# Patient Record
Sex: FEMALE | Race: WHITE | NOT HISPANIC OR LATINO | Employment: OTHER | URBAN - METROPOLITAN AREA
[De-identification: names, ages, dates, MRNs, and addresses within clinical notes are randomized per-mention and may not be internally consistent; named-entity substitution may affect disease eponyms.]

---

## 2017-02-01 ENCOUNTER — ANESTHESIA (OUTPATIENT)
Dept: PERIOP | Facility: HOSPITAL | Age: 67
End: 2017-02-01

## 2017-02-01 ENCOUNTER — ANESTHESIA EVENT (OUTPATIENT)
Dept: PERIOP | Facility: HOSPITAL | Age: 67
End: 2017-02-01

## 2017-02-01 PROCEDURE — 25010000002 MIDAZOLAM PER 1 MG: Performed by: NURSE ANESTHETIST, CERTIFIED REGISTERED

## 2017-02-01 PROCEDURE — 25010000002 PROPOFOL 10 MG/ML EMULSION: Performed by: NURSE ANESTHETIST, CERTIFIED REGISTERED

## 2017-02-01 PROCEDURE — 25010000002 SUCCINYLCHOLINE PER 20 MG: Performed by: NURSE ANESTHETIST, CERTIFIED REGISTERED

## 2017-02-01 PROCEDURE — 25010000002 PHENYLEPHRINE PER 1 ML: Performed by: NURSE ANESTHETIST, CERTIFIED REGISTERED

## 2017-02-01 PROCEDURE — 25010000002 FENTANYL CITRATE (PF) 100 MCG/2ML SOLUTION: Performed by: NURSE ANESTHETIST, CERTIFIED REGISTERED

## 2017-02-01 PROCEDURE — 25010000002 ONDANSETRON PER 1 MG: Performed by: NURSE ANESTHETIST, CERTIFIED REGISTERED

## 2017-02-01 PROCEDURE — 25010000002 DEXAMETHASONE PER 1 MG: Performed by: NURSE ANESTHETIST, CERTIFIED REGISTERED

## 2017-02-01 RX ORDER — LIDOCAINE HYDROCHLORIDE 10 MG/ML
INJECTION, SOLUTION INFILTRATION; PERINEURAL AS NEEDED
Status: DISCONTINUED | OUTPATIENT
Start: 2017-02-01 | End: 2017-02-01 | Stop reason: SURG

## 2017-02-01 RX ORDER — GLYCOPYRROLATE 0.2 MG/ML
INJECTION INTRAMUSCULAR; INTRAVENOUS AS NEEDED
Status: DISCONTINUED | OUTPATIENT
Start: 2017-02-01 | End: 2017-02-01 | Stop reason: SURG

## 2017-02-01 RX ORDER — MIDAZOLAM HYDROCHLORIDE 1 MG/ML
INJECTION INTRAMUSCULAR; INTRAVENOUS AS NEEDED
Status: DISCONTINUED | OUTPATIENT
Start: 2017-02-01 | End: 2017-02-01 | Stop reason: SURG

## 2017-02-01 RX ORDER — PROPOFOL 10 MG/ML
VIAL (ML) INTRAVENOUS AS NEEDED
Status: DISCONTINUED | OUTPATIENT
Start: 2017-02-01 | End: 2017-02-01 | Stop reason: SURG

## 2017-02-01 RX ORDER — ONDANSETRON 2 MG/ML
INJECTION INTRAMUSCULAR; INTRAVENOUS AS NEEDED
Status: DISCONTINUED | OUTPATIENT
Start: 2017-02-01 | End: 2017-02-01 | Stop reason: SURG

## 2017-02-01 RX ORDER — DEXAMETHASONE SODIUM PHOSPHATE 4 MG/ML
INJECTION, SOLUTION INTRA-ARTICULAR; INTRALESIONAL; INTRAMUSCULAR; INTRAVENOUS; SOFT TISSUE AS NEEDED
Status: DISCONTINUED | OUTPATIENT
Start: 2017-02-01 | End: 2017-02-01 | Stop reason: SURG

## 2017-02-01 RX ORDER — SUCCINYLCHOLINE CHLORIDE 20 MG/ML
INJECTION INTRAMUSCULAR; INTRAVENOUS AS NEEDED
Status: DISCONTINUED | OUTPATIENT
Start: 2017-02-01 | End: 2017-02-01 | Stop reason: SURG

## 2017-02-01 RX ORDER — FENTANYL CITRATE 50 UG/ML
INJECTION, SOLUTION INTRAMUSCULAR; INTRAVENOUS AS NEEDED
Status: DISCONTINUED | OUTPATIENT
Start: 2017-02-01 | End: 2017-02-01 | Stop reason: SURG

## 2017-02-01 RX ORDER — ATRACURIUM BESYLATE 10 MG/ML
INJECTION, SOLUTION INTRAVENOUS AS NEEDED
Status: DISCONTINUED | OUTPATIENT
Start: 2017-02-01 | End: 2017-02-01 | Stop reason: SURG

## 2017-02-01 RX ADMIN — EPHEDRINE SULFATE 5 MG: 50 INJECTION INTRAMUSCULAR; INTRAVENOUS; SUBCUTANEOUS at 09:45

## 2017-02-01 RX ADMIN — EPHEDRINE SULFATE 5 MG: 50 INJECTION INTRAMUSCULAR; INTRAVENOUS; SUBCUTANEOUS at 10:30

## 2017-02-01 RX ADMIN — PHENYLEPHRINE HYDROCHLORIDE 100 MCG: 10 INJECTION INTRAVENOUS at 10:30

## 2017-02-01 RX ADMIN — PHENYLEPHRINE HYDROCHLORIDE 50 MCG: 10 INJECTION INTRAVENOUS at 11:24

## 2017-02-01 RX ADMIN — EPHEDRINE SULFATE 10 MG: 50 INJECTION INTRAMUSCULAR; INTRAVENOUS; SUBCUTANEOUS at 09:48

## 2017-02-01 RX ADMIN — EPHEDRINE SULFATE 10 MG: 50 INJECTION INTRAMUSCULAR; INTRAVENOUS; SUBCUTANEOUS at 11:05

## 2017-02-01 RX ADMIN — EPHEDRINE SULFATE 10 MG: 50 INJECTION INTRAMUSCULAR; INTRAVENOUS; SUBCUTANEOUS at 09:09

## 2017-02-01 RX ADMIN — ROBINUL 0.2 MG: 0.2 INJECTION INTRAMUSCULAR; INTRAVENOUS at 08:27

## 2017-02-01 RX ADMIN — EPHEDRINE SULFATE 10 MG: 50 INJECTION INTRAMUSCULAR; INTRAVENOUS; SUBCUTANEOUS at 11:07

## 2017-02-01 RX ADMIN — EPHEDRINE SULFATE 10 MG: 50 INJECTION INTRAMUSCULAR; INTRAVENOUS; SUBCUTANEOUS at 11:24

## 2017-02-01 RX ADMIN — PHENYLEPHRINE HYDROCHLORIDE 100 MCG: 10 INJECTION INTRAVENOUS at 09:47

## 2017-02-01 RX ADMIN — EPHEDRINE SULFATE 10 MG: 50 INJECTION INTRAMUSCULAR; INTRAVENOUS; SUBCUTANEOUS at 08:20

## 2017-02-01 RX ADMIN — PHENYLEPHRINE HYDROCHLORIDE 50 MCG: 10 INJECTION INTRAVENOUS at 08:51

## 2017-02-01 RX ADMIN — SODIUM CHLORIDE, POTASSIUM CHLORIDE, SODIUM LACTATE AND CALCIUM CHLORIDE: 600; 310; 30; 20 INJECTION, SOLUTION INTRAVENOUS at 08:39

## 2017-02-01 RX ADMIN — PHENYLEPHRINE HYDROCHLORIDE 50 MCG: 10 INJECTION INTRAVENOUS at 08:59

## 2017-02-01 RX ADMIN — MIDAZOLAM HYDROCHLORIDE 2 MG: 1 INJECTION, SOLUTION INTRAMUSCULAR; INTRAVENOUS at 07:52

## 2017-02-01 RX ADMIN — EPHEDRINE SULFATE 10 MG: 50 INJECTION INTRAMUSCULAR; INTRAVENOUS; SUBCUTANEOUS at 10:35

## 2017-02-01 RX ADMIN — ONDANSETRON 4 MG: 2 INJECTION INTRAMUSCULAR; INTRAVENOUS at 11:33

## 2017-02-01 RX ADMIN — EPHEDRINE SULFATE 10 MG: 50 INJECTION INTRAMUSCULAR; INTRAVENOUS; SUBCUTANEOUS at 08:28

## 2017-02-01 RX ADMIN — EPHEDRINE SULFATE 5 MG: 50 INJECTION INTRAMUSCULAR; INTRAVENOUS; SUBCUTANEOUS at 08:51

## 2017-02-01 RX ADMIN — DEXAMETHASONE SODIUM PHOSPHATE 8 MG: 4 INJECTION, SOLUTION INTRAMUSCULAR; INTRAVENOUS at 08:30

## 2017-02-01 RX ADMIN — FENTANYL CITRATE 50 MCG: 50 INJECTION, SOLUTION INTRAMUSCULAR; INTRAVENOUS at 07:56

## 2017-02-01 RX ADMIN — SODIUM CHLORIDE, POTASSIUM CHLORIDE, SODIUM LACTATE AND CALCIUM CHLORIDE: 600; 310; 30; 20 INJECTION, SOLUTION INTRAVENOUS at 11:07

## 2017-02-01 RX ADMIN — EPHEDRINE SULFATE 5 MG: 50 INJECTION INTRAMUSCULAR; INTRAVENOUS; SUBCUTANEOUS at 10:25

## 2017-02-01 RX ADMIN — ATRACURIUM BESYLATE 10 MG: 10 INJECTION, SOLUTION INTRAVENOUS at 07:58

## 2017-02-01 RX ADMIN — FENTANYL CITRATE 125 MCG: 50 INJECTION, SOLUTION INTRAMUSCULAR; INTRAVENOUS at 12:23

## 2017-02-01 RX ADMIN — PROPOFOL 25 MG: 10 INJECTION, EMULSION INTRAVENOUS at 11:58

## 2017-02-01 RX ADMIN — SUCCINYLCHOLINE CHLORIDE 100 MG: 20 INJECTION, SOLUTION INTRAMUSCULAR; INTRAVENOUS at 07:59

## 2017-02-01 RX ADMIN — EPHEDRINE SULFATE 10 MG: 50 INJECTION INTRAMUSCULAR; INTRAVENOUS; SUBCUTANEOUS at 10:58

## 2017-02-01 RX ADMIN — LIDOCAINE HYDROCHLORIDE 50 MG: 10 INJECTION, SOLUTION INFILTRATION; PERINEURAL at 07:58

## 2017-02-01 RX ADMIN — LIDOCAINE HYDROCHLORIDE 60 MG: 10 INJECTION, SOLUTION INFILTRATION; PERINEURAL at 12:03

## 2017-02-01 RX ADMIN — FENTANYL CITRATE 25 MCG: 50 INJECTION, SOLUTION INTRAMUSCULAR; INTRAVENOUS at 11:03

## 2017-02-01 RX ADMIN — EPHEDRINE SULFATE 10 MG: 50 INJECTION INTRAMUSCULAR; INTRAVENOUS; SUBCUTANEOUS at 09:54

## 2017-02-01 RX ADMIN — PROPOFOL 100 MG: 10 INJECTION, EMULSION INTRAVENOUS at 07:58

## 2017-02-01 RX ADMIN — PHENYLEPHRINE HYDROCHLORIDE 50 MCG: 10 INJECTION INTRAVENOUS at 11:45

## 2017-02-01 RX ADMIN — FENTANYL CITRATE 50 MCG: 50 INJECTION, SOLUTION INTRAMUSCULAR; INTRAVENOUS at 08:15

## 2017-02-01 RX ADMIN — PROPOFOL 50 MG: 10 INJECTION, EMULSION INTRAVENOUS at 11:48

## 2017-02-01 RX ADMIN — PHENYLEPHRINE HYDROCHLORIDE 100 MCG: 10 INJECTION INTRAVENOUS at 10:20

## 2017-02-07 ENCOUNTER — APPOINTMENT (OUTPATIENT)
Dept: GENERAL RADIOLOGY | Facility: HOSPITAL | Age: 67
End: 2017-02-07

## 2017-02-07 ENCOUNTER — DOCUMENTATION (OUTPATIENT)
Dept: GASTROENTEROLOGY | Facility: CLINIC | Age: 67
End: 2017-02-07

## 2017-02-07 ENCOUNTER — HOSPITAL ENCOUNTER (INPATIENT)
Facility: HOSPITAL | Age: 67
LOS: 4 days | Discharge: HOME | End: 2017-02-11
Attending: OTOLARYNGOLOGY | Admitting: INTERNAL MEDICINE

## 2017-02-07 ENCOUNTER — HOSPITAL ENCOUNTER (EMERGENCY)
Facility: HOSPITAL | Age: 67
Discharge: ANOTHER HOSPITAL | End: 2017-02-07
Attending: EMERGENCY MEDICINE | Admitting: EMERGENCY MEDICINE

## 2017-02-07 ENCOUNTER — APPOINTMENT (OUTPATIENT)
Dept: CT IMAGING | Facility: HOSPITAL | Age: 67
End: 2017-02-07

## 2017-02-07 VITALS
DIASTOLIC BLOOD PRESSURE: 58 MMHG | BODY MASS INDEX: 21.35 KG/M2 | HEIGHT: 67 IN | OXYGEN SATURATION: 94 % | WEIGHT: 136 LBS | HEART RATE: 78 BPM | TEMPERATURE: 99.8 F | RESPIRATION RATE: 18 BRPM | SYSTOLIC BLOOD PRESSURE: 105 MMHG

## 2017-02-07 DIAGNOSIS — R13.12 OROPHARYNGEAL DYSPHAGIA: Primary | ICD-10-CM

## 2017-02-07 DIAGNOSIS — R22.1 NECK SWELLING: Primary | ICD-10-CM

## 2017-02-07 PROBLEM — R50.9 FEVER: Status: ACTIVE | Noted: 2017-02-07

## 2017-02-07 PROBLEM — E89.2 S/P PARATHYROIDECTOMY (HCC): Status: ACTIVE | Noted: 2017-02-07

## 2017-02-07 PROBLEM — D64.9 ANEMIA: Status: ACTIVE | Noted: 2017-02-07

## 2017-02-07 PROBLEM — L03.221 CELLULITIS, NECK: Status: ACTIVE | Noted: 2017-02-07

## 2017-02-07 PROBLEM — R13.10 DYSPHAGIA: Status: ACTIVE | Noted: 2017-02-07

## 2017-02-07 LAB
ALBUMIN SERPL-MCNC: 3.6 G/DL (ref 3.5–5)
ALBUMIN/GLOB SERPL: 1.2 G/DL (ref 1–2)
ALP SERPL-CCNC: 110 U/L (ref 38–126)
ALT SERPL W P-5'-P-CCNC: 29 U/L (ref 13–69)
ANION GAP SERPL CALCULATED.3IONS-SCNC: 19.8 MMOL/L
AST SERPL-CCNC: 12 U/L (ref 15–46)
BASOPHILS # BLD AUTO: 0 10*3/MM3 (ref 0–0.2)
BASOPHILS # BLD AUTO: 0.03 10*3/MM3 (ref 0–0.2)
BASOPHILS NFR BLD AUTO: 0 % (ref 0–1)
BASOPHILS NFR BLD AUTO: 0.4 % (ref 0–2.5)
BILIRUB SERPL-MCNC: 0.4 MG/DL (ref 0.2–1.3)
BUN BLD-MCNC: 14 MG/DL (ref 7–20)
BUN/CREAT SERPL: 14 (ref 7.1–23.5)
CA-I SERPL ISE-MCNC: 1 MMOL/L (ref 1.12–1.32)
CALCIUM SPEC-SCNC: 7.7 MG/DL (ref 8.4–10.2)
CHLORIDE SERPL-SCNC: 102 MMOL/L (ref 98–107)
CO2 SERPL-SCNC: 21 MMOL/L (ref 26–30)
CREAT BLD-MCNC: 1 MG/DL (ref 0.6–1.3)
D-LACTATE SERPL-SCNC: 1.4 MMOL/L (ref 0.5–2)
DEPRECATED RDW RBC AUTO: 46.5 FL (ref 37–54)
DEPRECATED RDW RBC AUTO: 48.4 FL (ref 37–54)
EOSINOPHIL # BLD AUTO: 0.02 10*3/MM3 (ref 0.1–0.3)
EOSINOPHIL # BLD AUTO: 0.36 10*3/MM3 (ref 0–0.7)
EOSINOPHIL NFR BLD AUTO: 0.3 % (ref 0–3)
EOSINOPHIL NFR BLD AUTO: 4.4 % (ref 0–7)
ERYTHROCYTE [DISTWIDTH] IN BLOOD BY AUTOMATED COUNT: 14 % (ref 11.5–14.5)
ERYTHROCYTE [DISTWIDTH] IN BLOOD BY AUTOMATED COUNT: 14.2 % (ref 11.3–14.5)
GFR SERPL CREATININE-BSD FRML MDRD: 55 ML/MIN/1.73
GLOBULIN UR ELPH-MCNC: 3.1 GM/DL
GLUCOSE BLD-MCNC: 115 MG/DL (ref 74–98)
HCT VFR BLD AUTO: 28.1 % (ref 34.5–44)
HCT VFR BLD AUTO: 30.1 % (ref 37–47)
HGB BLD-MCNC: 9.1 G/DL (ref 11.5–15.5)
HGB BLD-MCNC: 9.8 G/DL (ref 12–16)
HOLD SPECIMEN: NORMAL
IMM GRANULOCYTES # BLD: 0.02 10*3/MM3 (ref 0–0.03)
IMM GRANULOCYTES # BLD: 0.03 10*3/MM3 (ref 0–0.06)
IMM GRANULOCYTES NFR BLD: 0.3 % (ref 0–0.6)
IMM GRANULOCYTES NFR BLD: 0.4 % (ref 0–0.6)
LYMPHOCYTES # BLD AUTO: 0.64 10*3/MM3 (ref 0.6–4.8)
LYMPHOCYTES # BLD AUTO: 2.08 10*3/MM3 (ref 0.6–3.4)
LYMPHOCYTES NFR BLD AUTO: 10.7 % (ref 24–44)
LYMPHOCYTES NFR BLD AUTO: 25.3 % (ref 10–50)
MCH RBC QN AUTO: 29.6 PG (ref 27–31)
MCH RBC QN AUTO: 30.1 PG (ref 27–31)
MCHC RBC AUTO-ENTMCNC: 32.4 G/DL (ref 32–36)
MCHC RBC AUTO-ENTMCNC: 32.6 G/DL (ref 30–37)
MCV RBC AUTO: 90.9 FL (ref 81–99)
MCV RBC AUTO: 93 FL (ref 80–99)
MONOCYTES # BLD AUTO: 0.09 10*3/MM3 (ref 0–1)
MONOCYTES # BLD AUTO: 0.86 10*3/MM3 (ref 0–0.9)
MONOCYTES NFR BLD AUTO: 1.5 % (ref 0–12)
MONOCYTES NFR BLD AUTO: 10.5 % (ref 0–12)
NEUTROPHILS # BLD AUTO: 4.86 10*3/MM3 (ref 2–6.9)
NEUTROPHILS # BLD AUTO: 5.23 10*3/MM3 (ref 1.5–8.3)
NEUTROPHILS NFR BLD AUTO: 59 % (ref 37–80)
NEUTROPHILS NFR BLD AUTO: 87.2 % (ref 41–71)
NRBC BLD MANUAL-RTO: 0 /100 WBC (ref 0–0)
PLATELET # BLD AUTO: 253 10*3/MM3 (ref 150–450)
PLATELET # BLD AUTO: 292 10*3/MM3 (ref 130–400)
PMV BLD AUTO: 8.8 FL (ref 6–12)
PMV BLD AUTO: 9.4 FL (ref 6–12)
POTASSIUM BLD-SCNC: 3.8 MMOL/L (ref 3.5–5.1)
PROT SERPL-MCNC: 6.7 G/DL (ref 6.3–8.2)
RBC # BLD AUTO: 3.02 10*6/MM3 (ref 3.89–5.14)
RBC # BLD AUTO: 3.31 10*6/MM3 (ref 4.2–5.4)
SODIUM BLD-SCNC: 139 MMOL/L (ref 137–145)
WBC NRBC COR # BLD: 6 10*3/MM3 (ref 3.5–10.8)
WBC NRBC COR # BLD: 8.22 10*3/MM3 (ref 4.8–10.8)
WHOLE BLOOD HOLD SPECIMEN: NORMAL

## 2017-02-07 PROCEDURE — 83605 ASSAY OF LACTIC ACID: CPT | Performed by: EMERGENCY MEDICINE

## 2017-02-07 PROCEDURE — 25010000002 MORPHINE SULFATE (PF) 2 MG/ML SOLUTION: Performed by: OTOLARYNGOLOGY

## 2017-02-07 PROCEDURE — 82330 ASSAY OF CALCIUM: CPT | Performed by: OTOLARYNGOLOGY

## 2017-02-07 PROCEDURE — 25010000002 VANCOMYCIN HCL IN NACL 1.25-0.9 GM/250ML-% SOLUTION: Performed by: INTERNAL MEDICINE

## 2017-02-07 PROCEDURE — 25010000003 AMPICILLIN-SULBACTAM PER 1.5 G: Performed by: NURSE PRACTITIONER

## 2017-02-07 PROCEDURE — 25010000002 DEXAMETHASONE: Performed by: NURSE PRACTITIONER

## 2017-02-07 PROCEDURE — 70490 CT SOFT TISSUE NECK W/O DYE: CPT

## 2017-02-07 PROCEDURE — 85025 COMPLETE CBC W/AUTO DIFF WBC: CPT | Performed by: OTOLARYNGOLOGY

## 2017-02-07 PROCEDURE — 25010000002 ONDANSETRON PER 1 MG: Performed by: EMERGENCY MEDICINE

## 2017-02-07 PROCEDURE — 99285 EMERGENCY DEPT VISIT HI MDM: CPT

## 2017-02-07 PROCEDURE — 83970 ASSAY OF PARATHORMONE: CPT | Performed by: OTOLARYNGOLOGY

## 2017-02-07 PROCEDURE — 74220 X-RAY XM ESOPHAGUS 1CNTRST: CPT

## 2017-02-07 PROCEDURE — 85025 COMPLETE CBC W/AUTO DIFF WBC: CPT | Performed by: EMERGENCY MEDICINE

## 2017-02-07 PROCEDURE — 80053 COMPREHEN METABOLIC PANEL: CPT | Performed by: EMERGENCY MEDICINE

## 2017-02-07 PROCEDURE — 99223 1ST HOSP IP/OBS HIGH 75: CPT | Performed by: INTERNAL MEDICINE

## 2017-02-07 PROCEDURE — 87081 CULTURE SCREEN ONLY: CPT | Performed by: INTERNAL MEDICINE

## 2017-02-07 PROCEDURE — 92610 EVALUATE SWALLOWING FUNCTION: CPT

## 2017-02-07 PROCEDURE — 25010000002 DEXAMETHASONE SODIUM PHOSPHATE 10 MG/ML SOLUTION: Performed by: EMERGENCY MEDICINE

## 2017-02-07 PROCEDURE — 99284 EMERGENCY DEPT VISIT MOD MDM: CPT

## 2017-02-07 PROCEDURE — 87040 BLOOD CULTURE FOR BACTERIA: CPT | Performed by: NURSE PRACTITIONER

## 2017-02-07 PROCEDURE — 93005 ELECTROCARDIOGRAM TRACING: CPT | Performed by: EMERGENCY MEDICINE

## 2017-02-07 PROCEDURE — 96361 HYDRATE IV INFUSION ADD-ON: CPT

## 2017-02-07 PROCEDURE — 82330 ASSAY OF CALCIUM: CPT | Performed by: INTERNAL MEDICINE

## 2017-02-07 PROCEDURE — 25010000002 HYDROMORPHONE PER 4 MG: Performed by: EMERGENCY MEDICINE

## 2017-02-07 PROCEDURE — 25010000002 ONDANSETRON PER 1 MG: Performed by: OTOLARYNGOLOGY

## 2017-02-07 PROCEDURE — 71010 HC CHEST PA OR AP: CPT

## 2017-02-07 PROCEDURE — 96374 THER/PROPH/DIAG INJ IV PUSH: CPT

## 2017-02-07 PROCEDURE — 96375 TX/PRO/DX INJ NEW DRUG ADDON: CPT

## 2017-02-07 RX ORDER — POTASSIUM CHLORIDE 750 MG/1
40 CAPSULE, EXTENDED RELEASE ORAL AS NEEDED
Status: DISCONTINUED | OUTPATIENT
Start: 2017-02-07 | End: 2017-02-11 | Stop reason: HOSPADM

## 2017-02-07 RX ORDER — MECLIZINE HCL 25MG 25 MG/1
25 TABLET, CHEWABLE ORAL EVERY 6 HOURS PRN
COMMUNITY
End: 2018-11-28 | Stop reason: HOSPADM

## 2017-02-07 RX ORDER — ONDANSETRON 2 MG/ML
4 INJECTION INTRAMUSCULAR; INTRAVENOUS EVERY 6 HOURS PRN
Status: DISCONTINUED | OUTPATIENT
Start: 2017-02-07 | End: 2017-02-11 | Stop reason: HOSPADM

## 2017-02-07 RX ORDER — POTASSIUM CHLORIDE 7.45 MG/ML
10 INJECTION INTRAVENOUS
Status: DISCONTINUED | OUTPATIENT
Start: 2017-02-07 | End: 2017-02-11 | Stop reason: HOSPADM

## 2017-02-07 RX ORDER — SODIUM CHLORIDE 0.9 % (FLUSH) 0.9 %
1-10 SYRINGE (ML) INJECTION AS NEEDED
Status: DISCONTINUED | OUTPATIENT
Start: 2017-02-07 | End: 2017-02-11 | Stop reason: HOSPADM

## 2017-02-07 RX ORDER — HYDROCODONE BITARTRATE AND ACETAMINOPHEN 10; 325 MG/1; MG/1
1 TABLET ORAL EVERY 4 HOURS PRN
COMMUNITY
End: 2018-04-19

## 2017-02-07 RX ORDER — SODIUM CHLORIDE 9 MG/ML
125 INJECTION, SOLUTION INTRAVENOUS CONTINUOUS
Status: DISCONTINUED | OUTPATIENT
Start: 2017-02-07 | End: 2017-02-07 | Stop reason: HOSPADM

## 2017-02-07 RX ORDER — ONDANSETRON 2 MG/ML
4 INJECTION INTRAMUSCULAR; INTRAVENOUS ONCE
Status: COMPLETED | OUTPATIENT
Start: 2017-02-07 | End: 2017-02-07

## 2017-02-07 RX ORDER — ASPIRIN 81 MG/1
81 TABLET, CHEWABLE ORAL DAILY
Status: DISCONTINUED | OUTPATIENT
Start: 2017-02-08 | End: 2017-02-11 | Stop reason: HOSPADM

## 2017-02-07 RX ORDER — SODIUM CHLORIDE 0.9 % (FLUSH) 0.9 %
10 SYRINGE (ML) INJECTION AS NEEDED
Status: DISCONTINUED | OUTPATIENT
Start: 2017-02-07 | End: 2017-02-07 | Stop reason: HOSPADM

## 2017-02-07 RX ORDER — NALOXONE HCL 0.4 MG/ML
0.4 VIAL (ML) INJECTION
Status: DISCONTINUED | OUTPATIENT
Start: 2017-02-07 | End: 2017-02-11 | Stop reason: HOSPADM

## 2017-02-07 RX ORDER — SODIUM CHLORIDE 9 MG/ML
50 INJECTION, SOLUTION INTRAVENOUS CONTINUOUS
Status: DISCONTINUED | OUTPATIENT
Start: 2017-02-07 | End: 2017-02-07 | Stop reason: SDUPTHER

## 2017-02-07 RX ORDER — CLOPIDOGREL BISULFATE 75 MG/1
75 TABLET ORAL DAILY
Status: DISCONTINUED | OUTPATIENT
Start: 2017-02-08 | End: 2017-02-11 | Stop reason: HOSPADM

## 2017-02-07 RX ORDER — CALCIUM CARBONATE 200(500)MG
1 TABLET,CHEWABLE ORAL 3 TIMES DAILY PRN
Status: DISCONTINUED | OUTPATIENT
Start: 2017-02-07 | End: 2017-02-07

## 2017-02-07 RX ORDER — DEXAMETHASONE SODIUM PHOSPHATE 10 MG/ML
10 INJECTION, SOLUTION INTRAMUSCULAR; INTRAVENOUS ONCE
Status: COMPLETED | OUTPATIENT
Start: 2017-02-07 | End: 2017-02-07

## 2017-02-07 RX ORDER — VENLAFAXINE HYDROCHLORIDE 150 MG/1
150 CAPSULE, EXTENDED RELEASE ORAL EVERY OTHER DAY
COMMUNITY
End: 2018-03-22 | Stop reason: SDUPTHER

## 2017-02-07 RX ORDER — VENLAFAXINE HYDROCHLORIDE 75 MG/1
75 CAPSULE, EXTENDED RELEASE ORAL DAILY
COMMUNITY
End: 2017-02-07

## 2017-02-07 RX ORDER — SODIUM CHLORIDE 9 MG/ML
100 INJECTION, SOLUTION INTRAVENOUS CONTINUOUS
Status: DISCONTINUED | OUTPATIENT
Start: 2017-02-07 | End: 2017-02-07

## 2017-02-07 RX ORDER — MORPHINE SULFATE 2 MG/ML
2 INJECTION, SOLUTION INTRAMUSCULAR; INTRAVENOUS
Status: DISCONTINUED | OUTPATIENT
Start: 2017-02-07 | End: 2017-02-11 | Stop reason: HOSPADM

## 2017-02-07 RX ORDER — MORPHINE SULFATE 2 MG/ML
1 INJECTION, SOLUTION INTRAMUSCULAR; INTRAVENOUS EVERY 4 HOURS PRN
Status: DISCONTINUED | OUTPATIENT
Start: 2017-02-07 | End: 2017-02-11 | Stop reason: HOSPADM

## 2017-02-07 RX ORDER — ACETAMINOPHEN 325 MG/1
650 TABLET ORAL EVERY 4 HOURS PRN
Status: DISCONTINUED | OUTPATIENT
Start: 2017-02-07 | End: 2017-02-11 | Stop reason: HOSPADM

## 2017-02-07 RX ORDER — VANCOMYCIN HYDROCHLORIDE
20 ONCE
Status: COMPLETED | OUTPATIENT
Start: 2017-02-07 | End: 2017-02-08

## 2017-02-07 RX ORDER — POTASSIUM CHLORIDE 1.5 G/1.77G
40 POWDER, FOR SOLUTION ORAL AS NEEDED
Status: DISCONTINUED | OUTPATIENT
Start: 2017-02-07 | End: 2017-02-11 | Stop reason: HOSPADM

## 2017-02-07 RX ORDER — CEPHALEXIN 500 MG/1
500 CAPSULE ORAL 3 TIMES DAILY
COMMUNITY
Start: 2017-02-04 | End: 2017-02-11 | Stop reason: HOSPADM

## 2017-02-07 RX ORDER — MORPHINE SULFATE 2 MG/ML
2 INJECTION, SOLUTION INTRAMUSCULAR; INTRAVENOUS ONCE
Status: COMPLETED | OUTPATIENT
Start: 2017-02-07 | End: 2017-02-07

## 2017-02-07 RX ORDER — ONDANSETRON 4 MG/1
4 TABLET, FILM COATED ORAL EVERY 6 HOURS PRN
Status: DISCONTINUED | OUTPATIENT
Start: 2017-02-07 | End: 2017-02-11 | Stop reason: HOSPADM

## 2017-02-07 RX ORDER — FOLIC ACID 1 MG/1
1 TABLET ORAL DAILY
Status: DISCONTINUED | OUTPATIENT
Start: 2017-02-08 | End: 2017-02-11 | Stop reason: HOSPADM

## 2017-02-07 RX ORDER — HYDROCODONE BITARTRATE AND ACETAMINOPHEN 5; 325 MG/1; MG/1
1 TABLET ORAL EVERY 4 HOURS PRN
Status: DISCONTINUED | OUTPATIENT
Start: 2017-02-07 | End: 2017-02-11 | Stop reason: HOSPADM

## 2017-02-07 RX ADMIN — DEXAMETHASONE SODIUM PHOSPHATE 10 MG: 10 INJECTION, SOLUTION INTRAMUSCULAR; INTRAVENOUS at 02:40

## 2017-02-07 RX ADMIN — AMPICILLIN SODIUM AND SULBACTAM SODIUM 3 G: 2; 1 INJECTION, POWDER, FOR SOLUTION INTRAMUSCULAR; INTRAVENOUS at 18:04

## 2017-02-07 RX ADMIN — ONDANSETRON 4 MG: 2 INJECTION INTRAMUSCULAR; INTRAVENOUS at 02:42

## 2017-02-07 RX ADMIN — VANCOMYCIN HYDROCHLORIDE 1250 MG: 1 INJECTION, POWDER, LYOPHILIZED, FOR SOLUTION INTRAVENOUS at 22:41

## 2017-02-07 RX ADMIN — ONDANSETRON 4 MG: 2 INJECTION INTRAMUSCULAR; INTRAVENOUS at 11:10

## 2017-02-07 RX ADMIN — MORPHINE SULFATE 2 MG: 2 INJECTION, SOLUTION INTRAMUSCULAR; INTRAVENOUS at 10:41

## 2017-02-07 RX ADMIN — SODIUM CHLORIDE 125 ML/HR: 9 INJECTION, SOLUTION INTRAVENOUS at 02:47

## 2017-02-07 RX ADMIN — HYDROMORPHONE HYDROCHLORIDE 0.5 MG: 1 INJECTION, SOLUTION INTRAMUSCULAR; INTRAVENOUS; SUBCUTANEOUS at 02:43

## 2017-02-07 RX ADMIN — Medication 10 MG: at 16:00

## 2017-02-07 RX ADMIN — SODIUM CHLORIDE 100 ML/HR: 9 INJECTION, SOLUTION INTRAVENOUS at 16:51

## 2017-02-07 NOTE — ED NOTES
Contacted dispatch to request EMS transport to [facility] ER.     April Shira Dunlap  02/07/17 0446

## 2017-02-07 NOTE — PROGRESS NOTES
Acute Care - Speech Language Pathology   Swallow Initial Evaluation [facility]   Clinical Swallow Evaluation     Patient Name: Kailee Almanza  : 1950  MRN: [id number]  Today's Date: 2017               Admit Date: 2017    Visit Dx:     ICD-10-CM ICD-9-CM   1. Dysphagia, unspecified type R13.10 787.20     Patient Active Problem List   Diagnosis   • Peripheral vascular disease   • Hypertension   • Cerebrovascular disease   • Hyperlipidemia   • Chronic back pain   • Centrilobular emphysema   • Costal chondritis   • Dyslipidemia   • Dysphagia   • S/P parathyroidectomy 17   • Cellulitis, neck   • Anemia   • Fever     Past Medical History   Diagnosis Date   • Anemia      Due to low folic acid   • Arthritis    • Cerebrovascular disease 2016     Amaurosis fugax, OD.  Stent 70% LJ stenosis, 2014:  Questionable recurrent symptoms “early” following stent, treated with reinstitution Plavix.   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  High-degree left subclavian artery stenosis, treated with stenting, 2014.      • Chronic back pain 2016   • COPD (chronic obstructive pulmonary disease)    • Coronary artery disease    • History of chest x-ray 2015     post inflammatory scarring noted in right apical region, stable since CT of 10/10/2014; COPD changes with no acute findings   • History of chest x-ray 2014     no acute cardiopulmonary disease; calcified granuloma present in the left midlung; mild scarring in right upper lobe.    • Hyperlipidemia 2016   • Hypertension 2016   • Parathyroid abnormality    • Peripheral vascular disease 2016     1. Angiography the left subclavian artery.  2. Placement of a single, 4.0 mm diameter x 28 mm length, Xience Alpine everolimus-eluting stent in the proximal left subclavian artery in an area of in-stent restenosis. 7-22-15 3.  Femoral-femoral bypass  4.  Aortobifemoral bypass      Past Surgical History    Procedure Laterality Date   • Cholecystectomy     • Appendectomy     • Incontinence surgery     • Hysterectomy       bleeding, uterine cyst   • Aorta - femoral artery bypass graft     • Femoral artery - femoral artery bypass graft     • Subclavian artery stent       7/15   • Carotid endarterectomy       Right. 2010   • Carotid stent       Right common carotid artery, 2015   • Refractive surgery     • Breast biopsy Bilateral    • Eye surgery       lasik   • Salivary gland surgery Left    • Pr explore parathyroid glands Bilateral 2/1/2017     Procedure: PARATHYROIDECTOMY;  Surgeon: Isaac CORONA MD;  [facility];  Service: ENT          SWALLOW EVALUATION (last 72 hours)      Swallow Evaluation       02/07/17 1600                Rehab Evaluation    Document Type evaluation  -SM        Subjective Information no complaints  -SM        General Information    Patient Profile Review yes  -SM        Subjective Patient Observations Alert and cooperative  -        Pertinent History Of Current Problem Anterior neck swelling, onset dysphagia yesterday, improving  -SM        Current Diet Limitations thin liquids;other (see comments)   clear liq diet  -SM        Precautions/Limitations, Vision WFL  -SM        Precautions/Limitations, Hearing WFL  -SM        Prior Level of Function- Communication functional in all spheres  -SM        Prior Level of Function- Swallowing safe, efficient swallowing in all situations  -        Plans/Goals Discussed With patient and family;agreed upon  -        Barriers to Rehab none identified  -        Clinical Impression    Patient's Goals For Discharge eat/drink without coughing/choking;return to regular diet  -SM        SLP Diet Recommendation thin liquids;advance diet as tolerated;other (see comments)   continue clear for now  -        Recommended Diagnostics reassess via clinical swallow (non-instrumental exam)  -        Recommended Feeding/Eating Techniques maintain  upright posture during/after eating for 30 mins;small sips/bites  -        SLP Rec. for Method of Medication Administration --   meds finely crushed with thin liquids  -        Cognitive Assessment/Intervention    Current Cognitive/Communication Assessment functional  -        Oral Motor Structure and Function    Oral Musculature General Assessment WFL (within functional limits)  -        Clinical Swallow Exam    Oral Phase Results intact oral phase without signs of dysfunction;other (see comments)   DNT solids  -        Pharyngeal Phase Results sign/symptoms of pharyngeal impairment  -        Summary of Clinical Exam Dysphagia onset dollowing removal of drain, sympotoms resolving per pt.  Two hours ago with coughing on saliva and liquids.  Trialed small sips of liquid, showed no s/s aspiration though effortful swallow  utilized, likely to assist clearing bolus into esophagus and limiting residue.  C/o discomfort and increased sticking with nectar-thick tsp trial.    -          User Key  (r) = Recorded By, (t) = Taken By, (c) = Cosigned By    Initials Name Effective Dates     Ellen Escamilla MS CCC-SLP 06/22/15 -         EDUCATION  The patient has been educated in the following areas:   Dysphagia (Swallowing Impairment) Oral Care/Hydration Modified Diet Instruction.    SLP Recommendation and Plan     SLP Diet Recommendation: thin liquids, advance diet as tolerated, other (see comments) (continue clear for now)  Recommended Feeding/Eating Techniques: maintain upright posture during/after eating for 30 mins, small sips/bites  SLP Rec. for Method of Medication Administration:  (meds finely crushed with thin liquids)     Recommended Diagnostics: reassess via clinical swallow (non-instrumental exam)     Plan of Care Review  Plan Of Care Reviewed With: patient  Outcome Summary/Follow up Plan: Dysphagia Eval completed.  Dysphagia onset dollowing removal of drain, sympotoms resolving per pt.  Two hours  ago with coughing on saliva and liquids.  Trialed small sips of liquid, showed no s/s aspiration though effortful swallow  utilized, likely to assist clearing bolus into esophagus and limiting residue.  C/o discomfort and increased sticking with nectar-thick tsp trial.  Recommend: Continue clear liquid diet for now, meds (if needed) finely crushed and mixed with thin liquids.  If significantly improves overnight, may advance as tolerated (pt with good awareness and understanding of all, may follow her lead).  SLP will repeat clinical dysphagia eval tomorrow as suspect will continue to resolve with time.  If persists or not showing continued improvement, will consider FEES to further assess (will not do MBS as pt had Bairum Swallow of esophagus earlier and gagged in response to barium).  Thanks for the consult.              Time Calculation:         Time Calculation- SLP       02/07/17 1703          Time Calculation- SLP    SLP Start Time 1600  -      SLP Received On 02/07/17  -        User Key  (r) = Recorded By, (t) = Taken By, (c) = Cosigned By    Initials Name Provider Type     Ellen Escamilla MS CCC-SLP Speech and Language Pathologist          Therapy Charges for Today     Code Description Service Date Service Provider Modifiers Qty    [id number] HC ST EVAL ORAL PHARYNG SWALLOW 4 2/7/2017 Ellen Escamilla MS CCC-SLP GN 1               Ellen Escamilla MS CCC-SLP  2/7/2017

## 2017-02-07 NOTE — ED NOTES
Report given to Alicia Mendez @ [facility] ED     Kiana Prajapati RN  02/07/17 0444

## 2017-02-07 NOTE — H&P
Hospital Medicine History and Physical    Primary Care Physician: Janette Srinivasan MD    Chief complaint Dysphagia    Subjective     History of Present Illness   Mrs. Almanza is a 66 year old female with history of HTN, PAD s/p aortofem bypass and fem-fem bypass, and hyperparathyroidism s/p recent thyroidectomy on 2/1/2017 per Dr. Quiles, ENT.  2 days' postop patient developed fever up to 102, was seen in office, fever persisted and Keflex called into pharmacy 2/4/2017.  Despite taking antibiotic, patient states fever has intermittently persisted up to 103.  Last fever being yesterday evening.  This accompanied by chills, sweats, fatigue, body aches.  Patient was seen at ENT office yesterday and surgical drain was pulled.  She has noticed increasing anterior neck redness and swelling throughout the week, but continued to swell and is slightly redder than yesterday since drain was pulled.  Patient presented to [facility] ER overnight due to the dysphagia and inability to swallow secretions.  Patient denies any respiratory symptoms-no shortness of breath, wheezing, cough.  She was transferred to [facility] for ENT evaluation.    Patient has seen Dr. Galvan who recommended patient be discharged with dysphasia improved, however has not improved during ER duration.  Patient was evaluated by Dr. Fernandez, GI, and attempted to undergo modified barium swallow but was unable to tolerate consistencies or swallow.  States it caused her nausea and near vomiting.  CT without contrast of the neck did reveal soft tissue swelling, no abscess, however incomplete evaluation without contrasted study.  Patient will be admitted to hospitalist service for probable anterior neck cellulitis accompanied by dysphagia.  Review of Systems   Constitutional: Positive for appetite change, chills, diaphoresis, fatigue and fever.   HENT: Positive for drooling and trouble swallowing. Negative for facial swelling and voice  change.    Eyes: Negative.    Respiratory: Positive for choking. Negative for cough, chest tightness, shortness of breath and wheezing.    Cardiovascular: Negative.    Gastrointestinal: Negative.    Endocrine: Negative.    Genitourinary: Negative.    Musculoskeletal: Negative.    Skin: Positive for color change (redness at surgical site) and wound.   Neurological: Positive for weakness.   Hematological: Negative.    Psychiatric/Behavioral: Negative.       Otherwise complete ROS is negative except as mentioned in the HPI.    Past Medical History:   Past Medical History   Diagnosis Date   • Anemia      Due to low folic acid   • Arthritis    • Cerebrovascular disease 7/21/2016     Amaurosis fugax, OD.  Stent 70% LJ stenosis, April 2014:  Questionable recurrent symptoms “early” following stent, treated with reinstitution Plavix.   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  High-degree left subclavian artery stenosis, treated with stenting, April 2014.      • Chronic back pain 7/22/2016   • COPD (chronic obstructive pulmonary disease)    • Coronary artery disease    • History of chest x-ray 07/22/2015     post inflammatory scarring noted in right apical region, stable since CT of 10/10/2014; COPD changes with no acute findings   • History of chest x-ray 04/22/2014     no acute cardiopulmonary disease; calcified granuloma present in the left midlung; mild scarring in right upper lobe.    • Hyperlipidemia 7/21/2016   • Hypertension 7/21/2016   • Parathyroid abnormality    • Peripheral vascular disease 7/21/2016     1. Angiography the left subclavian artery.  2. Placement of a single, 4.0 mm diameter x 28 mm length, Xience Alpine everolimus-eluting stent in the proximal left subclavian artery in an area of in-stent restenosis. 7-22-15 3.  Femoral-femoral bypass 2009 4.  Aortobifemoral bypass 1995       Past Surgical History:  Past Surgical History   Procedure Laterality Date   • Cholecystectomy     • Appendectomy      • Incontinence surgery     • Hysterectomy       bleeding, uterine cyst   • Aorta - femoral artery bypass graft     • Femoral artery - femoral artery bypass graft     • Subclavian artery stent       7/15   • Carotid endarterectomy       Right. 2010   • Carotid stent       Right common carotid artery, 2015   • Refractive surgery     • Breast biopsy Bilateral    • Eye surgery       lasik   • Salivary gland surgery Left    • Pr explore parathyroid glands Bilateral 2/1/2017     Procedure: PARATHYROIDECTOMY;  Surgeon: Isaac CORONA MD;  Location: [facility];  Service: ENT       Family History: family history includes Alzheimer's disease in her mother; Heart attack in her father; Heart disease in her father, mother, and paternal grandfather.    Social History:  reports that she quit smoking about 10 years ago. Her smoking use included Cigarettes. She has a 35.00 pack-year smoking history. She does not have any smokeless tobacco history on file. She reports that she drinks alcohol. She reports that she does not use illicit drugs.    Medications:  Prescriptions Prior to Admission   Medication Sig Dispense Refill Last Dose   • aspirin 81 MG tablet Take 81 mg by mouth Daily.   1/19/2017 at 1800   • atorvastatin (LIPITOR) 80 MG tablet TAKE 1 TABLET AT BEDTIME 90 tablet 3 1/31/2017 at 1800   • Calcium Carb-Cholecalciferol (CALCIUM + D3 PO) Take 1 tablet by mouth Daily.      • cephalexin (KEFLEX) 500 MG capsule Take 500 mg by mouth 3 (Three) Times a Day. 7 day course      • clopidogrel (PLAVIX) 75 MG tablet TAKE 1 TABLET DAILY 90 tablet 2 1/19/2017 at 1800   • folic acid (FOLVITE) 1 MG tablet Take 1 mg by mouth Daily.   1/31/2017 at 1800   • HYDROcodone-acetaminophen (NORCO)  MG per tablet Take 1 tablet by mouth Every 4 (Four) Hours As Needed for moderate pain (4-6).      • lisinopril (PRINIVIL,ZESTRIL) 40 MG tablet Take 40 mg by mouth Daily.   1/31/2017 at 1800   • meclizine 25 MG chewable tablet chewable tablet  "Chew 25 mg Every 6 (Six) Hours As Needed.      • venlafaxine XR (EFFEXOR-XR) 150 MG 24 hr capsule Take 150 mg by mouth Every Other Day.        Allergies   Allergen Reactions   • Sulfa Antibiotics Rash     Last as a baby.   • Percodan [Oxycodone-Aspirin] Mental Status Change       Objective    Physical Exam:   Vital Signs:   Visit Vitals   • /64   • Pulse 66   • Temp 98.1 °F (36.7 °C) (Oral)   • Resp 20   • Ht 67\" (170.2 cm)   • Wt 136 lb (61.7 kg)   • SpO2 97%   • BMI 21.3 kg/m2     Physical Exam   Constitutional: She is oriented to person, place, and time. She appears well-developed and well-nourished. No distress.   Appears uncomfortable- using yonker at bedside   HENT:   Head: Normocephalic and atraumatic.   Mouth/Throat: Oropharynx is clear and moist.   Eyes: Pupils are equal, round, and reactive to light.   Neck: Neck supple.   Marked edema anteriorly with erythema around surgical site   Cardiovascular: Normal rate, regular rhythm, normal heart sounds and intact distal pulses.    No murmur heard.  Pulmonary/Chest: Effort normal and breath sounds normal. No respiratory distress. She has no wheezes.   Abdominal: Soft. Bowel sounds are normal. She exhibits no distension. There is no tenderness.   Musculoskeletal: Normal range of motion. She exhibits no edema.   Lymphadenopathy:     She has no cervical adenopathy.   Neurological: She is alert and oriented to person, place, and time.   Skin: Skin is warm and dry. There is erythema (anterior neck).   Psychiatric: She has a normal mood and affect. Her behavior is normal. Judgment and thought content normal.         Results Reviewed:  I have personally reviewed current lab, radiology, and data and agree.    Results from last 7 days  Lab Units 02/07/17  0945   WBC 10*3/mm3 6.00   HEMOGLOBIN g/dL 9.1*   PLATELETS 10*3/mm3 253       Results from last 7 days  Lab Units 02/07/17  0126   SODIUM mmol/L 139   POTASSIUM mmol/L 3.8   TOTAL CO2 mmol/L 21.0*   CREATININE " mg/dL 1.00   GLUCOSE mg/dL 115*   CALCIUM mg/dL 7.7*     i ca 1.0      Assessment/Plan   Assessment & Plan  Principal Problem:    Dysphagia  Active Problems:    Peripheral vascular disease    Hypertension    S/P parathyroidectomy 2/1/17    Cellulitis, neck    Anemia        Plan:  Anterior Neck swelling/ Probable cellulitis and dysphagia s/p parathyroidectomy  -- blood cultures, wound culture of surgical site pending  -- start unasyn  -- decadron 10mg IV daily  -- C/S ENT  -- SLP consult for eval  -- clear liquids for now    Fever  -- cultures pending  -- abx  -- tylenol prn    Essential HTN  -- continue home meds    Anemia  -- Monitor    H/O PVD/PAD    DVT PPX: Lovenox SQ      I discussed the patients findings and my recommendations with:Dr. Jordy South, APRN 02/07/17 4:34 PM      Brief Attending Note   I have seen and examined the patient, performing an independent face-to-face diagnostic evaluation with plan of care reviewed and developed with the advanced practice clinician (APC).  Brief Summary Statement/HPI:   67 yo with recent parathyroidectomy with postoperative fever despite keflex and increase swelling and redness of her neck to the point of inability to handle her own secretions.  Or to take her po calcium  Attending Physical Exam:  Patient is alert and talkative in no distress at rest  She has facial twitching  Neck has significant anterior redness and warmth no drainage from her sites  Heart is Reg wo murmur  Lungs are clear wo wheeze or crackle  Abd is soft without HSM or mass  MAEW  Skin is without rash  Neurologic exam in nonfocal   Mood is appropriate  Data:  reviewed all her labs and her radiology films  Brief Assessment/Plan :  65yo with postoperative fever and cellulitis of her neck.  She also has symptomatic hypocalcemia which we will provide replacement in a IV drip with BMP Q6H  Will cont unasyn ordered by ENT and add vanc, check mrsa screen  See above for further detailed  assessment and plan developed with APC which I have reviewed and/or edited.    I believe this patient meets inpatient criteria for her hypocalcemia and fever with inability to tolerate po     Fiona Spence MD 02/07/17 10:51 PM

## 2017-02-07 NOTE — PROGRESS NOTES
65yo wf who underwent parathyroidectomy by Dr Perdue last Wednesday. Had drain in until yesterday morning. Last evening began having dysphagia. No dyspnea. No worsening pain. No fever. No voice changes. Went to ED in [place]. CT scan performed and read as no fluid collection. Sent to [place] for evaluation. Feels like dysphagia is improving.    PE  Awake, alert, oriented x 3  No distress. No stridor. Breathing comfortably.  Eomi, perrl  Oc/op nl  Ears nl  Neck inc c/d/i, no fullness, no mass, no ecchymosis, good range of motion  Negative Chvostek's    A/p dysphagia after parathyroidectomy. No airway compromise present and no evidence of a hematoma. Will do liquid diet this am and d/c home if tolerates.

## 2017-02-07 NOTE — PLAN OF CARE
Problem: Patient Care Overview (Adult)  Goal: Plan of Care Review  Outcome: Ongoing (interventions implemented as appropriate)    02/07/17 1700   Coping/Psychosocial Response Interventions   Plan Of Care Reviewed With patient   Outcome Evaluation   Outcome Summary/Follow up Plan Dysphagia Eval completed. Dysphagia onset dollowing removal of drain, sympotoms resolving per pt. Two hours ago with coughing on saliva and liquids. Trialed small sips of liquid, showed no s/s aspiration though effortful swallow utilized, likely to assist clearing bolus into esophagus and limiting residue. C/o discomfort and increased sticking with nectar-thick tsp trial. Recommend: Continue clear liquid diet for now, meds (if needed) finely crushed and mixed with thin liquids. If significantly improves overnight, may advance as tolerated (pt with good awareness and understanding of all, may follow her lead). SLP will repeat clinical dysphagia eval tomorrow as suspect will continue to resolve with time. If persists or not showing continued improvement, will consider FEES to further assess (will not do MBS as pt had Bairum Swallow of esophagus earlier and gagged in response to barium). Thanks for the consult.

## 2017-02-07 NOTE — PROGRESS NOTES
Consult- Difficult swallowing  HPI: Mrs. Almanza is a 66-year-old with a history of hypertension and peripheral vascular disease.  The patient had parathyroid surgery on 1 February.  She went home the same day and states that on the Thursday she was just coming around from anesthesia.  The patient states on Friday, February 3 she did not feel well and had a fever.  The patient gives a history of having a fever of 101 to  102.  The patient was evaluated by Dr. Perdue on February 3.  The patient was given a prescription for Keflex on February 4 by Dr. Sanchez.  She states a drain was pulled yesterday during a follow-up visit.  The patient has noted increased swelling in the neck area and decreased ability to handle secretions. The family has noticed some increased erythema in the neck area.  She has also been unable to swallow any solid food.  The patient denies any difficult or painful swallowing prior to surgery.  The patient denies any problems with heartburn prior to surgery.  She does have a history of a carotid stent placed for severe carotid disease.  Mrs. Burden denies any abdominal pain.  There is no history of bloating with meals.  The patient denies any change in bowel habits.  There are no signs of GI bleeding.    PMH: Hypertension            PVD            Emphysema  PSH:  Appendectomy            Hysterectomy            Femoral bypass            Right carotid stent            Left subclavian stenet  All: Sulfa  Fam hx: Heart disease                PVD  Soc Hx: , 3 children, no current tobacco  ROS: see HPI  PE: Gen- pleasant female, uncomfortable in appearance         HEENT- some erythema in the neck diffusely, tender with palpation on right just above incision         Chest- clear to auscultation         Cardiac- s1s2, no murmur or gallop         Abdomen- soft, bowel sounds present, no tenderness         Ext- no cyanosis, no edema         Neuro- awake, alert, no asterixis, moves all extremities          Skin- no spider nevi  Lab: Hgb 9.1, WBC 6.0.   Radiology CT neck without contrast- results in EPIC reviewed with radiologist.    Imp: POD 6 from parathyroid surgery- now with  difficult swallowing and problems handling secretions. Patient reports fever for several days. The barium study was performed and no obvious issue was identified.    Recs: Suggest Neck CT with contrast.             Discussed with Dr. Perdue that at this juncture an EGD would be of no benefit.
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
## 2017-02-08 ENCOUNTER — APPOINTMENT (OUTPATIENT)
Dept: GENERAL RADIOLOGY | Facility: HOSPITAL | Age: 67
End: 2017-02-08
Attending: OTOLARYNGOLOGY

## 2017-02-08 LAB
ANION GAP SERPL CALCULATED.3IONS-SCNC: 11 MMOL/L (ref 3–11)
ANION GAP SERPL CALCULATED.3IONS-SCNC: 12 MMOL/L (ref 3–11)
ANION GAP SERPL CALCULATED.3IONS-SCNC: 8 MMOL/L (ref 3–11)
ANION GAP SERPL CALCULATED.3IONS-SCNC: 9 MMOL/L (ref 3–11)
BUN BLD-MCNC: 14 MG/DL (ref 9–23)
BUN BLD-MCNC: 15 MG/DL (ref 9–23)
BUN BLD-MCNC: 15 MG/DL (ref 9–23)
BUN BLD-MCNC: 17 MG/DL (ref 9–23)
BUN/CREAT SERPL: 16.7 (ref 7–25)
BUN/CREAT SERPL: 17.5 (ref 7–25)
BUN/CREAT SERPL: 18.8 (ref 7–25)
BUN/CREAT SERPL: 21.3 (ref 7–25)
CA-I SERPL ISE-MCNC: 0.77 MMOL/L (ref 1.12–1.32)
CA-I SERPL ISE-MCNC: 0.96 MMOL/L (ref 1.12–1.32)
CA-I SERPL ISE-MCNC: 1.06 MMOL/L (ref 1.12–1.32)
CA-I SERPL ISE-MCNC: 1.12 MMOL/L (ref 1.12–1.32)
CALCIUM SPEC-SCNC: 6.9 MG/DL (ref 8.7–10.4)
CALCIUM SPEC-SCNC: 7.4 MG/DL (ref 8.7–10.4)
CALCIUM SPEC-SCNC: 8.4 MG/DL (ref 8.7–10.4)
CALCIUM SPEC-SCNC: 8.8 MG/DL (ref 8.7–10.4)
CHLORIDE SERPL-SCNC: 109 MMOL/L (ref 99–109)
CHLORIDE SERPL-SCNC: 109 MMOL/L (ref 99–109)
CHLORIDE SERPL-SCNC: 110 MMOL/L (ref 99–109)
CHLORIDE SERPL-SCNC: 113 MMOL/L (ref 99–109)
CO2 SERPL-SCNC: 19 MMOL/L (ref 20–31)
CO2 SERPL-SCNC: 19 MMOL/L (ref 20–31)
CO2 SERPL-SCNC: 23 MMOL/L (ref 20–31)
CO2 SERPL-SCNC: 26 MMOL/L (ref 20–31)
CREAT BLD-MCNC: 0.8 MG/DL (ref 0.6–1.3)
CREAT BLD-MCNC: 0.9 MG/DL (ref 0.6–1.3)
DEPRECATED RDW RBC AUTO: 48.6 FL (ref 37–54)
ERYTHROCYTE [DISTWIDTH] IN BLOOD BY AUTOMATED COUNT: 14.2 % (ref 11.3–14.5)
GFR SERPL CREATININE-BSD FRML MDRD: 63 ML/MIN/1.73
GFR SERPL CREATININE-BSD FRML MDRD: 72 ML/MIN/1.73
GLUCOSE BLD-MCNC: 109 MG/DL (ref 70–100)
GLUCOSE BLD-MCNC: 120 MG/DL (ref 70–100)
GLUCOSE BLD-MCNC: 130 MG/DL (ref 70–100)
GLUCOSE BLD-MCNC: 149 MG/DL (ref 70–100)
HCT VFR BLD AUTO: 23.7 % (ref 34.5–44)
HGB BLD-MCNC: 7.6 G/DL (ref 11.5–15.5)
MCH RBC QN AUTO: 29.9 PG (ref 27–31)
MCHC RBC AUTO-ENTMCNC: 32.1 G/DL (ref 32–36)
MCV RBC AUTO: 93.3 FL (ref 80–99)
PLATELET # BLD AUTO: 224 10*3/MM3 (ref 150–450)
PMV BLD AUTO: 9 FL (ref 6–12)
POTASSIUM BLD-SCNC: 3.2 MMOL/L (ref 3.5–5.5)
POTASSIUM BLD-SCNC: 3.7 MMOL/L (ref 3.5–5.5)
POTASSIUM BLD-SCNC: 3.9 MMOL/L (ref 3.5–5.5)
POTASSIUM BLD-SCNC: 4.2 MMOL/L (ref 3.5–5.5)
PTH-INTACT SERPL-MCNC: 25 PG/ML (ref 15–65)
RBC # BLD AUTO: 2.54 10*6/MM3 (ref 3.89–5.14)
SODIUM BLD-SCNC: 140 MMOL/L (ref 132–146)
SODIUM BLD-SCNC: 141 MMOL/L (ref 132–146)
SODIUM BLD-SCNC: 143 MMOL/L (ref 132–146)
SODIUM BLD-SCNC: 144 MMOL/L (ref 132–146)
TSH SERPL DL<=0.05 MIU/L-ACNC: 0.03 MIU/ML (ref 0.35–5.35)
WBC NRBC COR # BLD: 7.27 10*3/MM3 (ref 3.5–10.8)

## 2017-02-08 PROCEDURE — 25010000003 AMPICILLIN-SULBACTAM PER 1.5 G: Performed by: NURSE PRACTITIONER

## 2017-02-08 PROCEDURE — 84443 ASSAY THYROID STIM HORMONE: CPT | Performed by: NURSE PRACTITIONER

## 2017-02-08 PROCEDURE — 80048 BASIC METABOLIC PNL TOTAL CA: CPT | Performed by: INTERNAL MEDICINE

## 2017-02-08 PROCEDURE — 87147 CULTURE TYPE IMMUNOLOGIC: CPT | Performed by: NURSE PRACTITIONER

## 2017-02-08 PROCEDURE — 74220 X-RAY XM ESOPHAGUS 1CNTRST: CPT

## 2017-02-08 PROCEDURE — 92612 ENDOSCOPY SWALLOW (FEES) VID: CPT

## 2017-02-08 PROCEDURE — 25010000002 CALCIUM GLUCONATE PER 10 ML: Performed by: INTERNAL MEDICINE

## 2017-02-08 PROCEDURE — 94799 UNLISTED PULMONARY SVC/PX: CPT

## 2017-02-08 PROCEDURE — 25010000002 DEXAMETHASONE: Performed by: NURSE PRACTITIONER

## 2017-02-08 PROCEDURE — 85027 COMPLETE CBC AUTOMATED: CPT | Performed by: NURSE PRACTITIONER

## 2017-02-08 PROCEDURE — 99232 SBSQ HOSP IP/OBS MODERATE 35: CPT | Performed by: INTERNAL MEDICINE

## 2017-02-08 PROCEDURE — 87186 SC STD MICRODIL/AGAR DIL: CPT | Performed by: NURSE PRACTITIONER

## 2017-02-08 PROCEDURE — 87070 CULTURE OTHR SPECIMN AEROBIC: CPT | Performed by: NURSE PRACTITIONER

## 2017-02-08 PROCEDURE — 92610 EVALUATE SWALLOWING FUNCTION: CPT

## 2017-02-08 PROCEDURE — 82330 ASSAY OF CALCIUM: CPT | Performed by: INTERNAL MEDICINE

## 2017-02-08 PROCEDURE — 87185 SC STD ENZYME DETCJ PER NZM: CPT | Performed by: NURSE PRACTITIONER

## 2017-02-08 PROCEDURE — 87205 SMEAR GRAM STAIN: CPT | Performed by: NURSE PRACTITIONER

## 2017-02-08 PROCEDURE — 87077 CULTURE AEROBIC IDENTIFY: CPT | Performed by: NURSE PRACTITIONER

## 2017-02-08 PROCEDURE — 83970 ASSAY OF PARATHORMONE: CPT | Performed by: OTOLARYNGOLOGY

## 2017-02-08 RX ORDER — VENLAFAXINE 37.5 MG/1
75 TABLET ORAL 2 TIMES DAILY WITH MEALS
Status: DISCONTINUED | OUTPATIENT
Start: 2017-02-08 | End: 2017-02-11 | Stop reason: HOSPADM

## 2017-02-08 RX ORDER — ZOLPIDEM TARTRATE 5 MG/1
5 TABLET ORAL NIGHTLY PRN
Status: DISCONTINUED | OUTPATIENT
Start: 2017-02-08 | End: 2017-02-11 | Stop reason: HOSPADM

## 2017-02-08 RX ADMIN — CLOPIDOGREL 75 MG: 75 TABLET, FILM COATED ORAL at 10:03

## 2017-02-08 RX ADMIN — AMPICILLIN SODIUM AND SULBACTAM SODIUM 3 G: 2; 1 INJECTION, POWDER, FOR SOLUTION INTRAMUSCULAR; INTRAVENOUS at 23:55

## 2017-02-08 RX ADMIN — HYDROCODONE BITARTATE AND ACETAMINOPHEN 1 TABLET: 5; 325 TABLET ORAL at 15:48

## 2017-02-08 RX ADMIN — ASPIRIN 81 MG 81 MG: 81 TABLET ORAL at 10:03

## 2017-02-08 RX ADMIN — AMPICILLIN SODIUM AND SULBACTAM SODIUM 3 G: 2; 1 INJECTION, POWDER, FOR SOLUTION INTRAMUSCULAR; INTRAVENOUS at 08:02

## 2017-02-08 RX ADMIN — AMPICILLIN SODIUM AND SULBACTAM SODIUM 3 G: 2; 1 INJECTION, POWDER, FOR SOLUTION INTRAMUSCULAR; INTRAVENOUS at 12:14

## 2017-02-08 RX ADMIN — ZOLPIDEM TARTRATE 5 MG: 5 TABLET, FILM COATED ORAL at 23:59

## 2017-02-08 RX ADMIN — CALCIUM GLUCONATE 50 ML/HR: 94 INJECTION, SOLUTION INTRAVENOUS at 00:33

## 2017-02-08 RX ADMIN — HYDROCODONE BITARTATE AND ACETAMINOPHEN 1 TABLET: 5; 325 TABLET ORAL at 03:33

## 2017-02-08 RX ADMIN — CALCIUM CARBONATE 1250 MG: 1250 SUSPENSION ORAL at 17:57

## 2017-02-08 RX ADMIN — AMPICILLIN SODIUM AND SULBACTAM SODIUM 3 G: 2; 1 INJECTION, POWDER, FOR SOLUTION INTRAMUSCULAR; INTRAVENOUS at 17:57

## 2017-02-08 RX ADMIN — FOLIC ACID 1 MG: 1 TABLET ORAL at 10:03

## 2017-02-08 RX ADMIN — VENLAFAXINE HYDROCHLORIDE 75 MG: 37.5 TABLET ORAL at 17:57

## 2017-02-08 RX ADMIN — POTASSIUM CHLORIDE 40 MEQ: 1.5 POWDER, FOR SOLUTION ORAL at 23:55

## 2017-02-08 RX ADMIN — AMPICILLIN SODIUM AND SULBACTAM SODIUM 3 G: 2; 1 INJECTION, POWDER, FOR SOLUTION INTRAMUSCULAR; INTRAVENOUS at 00:33

## 2017-02-08 RX ADMIN — POTASSIUM CHLORIDE 40 MEQ: 1.5 POWDER, FOR SOLUTION ORAL at 17:57

## 2017-02-08 RX ADMIN — Medication 10 MG: at 10:02

## 2017-02-08 NOTE — PROGRESS NOTES
Acute Care - Speech Language Pathology   Swallow Initial Evaluation [facility]   Fiberoptic Endoscopic Evaluation of Swallowing (FEES)       Patient Name: Kailee Almanza  : 1950  MRN: [id number]  Today's Date: 2017               Admit Date: 2017    Visit Dx:     ICD-10-CM ICD-9-CM   1. Dysphagia, unspecified type R13.10 787.20     Patient Active Problem List   Diagnosis   • Peripheral vascular disease   • Hypertension   • Cerebrovascular disease   • Hyperlipidemia   • Chronic back pain   • Centrilobular emphysema   • Costal chondritis   • Dyslipidemia   • Dysphagia   • S/P parathyroidectomy 17   • Cellulitis, neck   • Anemia   • Fever     Past Medical History   Diagnosis Date   • Anemia      Due to low folic acid   • Arthritis    • Cerebrovascular disease 2016     Amaurosis fugax, OD.  Stent 70% LJ stenosis, 2014:  Questionable recurrent symptoms “early” following stent, treated with reinstitution Plavix.   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  High-degree left subclavian artery stenosis, treated with stenting, 2014.      • Chronic back pain 2016   • COPD (chronic obstructive pulmonary disease)    • Coronary artery disease    • History of chest x-ray 2015     post inflammatory scarring noted in right apical region, stable since CT of 10/10/2014; COPD changes with no acute findings   • History of chest x-ray 2014     no acute cardiopulmonary disease; calcified granuloma present in the left midlung; mild scarring in right upper lobe.    • Hyperlipidemia 2016   • Hypertension 2016   • Parathyroid abnormality    • Peripheral vascular disease 2016     1. Angiography the left subclavian artery.  2. Placement of a single, 4.0 mm diameter x 28 mm length, Xience Alpine everolimus-eluting stent in the proximal left subclavian artery in an area of in-stent restenosis. 7-22-15 3.  Femoral-femoral bypass  4.  Aortobifemoral bypass       Past Surgical History   Procedure Laterality Date   • Cholecystectomy     • Appendectomy     • Incontinence surgery     • Hysterectomy       bleeding, uterine cyst   • Aorta - femoral artery bypass graft     • Femoral artery - femoral artery bypass graft     • Subclavian artery stent       7/15   • Carotid endarterectomy       Right. 2010   • Carotid stent       Right common carotid artery, 2015   • Refractive surgery     • Breast biopsy Bilateral    • Eye surgery       lasik   • Salivary gland surgery Left    • Pr explore parathyroid glands Bilateral 2/1/2017     Procedure: PARATHYROIDECTOMY;  Surgeon: Isaac CORONA MD;  Location: [facility];  Service: ENT          SWALLOW EVALUATION (last 72 hours)      Swallow Evaluation       02/08/17 1000 02/07/17 1600             Rehab Evaluation    Document Type evaluation  -SG evaluation  -SM       Subjective Information complains of   pt emotional and anxious during FEES  -SG no complaints  -SM       Patient Effort, Rehab Treatment good  -SG        Symptoms Noted During/After Treatment none  -SG        General Information    Patient Profile Review yes  -SG yes  -SM       Onset of Illness/Injury 02/01/17  -SG        Date of Surgery 02/01/17  -SG        Subjective Patient Observations Alert, cooperative, emotional   -SG Alert and cooperative  -SM       Pertinent History Of Current Problem c/o dysphagia following surgery 1 week ago. Esophagram completed this am. Aspiration resported when SLP reviewed w/ Radiology PA-- FEES compelted  -SG Anterior neck swelling, onset dysphagia yesterday, improving  -SM       Current Diet Limitations other (see comments)   clear liquids  -SG thin liquids;other (see comments)   clear liq diet  -SM       Precautions/Limitations, Vision WFL  -SG WFL  -SM       Precautions/Limitations, Hearing WFL  -SG WFL  -SM       Prior Level of Function- Communication functional in all spheres  -SG functional in all spheres  -SM       Prior Level of  Function- Swallowing no diet consistency restrictions  - safe, efficient swallowing in all situations  -       Plans/Goals Discussed With patient;family;agreed upon  - patient and family;agreed upon  -       Barriers to Rehab none identified  - none identified  -       Clinical Impression    Patient's Goals For Discharge eat/drink without coughing/choking  - eat/drink without coughing/choking;return to regular diet  -       Family Goals For Discharge patient able to eat/drink without coughing/choking  -        SLP Swallowing Diagnosis mild dysphagia  -        Rehab Potential/Prognosis, Swallowing good, to achieve stated therapy goals  -        Criteria for Skilled Therapeutic Interventions Met skilled criteria for dysphagia intervention met  -        FCM, Swallowing 6-->Level 6  -SG        Therapy Frequency 3-5 times/wk  -        Predicted Duration Therapy Interv (days) until discharge  -        SLP Diet Recommendation soft textures;thin liquids  - thin liquids;advance diet as tolerated;other (see comments)   continue clear for now  -       Recommended Diagnostics FEES   completed this am  - reassess via clinical swallow (non-instrumental exam)  -       Recommended Feeding/Eating Techniques small sips/bites;other (see comments)   Multiple swallows prn  - maintain upright posture during/after eating for 30 mins;small sips/bites  -       SLP Rec. for Method of Medication Administration meds crushed in pudding/applesauce  - --   meds finely crushed with thin liquids  -       Monitor For Signs Of Aspiration cough;other (see comments)   Pt throat cleared during FEES- no aspiration viewed  -        Anticipated Discharge Disposition other (see comments)   unknown at this time  -SG        Pain Assessment    Pain Assessment No/denies pain  -        Cognitive Assessment/Intervention    Current Cognitive/Communication Assessment  functional  -       Oral Motor Structure and  Function    Oral Musculature General Assessment  WFL (within functional limits)  -       Clinical Swallow Exam    Oral Phase Results  intact oral phase without signs of dysfunction;other (see comments)   DNT solids  -       Pharyngeal Phase Results  sign/symptoms of pharyngeal impairment  -       Summary of Clinical Exam  Dysphagia onset dollowing removal of drain, sympotoms resolving per pt.  Two hours ago with coughing on saliva and liquids.  Trialed small sips of liquid, showed no s/s aspiration though effortful swallow  utilized, likely to assist clearing bolus into esophagus and limiting residue.  C/o discomfort and increased sticking with nectar-thick tsp trial.    -       VFSS    Mode of Presentation --  -SG        Pharyngeal Phase Physiologic Impairment --  -SG        Post Swallow Residue --  -SG        Rosenbek's PenAsp Scale --  -SG        Response to Aspiration --  -SG        Pharyngeal Phase Results --  -        Summary of VFSS FEES completed 2' Radiology report (confirmed w/ PA) of aspiration on esophagram this am. Pt very anxious regarding FEES and became emotional for exam. With encouragement pt able to tolerate scope pass and FEES. Pt hypersenstive to scope. No aspiration viewed on this exam, but penetration noted w/ thins.Very mild pys residue w/ thins-- none viewed w/ solid but amout had to be limited 2' pt's agitation and sensitivity with scope. Rec: Mech soft and thins. Dys tx for compensations. UPDATE: RN called stating pt uncomfortable w/ mech soft. Pt c/o coughing and discomfort w/ dry chopped solids. Downgrade to puree and f/u for DT and compensations   -SG        Fiberoptic Endoscopic Swallowing Exam    Risks/Benefits Reviewed patient  -SG        Positioning Needs for Swallow Exam upright at 90 for evaluation  -SG        Nasal Entry, Topical Anesthetic left:;nostril entered using no topical anesthetic  -SG        Anatomy and Physiology    Velopharyngeal WFL  -SG        Base of  Tongue symmetrical  -SG        Epiglottis WFL  -SG        Laryngeal Function Breathing symmetrical  -SG        Laryngeal Function Phonation symmetrical  -SG        Laryngeal Function Breath Hold TVF contact  -SG        Secretions 0- Normal, no visible secretions  -SG        Secretion Description thin;clear  -SG        Ice Chips elicited swallow  -SG        Spontaneous Swallow frequency adequate  -SG        Sensory senses scope   hypersensitive to scope  -SG        Oral-Phary Transit WFL  -SG        Anatomy Hypopharynx    Observation Anatomic Considerations no anatomic structural deviation via FEES  -SG        FEES    Mode of Presentation thin:;pudding:;cohesive solid:;cup;spoon;straw  -SG        Pharyngeal Phase Impairment impaired timing with penetration before  -SG        Post Swallow Residue residue present pyriform sinuses   mild in pys  -SG        Rosenbek's Scale 4-->Level 4  -SG        Response to Aspiration other (see comments)   no aspiration on exam  -SG        Attempted Compensatory Maneuvers multiple swallows   cleared all pharyngeal residue  -SG        FEES Swallow Recommendations    Eating Assistance needs occasional supervision during self eating activity  -SG        Oral Care oral care with toothbrush and dentifrice BID and PRN  -SG        Modified Eating Strategies upright positioning 90 degrees  -SG        Other Recommendations mechanical soft;puree;thin  -SG          User Key  (r) = Recorded By, (t) = Taken By, (c) = Cosigned By    Initials Name Effective Dates    SG Magdalene Garcia, MS CCC-SLP 06/22/15 -     DEISY Escamilla, MS CCC-SLP 06/22/15 -         EDUCATION  The patient has been educated in the following areas:   Dysphagia (Swallowing Impairment) Oral Care/Hydration Modified Diet Instruction.    SLP Recommendation and Plan  SLP Swallowing Diagnosis: mild dysphagia  SLP Diet Recommendation: soft textures, thin liquids  Recommended Feeding/Eating Techniques: small  sips/bites, other (see comments) (Multiple swallows prn)  SLP Rec. for Method of Medication Administration: meds crushed in pudding/applesauce  Monitor For Signs Of Aspiration: cough, other (see comments) (Pt throat cleared during FEES- no aspiration viewed)  Recommended Diagnostics: FEES (completed this am)  Criteria for Skilled Therapeutic Interventions Met: skilled criteria for dysphagia intervention met  Anticipated Discharge Disposition: other (see comments) (unknown at this time)  Rehab Potential/Prognosis, Swallowing: good, to achieve stated therapy goals  Therapy Frequency: 3-5 times/wk             Plan of Care Review  Plan Of Care Reviewed With: patient  Progress: improving  Outcome Summary/Follow up Plan: FEES completed 2' Radiology report (confirmed w/ PA) of aspiration on esophagram this am. Pt very anxious regarding FEES and became emotional for exam. With encouragement pt able to tolerate scope pass and FEES. Pt hypersenstive to scope. No aspiration viewed on this exam, but penetration noted w/ thins.Very mild pys residue w/ thins-- none viewed w/ solid but amout had to be limited 2' pt's agitation and sensitivity with scope. Rec: Mech soft and thins. Dys tx for compensations. UPDATE: RN called stating pt uncomfortable w/ mech soft. Pt c/o coughing and discomfort w/ dry chopped solids. Downgrade to puree and f/u for DT and compensations              Time Calculation:         Time Calculation- SLP       02/08/17 1526          Time Calculation- SLP    SLP Start Time 1000  -SG      SLP Received On 02/08/17  -        User Key  (r) = Recorded By, (t) = Taken By, (c) = Cosigned By    Initials Name Provider Type     Magdalene Garcia MS CCC-SLP Speech and Language Pathologist          Therapy Charges for Today     Code Description Service Date Service Provider Modifiers Qty    [id number] HC ST FIBEROPTIC ENDO EVAL SWALL 6 2/8/2017 Magdalene Garcia MS CCC-SLP GN 1                Magdalene Garcia, MS CCC-SLP  2/8/2017

## 2017-02-08 NOTE — PROGRESS NOTES
Acute Care - Speech Language Pathology   Swallow Initial Evaluation [facility]   Clinical Swallow Evaluation       Patient Name: Kailee Almanza  : 1950  MRN: [id number]  Today's Date: 2017               Admit Date: 2017    Visit Dx:     ICD-10-CM ICD-9-CM   1. Dysphagia, unspecified type R13.10 787.20     Patient Active Problem List   Diagnosis   • Peripheral vascular disease   • Hypertension   • Cerebrovascular disease   • Hyperlipidemia   • Chronic back pain   • Centrilobular emphysema   • Costal chondritis   • Dyslipidemia   • Dysphagia   • S/P parathyroidectomy 17   • Cellulitis, neck   • Anemia   • Fever     Past Medical History   Diagnosis Date   • Anemia      Due to low folic acid   • Arthritis    • Cerebrovascular disease 2016     Amaurosis fugax, OD.  Stent 70% LJ stenosis, 2014:  Questionable recurrent symptoms “early” following stent, treated with reinstitution Plavix.   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  High-degree left subclavian artery stenosis, treated with stenting, 2014.      • Chronic back pain 2016   • COPD (chronic obstructive pulmonary disease)    • Coronary artery disease    • History of chest x-ray 2015     post inflammatory scarring noted in right apical region, stable since CT of 10/10/2014; COPD changes with no acute findings   • History of chest x-ray 2014     no acute cardiopulmonary disease; calcified granuloma present in the left midlung; mild scarring in right upper lobe.    • Hyperlipidemia 2016   • Hypertension 2016   • Parathyroid abnormality    • Peripheral vascular disease 2016     1. Angiography the left subclavian artery.  2. Placement of a single, 4.0 mm diameter x 28 mm length, Xience Alpine everolimus-eluting stent in the proximal left subclavian artery in an area of in-stent restenosis. 7-22-15 3.  Femoral-femoral bypass  4.  Aortobifemoral bypass      Past Surgical History    Procedure Laterality Date   • Cholecystectomy     • Appendectomy     • Incontinence surgery     • Hysterectomy       bleeding, uterine cyst   • Aorta - femoral artery bypass graft     • Femoral artery - femoral artery bypass graft     • Subclavian artery stent       7/15   • Carotid endarterectomy       Right. 2010   • Carotid stent       Right common carotid artery, 2015   • Refractive surgery     • Breast biopsy Bilateral    • Eye surgery       lasik   • Salivary gland surgery Left    • Pr explore parathyroid glands Bilateral 2/1/2017     Procedure: PARATHYROIDECTOMY;  Surgeon: Isaac CORONA MD;  Location: [facility];  Service: ENT          SWALLOW EVALUATION (last 72 hours)      Swallow Evaluation       02/08/17 1000 02/08/17 0900 02/07/17 1600          Rehab Evaluation    Document Type evaluation  -SG evaluation  -SG evaluation  -SM      Subjective Information complains of   pt emotional and anxious during FEES  -SG no complaints;agree to therapy  -SG no complaints  -SM      Patient Effort, Rehab Treatment good  -SG good  -SG       Symptoms Noted During/After Treatment none  -SG none  -SG       General Information    Patient Profile Review yes  -SG yes  -SG yes  -SM      Onset of Illness/Injury 02/01/17  -SG 02/01/17  -SG       Date of Surgery 02/01/17  -SG 02/01/17  -SG       Subjective Patient Observations Alert, cooperative, emotional   -SG alert, cooperative for bedside eval  -SG Alert and cooperative  -SM      Pertinent History Of Current Problem c/o dysphagia following surgery 1 week ago. Esophagram completed this am. Aspiration resported when SLP reviewed w/ Radiology PA-- FEES compelted  -SG c/o dysphagia following surgery 1 week ago  -SG Anterior neck swelling, onset dysphagia yesterday, improving  -SM      Current Diet Limitations other (see comments)   clear liquids  -SG other (see comments)   clear liquids  -SG thin liquids;other (see comments)   clear liq diet  -SM       Precautions/Limitations, Vision WFL  -SG WFL  -SG WFL  -SM      Precautions/Limitations, Hearing WFL  -SG WFL  -SG WFL  -SM      Prior Level of Function- Communication functional in all spheres  -SG functional in all spheres  -SG functional in all spheres  -SM      Prior Level of Function- Swallowing no diet consistency restrictions  -SG no diet consistency restrictions  -SG safe, efficient swallowing in all situations  -      Plans/Goals Discussed With patient;family;agreed upon  -SG patient;family  -SG patient and family;agreed upon  -      Barriers to Rehab none identified  -SG none identified  -SG none identified  -      Clinical Impression    Patient's Goals For Discharge eat/drink without coughing/choking  -SG eat/drink without coughing/choking  -SG eat/drink without coughing/choking;return to regular diet  -      Family Goals For Discharge patient able to eat/drink without coughing/choking  -SG patient able to eat/drink without coughing/choking  -SG       SLP Swallowing Diagnosis mild dysphagia  -SG other (see comments)   r/o pharyngeal dys  -SG       Rehab Potential/Prognosis, Swallowing good, to achieve stated therapy goals  -SG good, to achieve stated therapy goals  -SG       Criteria for Skilled Therapeutic Interventions Met skilled criteria for dysphagia intervention met  -SG skilled criteria for dysphagia intervention met  -SG       FCM, Swallowing 6-->Level 6  -SG 1-->Level 1  -SG       Therapy Frequency 3-5 times/wk  - daily  -       Predicted Duration Therapy Interv (days) until discharge  -        SLP Diet Recommendation soft textures;thin liquids  - NPO: unsafe for food/liquid intake   until FEES this am  -SG thin liquids;advance diet as tolerated;other (see comments)   continue clear for now  -      Recommended Diagnostics FEES   completed this am  -  reassess via clinical swallow (non-instrumental exam)  -      Recommended Feeding/Eating Techniques small sips/bites;other  (see comments)   Multiple swallows prn  -SG  maintain upright posture during/after eating for 30 mins;small sips/bites  -SM      SLP Rec. for Method of Medication Administration meds crushed in pudding/applesauce  -SG meds via alternate route  -SG --   meds finely crushed with thin liquids  -      Monitor For Signs Of Aspiration cough;other (see comments)   Pt throat cleared during FEES- no aspiration viewed  -SG        Anticipated Discharge Disposition other (see comments)   unknown at this time  -SG        Pain Assessment    Pain Assessment No/denies pain  -SG No/denies pain  -SG       Cognitive Assessment/Intervention    Current Cognitive/Communication Assessment   functional  -SM      Oral Motor Structure and Function    Oral Motor Anatomy and Physiology  patient demonstrates anatomy and physiology that is WNL  -SG       Dentition Assessment  present and adequate  -SG       Secretion Management  WNL/WFL  -SG       Mucosal Quality  moist, healthy  -SG       Velar Elevation  WFL (within functional limits)  -SG       Gag Response  WFL (within functional limits)  -SG       Volitional Swallow  mild to moderate difficulties initiating volitional swallow  -SG       Volitional Cough  no difficulties initiating volitional cough  -SG       Oral Musculature General Assessment  WFL (within functional limits)  -SG WFL (within functional limits)  -      General Feeding/Swallowing Observations    Current Feeding Method  NPO  -SG       Observations of Self Feeding Skills  appropriate self feeding skills observed  -SG       Observations of Posture During Feeding  upright at 90 for eval  -SG       General Swallow Observations    General Swallow Observations  Intact  -SG       Clinical Swallow Exam    Mode of Presentation  fed by clinician;cup;spoon;straw  -SG       Oral Phase Results  intact oral phase without signs of dysfunction  -SG intact oral phase without signs of dysfunction;other (see comments)   DNT solids  -SM       Pharyngeal Phase Results  sign/symptoms of pharyngeal impairment;cough;multiple swallows;throat clear  - sign/symptoms of pharyngeal impairment  -      Summary of Clinical Exam  Esophagram completed this am-- results not reported at time of dysphagia evaluation. Called Radiology and spoke to PA who reported aspiration/penetration and residue on esophagram. FEES indicated to assess pharyngeal function.  -SG Dysphagia onset dollowing removal of drain, sympotoms resolving per pt.  Two hours ago with coughing on saliva and liquids.  Trialed small sips of liquid, showed no s/s aspiration though effortful swallow  utilized, likely to assist clearing bolus into esophagus and limiting residue.  C/o discomfort and increased sticking with nectar-thick tsp trial.    -      VFSS    Mode of Presentation --  -SG        Pharyngeal Phase Physiologic Impairment --  -SG        Post Swallow Residue --  -SG        Rosenbek's PenAsp Scale --  -SG        Response to Aspiration --  -SG        Pharyngeal Phase Results --  -        Summary of VFSS FEES completed 2' Radiology report (confirmed w/ PA) of aspiration on esophagram this am. Pt very anxious regarding FEES and became emotional for exam. With encouragement pt able to tolerate scope pass and FEES. Pt hypersenstive to scope. No aspiration viewed on this exam, but penetration noted w/ thins.Very mild pys residue w/ thins-- none viewed w/ solid but amout had to be limited 2' pt's agitation and sensitivity with scope. Rec: Mech soft and thins. Dys tx for compensations. UPDATE: RN called stating pt uncomfortable w/ mech soft. Pt c/o coughing and discomfort w/ dry chopped solids. Downgrade to puree and f/u for DT and compensations   -SG        Fiberoptic Endoscopic Swallowing Exam    Risks/Benefits Reviewed patient  -SG        Positioning Needs for Swallow Exam upright at 90 for evaluation  -SG        Nasal Entry, Topical Anesthetic left:;nostril entered using no topical  anesthetic  -SG        Anatomy and Physiology    Velopharyngeal WFL  -SG        Base of Tongue symmetrical  -SG        Epiglottis WFL  -SG        Laryngeal Function Breathing symmetrical  -SG        Laryngeal Function Phonation symmetrical  -SG        Laryngeal Function Breath Hold TVF contact  -SG        Secretions 0- Normal, no visible secretions  -SG        Secretion Description thin;clear  -SG        Ice Chips elicited swallow  -SG        Spontaneous Swallow frequency adequate  -SG        Sensory senses scope   hypersensitive to scope  -SG        Oral-Phary Transit WFL  -SG        Anatomy Hypopharynx    Observation Anatomic Considerations no anatomic structural deviation via FEES  -SG        FEES    Mode of Presentation thin:;pudding:;cohesive solid:;cup;spoon;straw  -SG        Pharyngeal Phase Impairment impaired timing with penetration before  -SG        Post Swallow Residue residue present pyriform sinuses   mild in pys  -SG        Rosenbek's Scale 4-->Level 4  -SG        Response to Aspiration other (see comments)   no aspiration on exam  -SG        Attempted Compensatory Maneuvers multiple swallows   cleared all pharyngeal residue  -SG        FEES Swallow Recommendations    Eating Assistance needs occasional supervision during self eating activity  -SG        Oral Care oral care with toothbrush and dentifrice BID and PRN  -SG        Modified Eating Strategies upright positioning 90 degrees  -SG        Other Recommendations mechanical soft;puree;thin  -SG        Dysphagia Treatment Objectives and Progress    To improve timing of pharyngeal response, patient will: Swallow in timely way using super-supraglottic swallow;80%;with consistent cues  -SG        Timing of Pharyngeal Response Progress continue to adress  -SG        To improve closure at entrance to airway, patient will: Complete super-supraglottic swallow;80%;with consistent cues  -SG        Closure at Entrance to Airway Progress continue to adress   -SG        To improve laryngeal elevation, patient will: Complete super-supraglottic swallow;80%;with consistent cues  -SG        Laryngeal Elevation Progress continue to adress  -SG          User Key  (r) = Recorded By, (t) = Taken By, (c) = Cosigned By    Initials Name Effective Dates    SG Magdalene Harris-Precious, MS [facility]-SLP 06/22/15 -     SM Ellen CAPELLAN Francine, MS CCC-SLP 06/22/15 -         EDUCATION  The patient has been educated in the following areas:   Dysphagia (Swallowing Impairment) Oral Care/Hydration NPO rationale.    SLP Recommendation and Plan  SLP Swallowing Diagnosis: mild dysphagia  SLP Diet Recommendation: soft textures, thin liquids  Recommended Feeding/Eating Techniques: small sips/bites, other (see comments) (Multiple swallows prn)  SLP Rec. for Method of Medication Administration: meds crushed in pudding/applesauce  Monitor For Signs Of Aspiration: cough, other (see comments) (Pt throat cleared during FEES- no aspiration viewed)  Recommended Diagnostics: FEES (completed this am)  Criteria for Skilled Therapeutic Interventions Met: skilled criteria for dysphagia intervention met  Anticipated Discharge Disposition: other (see comments) (unknown at this time)  Rehab Potential/Prognosis, Swallowing: good, to achieve stated therapy goals  Therapy Frequency: 3-5 times/wk             Plan of Care Review  Plan Of Care Reviewed With: patient  Progress: improving  Outcome Summary/Follow up Plan: FEES completed 2' Radiology report (confirmed w/ PA) of aspiration on esophagram this am. Pt very anxious regarding FEES and became emotional for exam. With encouragement pt able to tolerate scope pass and FEES. Pt hypersenstive to scope. No aspiration viewed on this exam, but penetration noted w/ thins.Very mild pys residue w/ thins-- none viewed w/ solid but amout had to be limited 2' pt's agitation and sensitivity with scope. Rec: Mech soft and thins. Dys tx for compensations. UPDATE: RN called stating  pt uncomfortable w/ mech soft. Pt c/o coughing and discomfort w/ dry chopped solids. Downgrade to puree and f/u for DT and compensations              Time Calculation:         Time Calculation- SLP       02/08/17 1539 02/08/17 1526       Time Calculation- SLP    SLP Start Time 0930  -SG 1000  -SG     SLP Received On 02/08/17  -SG 02/08/17  -       User Key  (r) = Recorded By, (t) = Taken By, (c) = Cosigned By    Initials Name Provider Type     Magdalene Garcia, MS CCC-SLP Speech and Language Pathologist          Therapy Charges for Today     Code Description Service Date Service Provider Modifiers Qty    82876505129 HC ST FIBEROPTIC ENDO EVAL SWALL 6 2/8/2017 Magdalene Garcia MS CCC-SLP GN 1    [id number] HC ST EVAL ORAL PHARYNG SWALLOW 3 2/8/2017 Magdalene Garcia MS CCC-SLP GN 1               Magdalene Garcia MS CCC-SLP  2/8/2017

## 2017-02-08 NOTE — PLAN OF CARE
Problem: Patient Care Overview (Adult)  Goal: Plan of Care Review  Outcome: Ongoing (interventions implemented as appropriate)    02/08/17 0349   Coping/Psychosocial Response Interventions   Plan Of Care Reviewed With patient   Outcome Evaluation   Outcome Summary/Follow up Plan able to swallow without difficulty; clear liquid diet; dressing CDI; no drainage; hypocalcemia treated with calcium gluconate; tingling in lips/hands/feet; vitals wnl; NSR on monitor   Patient Care Overview   Progress improving       Goal: Adult Individualization and Mutuality  Outcome: Ongoing (interventions implemented as appropriate)  Goal: Discharge Needs Assessment  Outcome: Ongoing (interventions implemented as appropriate)    02/07/17 2020   Living Environment   Transportation Available car   Self-Care   Equipment Currently Used at Home none         Problem: Dysphagia (Adult)  Goal: Identify Related Risk Factors and Signs and Symptoms  Outcome: Ongoing (interventions implemented as appropriate)  Goal: Functional/Safe Swallow  Outcome: Ongoing (interventions implemented as appropriate)    02/08/17 0349   Dysphagia (Adult)   Functional/Safe Swallow making progress toward outcome       Goal: Compensatory Techniques to Improve Safety/Function with Swallowing  Outcome: Ongoing (interventions implemented as appropriate)    02/08/17 0349   Dysphagia (Adult)   Compensatory Techniques to Improve Safety/Function with Swallowing making progress toward outcome         Problem: Perioperative Period (Adult)  Goal: Signs and Symptoms of Listed Potential Problems Will be Absent or Manageable (Perioperative Period)  Outcome: Ongoing (interventions implemented as appropriate)    02/08/17 0349   Perioperative Period   Problems Assessed (Perioperative Period) all   Problems Present (Perioperative Period) pain

## 2017-02-08 NOTE — PROGRESS NOTES
Hospitalist Daily Progress Note    Date of Admission: 2/7/2017   LOS: 1 day   PCP: Janette Srinivasan MD    Chief Complaint: Difficulty swallowing    Subjective    Feels a little better but not much.  Still complains of difficulty swallowing.  No chest pain or shortness of breath.  No nausea, vomiting, diarrhea, abdominal pain.  History of Present Illness    Review of Systems  General: no fevers, chills  Respiratory: no cough, dyspnea  Cardiovascular: no chest pain, palpitations  Abdomen: No abdominal pain, nausea, vomiting, or diarrhea  Neurologic: No focal weakness    Objective   Physical Exam:  I have reviewed the vital signs.  Temp:  [97.8 °F (36.6 °C)-98.2 °F (36.8 °C)] 98 °F (36.7 °C)  Heart Rate:  [67-91] 91  Resp:  [16-18] 16  BP: (102-139)/(44-65) 128/44    Objective  General Appearance:    Alert, cooperative, no distress  Head:    Normocephalic, atraumatic  Eyes:    Sclerae anicteric  Neck:   Supple, inflammation of anterior aspect of the neck.  Lungs: Clear to auscultation bilaterally, respirations unlabored  Heart: Regular rate and rhythm, S1 and S2 normal, no murmur, rub or gallop  Abdomen:  Soft, non-tender, bowel sounds active, nondistended  Extremities: No clubbing, cyanosis, or edema to lower extremities  Skin: No rashes   Neurologic: Oriented x3, Normal strength to extremities    Results Review:    I have reviewed the labs, radiology results and diagnostic studies.      Results from last 7 days  Lab Units 02/08/17  0445   WBC 10*3/mm3 7.27   HEMOGLOBIN g/dL 7.6*   PLATELETS 10*3/mm3 224       Results from last 7 days  Lab Units 02/08/17  1158   SODIUM mmol/L 144   POTASSIUM mmol/L 3.2*   TOTAL CO2 mmol/L 26.0   CREATININE mg/dL 0.90   GLUCOSE mg/dL 109*       I have reviewed the medications.    ---------------------------------------------------------------------------------------------  Assessment/Plan   Assessment & Plan  Assessment/Problem List    Principal Problem:    Dysphagia secondary  to inflammation of the neck post surgically.  :    Peripheral vascular disease      Hypertension      S/P parathyroidectomy 2/1/17      Anemia      Fever       Plan  - Ambien to help patient sleep  - Labs in the morning  - Continue current care and monitor calcium.      DVT prophylaxis:  Discharge Planning: I expect patient to be discharged to home in a few days.    Twan Jorge MD 02/08/17 6:12 PM

## 2017-02-08 NOTE — PROGRESS NOTES
67 y/o female c/o dysphagia starting 2/6/2017.  S/p bilateral inferior parathyroidectomy 2/1/2017 @[facility] for parathyroid hyperplasia with primary hyperparathyroidism and hypercalcemia.  Had good reduction of PTH and ionized calcium immediately and D/C ed on POD#1.  Was seen on POD#2 but NIGHAT drain not removed due to continued serious drainage. Called Dr. Sanders on 2/4/17 and Rxed Keflex for neck pain. Seen on office 2/5/2017 with feelings of subjective fever but was afebrile (98.1). Unremarkable swelling and drain was removed.  Last PM c/o dysphagia and fever and was seen in ER by Dr. Galvan (ENT)- no unusual findings.  PSHx includes RIGHT Carotid Endartectomy (Developed PO inf at that time).    CT neck- no abcess-typical edema s/p neck exploration  WBC 6.0 (nl) but left shift in diff  iCa 1.0 (low)  Alb 3.6    P/E: Pt eating and drinking currently  Neck: no pus,unremarkable edema, no sign of infection  FLEX LARYNGOSCOPY: No edema of larynx nor hypopharynx  No pooling of secretions, able to swallow grape juice w/o difficulty. No stridor    Subjective Dysphagia without obvious findings  Unclear as to obvious etiology-     Appreciate Hospitalist  Care with IVF/ABX/Steriods until sx improves

## 2017-02-08 NOTE — PLAN OF CARE
Problem: Patient Care Overview (Adult)  Goal: Plan of Care Review  Outcome: Ongoing (interventions implemented as appropriate)    02/08/17 1524   Coping/Psychosocial Response Interventions   Plan Of Care Reviewed With patient   Outcome Evaluation   Outcome Summary/Follow up Plan FEES completed 2' Radiology report (confirmed w/ PA) of aspiration on esophagram this am. Pt very anxious regarding FEES and became emotional for exam. With encouragement pt able to tolerate scope pass and FEES. Pt hypersenstive to scope. No aspiration viewed on this exam, but penetration noted w/ thins.Very mild pys residue w/ thins-- none viewed w/ solid but amout had to be limited 2' pt's agitation and sensitivity with scope. Rec: Mech soft and thins. Dys tx for compensations. UPDATE: RN called stating pt uncomfortable w/ mech soft. Pt c/o coughing and discomfort w/ dry chopped solids. Downgrade to puree and f/u for DT and compensations    Patient Care Overview   Progress improving

## 2017-02-08 NOTE — PROGRESS NOTES
HD #2   Pt anxious    1. Dysphagia: improved subjectively and objectively. No aspiration on direct visualization yesterday during laryngosgopy nor on BA swallow today but is still reluctant and impaired- ? Etiology    2. Hypocalcemia: s/p Parathyroidectomy 1 week and not taking PO calcium for 2-3 days- improving with IV Ca and will add liquid calcium carbonate    3. No objective evidence of abscess, cellulitis, nor wound infection but as pt.appears to be improving so abx/steroids continuation seems reasonable    4. Anemia 7.6/23 -normocytic - worse without sign of significant bleeding (OR losses < 50ml )    P/E: wound A neck    Following with you  Appreciate Dr. Spence et al    Gregory Perdue MD  c [phone]

## 2017-02-08 NOTE — PROGRESS NOTES
Discharge Planning Assessment  [facility]     Patient Name: Kailee Almanza  MRN: [id number]  Today's Date: 2/8/2017    Admit Date: 2/7/2017          Discharge Needs Assessment       02/08/17 1551    Living Environment    Lives With spouse    Living Arrangements house    Provides Primary Care For no one    Quality Of Family Relationships supportive    Able to Return to Prior Living Arrangements yes    Living Arrangement Comments Patient lives in one story home with her  in [place] [facility].    Discharge Needs Assessment    Concerns To Be Addressed denies needs/concerns at this time    Equipment Currently Used at Home none    Transportation Available family or friend will provide;car            Discharge Plan       02/08/17 1552    Case Management/Social Work Plan    Plan Home with     Additional Comments Spoke with patient at bedside.  She lives with her  in a one story home in [place] [facility].  He can care for her at home.  She has been independent with ADLs prior to surgery last week.  Denies HH.  Denies DME.  She does have Rx drug coverage.  Denies AD/LW/POA.  Her goal is to return home with .  She does not anticipate needing anything at discharge but is open to home health if needed.  She does not have an agency preference.  CM will follow and assess for discharge needs.          Discharge Placement     No information found                Demographic Summary       02/08/17 1550    Referral Information    Admission Type inpatient    Reason For Consult discharge planning    Contact Information    Permission Granted to Share Information With     Primary Care Physician Information    Name Janette Lylesnington            Functional Status     None            Psychosocial       02/08/17 1551    Emotional/Psychological    Affect no deficits noted    Mood congruent to situation    Verbal Skills no deficits noted    Current Interpersonal Conduct/Behavior appropriate to situation             Abuse/Neglect     None            Legal     None            Substance Abuse     None            Patient Forms     None          Christina Wells

## 2017-02-08 NOTE — PLAN OF CARE
Problem: Patient Care Overview (Adult)  Goal: Plan of Care Review  Outcome: Ongoing (interventions implemented as appropriate)    02/08/17 0930   Coping/Psychosocial Response Interventions   Plan Of Care Reviewed With patient   Outcome Evaluation   Outcome Summary/Follow up Plan Esophagram completed this am-- results not reported at time of dysphagia evaluation. Called Radiology and spoke to PA who reported aspiration/penetration and residue on esophagram. FEES indicated to assess pharyngeal function. Rec: NPO until FEES in am   Patient Care Overview   Progress (Evaluation)

## 2017-02-09 ENCOUNTER — APPOINTMENT (OUTPATIENT)
Dept: GENERAL RADIOLOGY | Facility: HOSPITAL | Age: 67
End: 2017-02-09
Attending: HOSPITALIST

## 2017-02-09 LAB
ANION GAP SERPL CALCULATED.3IONS-SCNC: 11 MMOL/L (ref 3–11)
ANION GAP SERPL CALCULATED.3IONS-SCNC: 8 MMOL/L (ref 3–11)
ANION GAP SERPL CALCULATED.3IONS-SCNC: 8 MMOL/L (ref 3–11)
BUN BLD-MCNC: 15 MG/DL (ref 9–23)
BUN BLD-MCNC: 15 MG/DL (ref 9–23)
BUN BLD-MCNC: 16 MG/DL (ref 9–23)
BUN/CREAT SERPL: 15 (ref 7–25)
BUN/CREAT SERPL: 16.7 (ref 7–25)
BUN/CREAT SERPL: 17.8 (ref 7–25)
CA-I SERPL ISE-MCNC: 1.23 MMOL/L (ref 1.12–1.32)
CA-I SERPL ISE-MCNC: 1.27 MMOL/L (ref 1.12–1.32)
CA-I SERPL ISE-MCNC: 1.32 MMOL/L (ref 1.12–1.32)
CALCIUM SPEC-SCNC: 9.3 MG/DL (ref 8.7–10.4)
CALCIUM SPEC-SCNC: 9.5 MG/DL (ref 8.7–10.4)
CALCIUM SPEC-SCNC: 9.9 MG/DL (ref 8.7–10.4)
CHLORIDE SERPL-SCNC: 108 MMOL/L (ref 99–109)
CHLORIDE SERPL-SCNC: 110 MMOL/L (ref 99–109)
CHLORIDE SERPL-SCNC: 111 MMOL/L (ref 99–109)
CO2 SERPL-SCNC: 22 MMOL/L (ref 20–31)
CO2 SERPL-SCNC: 25 MMOL/L (ref 20–31)
CO2 SERPL-SCNC: 26 MMOL/L (ref 20–31)
CREAT BLD-MCNC: 0.9 MG/DL (ref 0.6–1.3)
CREAT BLD-MCNC: 0.9 MG/DL (ref 0.6–1.3)
CREAT BLD-MCNC: 1 MG/DL (ref 0.6–1.3)
DEPRECATED RDW RBC AUTO: 50.8 FL (ref 37–54)
ERYTHROCYTE [DISTWIDTH] IN BLOOD BY AUTOMATED COUNT: 14.5 % (ref 11.3–14.5)
GFR SERPL CREATININE-BSD FRML MDRD: 55 ML/MIN/1.73
GFR SERPL CREATININE-BSD FRML MDRD: 63 ML/MIN/1.73
GFR SERPL CREATININE-BSD FRML MDRD: 63 ML/MIN/1.73
GLUCOSE BLD-MCNC: 103 MG/DL (ref 70–100)
GLUCOSE BLD-MCNC: 105 MG/DL (ref 70–100)
GLUCOSE BLD-MCNC: 106 MG/DL (ref 70–100)
HCT VFR BLD AUTO: 28.8 % (ref 34.5–44)
HGB BLD-MCNC: 9 G/DL (ref 11.5–15.5)
MCH RBC QN AUTO: 29.8 PG (ref 27–31)
MCHC RBC AUTO-ENTMCNC: 31.3 G/DL (ref 32–36)
MCV RBC AUTO: 95.4 FL (ref 80–99)
PLATELET # BLD AUTO: 376 10*3/MM3 (ref 150–450)
PMV BLD AUTO: 9.1 FL (ref 6–12)
POTASSIUM BLD-SCNC: 3.8 MMOL/L (ref 3.5–5.5)
POTASSIUM BLD-SCNC: 4.1 MMOL/L (ref 3.5–5.5)
POTASSIUM BLD-SCNC: 4.3 MMOL/L (ref 3.5–5.5)
PTH-INTACT SERPL-MCNC: 19 PG/ML (ref 15–65)
RBC # BLD AUTO: 3.02 10*6/MM3 (ref 3.89–5.14)
SODIUM BLD-SCNC: 142 MMOL/L (ref 132–146)
SODIUM BLD-SCNC: 143 MMOL/L (ref 132–146)
SODIUM BLD-SCNC: 144 MMOL/L (ref 132–146)
WBC NRBC COR # BLD: 10.02 10*3/MM3 (ref 3.5–10.8)

## 2017-02-09 PROCEDURE — 71020 HC CHEST PA AND LATERAL: CPT

## 2017-02-09 PROCEDURE — 25010000003 AMPICILLIN-SULBACTAM PER 1.5 G: Performed by: NURSE PRACTITIONER

## 2017-02-09 PROCEDURE — 80048 BASIC METABOLIC PNL TOTAL CA: CPT | Performed by: INTERNAL MEDICINE

## 2017-02-09 PROCEDURE — 94640 AIRWAY INHALATION TREATMENT: CPT

## 2017-02-09 PROCEDURE — 82330 ASSAY OF CALCIUM: CPT | Performed by: INTERNAL MEDICINE

## 2017-02-09 PROCEDURE — 25010000002 CALCIUM GLUCONATE PER 10 ML: Performed by: INTERNAL MEDICINE

## 2017-02-09 PROCEDURE — 94760 N-INVAS EAR/PLS OXIMETRY 1: CPT

## 2017-02-09 PROCEDURE — 25010000002 DEXAMETHASONE: Performed by: NURSE PRACTITIONER

## 2017-02-09 PROCEDURE — 94799 UNLISTED PULMONARY SVC/PX: CPT

## 2017-02-09 PROCEDURE — 92526 ORAL FUNCTION THERAPY: CPT

## 2017-02-09 PROCEDURE — 99233 SBSQ HOSP IP/OBS HIGH 50: CPT | Performed by: HOSPITALIST

## 2017-02-09 PROCEDURE — 85027 COMPLETE CBC AUTOMATED: CPT | Performed by: HOSPITALIST

## 2017-02-09 RX ORDER — LISINOPRIL 20 MG/1
20 TABLET ORAL
Status: DISCONTINUED | OUTPATIENT
Start: 2017-02-09 | End: 2017-02-11 | Stop reason: HOSPADM

## 2017-02-09 RX ORDER — IPRATROPIUM BROMIDE AND ALBUTEROL SULFATE 2.5; .5 MG/3ML; MG/3ML
3 SOLUTION RESPIRATORY (INHALATION) EVERY 4 HOURS PRN
Status: DISCONTINUED | OUTPATIENT
Start: 2017-02-09 | End: 2017-02-11 | Stop reason: HOSPADM

## 2017-02-09 RX ADMIN — AMPICILLIN SODIUM AND SULBACTAM SODIUM 3 G: 2; 1 INJECTION, POWDER, FOR SOLUTION INTRAMUSCULAR; INTRAVENOUS at 18:02

## 2017-02-09 RX ADMIN — CALCIUM CARBONATE 1250 MG: 1250 SUSPENSION ORAL at 18:03

## 2017-02-09 RX ADMIN — VENLAFAXINE HYDROCHLORIDE 75 MG: 37.5 TABLET ORAL at 07:45

## 2017-02-09 RX ADMIN — CLOPIDOGREL 75 MG: 75 TABLET, FILM COATED ORAL at 09:12

## 2017-02-09 RX ADMIN — CALCIUM CARBONATE 1250 MG: 1250 SUSPENSION ORAL at 07:45

## 2017-02-09 RX ADMIN — Medication 10 MG: at 09:12

## 2017-02-09 RX ADMIN — VENLAFAXINE HYDROCHLORIDE 75 MG: 37.5 TABLET ORAL at 18:03

## 2017-02-09 RX ADMIN — IPRATROPIUM BROMIDE AND ALBUTEROL SULFATE 3 ML: .5; 3 SOLUTION RESPIRATORY (INHALATION) at 06:34

## 2017-02-09 RX ADMIN — LISINOPRIL 20 MG: 20 TABLET ORAL at 16:24

## 2017-02-09 RX ADMIN — AMPICILLIN SODIUM AND SULBACTAM SODIUM 3 G: 2; 1 INJECTION, POWDER, FOR SOLUTION INTRAMUSCULAR; INTRAVENOUS at 06:10

## 2017-02-09 RX ADMIN — IPRATROPIUM BROMIDE AND ALBUTEROL SULFATE 3 ML: .5; 3 SOLUTION RESPIRATORY (INHALATION) at 11:50

## 2017-02-09 RX ADMIN — FOLIC ACID 1 MG: 1 TABLET ORAL at 09:12

## 2017-02-09 RX ADMIN — ASPIRIN 81 MG 81 MG: 81 TABLET ORAL at 09:12

## 2017-02-09 RX ADMIN — CALCIUM GLUCONATE 50 ML/HR: 94 INJECTION, SOLUTION INTRAVENOUS at 09:48

## 2017-02-09 RX ADMIN — ZOLPIDEM TARTRATE 5 MG: 5 TABLET, FILM COATED ORAL at 20:58

## 2017-02-09 RX ADMIN — AMPICILLIN SODIUM AND SULBACTAM SODIUM 3 G: 2; 1 INJECTION, POWDER, FOR SOLUTION INTRAMUSCULAR; INTRAVENOUS at 12:06

## 2017-02-09 NOTE — PLAN OF CARE
"Problem: Patient Care Overview (Adult)  Goal: Plan of Care Review  Outcome: Ongoing (interventions implemented as appropriate)    02/08/17 1934   Coping/Psychosocial Response Interventions   Plan Of Care Reviewed With patient;spouse   Outcome Evaluation   Outcome Summary/Follow up Plan Patient states discomfort with swallowing. Coughs with anything po.    Patient Care Overview   Progress no change         Problem: Dysphagia (Adult)  Goal: Identify Related Risk Factors and Signs and Symptoms  Outcome: Ongoing (interventions implemented as appropriate)    02/08/17 1934   Dysphagia   Dysphagia: Related Risk Factors surgery   General Observations Signs and Symptoms (Dysphagia) dietary habit changes   Oral Phase Dysphagia Signs and Symptoms (Dysphagia) difficulty containing/controlling liquid/food   Pharyngeal Phase Dysphagia Signs and Symptoms (Dysphagia) frequent cough after meal   Upper Esophageal Phase Dysphagia Signs and Symptoms (Dysphagia) reports \"food sticking\" in esophagus           "

## 2017-02-09 NOTE — PROGRESS NOTES
[facility] Medicine Services  INPATIENT PROGRESS NOTE    Date of Admission: 2/7/2017  Length of Stay: 2  Primary Care Physician: Janette Srinivasan MD    Subjective     Chief Complaint: f/u dysphagia  HPI:  Pt sitting up in bed when seen. She reports continued symptoms of solid food dysphagia. Tolerating liquids. Today, she has had 2-3 episodes of severe dyspnea with wheezing that resolve with albuterol. Denies cough, dyspnea in between episodes, fevers, or chills.     Review Of Systems:   Review of Systems   Constitutional: Negative for chills and fever.   Respiratory: Positive for shortness of breath and wheezing. Negative for cough.    Cardiovascular: Negative for chest pain.   Gastrointestinal: Negative for abdominal pain, diarrhea, nausea and vomiting.       Objective      Temp:  [97.6 °F (36.4 °C)-97.9 °F (36.6 °C)] 97.9 °F (36.6 °C)  Heart Rate:  [83] 83  Resp:  [18] 18  BP: (145-156)/(57-66) 156/66  Physical Exam  Constitutional: no acute distress, awake, alert,  Respiratory: R>L base crackles, nonlabored respirations   Cardiovascular: RRR, II/VI systolic murmur, no rubs, or gallops, palpable pedal pulses bilaterally  Gastrointestinal: Positive bowel sounds, soft, nontender, nondistended  Musculoskeletal: No bilateral ankle edema  Psychiatric: oriented x 3, anxious affect, cooperative  Skin: neck incision c/d/i with mild erythema, no induration   Neurologic: Strength symmetric in all extremities       Results Review:    I have reviewed the labs, radiology results and diagnostic studies.      Results from last 7 days  Lab Units 02/09/17  0626   WBC 10*3/mm3 10.02   HEMOGLOBIN g/dL 9.0*   PLATELETS 10*3/mm3 376       Results from last 7 days  Lab Units 02/09/17  1125   SODIUM mmol/L 142   POTASSIUM mmol/L 3.8   TOTAL CO2 mmol/L 26.0   CREATININE mg/dL 1.00   GLUCOSE mg/dL 105*       Culture Data:   BLOOD CULTURE   Date Value Ref Range Status   02/07/2017 No growth at 24 hours   Preliminary   02/07/2017 No growth at 24 hours  Preliminary     MRSA SCREEN CX   Date Value Ref Range Status   02/07/2017 Culture in progress  Preliminary     Radiology Data: Studies from admission reviewed     I have reviewed the medications.    Assessment/Plan   Mrs. Almanza is a 66 year-old F with a past hx of HTN, COPD, CAD, HTN, PAD s/p aortofem and fem-fem bypass, and recent parathyroidectomy 2/1/17 by Dr. Chicas who presented with persistent fever despite keflex, anterior neck erythema, symptomatic hypocalcemia (unable to take PO supplements), and dysphagia. Evaluation here disclosed anterior neck cellulitis:     Assessment/Problem List  Principal Problem:    Dysphagia  Active Problems:    Peripheral vascular disease    Hypertension    S/P parathyroidectomy 2/1/17    Cellulitis, neck    Anemia    Fever    Plan  - FEES today to assess dysphagia negative with no overt signs of aspiration with thins, pt with possible globus sensation. ? Anxiety as a component of dysphagia   - Calcium normal, pt tolerating liquid calcium carbonate  - Dr. Chicas feels that there is no objective evidence of wound infection but recommends continued abx and steroids (pt on unasyn and IV decadron)  - Will transition to oral antibiotics tomorrow and dc steroids   - Check CBC tomorrow AM  - Ordered and personally reviewed CXR with mild bibasilar effusions, no significant focal consolidation, Suspect her episodic dyspnea is either mild COPD vs. Anxiety vs. Resolving aspiration pneumonitis    Total 40 mins spent on care today with greater than 20 mins spent at bedside counseling pt on results of work-up and plan of care.     DVT prophylaxis: mechanical  Discharge Planning: I expect patient to be discharged to home in 1 days.    Isaac Page MD   02/09/17   7:44 AM    Please note that portions of this note may have been completed with a voice recognition program. Efforts were made to edit the dictations, but occasionally words  are mistranscribed.

## 2017-02-09 NOTE — PLAN OF CARE
Problem: Patient Care Overview (Adult)  Goal: Plan of Care Review  Outcome: Ongoing (interventions implemented as appropriate)    Problem: Dysphagia (Adult)  Goal: Identify Related Risk Factors and Signs and Symptoms  Outcome: Ongoing (interventions implemented as appropriate)  Goal: Functional/Safe Swallow  Outcome: Ongoing (interventions implemented as appropriate)  Goal: Compensatory Techniques to Improve Safety/Function with Swallowing  Outcome: Ongoing (interventions implemented as appropriate)    Problem: Perioperative Period (Adult)  Goal: Signs and Symptoms of Listed Potential Problems Will be Absent or Manageable (Perioperative Period)  Outcome: Ongoing (interventions implemented as appropriate)

## 2017-02-09 NOTE — PROGRESS NOTES
Acute Care - Speech Language Pathology   Swallow Treatment Note [facility]     Patient Name: Kailee Almanza  : 1950  MRN: [id number]  Today's Date: 2017               Admit Date: 2017    Visit Dx:      ICD-10-CM ICD-9-CM   1. Dysphagia, unspecified type R13.10 787.20   2. Oropharyngeal dysphagia R13.12 787.22     Patient Active Problem List   Diagnosis   • Peripheral vascular disease   • Hypertension   • Cerebrovascular disease   • Hyperlipidemia   • Chronic back pain   • Centrilobular emphysema   • Costal chondritis   • Dyslipidemia   • Dysphagia   • S/P parathyroidectomy 17   • Cellulitis, neck   • Anemia   • Fever             Adult Rehabilitation Note       17 1000          Rehab Assessment/Intervention    Discipline speech language pathologist  -LS      Document Type therapy note (daily note)  -LS      Subjective Information agree to therapy  -LS      Patient Effort, Rehab Treatment good  -LS      Recorded by [LS] Kelsy Velez, MS CCC-SLP      Pain Assessment    Pain Assessment No/denies pain  -LS      Recorded by [LS] Kelsy Velez, MS CCC-SLP      Swallow Assessment/Intervention    Additional Documentation Dysphagia/Swallow Intervention (Group)  -LS      Recorded by [LS] Kelsy Velez, MS CCC-SLP      Dysphagia/Swallow Intervention    Dysphagia/Swallow Therapeutic Feed Pt with breakfast tray. Pt took 2 swallows with every bite of pureed followed by sips of thins. Throat clear occasionally observed w/ pureed. No overt s/s of asp w/ thins. FEES completed  was WFL. Pt concerned as she feel food is sticking in her throat. SLP provided educaiton on FEES results and possible globus sensation. Clinical swallow symptoms have been imroving since arrival. Pt agreed to continue with swallow strategies for safe swallow and if symptoms did not improve over 24 hrs would consider MBS.    -LS      Recorded by [LS] Kelsy Velez, MS CCC-SLP        User Key  (r) = Recorded By, (t) =  Taken By, (c) = Cosigned By    Initials Name Effective Dates    LS Kelsy Velez MS CCC-SLP 06/22/15 -                 EDUCATION  The patient has been educated in the following areas:   Dysphagia (Swallowing Impairment) Oral Care/Hydration Modified Diet Instruction.    SLP Recommendation and Plan                                           Plan of Care Review  Plan Of Care Reviewed With: patient  Progress: progress toward functional goals is gradual  Outcome Summary/Follow up Plan: Dysphagia therapy complete. Pt with breakfast tray. Pt took 2 swallows with every bite of pureed followed by sips of thins. Throat clear occasionally observed w/ pureed. No overt s/s of asp w/ thins. FEES completed 2/8 was WFL. Pt concerned as she feel food is sticking in her throat. SLP provided educaiton on FEES results and possible globus sensation. Clinical swallow symptoms have been imroving since arrival. Pt agreed to continue with swallow strategies for safe swallow and if symptoms did not improve over 24 hrs would consider MBS.             Time Calculation:         Time Calculation- SLP       02/09/17 1416          Time Calculation- SLP    SLP Start Time 1000  -      SLP Received On 02/09/17  -        User Key  (r) = Recorded By, (t) = Taken By, (c) = Cosigned By    Initials Name Provider Type    LS Kelsy Velez MS CCC-SLP Speech and Language Pathologist          Therapy Charges for Today     Code Description Service Date Service Provider Modifiers Qty    [id number]  ST TREATMENT SWALLOW 3 2/9/2017 Kelsy Velez, MS CCC-SLP GN 1                 Kelsy Velez MS CCC-SLP  2/9/2017

## 2017-02-09 NOTE — PLAN OF CARE
Problem: Patient Care Overview (Adult)  Goal: Plan of Care Review  Outcome: Ongoing (interventions implemented as appropriate)    02/09/17 1415   Coping/Psychosocial Response Interventions   Plan Of Care Reviewed With patient   Outcome Evaluation   Outcome Summary/Follow up Plan Dysphagia therapy complete. Pt with breakfast tray. Pt took 2 swallows with every bite of pureed followed by sips of thins. Throat clear occasionally observed w/ pureed. No overt s/s of asp w/ thins. FEES completed 2/8 was WFL. Pt concerned as she feel food is sticking in her throat. SLP provided education on FEES results and possible globus sensation. Clinical swallow symptoms have been improving since arrival. Pt agreed to continue with swallow strategies for safe swallow and if symptoms did not improve over 24 hrs would consider MBS.    Patient Care Overview   Progress progress toward functional goals is gradual

## 2017-02-09 NOTE — PLAN OF CARE
Problem: Patient Care Overview (Adult)  Goal: Plan of Care Review  Outcome: Ongoing (interventions implemented as appropriate)    02/09/17 0402   Coping/Psychosocial Response Interventions   Plan Of Care Reviewed With patient   Outcome Evaluation   Outcome Summary/Follow up Plan 2nd dose of potassium replacement given this shift, current value 4.1   Patient Care Overview   Progress progress toward functional goals as expected         Problem: Dysphagia (Adult)  Goal: Functional/Safe Swallow  Outcome: Ongoing (interventions implemented as appropriate)    02/09/17 0402   Dysphagia (Adult)   Functional/Safe Swallow making progress toward outcome

## 2017-02-10 ENCOUNTER — APPOINTMENT (OUTPATIENT)
Dept: MRI IMAGING | Facility: HOSPITAL | Age: 67
End: 2017-02-10

## 2017-02-10 ENCOUNTER — APPOINTMENT (OUTPATIENT)
Dept: GENERAL RADIOLOGY | Facility: HOSPITAL | Age: 67
End: 2017-02-10
Attending: HOSPITALIST

## 2017-02-10 ENCOUNTER — APPOINTMENT (OUTPATIENT)
Dept: CT IMAGING | Facility: HOSPITAL | Age: 67
End: 2017-02-10

## 2017-02-10 PROBLEM — J96.01 ACUTE RESPIRATORY FAILURE WITH HYPOXIA: Status: ACTIVE | Noted: 2017-02-10

## 2017-02-10 PROBLEM — J90 PLEURAL EFFUSION, BILATERAL: Status: ACTIVE | Noted: 2017-02-10

## 2017-02-10 LAB
ANION GAP SERPL CALCULATED.3IONS-SCNC: 9 MMOL/L (ref 3–11)
BNP SERPL-MCNC: 774 PG/ML (ref 0–100)
BUN BLD-MCNC: 12 MG/DL (ref 9–23)
BUN/CREAT SERPL: 12 (ref 7–25)
CALCIUM SPEC-SCNC: 8.4 MG/DL (ref 8.7–10.4)
CHLORIDE SERPL-SCNC: 110 MMOL/L (ref 99–109)
CO2 SERPL-SCNC: 25 MMOL/L (ref 20–31)
CREAT BLD-MCNC: 1 MG/DL (ref 0.6–1.3)
DEPRECATED RDW RBC AUTO: 49.7 FL (ref 37–54)
ERYTHROCYTE [DISTWIDTH] IN BLOOD BY AUTOMATED COUNT: 14.5 % (ref 11.3–14.5)
GFR SERPL CREATININE-BSD FRML MDRD: 55 ML/MIN/1.73
GLUCOSE BLD-MCNC: 98 MG/DL (ref 70–100)
HCT VFR BLD AUTO: 25.4 % (ref 34.5–44)
HGB BLD-MCNC: 8 G/DL (ref 11.5–15.5)
MCH RBC QN AUTO: 29.5 PG (ref 27–31)
MCHC RBC AUTO-ENTMCNC: 31.5 G/DL (ref 32–36)
MCV RBC AUTO: 93.7 FL (ref 80–99)
MRSA SPEC QL CULT: NORMAL
PLATELET # BLD AUTO: 297 10*3/MM3 (ref 150–450)
PMV BLD AUTO: 9 FL (ref 6–12)
POTASSIUM BLD-SCNC: 3.5 MMOL/L (ref 3.5–5.5)
POTASSIUM BLD-SCNC: 4.5 MMOL/L (ref 3.5–5.5)
RBC # BLD AUTO: 2.71 10*6/MM3 (ref 3.89–5.14)
SODIUM BLD-SCNC: 144 MMOL/L (ref 132–146)
WBC NRBC COR # BLD: 7.65 10*3/MM3 (ref 3.5–10.8)

## 2017-02-10 PROCEDURE — 94799 UNLISTED PULMONARY SVC/PX: CPT

## 2017-02-10 PROCEDURE — 84132 ASSAY OF SERUM POTASSIUM: CPT | Performed by: HOSPITALIST

## 2017-02-10 PROCEDURE — 71275 CT ANGIOGRAPHY CHEST: CPT

## 2017-02-10 PROCEDURE — 74230 X-RAY XM SWLNG FUNCJ C+: CPT

## 2017-02-10 PROCEDURE — 0 IOPAMIDOL PER 1 ML: Performed by: HOSPITALIST

## 2017-02-10 PROCEDURE — 93010 ELECTROCARDIOGRAM REPORT: CPT | Performed by: INTERNAL MEDICINE

## 2017-02-10 PROCEDURE — 94640 AIRWAY INHALATION TREATMENT: CPT

## 2017-02-10 PROCEDURE — 93005 ELECTROCARDIOGRAM TRACING: CPT | Performed by: HOSPITALIST

## 2017-02-10 PROCEDURE — 85027 COMPLETE CBC AUTOMATED: CPT | Performed by: HOSPITALIST

## 2017-02-10 PROCEDURE — 92611 MOTION FLUOROSCOPY/SWALLOW: CPT

## 2017-02-10 PROCEDURE — 99233 SBSQ HOSP IP/OBS HIGH 50: CPT | Performed by: HOSPITALIST

## 2017-02-10 PROCEDURE — 80048 BASIC METABOLIC PNL TOTAL CA: CPT | Performed by: HOSPITALIST

## 2017-02-10 PROCEDURE — 94760 N-INVAS EAR/PLS OXIMETRY 1: CPT

## 2017-02-10 PROCEDURE — 25010000003 AMPICILLIN-SULBACTAM PER 1.5 G: Performed by: NURSE PRACTITIONER

## 2017-02-10 PROCEDURE — 83880 ASSAY OF NATRIURETIC PEPTIDE: CPT | Performed by: HOSPITALIST

## 2017-02-10 PROCEDURE — 70551 MRI BRAIN STEM W/O DYE: CPT

## 2017-02-10 RX ORDER — AMOXICILLIN AND CLAVULANATE POTASSIUM 400; 57 MG/5ML; MG/5ML
800 POWDER, FOR SUSPENSION ORAL 2 TIMES DAILY
Status: DISCONTINUED | OUTPATIENT
Start: 2017-02-10 | End: 2017-02-11 | Stop reason: HOSPADM

## 2017-02-10 RX ORDER — FUROSEMIDE 10 MG/ML
40 INJECTION INTRAMUSCULAR; INTRAVENOUS ONCE
Status: COMPLETED | OUTPATIENT
Start: 2017-02-11 | End: 2017-02-11

## 2017-02-10 RX ADMIN — VENLAFAXINE HYDROCHLORIDE 75 MG: 37.5 TABLET ORAL at 20:44

## 2017-02-10 RX ADMIN — CLOPIDOGREL 75 MG: 75 TABLET, FILM COATED ORAL at 08:08

## 2017-02-10 RX ADMIN — IOPAMIDOL 95 ML: 755 INJECTION, SOLUTION INTRAVENOUS at 12:00

## 2017-02-10 RX ADMIN — POTASSIUM CHLORIDE 40 MEQ: 1.5 POWDER, FOR SOLUTION ORAL at 08:09

## 2017-02-10 RX ADMIN — CALCIUM CARBONATE 1250 MG: 1250 SUSPENSION ORAL at 13:42

## 2017-02-10 RX ADMIN — CALCIUM CARBONATE 1250 MG: 1250 SUSPENSION ORAL at 08:10

## 2017-02-10 RX ADMIN — AMPICILLIN SODIUM AND SULBACTAM SODIUM 3 G: 2; 1 INJECTION, POWDER, FOR SOLUTION INTRAMUSCULAR; INTRAVENOUS at 00:26

## 2017-02-10 RX ADMIN — FOLIC ACID 1 MG: 1 TABLET ORAL at 08:09

## 2017-02-10 RX ADMIN — IPRATROPIUM BROMIDE AND ALBUTEROL SULFATE 3 ML: .5; 3 SOLUTION RESPIRATORY (INHALATION) at 08:23

## 2017-02-10 RX ADMIN — VENLAFAXINE HYDROCHLORIDE 75 MG: 37.5 TABLET ORAL at 08:09

## 2017-02-10 RX ADMIN — AMOXICILLIN AND CLAVULANATE POTASSIUM 800 MG: 400; 57 POWDER, FOR SUSPENSION ORAL at 16:46

## 2017-02-10 RX ADMIN — LISINOPRIL 20 MG: 20 TABLET ORAL at 08:08

## 2017-02-10 RX ADMIN — CALCIUM CARBONATE 1250 MG: 1250 SUSPENSION ORAL at 16:47

## 2017-02-10 RX ADMIN — ZOLPIDEM TARTRATE 5 MG: 5 TABLET, FILM COATED ORAL at 20:45

## 2017-02-10 RX ADMIN — ASPIRIN 81 MG 81 MG: 81 TABLET ORAL at 08:08

## 2017-02-10 RX ADMIN — POTASSIUM CHLORIDE 40 MEQ: 1.5 POWDER, FOR SOLUTION ORAL at 13:42

## 2017-02-10 RX ADMIN — AMPICILLIN SODIUM AND SULBACTAM SODIUM 3 G: 2; 1 INJECTION, POWDER, FOR SOLUTION INTRAMUSCULAR; INTRAVENOUS at 05:51

## 2017-02-10 RX ADMIN — IPRATROPIUM BROMIDE AND ALBUTEROL SULFATE 3 ML: .5; 3 SOLUTION RESPIRATORY (INHALATION) at 00:57

## 2017-02-10 NOTE — PLAN OF CARE
Problem: Patient Care Overview (Adult)  Goal: Plan of Care Review  Outcome: Ongoing (interventions implemented as appropriate)    02/10/17 1413   Coping/Psychosocial Response Interventions   Plan Of Care Reviewed With patient   Outcome Evaluation   Outcome Summary/Follow up Plan MBS completed. Mild oral, mod-severe pharyngeal dysphagia. Compared to report from FEES two days ago, this is a significant decline in swallowing function. Severe diffuse pharyngeal weakness with limited hyolaryngeal elevation and even more so excursion. This resulted in penetration during the swallow with thin liquids with subsequent intermittent aspiration after the swallow from residue. Severe residue with all, greatest with solids, extending from valleculae into pyriform sinuses. In A-P view, L>R residue 2' reduced L lateral contraction. Prevented penetration/aspiration and reduced/minimized residue with use of chin tuck + head turn to R shoulder (yes R shoulder, despite residue being greater on L side). Recommend: Mech-soft diet, thin liquids, meds crushed in applesauce/pudding. Small bites and sips, ok with straw. Must use chin tuck + head turn to R for all bites/sips. Will initiate dysphagia tx. Swallow function appears as expect following neurologic event (e.g., CVA). Discussed with MD (aCmilo) who is going to further discuss with pt and likely MRI. Thanks.    Patient Care Overview   Progress declining         Problem: Inpatient SLP  Goal: Dysphagia- Patient will safely consume diet as per recommendation with no signs/symptoms of aspiration  Outcome: Ongoing (interventions implemented as appropriate)    02/10/17 1413   Safely Consume Diet   Safely Consume Diet- SLP, Date Established 02/10/17   Safely Consume Diet- SLP, Time to Achieve by discharge   Safely Consume Diet- SLP, Outcome goal ongoing

## 2017-02-10 NOTE — PLAN OF CARE
Problem: Patient Care Overview (Adult)  Goal: Plan of Care Review  Outcome: Ongoing (interventions implemented as appropriate)    Problem: Dysphagia (Adult)  Goal: Identify Related Risk Factors and Signs and Symptoms  Outcome: Ongoing (interventions implemented as appropriate)  Goal: Functional/Safe Swallow  Outcome: Ongoing (interventions implemented as appropriate)  Goal: Compensatory Techniques to Improve Safety/Function with Swallowing  Outcome: Ongoing (interventions implemented as appropriate)

## 2017-02-10 NOTE — PROGRESS NOTES
Able to swallow better  Labs: eucalcemic (on replacement)  Wound A, never has had cellulitis  Could anxiety combined with presbydeglutition be a factor?  Nothing to add at this time.  Will sign off and see next week as outpatient as previously scheduled.  Rx Ca

## 2017-02-10 NOTE — PROGRESS NOTES
"    [facility] Medicine Services  INPATIENT PROGRESS NOTE    Date of Admission: 2/7/2017  Length of Stay: 3  Primary Care Physician: Janette Srinivasan MD    Subjective     Chief Complaint: f/u dysphagia  HPI:  Pt's dyspnea continues, mainly with exertion. She had an episode this morning of \"severe\" dyspnea while trying to sit up and reposition in bed. She also associates tachycardia. Nebs improve her symptoms. She denied dyspnea when seen. Continues to note subjective dysphagia without clinical aspiration. No fevers, chills, wheezing, nausea, or vomiting. Denies prior cardiac hx. Reports that anxiety and tremors are not chronic issues for her.     Review Of Systems:   Review of Systems   Constitutional: Negative for chills and fever.   Respiratory: Positive for shortness of breath and wheezing. Negative for cough.    Cardiovascular: Negative for chest pain.   Gastrointestinal: Negative for abdominal pain, diarrhea, nausea and vomiting.     Objective      Temp:  [97.6 °F (36.4 °C)-98 °F (36.7 °C)] 97.6 °F (36.4 °C)  Heart Rate:  [] 100  Resp:  [18-22] 18  BP: (162)/(81) 162/81  Physical Exam  Constitutional: no acute distress, awake, alert,  Respiratory: Decreased breath sounds at bases, nonlabored respirations   Cardiovascular: RRR, II/VI systolic murmur, no rubs, or gallops, palpable pedal pulses bilaterally  Gastrointestinal: Positive bowel sounds, soft, nontender, nondistended  Musculoskeletal: No bilateral ankle edema  Psychiatric: oriented x 3, anxious affect, cooperative  Skin: neck incision c/d/i with mild erythema, no induration   Neurologic: Strength symmetric in all extremities, tremulous      Results Review:    I have reviewed the labs, radiology results and diagnostic studies.      Results from last 7 days  Lab Units 02/10/17  0624   WBC 10*3/mm3 7.65   HEMOGLOBIN g/dL 8.0*   PLATELETS 10*3/mm3 297       Results from last 7 days  Lab Units 02/10/17  0624   SODIUM " mmol/L 144   POTASSIUM mmol/L 3.5   TOTAL CO2 mmol/L 25.0   CREATININE mg/dL 1.00   GLUCOSE mg/dL 98       Culture Data:   Cultures:    BLOOD CULTURE   Date Value Ref Range Status   02/07/2017 No growth at 2 days  Preliminary   02/07/2017 No growth at 2 days  Preliminary     WOUND CULTURE   Date Value Ref Range Status   02/08/2017 (A)  Preliminary    Light growth (2+) Staphylococcus, coagulase negative     MRSA SCREEN CX   Date Value Ref Range Status   02/07/2017   Final    No Methicillin Resistant Staphylococcus aureus isolated           Radiology Data: CT angiogram of the chest personally reviewed, await official interpretation, on my view, I do not see pulmonary emboli but I do note chronic mosaic changes bilaterally, some bibasilar groundglass changes and mild pleural effusions    I have reviewed the medications.    Assessment/Plan   Mrs. Almanza is a 66 year-old F with a past hx of HTN, COPD, CAD, HTN, PAD s/p aortofem and fem-fem bypass, and recent parathyroidectomy 2/1/17 by Dr. Chicas who presented with persistent fever despite keflex, anterior neck erythema, symptomatic hypocalcemia (unable to take PO supplements), and dysphagia. Evaluation here disclosed anterior neck cellulitis. Hospitalization has been complicated by persistent dyspnea and subjective dysphagia :     Assessment/Problem List  Principal Problem:    Dysphagia  Active Problems:    Acute respiratory failure with hypoxia    Peripheral vascular disease    Hypertension    S/P parathyroidectomy 2/1/17    Anemia    Fever    Pleural effusion, bilateral    Plan  -Patient with recurrent hypoxia, predominantly exertional, new bilateral pleural effusions, and episodic dyspnea.  Follow-up official interpretation of CT chest to rule out pulmonary embolus.  Note her BNP is elevated, we'll check echocardiogram.  I'm concerned about mild pulmonary edema on my review of CT chest, if confirmed official report, we'll give IV Lasix.  An alternative  explanation of patient's CT chest would be aspiration pneumonitis versus pneumonia which should be adequately covered with her Augmentin. COPD likely playing a minor role in her symptoms   -Transition from IV Unasyn to liquid Augmentin (treating cellulitis and aspiration pneumonia)  -Speech therapy following.  Patient was no overt, objective signs of aspiration on several studies, to have repeat modified barium swallow today.  This may be globus sensation versus anxiety causing subjective dysphagia  -I suspect that patient's tremors and anxiety are precipitated by IV Decadron which I discontinued  - Calcium normal, pt tolerating liquid calcium carbonate  - Dr. Chicas has signed off, he will f/u post-op  - Monitor BP, lisinopril restarted   - Repeat BMP in AM if lasix ordered   - Counseled pt and family in detail on results of work-up and plan of care     DVT prophylaxis: mechanical  Discharge Planning: I expect patient to be discharged to home in 1 days, she may require home O2.    Isaac Page MD   02/10/17   10:16 AM    Please note that portions of this note may have been completed with a voice recognition program. Efforts were made to edit the dictations, but occasionally words are mistranscribed.    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  I reviewed her prior imaging in the available medical record and I do not see prior mention of this.  We'll have her follow-up with Dr. Gordon or partner in clinic as he did her prior surgeries.  No evidence that this needs to be evaluated in the inpatient setting.    Will give a dose of IV Lasix in the a.m.  Follow up echocardiogram    Discussed case with speech pathologist.  Patient with evidence of severe pharyngeal dysphagia that is suggestive of a neurologic disorder given left sided pharyngeal  weakness.  This does not appear to be consistent with a postoperative neurologic injury.  Will obtain an MRI of the brain to exclude stroke as cause.    All of this discussed in detail with patient and family.

## 2017-02-10 NOTE — PLAN OF CARE
Problem: Dysphagia (Adult)  Goal: Identify Related Risk Factors and Signs and Symptoms  Outcome: Ongoing (interventions implemented as appropriate)

## 2017-02-10 NOTE — PROGRESS NOTES
Acute Care - Speech Language Pathology   Swallow Re-Evaluation  [place]   Modified Barium Swallow Study (MBS)     Patient Name: Kailee Almanza  : 1950  MRN: [id number]  Today's Date: 2/10/2017               Admit Date: 2017    Visit Dx:     ICD-10-CM ICD-9-CM   1. Oropharyngeal dysphagia R13.12 787.22     Patient Active Problem List   Diagnosis   • Peripheral vascular disease   • Hypertension   • Cerebrovascular disease   • Hyperlipidemia   • Chronic back pain   • Centrilobular emphysema   • Costal chondritis   • Dyslipidemia   • Dysphagia   • S/P parathyroidectomy 17   • Anemia   • Fever   • Acute respiratory failure with hypoxia   • Pleural effusion, bilateral     Past Medical History   Diagnosis Date   • Anemia      Due to low folic acid   • Arthritis    • Cerebrovascular disease 2016     Amaurosis fugax, OD.  Stent 70% LJ stenosis, 2014:  Questionable recurrent symptoms “early” following stent, treated with reinstitution Plavix.   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  High-degree left subclavian artery stenosis, treated with stenting, 2014.      • Chronic back pain 2016   • COPD (chronic obstructive pulmonary disease)    • Coronary artery disease    • History of chest x-ray 2015     post inflammatory scarring noted in right apical region, stable since CT of 10/10/2014; COPD changes with no acute findings   • History of chest x-ray 2014     no acute cardiopulmonary disease; calcified granuloma present in the left midlung; mild scarring in right upper lobe.    • Hyperlipidemia 2016   • Hypertension 2016   • Parathyroid abnormality    • Peripheral vascular disease 2016     1. Angiography the left subclavian artery.  2. Placement of a single, 4.0 mm diameter x 28 mm length, Xience Alpine everolimus-eluting stent in the proximal left subclavian artery in an area of in-stent restenosis. 7-22-15 3.  Femoral-femoral bypass  4.   Aortobifemoral bypass 1995     Past Surgical History   Procedure Laterality Date   • Cholecystectomy     • Appendectomy     • Incontinence surgery     • Hysterectomy       bleeding, uterine cyst   • Aorta - femoral artery bypass graft     • Femoral artery - femoral artery bypass graft     • Subclavian artery stent       7/15   • Carotid endarterectomy       Right. 2010   • Carotid stent       Right common carotid artery, 2015   • Refractive surgery     • Breast biopsy Bilateral    • Eye surgery       lasik   • Salivary gland surgery Left    • Pr explore parathyroid glands Bilateral 2/1/2017     Procedure: PARATHYROIDECTOMY;  Surgeon: Isaac CORONA MD;  Location: [facility];  Service: ENT          SWALLOW EVALUATION (last 72 hours)      Swallow Evaluation       02/10/17 1300 02/08/17 1000 02/08/17 0900 02/07/17 1600       Rehab Evaluation    Document Type evaluation;other (see comments)   MBS  -SM evaluation  -SG evaluation  -SG evaluation  -SM     Subjective Information agree to therapy  -SM complains of   pt emotional and anxious during FEES  -SG no complaints;agree to therapy  -SG no complaints  -SM     Patient Effort, Rehab Treatment  good  -SG good  -SG      Symptoms Noted During/After Treatment  none  -SG none  -SG      General Information    Patient Profile Review  yes  -SG yes  -SG yes  -SM     Onset of Illness/Injury  02/01/17  -SG 02/01/17  -SG      Date of Surgery  02/01/17  -SG 02/01/17  -SG      Subjective Patient Observations  Alert, cooperative, emotional   -SG alert, cooperative for bedside eval  -SG Alert and cooperative  -SM     Pertinent History Of Current Problem  c/o dysphagia following surgery 1 week ago. Esophagram completed this am. Aspiration resported when SLP reviewed w/ Radiology PA-- FEES compelted  -SG c/o dysphagia following surgery 1 week ago  -SG Anterior neck swelling, onset dysphagia yesterday, improving  -SM     Current Diet Limitations  other (see comments)   clear liquids   -SG other (see comments)   clear liquids  -SG thin liquids;other (see comments)   clear liq diet  -SM     Precautions/Limitations, Vision  WFL  -SG WFL  -SG WFL  -SM     Precautions/Limitations, Hearing  WFL  -SG WFL  -SG WFL  -SM     Prior Level of Function- Communication  functional in all spheres  -SG functional in all spheres  -SG functional in all spheres  -SM     Prior Level of Function- Swallowing  no diet consistency restrictions  -SG no diet consistency restrictions  -SG safe, efficient swallowing in all situations  -SM     Plans/Goals Discussed With  patient;family;agreed upon  -SG patient;family  -SG patient and family;agreed upon  -SM     Barriers to Rehab  none identified  -SG none identified  -SG none identified  -SM     Clinical Impression    Patient's Goals For Discharge eat/drink without coughing/choking  -SM eat/drink without coughing/choking  -SG eat/drink without coughing/choking  -SG eat/drink without coughing/choking;return to regular diet  -SM     Family Goals For Discharge  patient able to eat/drink without coughing/choking  -SG patient able to eat/drink without coughing/choking  -SG      SLP Swallowing Diagnosis mild dysphagia;oral dysfunction;moderate dysphagia;severe dysphagia;pharyngeal dysfunction  -SM mild dysphagia  -SG other (see comments)   r/o pharyngeal dys  -SG      Rehab Potential/Prognosis, Swallowing good, to achieve stated therapy goals  -SM good, to achieve stated therapy goals  -SG good, to achieve stated therapy goals  -SG      Criteria for Skilled Therapeutic Interventions Met skilled criteria for dysphagia intervention met  -SM skilled criteria for dysphagia intervention met  -SG skilled criteria for dysphagia intervention met  -SG      FCM, Swallowing  6-->Level 6  -SG 1-->Level 1  -SG      Therapy Frequency 5 times/wk  -SM 3-5 times/wk  -SG daily  -SG      Predicted Duration Therapy Interv (days) until discharge  -SM until discharge  -SG       SLP Diet Recommendation  soft textures;whole;thin liquids  -SM soft textures;thin liquids  -SG NPO: unsafe for food/liquid intake   until FEES this am  -SG thin liquids;advance diet as tolerated;other (see comments)   continue clear for now  -     Recommended Diagnostics  FEES   completed this am  -SG  reassess via clinical swallow (non-instrumental exam)  -     Recommended Feeding/Eating Techniques maintain upright posture during/after eating for 30 mins;turn head right during every swallow;tuck chin during every swallow;small sips/bites  -SM small sips/bites;other (see comments)   Multiple swallows prn  -SG  maintain upright posture during/after eating for 30 mins;small sips/bites  -SM     SLP Rec. for Method of Medication Administration meds crushed in pudding/applesauce  -SM meds crushed in pudding/applesauce  -SG meds via alternate route  -SG --   meds finely crushed with thin liquids  -SM     Monitor For Signs Of Aspiration  cough;other (see comments)   Pt throat cleared during FEES- no aspiration viewed  -SG       Anticipated Discharge Disposition  other (see comments)   unknown at this time  -SG       Pain Assessment    Pain Assessment No/denies pain  -SM No/denies pain  -SG No/denies pain  -SG      Cognitive Assessment/Intervention    Current Cognitive/Communication Assessment functional  -SM   functional  -SM     Oral Motor Structure and Function    Oral Motor Anatomy and Physiology   patient demonstrates anatomy and physiology that is WNL  -SG      Dentition Assessment   present and adequate  -SG      Secretion Management   WNL/WFL  -SG      Mucosal Quality   moist, healthy  -SG      Velar Elevation   WFL (within functional limits)  -SG      Gag Response   WFL (within functional limits)  -SG      Volitional Swallow   mild to moderate difficulties initiating volitional swallow  -SG      Volitional Cough   no difficulties initiating volitional cough  -SG      Oral Musculature General Assessment   WFL (within functional limits)   -SG WFL (within functional limits)  -     General Feeding/Swallowing Observations    Current Feeding Method   NPO  -SG      Observations of Self Feeding Skills   appropriate self feeding skills observed  -      Observations of Posture During Feeding   upright at 90 for eval  -SG      General Swallow Observations    General Swallow Observations   Intact  -SG      Clinical Swallow Exam    Mode of Presentation   fed by clinician;cup;spoon;straw  -SG      Oral Phase Results   intact oral phase without signs of dysfunction  -SG intact oral phase without signs of dysfunction;other (see comments)   DNT solids  -     Pharyngeal Phase Results   sign/symptoms of pharyngeal impairment;cough;multiple swallows;throat clear  - sign/symptoms of pharyngeal impairment  -     Summary of Clinical Exam   Esophagram completed this am-- results not reported at time of dysphagia evaluation. Called Radiology and spoke to PA who reported aspiration/penetration and residue on esophagram. FEES indicated to assess pharyngeal function.  -SG Dysphagia onset dollowing removal of drain, sympotoms resolving per pt.  Two hours ago with coughing on saliva and liquids.  Trialed small sips of liquid, showed no s/s aspiration though effortful swallow  utilized, likely to assist clearing bolus into esophagus and limiting residue.  C/o discomfort and increased sticking with nectar-thick tsp trial.    -     Videofluoroscopic Swallowing Exam    Risks/Benefits Reviewed risks/benefits explained;agreed to eval;patient  -        Radiologic Views Used for Examination left lateral view;anterior/posterior (AP) view  -        VFSS    Mode of Presentation  --  -SG       Oral Phase Results cohesive solid:;increased prep time  -        Oral Transit Impairment pudding:;cohesive solid:;prolonged oral transit time;tongue pumping  -        Pharyngeal Phase Physiologic Impairment thin:;aspiration after from residue  - --  -SG       Post Swallow  Residue all consistencies:;residue present in valleculae;residue present pyriform sinuses;residue present on pharyngeal walls;other (see comments)  -SM --  -SG       Rosenbek's PenAsp Scale thin:;7-->Level 7  -SM --  -SG       Response to Aspiration other (see comments)   inconsistent aspiration.  Drops just below the VFs  -SM --  -SG       Att Compensatory Maneuvers chin down position;head turn right;other (see comments)   prevented pen/asp and reduced/eliminated residue  -        Pharyngeal Phase Results impaired pharyngeal phase of swallowing  -SM --  -SG       Summary of VFSS Mild oral, mod-severe pharyngeal dysphagia.  Compared to report from FEES two days ago, this is a significant decline in swallowing function.  Severe diffuse pharyngeal weakness  with limited hyolaryngeal elevation and even more so excursion.  This resulted in penetration during the swallow with thin liquids with subsequent intermittent aspiration after the swallow from residue.  Severe residue with all, greatest with solids, extending from valleculae into pyriform sinuses.  In A-P view, L>R residue 2' reduced L lateral contraction.  Prevented penetration/aspiration and reduced/minimized residue with use of chin tuck + head turn to R shoulder (yes R shoulder, despite residue being greater on L side).    - FEES completed 2' Radiology report (confirmed w/ PA) of aspiration on esophagram this am. Pt very anxious regarding FEES and became emotional for exam. With encouragement pt able to tolerate scope pass and FEES. Pt hypersenstive to scope. No aspiration viewed on this exam, but penetration noted w/ thins.Very mild pys residue w/ thins-- none viewed w/ solid but amout had to be limited 2' pt's agitation and sensitivity with scope. Rec: Mech soft and thins. Dys tx for compensations. UPDATE: RN called stating pt uncomfortable w/ mech soft. Pt c/o coughing and discomfort w/ dry chopped solids. Downgrade to puree and f/u for DT and  compensations   -SG       Fiberoptic Endoscopic Swallowing Exam    Risks/Benefits Reviewed  patient  -SG       Positioning Needs for Swallow Exam  upright at 90 for evaluation  -SG       Nasal Entry, Topical Anesthetic  left:;nostril entered using no topical anesthetic  -SG       Anatomy and Physiology    Velopharyngeal  WFL  -SG       Base of Tongue  symmetrical  -SG       Epiglottis  WFL  -SG       Laryngeal Function Breathing  symmetrical  -SG       Laryngeal Function Phonation  symmetrical  -SG       Laryngeal Function Breath Hold  TVF contact  -SG       Secretions  0- Normal, no visible secretions  -SG       Secretion Description  thin;clear  -SG       Ice Chips  elicited swallow  -SG       Spontaneous Swallow  frequency adequate  -SG       Sensory  senses scope   hypersensitive to scope  -SG       Oral-Phary Transit  WFL  -SG       Anatomy Hypopharynx    Observation Anatomic Considerations  no anatomic structural deviation via FEES  -SG       FEES    Mode of Presentation  thin:;pudding:;cohesive solid:;cup;spoon;straw  -SG       Pharyngeal Phase Impairment  impaired timing with penetration before  -SG       Post Swallow Residue  residue present pyriform sinuses   mild in pys  -SG       Rosenbek's Scale  4-->Level 4  -SG       Response to Aspiration  other (see comments)   no aspiration on exam  -SG       Attempted Compensatory Maneuvers  multiple swallows   cleared all pharyngeal residue  -SG       FEES Swallow Recommendations    Eating Assistance  needs occasional supervision during self eating activity  -SG       Oral Care  oral care with toothbrush and dentifrice BID and PRN  -SG       Modified Eating Strategies  upright positioning 90 degrees  -SG       Other Recommendations  mechanical soft;puree;thin  -SG       Dysphagia Treatment Objectives and Progress    Dysphagia Treatment Objectives Improve oral skills;Improve closure at entrance to airway;Improve laryngeal elevation;Improve hyolaryngeal  excursion;Improve tongue base & pharyngeal wall squeeze  -SM        To Improve Oral Skills, patient will: Increase tongue A-P movement;Increase back of tongue control;90%;without cues  -SM        Oral Skills Progress continue to adress  -SM        To improve timing of pharyngeal response, patient will:  Swallow in timely way using super-supraglottic swallow;80%;with consistent cues  -SG       Timing of Pharyngeal Response Progress  continue to adress  -SG       To improve closure at entrance to airway, patient will:  Complete super-supraglottic swallow;80%;with consistent cues  -SG       Closure at Entrance to Airway Progress  continue to adress  -SG       To improve laryngeal elevation, patient will: Complete Mendelsohn maneuver;80%;without cues  -SM Complete super-supraglottic swallow;80%;with consistent cues  -SG       Laryngeal Elevation Progress continue to adress  -SM continue to adress  -SG       To improve hyolaryngeal excursion, patient will: Complete chin tuck against resistance (comment number of repetitions);90%;without cues   20 sec reps x6  -SM        Hyolaryngeal Excursion Progress continue to adress  -SM        To improve tongue base & pharyngeal wall squeeze, patient will: Complete effortful swallow;Complete tongue hold swallow;80%;without cues  -SM        Tongue Base/Pharyngeal Wall Squeeze Progress continue to adress  -SM          User Key  (r) = Recorded By, (t) = Taken By, (c) = Cosigned By    Initials Name Effective Dates     Magdalene Garcia, MS [facility]-SLP 06/22/15 -     SM Ellen Escamilla, MS CCC-SLP 06/22/15 -         EDUCATION  The patient has been educated in the following areas:   Dysphagia (Swallowing Impairment) Oral Care/Hydration Modified Diet Instruction.    SLP Recommendation and Plan  SLP Swallowing Diagnosis: mild dysphagia, oral dysfunction, moderate dysphagia, severe dysphagia, pharyngeal dysfunction  SLP Diet Recommendation: soft textures, whole, thin  liquids  Recommended Feeding/Eating Techniques: maintain upright posture during/after eating for 30 mins, turn head right during every swallow, tuck chin during every swallow, small sips/bites  SLP Rec. for Method of Medication Administration: meds crushed in pudding/applesauce     Recommended Diagnostics: reassess via clinical swallow (non-instrumental exam)  Criteria for Skilled Therapeutic Interventions Met: skilled criteria for dysphagia intervention met     Rehab Potential/Prognosis, Swallowing: good, to achieve stated therapy goals  Therapy Frequency: 5 times/wk             Plan of Care Review  Plan Of Care Reviewed With: patient  Progress: declining  Outcome Summary/Follow up Plan: MBS completed.  Mild oral, mod-severe pharyngeal dysphagia.  Compared to report from FEES two days ago, this is a significant decline in swallowing function.  Severe diffuse pharyngeal weakness  with limited hyolaryngeal elevation and even more so excursion.  This resulted in penetration during the swallow with thin liquids with subsequent intermittent aspiration after the swallow from residue.  Severe residue with all, greatest with solids, extending from valleculae into pyriform sinuses.  In A-P view, L>R residue 2' reduced L lateral contraction.  Prevented penetration/aspiration and reduced/minimized residue with use of chin tuck + head turn to R shoulder (yes R shoulder, despite residue being greater on L side).  Recommend: Mech-soft diet, thin liquids, meds crushed in applesauce/pudding.  Small bites and sips, ok with straw.  Must use chin tuck + head turn to R for all bites/sips.  Will initiate dysphagia tx.  Swallow function appears as expect following neurologic event (e.g., CVA).  Discussed with MD (Camilo) who is going to further discuss with pt and likely MRI.  Thanks.            SLP Goals       02/10/17 1413          Safely Consume Diet    Safely Consume Diet- SLP, Date Established 02/10/17  -      Safely Consume  Diet- SLP, Time to Achieve by discharge  -SM      Safely Consume Diet- SLP, Outcome goal ongoing  -        User Key  (r) = Recorded By, (t) = Taken By, (c) = Cosigned By    Initials Name Provider Type    DEISY Escamilla MS CCC-SLP Speech and Language Pathologist               Time Calculation:         Time Calculation- SLP       02/10/17 1415          Time Calculation- SLP    SLP Start Time 1300  -      SLP Received On 02/10/17  -        User Key  (r) = Recorded By, (t) = Taken By, (c) = Cosigned By    Initials Name Provider Type    DEISY Escamilla MS CCC-SLP Speech and Language Pathologist          Therapy Charges for Today     Code Description Service Date Service Provider Modifiers Qty    [id number] HC ST MOTION FLUORO EVAL SWALLOW 6 2/10/2017 Ellen Escamilla MS CCC-SLP GN 1               Ellen Escamilla MS CCC-SLP  2/10/2017

## 2017-02-11 ENCOUNTER — APPOINTMENT (OUTPATIENT)
Dept: CARDIOLOGY | Facility: HOSPITAL | Age: 67
End: 2017-02-11
Attending: HOSPITALIST

## 2017-02-11 VITALS
OXYGEN SATURATION: 94 % | WEIGHT: 126 LBS | TEMPERATURE: 98.3 F | SYSTOLIC BLOOD PRESSURE: 116 MMHG | DIASTOLIC BLOOD PRESSURE: 63 MMHG | HEIGHT: 67 IN | RESPIRATION RATE: 18 BRPM | HEART RATE: 86 BPM | BODY MASS INDEX: 19.78 KG/M2

## 2017-02-11 PROBLEM — J69.0 ASPIRATION PNEUMONIA OF BOTH LOWER LOBES (HCC): Status: ACTIVE | Noted: 2017-02-11

## 2017-02-11 PROBLEM — R13.13 PHARYNGEAL DYSPHAGIA: Status: ACTIVE | Noted: 2017-02-07

## 2017-02-11 LAB
BACTERIA SPEC AEROBE CULT: ABNORMAL
BH CV ECHO MEAS - AO ROOT AREA (BSA CORRECTED): 1.6
BH CV ECHO MEAS - AO ROOT AREA: 5.9 CM^2
BH CV ECHO MEAS - AO ROOT DIAM: 2.7 CM
BH CV ECHO MEAS - BSA(HAYCOCK): 1.6 M^2
BH CV ECHO MEAS - BSA: 1.7 M^2
BH CV ECHO MEAS - BZI_BMI: 19.7 KILOGRAMS/M^2
BH CV ECHO MEAS - BZI_METRIC_HEIGHT: 170.2 CM
BH CV ECHO MEAS - BZI_METRIC_WEIGHT: 57.2 KG
BH CV ECHO MEAS - CONTRAST EF (2CH): 62.5 ML/M^2
BH CV ECHO MEAS - CONTRAST EF 4CH: 62.5 ML/M^2
BH CV ECHO MEAS - EDV(CUBED): 53.1 ML
BH CV ECHO MEAS - EDV(MOD-SP2): 40 ML
BH CV ECHO MEAS - EDV(MOD-SP4): 56 ML
BH CV ECHO MEAS - EDV(TEICH): 60.3 ML
BH CV ECHO MEAS - EF(CUBED): 65.4 %
BH CV ECHO MEAS - EF(MOD-SP2): 62.5 %
BH CV ECHO MEAS - EF(MOD-SP4): 62.5 %
BH CV ECHO MEAS - EF(TEICH): 57.7 %
BH CV ECHO MEAS - ESV(CUBED): 18.4 ML
BH CV ECHO MEAS - ESV(MOD-SP2): 15 ML
BH CV ECHO MEAS - ESV(MOD-SP4): 21 ML
BH CV ECHO MEAS - ESV(TEICH): 25.5 ML
BH CV ECHO MEAS - FS: 29.8 %
BH CV ECHO MEAS - IVS/LVPW: 0.9
BH CV ECHO MEAS - IVSD: 0.77 CM
BH CV ECHO MEAS - LA DIMENSION: 2.7 CM
BH CV ECHO MEAS - LA/AO: 0.98
BH CV ECHO MEAS - LAT PEAK E' VEL: 10.9 CM/SEC
BH CV ECHO MEAS - LV DIASTOLIC VOL/BSA (35-75): 33.7 ML/M^2
BH CV ECHO MEAS - LV MASS(C)D: 86.2 GRAMS
BH CV ECHO MEAS - LV MASS(C)DI: 51.9 GRAMS/M^2
BH CV ECHO MEAS - LV SYSTOLIC VOL/BSA (12-30): 12.6 ML/M^2
BH CV ECHO MEAS - LVIDD: 3.8 CM
BH CV ECHO MEAS - LVIDS: 2.6 CM
BH CV ECHO MEAS - LVLD AP2: 6.5 CM
BH CV ECHO MEAS - LVLD AP4: 6.8 CM
BH CV ECHO MEAS - LVLS AP2: 5.4 CM
BH CV ECHO MEAS - LVLS AP4: 5.7 CM
BH CV ECHO MEAS - LVPWD: 0.86 CM
BH CV ECHO MEAS - MED PEAK E' VEL: 8.77 CM/SEC
BH CV ECHO MEAS - MV A MAX VEL: 87.1 CM/SEC
BH CV ECHO MEAS - MV E MAX VEL: 100.8 CM/SEC
BH CV ECHO MEAS - MV E/A: 1.2
BH CV ECHO MEAS - PA ACC SLOPE: 552.1 CM/SEC^2
BH CV ECHO MEAS - PA ACC TIME: 0.13 SEC
BH CV ECHO MEAS - PA MAX PG: 3.7 MMHG
BH CV ECHO MEAS - PA PR(ACCEL): 22 MMHG
BH CV ECHO MEAS - PA V2 MAX: 95.8 CM/SEC
BH CV ECHO MEAS - RVDD: 2.3 CM
BH CV ECHO MEAS - SI(CUBED): 20.9 ML/M^2
BH CV ECHO MEAS - SI(MOD-SP2): 15 ML/M^2
BH CV ECHO MEAS - SI(MOD-SP4): 21.1 ML/M^2
BH CV ECHO MEAS - SI(TEICH): 21 ML/M^2
BH CV ECHO MEAS - SV(CUBED): 34.7 ML
BH CV ECHO MEAS - SV(MOD-SP2): 25 ML
BH CV ECHO MEAS - SV(MOD-SP4): 35 ML
BH CV ECHO MEAS - SV(TEICH): 34.8 ML
BH CV XLRA - RV BASE: 3.3 CM
BH CV XLRA - RV LENGTH: 5.6 CM
BH CV XLRA - RV MID: 2.6 CM
BH CV XLRA - TDI S': 14.4 CM/SEC
E/E' RATIO: 10
GRAM STN SPEC: ABNORMAL
LV EF 2D ECHO EST: 65 %

## 2017-02-11 PROCEDURE — 25010000002 FUROSEMIDE PER 20 MG: Performed by: HOSPITALIST

## 2017-02-11 PROCEDURE — 93306 TTE W/DOPPLER COMPLETE: CPT | Performed by: INTERNAL MEDICINE

## 2017-02-11 PROCEDURE — 99222 1ST HOSP IP/OBS MODERATE 55: CPT | Performed by: PSYCHIATRY & NEUROLOGY

## 2017-02-11 PROCEDURE — 93306 TTE W/DOPPLER COMPLETE: CPT

## 2017-02-11 PROCEDURE — 99239 HOSP IP/OBS DSCHRG MGMT >30: CPT | Performed by: HOSPITALIST

## 2017-02-11 RX ORDER — AMOXICILLIN AND CLAVULANATE POTASSIUM 400; 57 MG/5ML; MG/5ML
800 POWDER, FOR SUSPENSION ORAL 2 TIMES DAILY
Qty: 100 ML | Refills: 0 | Status: SHIPPED | OUTPATIENT
Start: 2017-02-11 | End: 2017-02-16

## 2017-02-11 RX ORDER — ALBUTEROL SULFATE 90 UG/1
2 AEROSOL, METERED RESPIRATORY (INHALATION) EVERY 4 HOURS PRN
Qty: 1 INHALER | Refills: 0 | Status: SHIPPED | OUTPATIENT
Start: 2017-02-11 | End: 2018-03-22 | Stop reason: SDUPTHER

## 2017-02-11 RX ADMIN — FOLIC ACID 1 MG: 1 TABLET ORAL at 09:22

## 2017-02-11 RX ADMIN — CLOPIDOGREL 75 MG: 75 TABLET, FILM COATED ORAL at 09:22

## 2017-02-11 RX ADMIN — AMOXICILLIN AND CLAVULANATE POTASSIUM 800 MG: 400; 57 POWDER, FOR SUSPENSION ORAL at 09:22

## 2017-02-11 RX ADMIN — CALCIUM CARBONATE 1250 MG: 1250 SUSPENSION ORAL at 09:21

## 2017-02-11 RX ADMIN — LISINOPRIL 20 MG: 20 TABLET ORAL at 09:22

## 2017-02-11 RX ADMIN — FUROSEMIDE 40 MG: 10 INJECTION, SOLUTION INTRAMUSCULAR; INTRAVENOUS at 09:22

## 2017-02-11 RX ADMIN — CALCIUM CARBONATE 1250 MG: 1250 SUSPENSION ORAL at 12:21

## 2017-02-11 RX ADMIN — ASPIRIN 81 MG 81 MG: 81 TABLET ORAL at 09:22

## 2017-02-11 RX ADMIN — VENLAFAXINE HYDROCHLORIDE 75 MG: 37.5 TABLET ORAL at 09:21

## 2017-02-11 NOTE — DISCHARGE SUMMARY
[facility] Medicine Services  DISCHARGE SUMMARY       Date of Admission: 2/7/2017  Date of Discharge:  2/11/2017    Primary Care Physician: Janette Srinivasan MD  Consulting Otolaryngologist: Dr. Chicas  Consulting Neurologist: Dr. Isaac Beckham     Discharge Diagnoses:  Active Hospital Problems (** Indicates Principal Problem)    Diagnosis Date Noted   • **Pharyngeal dysphagia [R13.13] 02/07/2017     Priority: High   • Acute respiratory failure with hypoxia [J96.01] 02/10/2017     Priority: High   • Aspiration pneumonia of both lower lobes [J69.0] 02/11/2017     Priority: Medium   • Pleural effusion, bilateral [J90] 02/10/2017   • S/P parathyroidectomy 2/1/17 [E89.2] 02/07/2017   • Anemia [D64.9] 02/07/2017   • Fever [R50.9] 02/07/2017   • Hypertension [I10] 07/21/2016   • Peripheral vascular disease [I73.9] 07/21/2016      Resolved Hospital Problems    Diagnosis Date Noted Date Resolved   No resolved problems to display.       Presenting Problem/History of Present Illness  Dysphagia [R13.10]     Chief Complaint on Day of Discharge:  F/u dysphagia  History of Present Illness on Day of Discharge: Pt reports stable dysphagia since yesterday. No further episodes of severe dyspnea. Has not noticed much difference with lasix. On room air. No cough. Wants to go home today.     Hospital Course  Mrs. Almanza is a 66 year-old F with a past hx of hypertension, mild COPD, CAD, PAD with prior aortofem and fem-fem bypass, and hyperparathyroidism s/p parathyroidectomy on 2/1/17 by Dr. Chicas who presented to the [facility] ER on 2/7 with persistent fever despite keflex ordered as an outpatient, anterior neck erythema, moderate to severe dysphagia, and symptomatic hypocalcemia (unable to take PO supplements).  ER evaluation included a CT of the neck which demonstrated postoperative changes at site of incision but no abscess.  No deep space infection.  Patient also reported shortness of breath  with initial chest x-ray negative for acute findings.    Patient was placed empirically on IV Unasyn and IV Decadron at the recommendation of ENT and her neck erythema improved dramatically.  Dr. Perdue saw the patient and did not feel that she had a surgical site infection but recommended continued antibiotics.  She was eventually transitioned to liquid Augmentin and she will complete a total 10 day course after discharge.  Her hypocalcemia resolved with oral liquid supplementation which she'll continue at discharge.    Patient had repetitive evaluations for her dysphagia with FEES as well as modified barium swallows.  Although initially there was no clear evidence of dysphagia but occasional aspiration of thin sips, on 2/10 she was noted to have severe pharyngeal weakness with moderate dysphagia with left-sided weakness suggestive of a neurologic cause of her dysphagia.  MRI of the brain was pursued which was negative for acute stroke and neurology was consulted.  They did not see any evidence of a ischemic cause of her dysphagia or Parkinson's and suggested that this may be due to a temporary nerve injury from her surgery and hopefully be self-limited.  She will be arranged for outpatient speech therapy in [place] after discharge.  She was instructed to use a chin tuck with head turned to the right when eating and was recommended to continue mechanical soft diet with thin liquids and meds crushed in applesauce/pudding at discharge.    Patient had several episodes of severe dyspnea and wheezing which responded to nebulizers while inpatient.  Repeat imaging of her chest with CT angiogram chest demonstrated no pulmonary embolus but bilateral pleural effusions as well as atelectasis of the inferior lobes.  I suspect that she had some aspiration with resulting probable bilateral aspiration pneumonia for which she should be adequately covered with Augmentin.  This was likely also aggravated by her chronic COPD.  I  have discharged her with an albuterol inhaler.  Evaluation of her dyspnea also included an elevated BNP with no other clear clinical signs of heart failure.  An echocardiogram is pending at this time but preliminarily appears normal with acceptable ejection fraction.    Incidentally on CTA of her chest she had dilation of her distal thoracic aorta with concern for possible aneurysm.  I'm uncertain of significance of this in light of her previous history of aortofem bypass.  I referred her to follow-up with Dr. David Gordon who performed her vascular surgeries in the past for further evaluation and perhaps, subsequent dedicated imaging studies.      Procedures Performed         Consults:   Consults     Date and Time Order Name Status Description    2/11/2017 0638 Inpatient Consult to Neurology      2/7/2017 1624 Inpatient Consult to ENT      2/7/2017 0322 Surgery (on-call MD unless specified) Completed           Pertinent Test Results:    Results from last 7 days  Lab Units 02/10/17  0624 02/09/17  0626 02/08/17  0445   WBC 10*3/mm3 7.65 10.02 7.27   HEMOGLOBIN g/dL 8.0* 9.0* 7.6*   HEMATOCRIT % 25.4* 28.8* 23.7*   PLATELETS 10*3/mm3 297 376 224         Results from last 7 days  Lab Units 02/10/17  1804 02/10/17  0624 02/09/17  1125 02/09/17  0626 02/07/17  0126   SODIUM mmol/L  --  144 142 143  < > 139   POTASSIUM mmol/L 4.5 3.5 3.8 4.1  < > 3.8   CHLORIDE mmol/L  --  110* 108 110*  < > 102   TOTAL CO2 mmol/L  --  25.0 26.0 25.0  < > 21.0*   BUN mg/dL  --  12 15 16  < > 14   CREATININE mg/dL  --  1.00 1.00 0.90  < > 1.00   CALCIUM mg/dL  --  8.4* 9.9 9.5  < > 7.7*   BILIRUBIN mg/dL  --   --   --   --   --  0.4   ALK PHOS U/L  --   --   --   --   --  110   ALT (SGPT) U/L  --   --   --   --   --  29   AST (SGOT) U/L  --   --   --   --   --  12*   GLUCOSE mg/dL  --  98 105* 103*  < > 115*   < > = values in this interval not displayed.       TSH 0.032    Cultures:    BLOOD CULTURE   Date Value Ref Range Status    02/07/2017 No growth at 3 days  Preliminary   02/07/2017 No growth at 3 days  Preliminary     WOUND CULTURE   Date Value Ref Range Status   02/08/2017 (A)  Preliminary    Light growth (2+) Staphylococcus, coagulase negative     MRSA SCREEN CX   Date Value Ref Range Status   02/07/2017   Final    No Methicillin Resistant Staphylococcus aureus isolated     Imaging Results (all)     Procedure Component Value Units Date/Time    XR Chest 1 View [[id number]] Collected:  02/07/17 1111     Updated:  02/07/17 2056    Narrative:       EXAMINATION: XR CHEST 1 VIEW-02/07/2017:      INDICATION: Difficulty swallowing.      COMPARISON: 07/22/2015 portable chest.     FINDINGS: Note is again made of the patient's right carotid stent. Right  apical scarring is stable compared to the 07/22/2015 exam. There is mild  coarsening of the interstitial markings elsewhere, and hyperlucency of  the apices consistent with obstructive airways disease. The lungs are  otherwise clear except for a few calcified granulomas. Heart and  vasculature are normal in size. No mediastinal enlargement is  identified.           Impression:       Stable right apical scarring, and chronic appearing changes  of COPD and old granulomatous disease. No new chest pathology is seen.     D:  02/07/2017  E:  02/07/2017     This report was finalized on 2/7/2017 8:54 PM by DR. Jam Lam MD.       FL Esophagram Complete [[id number]] Collected:  02/07/17 1607     Updated:  02/07/17 2129    Narrative:       EXAMINATION: FL ESOPHAGRAM COMPLETE-     INDICATION: difficulty swallowing         TECHNIQUE: 42 seconds of fluoroscopic time was used for this exam. 3  associated images were saved.  imaging reveals no gross  abnormalities. There are surgical clips in the gallbladder fossa.     COMPARISON: NONE     FINDINGS: Under fluoroscopic observation, multiple attempts were made to  evaluate the esophagus. Only a trace amount of contrast was swallowed  during this exam. No  gross extravasation of contrast was seen in the  cervical esophagus, however due to the small amount of contrast  swallowed, swallowing mechanism, and esophageal integrity cannot be  fully evaluated at this time.          Impression:       Limited esophagram study due to trace amount of contrast  ingested. Consider follow-up exam if clinically relevant.         This report was finalized on 2/7/2017 9:26 PM by DR. Jam Lam MD.       FL Esophagram Complete [[id number]] Collected:  02/08/17 1243     Updated:  02/08/17 1324    Narrative:       EXAMINATION: FL ESOPHAGRAM COMPLETE-     INDICATION: dysphagia: Unable to tolerate 2/7 in am but now drinking  better; R13.10-Dysphagia, unspecified         TECHNIQUE: 59 seconds of fluoroscopic time was used for this exam. 14  associated images were saved.  imaging reveals no gross  abnormalities.     COMPARISON: NONE     FINDINGS: Under fluoroscopic observation, the patient ingested thin  barium. There was penetration to the level of the vocal folds with  swallows of thin barium, and no aspiration was observed on 2 occasions,  which did elicit a cough reflex. Due to risk of aspiration, the  esophagus was not fully evaluated at this time. No gross extravasation  of contrast was seen. There was no evidence of a focal esophageal  stricture in the proximal third esophagus.          Impression:       Limited barium swallow series due to aspiration risk. No  gross extravasation of contrast was seen. No strictures were seen in the  proximal esophagus         This report was finalized on 2/8/2017 1:22 PM by Dr. Shai Aleman MD.       XR Chest PA & Lateral [[id number]] Collected:  02/09/17 1548     Updated:  02/09/17 1712    Narrative:          EXAMINATION: XR CHEST PA AND LATERA - 02/09/2017     INDICATION: R13.10-Dysphagia, unspecified; R13.12-Dysphagia,  oropharyngeal phase.      COMPARISON: None.     FINDINGS:   1. Obstructive lung disease is noted. There is centrilobular  emphysema  in the upper lung zones. Fibronodular scarring is seen at the right  apex.     2. Blunting of the costophrenic angles is noted bilaterally suggesting  bilateral basilar effusions, new from studies of 2 days ago. All other  chronic findings are stable. Significant new airspace disease is not  identified.       Impression:       Interval development of bilateral pleural effusions.  Centrilobular emphysema and obstructive lung disease are noted  diffusely.     DICTATED:     02/09/2017  EDITED:         02/09/2017     This report was finalized on 2/9/2017 5:10 PM by Dr. Chato Boogie MD.       CT Angiogram Chest With & Without Contrast [[id number]] Collected:  02/10/17 1307     Updated:  02/10/17 1307    Narrative:       EXAMINATION: CT ANGIOGRAM CHEST WITH AND WITHOUT CONTRAST-02/10/2017:      INDICATION: Rule out PE, eval for infiltrate; R13.10-Dysphagia,  unspecified; R13.12-Dysphagia, oropharyngeal phase.         TECHNIQUE: Spiral acquisition 3 mm post-IV contrast images through the  chest with coronal 10 mm 2-D reconstructions.     The radiation dose reduction device was turned on for each scan per the  ALARA (As Low as Reasonably Achievable) protocol.     COMPARISON: Unenhanced chest CT scan 10/01/2014.     FINDINGS: Patient history indicates recent parathyroid surgery, some  fever and difficulty swallowing, but sudden onset last night of dyspnea,  elevated heart rate and falling O2 saturation.     Today's study shows excellent contrast opacification of pulmonary  arteries with average Hounsfield units in the main pulmonary arteries of  over 500 H.U. There is no visible filling defect to suggest pulmonary  embolic disease. 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  Combined diameter of the aorta and pseudoaneurysm is very difficult to  gauge, due to its eccentric shape, but perhaps 4.8 x 3.3 cm as seen on  axial image 81. There is no evidence of extravasation. There are several  other eccentric areas of the thoracic wall that may represent small  pseudoaneurysms or just simply focal weakening of the wall.     No mediastinal adenopathy or pericardial effusion is seen. There are  very small symmetric pleural effusions. Lung window images show stable  right apical scarring, and quite advanced bullous disease of the lungs,  with essentially no normal appearing upper lobe parenchyma and what  appears to be micro-bullous disease of the remaining lower lungs. There  is mild discoid atelectasis of the lower lobes, and a couple of small  faint nodular densities of the right middle lobe, up to 5 mm, which are  nonspecific.     Included images of the upper abdomen show the upper margin of an  abdominal aortic aneurysm, at least 3 cm in diameter. No significant  abnormalities are seen of the included portions of the liver, spleen,  pancreas, adrenal glands, or left upper renal pole.     The upper margin of the scan shows expected postoperative changes of  recent surgery in the region of the inferior thyroid.       Impression:       1. No evidence of pulmonary embolic disease.  2. Advanced changes of centrilobular emphysema.  3. Very small pleural effusions.  4. Small right middle lobe pulmonary nodules, perhaps noncalcified  granulomas but new from 2014 scan.  5. Advanced atherosclerotic disease of the descending thoracic aorta and  abdominal aorta with highly irregular aortic wall, very eccentric  appearance of the descending thoracic aorta which may represent one or  more distal descending thoracic pseudoaneurysms, and suspected other  small pseudoaneurysms. No evidence of  rupture/leak. If patient has not  been evaluated by cardiothoracic surgery this should be considered in  the near future.     D:  02/10/2017  E:  02/10/2017             FL Video Swallow With Speech [[id number]] Collected:  02/10/17 1459     Updated:  02/10/17 1622    Narrative:       EXAMINATION: FL VIDEO SWALLOW W SPEECH-     INDICATION: dysphagia; R13.10-Dysphagia, unspecified; R13.12-Dysphagia,  oropharyngeal phase         TECHNIQUE: 3 minutes and 23 seconds of fluoroscopic time was used for  this exam. 1 associated image was saved. The patient was evaluated in  the seated lateral position, and the seated AP position while taking a  variety of consistencies of barium by mouth under the direction of  pathology.     COMPARISON: NONE     FINDINGS: There was aspiration with sips of thin barium. There was no  penetration, and no aspiration with sips of thin barium, and a modified  chin tuck. There is a mild amount of residue in the vallecula, and  piriform sinuses after swallows with pudding consistency barium. Residue  cleared moderately with subsequent swallows.          Impression:       Fluoroscopy provided for a modified barium swallow. Please  see speech therapy report for full details and recommendations.         This report was finalized on 2/10/2017 4:19 PM by Dr. Tessa Beal MD.       MRI Brain Without Contrast [[id number]] Collected:  02/10/17 1909     Updated:  02/11/17 1154    Narrative:       EXAMINATION: MRI BRAIN WO CONTRAST - 02/10/2017     INDICATION:  R13.12-Dysphagia, oropharyngeal phase.     TECHNIQUE: Routine multilevel imaging was obtained of the brain without   the administration of gadolinium contrast.      COMPARISON: NONE     FINDINGS: There is mild atrophy of the brain predominantly within the  frontal sinuses. There is no hemorrhage or hydrocephalus. No mass, mass  effect, or midline shift. Some signal change is seen within the  periventricular and subcortical white matter suggesting  "chronic small  vessel ischemic change. There is old insult with gliotic changes  involving the left frontal region. There is no evidence of restricted  diffusion to suggest evidence of an acute ischemic insult. Flow voids  are preserved in the major intracranial vessels. Pituitary and sella are  unremarkable. Craniovertebral junction is preserved. Visualized  paranasal sinuses are clear. Globes and orbits are intact. Mastoid air  cells are patent.  No cerebellar pontine angle mass is identified.       Impression:       Atrophy of the brain predominantly within the frontal  region. There are chronic changes seen within the brain with no evidence  of acute intracranial abnormality.      DICTATED:     02/10/2017  EDITED:         02/11/2017           ECG/EMG Results (all)     Procedure Component Value Units Date/Time    ECG 12 Lead [[id number]] Collected:  02/10/17 1524     Updated:  02/10/17 1606    Narrative:       Test Reason : change in T wave  Blood Pressure : **/** mmHG  Vent. Rate : 078 BPM     Atrial Rate : 078 BPM     P-R Int : 112 ms          QRS Dur : 058 ms      QT Int : 380 ms       P-R-T Axes : 060 046 054 degrees     QTc Int : 433 ms    Normal sinus rhythm  Normal ECG  When compared with ECG of 22-JUL-2015 09:10,  No significant change was found    Referred By:  REPASS           Confirmed By:     Adult Transthoracic Echo Complete [[id number]] Collected:  02/11/17 0828     BSA 1.7 m^2 Updated:  02/11/17 0858    Narrative:       PRELIMINARY TECH FINDINGS ONLY    Impression:               Condition on Discharge:  good    Physical Exam on Discharge:  Visit Vitals   • /63   • Pulse 86   • Temp 98.3 °F (36.8 °C) (Oral)   • Resp 18   • Ht 67\" (170.2 cm)   • Wt 126 lb (57.2 kg)   • SpO2 94%   • BMI 19.73 kg/m2     Physical Exam  Constitutional: no acute distress, awake, alert  Respiratory: Clear to auscultation bilaterally, nonlabored respirations   Cardiovascular: RRR, II/VI systolic murmur, no rubs, or " gallops, palpable pedal pulses bilaterally  Gastrointestinal: Positive bowel sounds, soft, nontender, nondistended  Musculoskeletal: No bilateral ankle edema  Psychiatric: oriented x 3, appropriate affect, cooperative  Skin: neck incision c/d/i with mild erythema, no induration   Neurologic: Strength symmetric in all extremities, no facial asymmetry       Discharge Disposition  Home or Self Care    Discharge Medications   Archie Kailee Delilah   Home Medication Instructions LUIS:[id number]    Printed on:02/11/17 1241   Medication Information                      albuterol (PROVENTIL HFA;VENTOLIN HFA) 108 (90 BASE) MCG/ACT inhaler  Inhale 2 puffs Every 4 (Four) Hours As Needed for wheezing.             amoxicillin-clavulanate (AUGMENTIN) 400-57 MG/5ML suspension  Take 10 mL by mouth 2 (Two) Times a Day for 5 days. Indications: Skin and Soft Tissue Infection             aspirin 81 MG tablet  Take 81 mg by mouth Daily.             atorvastatin (LIPITOR) 80 MG tablet  TAKE 1 TABLET AT BEDTIME             calcium carbonate 1250 (500 CA) MG/5ML  5 ml po TID AC             clopidogrel (PLAVIX) 75 MG tablet  TAKE 1 TABLET DAILY             folic acid (FOLVITE) 1 MG tablet  Take 1 mg by mouth Daily.             HYDROcodone-acetaminophen (NORCO)  MG per tablet  Take 1 tablet by mouth Every 4 (Four) Hours As Needed for moderate pain (4-6).             lisinopril (PRINIVIL,ZESTRIL) 40 MG tablet  Take 40 mg by mouth Daily.             meclizine 25 MG chewable tablet chewable tablet  Chew 25 mg Every 6 (Six) Hours As Needed.             venlafaxine XR (EFFEXOR-XR) 150 MG 24 hr capsule  Take 150 mg by mouth Every Other Day.                 Discharge Diet:   Diet Instructions     Diet: Regular, Soft Texture; Thin Liquids, No Restrictions; Whole       Discharge Diet:   Regular  Soft Texture      Fluid Consistency:  Thin Liquids, No Restrictions   Soft Options:  Whole                 Discharge Care Plan / Instructions:  - Follow  up with Dr. David Gordon in 4-6 weeks       Activity at Discharge:   Activity Instructions     Activity as Tolerated                     Follow-up Appointments  Future Appointments  Date Time Provider Department Center   4/19/2017 2:30 PM Arnulfo Borden MD, [facility] None     Referrals and Follow-ups to Schedule     Ambulatory Referral to Speech Therapy    As directed    Debbie Silva please       Follow-Up    As directed    Janette Srinivasan MD   Follow Up Details:  1-2 weeks                 Test Results Pending at Discharge   Order Current Status    Blood Culture Preliminary result    Blood Culture Preliminary result    Wound Culture Preliminary result           Isaac Page MD 02/11/17 12:41 PM    Time: Discharge 35 min    Please note that portions of this note may have been completed with a voice recognition program. Efforts were made to edit the dictations, but occasionally words are mistranscribed.

## 2017-02-11 NOTE — CONSULTS
"Subjective     CC: dysphagia    History of Present Illness   Kailee Almanza is a 66 y.o. female is seen today in consultation at the request of Dr. Page for dysphagia. She reports undergoing parathyroid surgery on 2/1/17 and having a drain removed on 2/6/17. She had started experiencing fevers over the previous 24-48 hours. On the afternoon of the 6th she began developing dysphagia, which became severe. Concomitant with this she developed significant cellulitis of the neck with associated swelling. She denies other associated symptoms to suggest stroke, parkinsonism or other neurologic disorder. Her swelling has improved, and her dysphagia has improved though not resolved over the past 5 days.    The following portions of the patient's history were reviewed and updated as appropriate: allergies, current medications, past family history, past medical history, past social history, past surgical history and problem list.    I reviewed admission PFSH from her admission dated 2/7/17.    Review of Systems   Constitutional: Negative.    Eyes: Negative.    Respiratory: Negative.    Cardiovascular: Negative.    Gastrointestinal: Negative.    Genitourinary: Negative.    Musculoskeletal: Negative.    All other systems reviewed and are negative.      Objective   General appearance today is normal.   Peripheral pulses were present and symmetric.   The ophthalmoscopic exam today is unremarkable. This discs and posterior elements are unremarkable.    Visit Vitals   • /63   • Pulse 85   • Temp 98.3 °F (36.8 °C) (Oral)   • Resp 18   • Ht 67\" (170.2 cm)   • Wt 126 lb (57.2 kg)   • SpO2 94%   • BMI 19.73 kg/m2       Physical Exam   Constitutional: She is oriented to person, place, and time.   Neurological: She is oriented to person, place, and time. She has normal strength. She has a normal Finger-Nose-Finger Test. Gait normal.   Reflex Scores:       Tricep reflexes are 1+ on the right side and 1+ on the left side.       " Bicep reflexes are 1+ on the right side and 1+ on the left side.       Brachioradialis reflexes are 1+ on the right side and 1+ on the left side.       Patellar reflexes are 1+ on the right side and 1+ on the left side.       Achilles reflexes are 1+ on the right side and 1+ on the left side.  Psychiatric: Her speech is normal.        Neurologic Exam     Mental Status   Oriented to person, place, and time.   Registration: recalls 3 of 3 objects. Recall at 5 minutes: recalls 3 of 3 objects. Follows 3 step commands.   Attention: normal. Concentration: normal.   Speech: speech is normal   Level of consciousness: alert  Knowledge: good.   Able to name object. Able to read. Able to repeat. Able to write. Normal comprehension.     Cranial Nerves   Cranial nerves II through XII intact.     Motor Exam   Muscle bulk: normal  Overall muscle tone: normal    Strength   Strength 5/5 throughout.     Sensory Exam   Light touch normal.     Gait, Coordination, and Reflexes     Gait  Gait: normal    Coordination   Finger to nose coordination: normal    Reflexes   Right brachioradialis: 1+  Left brachioradialis: 1+  Right biceps: 1+  Left biceps: 1+  Right triceps: 1+  Left triceps: 1+  Right patellar: 1+  Left patellar: 1+  Right achilles: 1+  Left achilles: 1+      Laboratory and radiological testing are notable for a normal MRI of the brain (I personally reviewed these images). BMP is unremarkable.      Assessment/Plan     Kailee Almanza is seen for dysphagia. I find no evidence for a central neurologic problem such as stroke or parkinsonism. Rather, I suspect her symptoms were related to her underlying cellulitis and swelling, potentially with local nerve impingement. I am hopeful that she will continue to improve with current care, and don't have additional recommendations myself at this time. I discussed this at some length with Ms Almanza and her family, and she is in agreement.      As part of this visit I reviewed prior lab  results, reviewed radiology images, obtained additional history from the family which is incorporated in the HPI and reviewed records from the current hospitalization which is incorporated in the HPI. Please see above for details.

## 2017-02-11 NOTE — PLAN OF CARE
"Problem: Patient Care Overview (Adult)  Goal: Plan of Care Review  Outcome: Ongoing (interventions implemented as appropriate)    02/11/17 0610   Coping/Psychosocial Response Interventions   Plan Of Care Reviewed With patient   Patient Care Overview   Progress progress towards functional goals is fair       Goal: Adult Individualization and Mutuality  Outcome: Ongoing (interventions implemented as appropriate)    02/11/17 0610   Individualization   Patient Specific Goals Patient will be able to eat normally.         Problem: Dysphagia (Adult)  Goal: Identify Related Risk Factors and Signs and Symptoms  Outcome: Ongoing (interventions implemented as appropriate)    02/08/17 1934 02/10/17 0519 02/11/17 0610   Dysphagia   Dysphagia: Related Risk Factors --  --  inflammatory process;neuromuscular impairment;surgery   General Observations Signs and Symptoms (Dysphagia) --  dietary habit changes --    Oral Phase Dysphagia Signs and Symptoms (Dysphagia) difficulty containing/controlling liquid/food --  --    Pharyngeal Phase Dysphagia Signs and Symptoms (Dysphagia) --  delayed initiation of swallow;reports \"food sticking\" in throat --    Upper Esophageal Phase Dysphagia Signs and Symptoms (Dysphagia) --  difficulty with solid foods --        Goal: Functional/Safe Swallow  Outcome: Ongoing (interventions implemented as appropriate)    02/11/17 0610   Dysphagia (Adult)   Functional/Safe Swallow making progress toward outcome       Goal: Compensatory Techniques to Improve Safety/Function with Swallowing  Outcome: Ongoing (interventions implemented as appropriate)    02/11/17 0610   Dysphagia (Adult)   Compensatory Techniques to Improve Safety/Function with Swallowing making progress toward outcome         Problem: Perioperative Period (Adult)  Goal: Signs and Symptoms of Listed Potential Problems Will be Absent or Manageable (Perioperative Period)  Outcome: Ongoing (interventions implemented as appropriate)    02/11/17 0610 "   Perioperative Period   Problems Assessed (Perioperative Period) all   Problems Present (Perioperative Period) pain

## 2017-02-11 NOTE — CONSULTS
"Adult Nutrition  Assessment/PES    Patient Name:  Kailee Almanza  YOB: 1950  MRN: [id number]  Admit Date:  2/7/2017    Assessment Date:  2/10/2017        Reason for Assessment       02/10/17 2000    Reason for Assessment    Reason For Assessment/Visit physician consult    Time Spent (min) 20    Cardiac PVD;HTN;Dyslipidemia    Hematological Anemia    Pulmonary/Critical Care COPD    Skin Cellulitis    Other diagnosis adm w fever; dysphagia              Nutrition/Diet History       02/10/17 2002    Nutrition/Diet History    Reported/Observed By Patient    Other Gives food prefs             Anthropometrics       02/10/17 2003    Anthropometrics    Height 170.2 cm (67\")    Weight 57.2 kg (126 lb)    Ideal Body Weight (IBW)    Ideal Body Weight (IBW), Female 62.26    % Ideal Body Weight 91.99    Body Mass Index (BMI)    BMI (kg/m2) 19.78            Labs/Tests/Procedures/Meds       02/10/17 2003    Labs/Tests/Procedures/Meds    Labs/Tests Review Reviewed    Procedure Review SLP                Nutrition Prescription Ordered       02/10/17 2004    Nutrition Prescription PO    Current PO Diet Soft Texture    Texture Whole foods    Fluid Consistency Thin            Evaluation of Received Nutrient/Fluid Intake       02/10/17 2004    PO Evaluation    Number of Meals 4    % PO Intake 25              Problem/Interventions:        Problem 1       02/10/17 2004    Nutrition Diagnoses Problem 1    Problem 1 Inadequate Intake/Infusion    Etiology (related to) Functional Diagnosis    Functional Diagnosis Dysphagia    Signs/Symptoms (evidenced by) PO Intake    Percent (%) intake recorded 25 %    Over number of meals 4                    Intervention Goal       02/10/17 2005    Intervention Goal    General Nutrition support treatment    PO Increase intake            Nutrition Intervention       02/10/17 2005    Nutrition Intervention    RD/Tech Action Advise alternate selection;Menu adjusted;Encourage intake;Follow Tx " progress              Education/Evaluation       02/10/17 2005    Monitor/Evaluation    Monitor Per protocol;PO intake        Comments:      Electronically signed by:  Emmy Pitts RD  02/10/17 8:05 PM

## 2017-02-12 LAB
BACTERIA SPEC AEROBE CULT: NORMAL
BACTERIA SPEC AEROBE CULT: NORMAL

## 2017-02-27 ENCOUNTER — OFFICE VISIT (OUTPATIENT)
Dept: CARDIAC SURGERY | Facility: CLINIC | Age: 67
End: 2017-02-27

## 2017-02-27 VITALS
WEIGHT: 134 LBS | HEART RATE: 94 BPM | BODY MASS INDEX: 21.03 KG/M2 | SYSTOLIC BLOOD PRESSURE: 114 MMHG | OXYGEN SATURATION: 99 % | HEIGHT: 67 IN | DIASTOLIC BLOOD PRESSURE: 72 MMHG

## 2017-02-27 DIAGNOSIS — I71.20 THORACIC AORTIC ANEURYSM WITHOUT RUPTURE (HCC): Primary | ICD-10-CM

## 2017-02-27 DIAGNOSIS — I73.9 PERIPHERAL VASCULAR DISEASE (HCC): ICD-10-CM

## 2017-02-27 PROCEDURE — 99204 OFFICE O/P NEW MOD 45 MIN: CPT | Performed by: THORACIC SURGERY (CARDIOTHORACIC VASCULAR SURGERY)

## 2017-02-27 NOTE — PROGRESS NOTES
02/27/2017  Patient Information  Kailee Almanza                                                                                          [address] [facility] [address]   1950  'PCP/Referring Physician'  Janette Srinivasan MD  [phone]  No ref. provider found    Chief Complaint   Patient presents with   • Consult     Patient is here for a possible thoracic aneurysm that was found when she was getting ready for parathyroid surgery.   • Thoracic Aneurysm       History of Present Illness:   The patient a 66 year old female who is referred at this time for evaluation. She has been found to have what appears to be some ulcerations of her descending thoracic aorta consistent with the thoracic aneurysm. She denies any chest pain or discomfort. She is not smoking. She has had no other symptoms. She has had parathyroid surgery recently.      Patient Active Problem List   Diagnosis   • Peripheral vascular disease   • Hypertension   • Cerebrovascular disease   • Hyperlipidemia   • Chronic back pain   • Centrilobular emphysema   • Costal chondritis   • Dyslipidemia   • Pharyngeal dysphagia   • S/P parathyroidectomy 2/1/17   • Anemia   • Fever   • Acute respiratory failure with hypoxia   • Pleural effusion, bilateral   • Aspiration pneumonia of both lower lobes   • Aneurysm of thoracic aorta     Past Medical History   Diagnosis Date   • Anemia      Due to low folic acid   • Arthritis    • Carotid stenosis    • Cerebrovascular disease 7/21/2016     Amaurosis fugax, OD.  Stent 70% LJ stenosis, April 2014:  Questionable recurrent symptoms “early” following stent, treated with reinstitution Plavix.   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  High-degree left subclavian artery stenosis, treated with stenting, April 2014.      • Chronic back pain 7/22/2016   • COPD (chronic obstructive pulmonary disease)    • Coronary artery disease    • History of chest x-ray 07/22/2015     post inflammatory  scarring noted in right apical region, stable since CT of 10/10/2014; COPD changes with no acute findings   • History of chest x-ray 04/22/2014     no acute cardiopulmonary disease; calcified granuloma present in the left midlung; mild scarring in right upper lobe.    • Hyperlipidemia 7/21/2016   • Hypertension 7/21/2016   • Parathyroid abnormality    • Peripheral vascular disease 7/21/2016     1. Angiography the left subclavian artery.  2. Placement of a single, 4.0 mm diameter x 28 mm length, Xience Alpine everolimus-eluting stent in the proximal left subclavian artery in an area of in-stent restenosis. 7-22-15 3.  Femoral-femoral bypass 2009 4.  Aortobifemoral bypass 1995     Past Surgical History   Procedure Laterality Date   • Cholecystectomy     • Appendectomy     • Incontinence surgery     • Hysterectomy       bleeding, uterine cyst   • Aorta - femoral artery bypass graft     • Femoral artery - femoral artery bypass graft     • Subclavian artery stent       7/15   • Carotid endarterectomy       Right. 2010   • Carotid stent       Right common carotid artery, 2015   • Refractive surgery     • Breast biopsy Bilateral    • Eye surgery       lasik   • Salivary gland surgery Left    • Pr explore parathyroid glands Bilateral 2/1/2017     Procedure: PARATHYROIDECTOMY;  Surgeon: Isaac CORONA MD;  Location: [facility];  Service: ENT       Current Outpatient Prescriptions:   •  albuterol (PROVENTIL HFA;VENTOLIN HFA) 108 (90 BASE) MCG/ACT inhaler, Inhale 2 puffs Every 4 (Four) Hours As Needed for wheezing., Disp: 1 inhaler, Rfl: 0  •  aspirin 81 MG tablet, Take 81 mg by mouth Daily., Disp: , Rfl:   •  atorvastatin (LIPITOR) 80 MG tablet, TAKE 1 TABLET AT BEDTIME, Disp: 90 tablet, Rfl: 3  •  calcium carbonate 1250 (500 CA) MG/5ML, 5 ml po TID AC, Disp: 450 mL, Rfl: 3  •  clopidogrel (PLAVIX) 75 MG tablet, TAKE 1 TABLET DAILY, Disp: 90 tablet, Rfl: 2  •  folic acid (FOLVITE) 1 MG tablet, Take 1 mg by mouth Daily.,  Disp: , Rfl:   •  lisinopril (PRINIVIL,ZESTRIL) 40 MG tablet, Take 40 mg by mouth Daily., Disp: , Rfl:   •  meclizine 25 MG chewable tablet chewable tablet, Chew 25 mg Every 6 (Six) Hours As Needed., Disp: , Rfl:   •  venlafaxine XR (EFFEXOR-XR) 150 MG 24 hr capsule, Take 150 mg by mouth Every Other Day., Disp: , Rfl:   •  HYDROcodone-acetaminophen (NORCO)  MG per tablet, Take 1 tablet by mouth Every 4 (Four) Hours As Needed for moderate pain (4-6)., Disp: , Rfl:   Allergies   Allergen Reactions   • Sulfa Antibiotics Rash     Last as a baby.   • Percodan [Oxycodone-Aspirin] Mental Status Change     Social History     Social History   • Marital status:      Spouse name: N/A   • Number of children: 3   • Years of education: N/A     Occupational History   • Disabled RN      Social History Main Topics   • Smoking status: Former Smoker     Packs/day: 1.00     Years: 35.00     Types: Cigarettes     Quit date: 2006   • Smokeless tobacco: Never Used   • Alcohol use Yes      Comment: occasional   • Drug use: No   • Sexual activity: Defer     Other Topics Concern   • Not on file     Social History Narrative    First   when children young in an accident.  to 2nd  24 yrs     Family History   Problem Relation Age of Onset   • Alzheimer's disease Mother    • Heart disease Mother    • Heart attack Father    • Heart disease Father    • Heart disease Paternal Grandfather      Review of Systems   Constitution: Negative. Negative for chills, fever, malaise/fatigue, night sweats and weight loss.   HENT: Positive for hoarse voice. Negative for headaches, hearing loss, odynophagia and sore throat.    Eyes: Positive for visual disturbance.   Cardiovascular: Negative.  Negative for chest pain, dyspnea on exertion, leg swelling, orthopnea and palpitations.   Respiratory: Negative.  Negative for cough and hemoptysis.    Endocrine: Negative.  Negative for cold intolerance, heat intolerance,  "polydipsia, polyphagia and polyuria.   Hematologic/Lymphatic: Bruises/bleeds easily.   Skin: Negative.  Negative for itching and rash.   Musculoskeletal: Positive for back pain and joint pain. Negative for joint swelling and myalgias.   Gastrointestinal: Positive for dysphagia. Negative for abdominal pain, constipation, diarrhea, hematemesis, hematochezia, melena, nausea and vomiting.   Genitourinary: Negative.  Negative for dysuria, frequency and hematuria.   Neurological: Negative.  Negative for focal weakness, numbness and seizures.   Psychiatric/Behavioral: Negative.  Negative for suicidal ideas.   Allergic/Immunologic: Negative.    All other systems reviewed and are negative.    Vitals:    02/27/17 1201   BP: 114/72   BP Location: Right arm   Pulse: 94   SpO2: 99%   Weight: 134 lb (60.8 kg)   Height: 67\" (170.2 cm)      Physical Exam   Constitutional: She is oriented to person, place, and time. She appears well-developed and well-nourished. No distress.   HENT:   Head: Normocephalic.   Eyes: EOM are normal. Pupils are equal, round, and reactive to light.   Neck: Normal range of motion. Carotid bruit is not present. No thyromegaly present.   Cardiovascular: Normal rate and regular rhythm.  Exam reveals no gallop and no friction rub.    No murmur heard.  Pulmonary/Chest: She has no wheezes. She has no rales.   Abdominal: Soft. Bowel sounds are normal. She exhibits no distension and no mass. There is no hepatomegaly. There is no tenderness.   Musculoskeletal: Normal range of motion. She exhibits no deformity.   Neurological: She is alert and oriented to person, place, and time. She has normal strength. No cranial nerve deficit or sensory deficit.   Skin: No bruising and no petechiae noted. No cyanosis. Nails show no clubbing.   Psychiatric: She has a normal mood and affect.       Labs/Imaging:  I obtained and reviewed medical records from Dr. Srinivasan's office and I have reviewed the CTA of her " chest.    Assessment/Plan:   The patient is a 66 year old female who does have what appears to be some ulcerations of her descending thoracic aneurysm. I reviewed this with Dr. Gordon and got his opinion as well. At this point, there does not appear to be any evidence of extravasation or rupture. She has no pain or discomfort at all. She is not smoking. The patient has a history of aortic bypass with femorofemoral bypass. I think at this point we will get a CTA of the abdominal aorta to further assess this. At this point I would lean toward probably continue conservative measures. Dr. Bear also reviewed these films with me.      Patient Active Problem List   Diagnosis   • Peripheral vascular disease   • Hypertension   • Cerebrovascular disease   • Hyperlipidemia   • Chronic back pain   • Centrilobular emphysema   • Costal chondritis   • Dyslipidemia   • Pharyngeal dysphagia   • S/P parathyroidectomy 2/1/17   • Anemia   • Fever   • Acute respiratory failure with hypoxia   • Pleural effusion, bilateral   • Aspiration pneumonia of both lower lobes   • Aneurysm of thoracic aorta     Signed by: Patrice Richmond M.D.    2/27/2017    CC:  Janette Srinivasan MD

## 2017-03-01 ENCOUNTER — HOSPITAL ENCOUNTER (OUTPATIENT)
Dept: CT IMAGING | Facility: HOSPITAL | Age: 67
Discharge: HOME | End: 2017-03-01
Admitting: PHYSICIAN ASSISTANT

## 2017-03-01 DIAGNOSIS — I71.20 THORACIC AORTIC ANEURYSM WITHOUT RUPTURE (HCC): ICD-10-CM

## 2017-03-01 PROCEDURE — 82565 ASSAY OF CREATININE: CPT

## 2017-03-01 PROCEDURE — 0 IOPAMIDOL PER 1 ML: Performed by: PHYSICIAN ASSISTANT

## 2017-03-01 PROCEDURE — 74174 CTA ABD&PLVS W/CONTRAST: CPT

## 2017-03-01 RX ADMIN — IOPAMIDOL 90 ML: 755 INJECTION, SOLUTION INTRAVENOUS at 17:42

## 2017-03-02 LAB — CREAT BLDA-MCNC: 1.2 MG/DL (ref 0.6–1.3)

## 2017-03-27 ENCOUNTER — OFFICE VISIT (OUTPATIENT)
Dept: CARDIAC SURGERY | Facility: CLINIC | Age: 67
End: 2017-03-27

## 2017-03-27 VITALS
SYSTOLIC BLOOD PRESSURE: 140 MMHG | WEIGHT: 137 LBS | DIASTOLIC BLOOD PRESSURE: 60 MMHG | HEART RATE: 88 BPM | HEIGHT: 67 IN | TEMPERATURE: 98.7 F | OXYGEN SATURATION: 99 % | BODY MASS INDEX: 21.5 KG/M2

## 2017-03-27 DIAGNOSIS — I73.9 PERIPHERAL VASCULAR DISEASE (HCC): ICD-10-CM

## 2017-03-27 DIAGNOSIS — I71.20 THORACIC AORTIC ANEURYSM WITHOUT RUPTURE (HCC): Primary | ICD-10-CM

## 2017-03-27 PROCEDURE — 99212 OFFICE O/P EST SF 10 MIN: CPT | Performed by: THORACIC SURGERY (CARDIOTHORACIC VASCULAR SURGERY)

## 2017-03-27 NOTE — PROGRESS NOTES
03/27/2017  Patient Information  Kailee Almanza                                                                                          [address] [place] [id number]   1950  'PCP/Referring Physician'  Janette Srinivasan MD  [phone]  No ref. provider found    Chief Complaint   Patient presents with   • Follow-up      FU with CTA Abdomen and Pelvis for Thoracic Aneurysm       History of Present Illness:   The patient returns today for follow-up of her aneurysm. She has been feeling well since last visit. She denies any chest pain or abdominal pain or other problems. The patient is not smoking at this time.      Patient Active Problem List   Diagnosis   • Peripheral vascular disease   • Hypertension   • Cerebrovascular disease   • Hyperlipidemia   • Chronic back pain   • Centrilobular emphysema   • Costal chondritis   • Dyslipidemia   • Pharyngeal dysphagia   • S/P parathyroidectomy 2/1/17   • Anemia   • Fever   • Acute respiratory failure with hypoxia   • Pleural effusion, bilateral   • Aspiration pneumonia of both lower lobes   • Aneurysm of thoracic aorta     Past Medical History:   Diagnosis Date   • Anemia     Due to low folic acid   • Arthritis    • Back pain    • Carotid stenosis    • Cerebrovascular disease 7/21/2016    Amaurosis fugax, OD.  Stent 70% LJ stenosis, April 2014:  Questionable recurrent symptoms “early” following stent, treated with reinstitution Plavix.   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  High-degree left subclavian artery stenosis, treated with stenting, April 2014.      • Chronic back pain 7/22/2016   • Colon cancer screening    • COPD (chronic obstructive pulmonary disease)    • Coronary artery disease    • Diarrhea    • Frequent UTI    • History of chest x-ray 07/22/2015    post inflammatory scarring noted in right apical region, stable since CT of 10/10/2014; COPD changes with no acute findings   • History of chest x-ray 04/22/2014    no acute  cardiopulmonary disease; calcified granuloma present in the left midlung; mild scarring in right upper lobe.    • Hyperlipidemia 7/21/2016   • Hypertension 7/21/2016   • Parathyroid abnormality    • Peripheral vascular disease 7/21/2016    1. Angiography the left subclavian artery.  2. Placement of a single, 4.0 mm diameter x 28 mm length, Xience Alpine everolimus-eluting stent in the proximal left subclavian artery in an area of in-stent restenosis. 7-22-15 3.  Femoral-femoral bypass 2009 4.  Aortobifemoral bypass 1995   • TIA (transient ischemic attack)     h/o      Past Surgical History:   Procedure Laterality Date   • AORTA - FEMORAL ARTERY BYPASS GRAFT     • APPENDECTOMY     • BREAST BIOPSY Bilateral    • CAROTID ENDARTERECTOMY      Right. 2010   • CAROTID STENT      Right common carotid artery, 2015   • CHOLECYSTECTOMY     • EYE SURGERY      lasik   • FEMORAL ARTERY - FEMORAL ARTERY BYPASS GRAFT     • HYSTERECTOMY      bleeding, uterine cyst   • INCONTINENCE SURGERY     • LA EXPLORE PARATHYROID GLANDS Bilateral 2/1/2017    Procedure: PARATHYROIDECTOMY;  Surgeon: Isaac CORONA MD;  Location: [facility];  Service: ENT   • REFRACTIVE SURGERY     • SALIVARY GLAND SURGERY Left    • SUBCLAVIAN ARTERY STENT      7/15   • THROMBOENDARTERECTOMY      f Subclavian    • THROMBOENDARTERECTOMY       History of Carotid Thromboendarterectomy       Current Outpatient Prescriptions:   •  albuterol (PROVENTIL HFA;VENTOLIN HFA) 108 (90 BASE) MCG/ACT inhaler, Inhale 2 puffs Every 4 (Four) Hours As Needed for wheezing., Disp: 1 inhaler, Rfl: 0  •  aspirin 81 MG tablet, Take 81 mg by mouth Daily., Disp: , Rfl:   •  atorvastatin (LIPITOR) 80 MG tablet, TAKE 1 TABLET AT BEDTIME, Disp: 90 tablet, Rfl: 3  •  calcium carbonate 1250 (500 CA) MG/5ML, 5 ml po TID AC, Disp: 450 mL, Rfl: 3  •  clopidogrel (PLAVIX) 75 MG tablet, TAKE 1 TABLET DAILY, Disp: 90 tablet, Rfl: 2  •  folic acid (FOLVITE) 1 MG tablet, Take 1 mg by mouth Daily.,  Disp: , Rfl:   •  HYDROcodone-acetaminophen (NORCO)  MG per tablet, Take 1 tablet by mouth Every 4 (Four) Hours As Needed for moderate pain (4-6)., Disp: , Rfl:   •  lisinopril (PRINIVIL,ZESTRIL) 40 MG tablet, Take 40 mg by mouth Daily., Disp: , Rfl:   •  meclizine 25 MG chewable tablet chewable tablet, Chew 25 mg Every 6 (Six) Hours As Needed., Disp: , Rfl:   •  venlafaxine XR (EFFEXOR-XR) 150 MG 24 hr capsule, Take 150 mg by mouth Every Other Day., Disp: , Rfl:   •  PredniSONE (DELTASONE) 10 MG (21) tablet pack, Daily taper- //3/, Disp: 21 tablet, Rfl: 0  Allergies   Allergen Reactions   • Sulfa Antibiotics Rash     Last as a baby.   • Percodan [Oxycodone-Aspirin] Mental Status Change     Social History     Social History   • Marital status:      Spouse name: N/A   • Number of children: 3   • Years of education: N/A     Occupational History   • Disabled RN      Social History Main Topics   • Smoking status: Former Smoker     Packs/day: 1.00     Years: 35.00     Types: Cigarettes     Quit date: 2006   • Smokeless tobacco: Never Used   • Alcohol use Yes      Comment: occasional   • Drug use: No   • Sexual activity: Defer     Other Topics Concern   • Not on file     Social History Narrative    First   when children young in an accident.  to 2nd  24 yrs     Family History   Problem Relation Age of Onset   • Alzheimer's disease Mother    • Heart disease Mother    • Heart attack Father    • Heart disease Father    • Heart disease Paternal Grandfather    • Colon cancer Other      Possible     Review of Systems   Constitution: Negative for chills, fever, malaise/fatigue, night sweats and weight loss.   HENT: Negative for headaches, hearing loss, odynophagia and sore throat.    Cardiovascular: Negative for chest pain, dyspnea on exertion, leg swelling, orthopnea and palpitations.   Respiratory: Negative for cough and hemoptysis.    Endocrine: Negative for cold intolerance,  "heat intolerance, polydipsia, polyphagia and polyuria.   Hematologic/Lymphatic: Bruises/bleeds easily.   Skin: Negative for itching and rash.   Musculoskeletal: Positive for back pain and joint pain. Negative for joint swelling and myalgias.   Gastrointestinal: Positive for dysphagia (improved since last visit). Negative for abdominal pain, constipation, diarrhea, hematemesis, hematochezia, melena, nausea and vomiting.   Genitourinary: Negative for dysuria, frequency and hematuria.   Neurological: Negative for focal weakness, numbness and seizures.   Psychiatric/Behavioral: Negative for suicidal ideas.   All other systems reviewed and are negative.    Vitals:    03/27/17 1218   BP: 140/60   BP Location: Right arm   Patient Position: Sitting   Pulse: 88   Temp: 98.7 °F (37.1 °C)   SpO2: 99%   Weight: 137 lb (62.1 kg)   Height: 67\" (170.2 cm)      Physical Exam   Neck: Normal range of motion. Neck supple. No JVD present. No tracheal deviation present. No thyromegaly present.   Cardiovascular: Normal rate and regular rhythm.  Exam reveals no gallop and no friction rub.    No murmur heard.  Pulmonary/Chest: No stridor. No respiratory distress. She has no wheezes. She has no rales. She exhibits no tenderness.   Abdominal: Soft. There is no tenderness.   Lymphadenopathy:     She has no cervical adenopathy.     Assessment/Plan:   The patient appears to be stable at this point. I will see her back in one year with a repeated CT scan of her chest. I reviewed her CT of her abdomen. Her femoral-femoral bypass appears to be intact. We will see her back in approximately one-year .        Patient Active Problem List   Diagnosis   • Peripheral vascular disease   • Hypertension   • Cerebrovascular disease   • Hyperlipidemia   • Chronic back pain   • Centrilobular emphysema   • Costal chondritis   • Dyslipidemia   • Pharyngeal dysphagia   • S/P parathyroidectomy 2/1/17   • Anemia   • Fever   • Acute respiratory failure with hypoxia "   • Pleural effusion, bilateral   • Aspiration pneumonia of both lower lobes   • Aneurysm of thoracic aorta     Signed by: Patrice Richmond M.D.    3/27/2017    CC:  Janette Srinivasan MD

## 2017-05-24 RX ORDER — CLOPIDOGREL BISULFATE 75 MG/1
TABLET ORAL
Qty: 90 TABLET | Refills: 1 | OUTPATIENT
Start: 2017-05-24

## 2017-06-07 ENCOUNTER — APPOINTMENT (OUTPATIENT)
Dept: CT IMAGING | Facility: HOSPITAL | Age: 67
End: 2017-06-07

## 2017-06-07 ENCOUNTER — HOSPITAL ENCOUNTER (EMERGENCY)
Facility: HOSPITAL | Age: 67
Discharge: HOME | End: 2017-06-07
Attending: STUDENT IN AN ORGANIZED HEALTH CARE EDUCATION/TRAINING PROGRAM | Admitting: STUDENT IN AN ORGANIZED HEALTH CARE EDUCATION/TRAINING PROGRAM

## 2017-06-07 VITALS
RESPIRATION RATE: 18 BRPM | HEIGHT: 67 IN | DIASTOLIC BLOOD PRESSURE: 70 MMHG | TEMPERATURE: 98.5 F | HEART RATE: 62 BPM | BODY MASS INDEX: 21.35 KG/M2 | WEIGHT: 136 LBS | OXYGEN SATURATION: 97 % | SYSTOLIC BLOOD PRESSURE: 131 MMHG

## 2017-06-07 DIAGNOSIS — M54.50 BILATERAL LOW BACK PAIN WITHOUT SCIATICA, UNSPECIFIED CHRONICITY: Primary | ICD-10-CM

## 2017-06-07 LAB
ALBUMIN SERPL-MCNC: 4.1 G/DL (ref 3.5–5)
ALBUMIN/GLOB SERPL: 1.4 G/DL (ref 1–2)
ALP SERPL-CCNC: 101 U/L (ref 38–126)
ALT SERPL W P-5'-P-CCNC: 22 U/L (ref 13–69)
ANION GAP SERPL CALCULATED.3IONS-SCNC: 18.8 MMOL/L
APTT PPP: 25.2 SECONDS (ref 25–36)
AST SERPL-CCNC: 22 U/L (ref 15–46)
BACTERIA UR QL AUTO: ABNORMAL /HPF
BASOPHILS # BLD AUTO: 0.03 10*3/MM3 (ref 0–0.2)
BASOPHILS NFR BLD AUTO: 0.5 % (ref 0–2.5)
BILIRUB SERPL-MCNC: 0.3 MG/DL (ref 0.2–1.3)
BILIRUB UR QL STRIP: NEGATIVE
BUN BLD-MCNC: 19 MG/DL (ref 7–20)
BUN/CREAT SERPL: 15.8 (ref 7.1–23.5)
CALCIUM SPEC-SCNC: 9 MG/DL (ref 8.4–10.2)
CHLORIDE SERPL-SCNC: 102 MMOL/L (ref 98–107)
CLARITY UR: ABNORMAL
CO2 SERPL-SCNC: 24 MMOL/L (ref 26–30)
COLOR UR: YELLOW
CREAT BLD-MCNC: 1.2 MG/DL (ref 0.6–1.3)
DEPRECATED RDW RBC AUTO: 50 FL (ref 37–54)
EOSINOPHIL # BLD AUTO: 0.26 10*3/MM3 (ref 0–0.7)
EOSINOPHIL NFR BLD AUTO: 4.5 % (ref 0–7)
ERYTHROCYTE [DISTWIDTH] IN BLOOD BY AUTOMATED COUNT: 15.3 % (ref 11.5–14.5)
GFR SERPL CREATININE-BSD FRML MDRD: 45 ML/MIN/1.73
GLOBULIN UR ELPH-MCNC: 3 GM/DL
GLUCOSE BLD-MCNC: 112 MG/DL (ref 74–98)
GLUCOSE UR STRIP-MCNC: NEGATIVE MG/DL
HCT VFR BLD AUTO: 27.3 % (ref 37–47)
HGB BLD-MCNC: 8.3 G/DL (ref 12–16)
HGB UR QL STRIP.AUTO: ABNORMAL
HOLD SPECIMEN: NORMAL
HOLD SPECIMEN: NORMAL
HYALINE CASTS UR QL AUTO: ABNORMAL /LPF
IMM GRANULOCYTES # BLD: 0.01 10*3/MM3 (ref 0–0.06)
IMM GRANULOCYTES NFR BLD: 0.2 % (ref 0–0.6)
INR PPP: 1 (ref 0.9–1.1)
KETONES UR QL STRIP: NEGATIVE
LEUKOCYTE ESTERASE UR QL STRIP.AUTO: ABNORMAL
LYMPHOCYTES # BLD AUTO: 1.47 10*3/MM3 (ref 0.6–3.4)
LYMPHOCYTES NFR BLD AUTO: 25.2 % (ref 10–50)
MCH RBC QN AUTO: 27.3 PG (ref 27–31)
MCHC RBC AUTO-ENTMCNC: 30.4 G/DL (ref 30–37)
MCV RBC AUTO: 89.8 FL (ref 81–99)
MONOCYTES # BLD AUTO: 0.43 10*3/MM3 (ref 0–0.9)
MONOCYTES NFR BLD AUTO: 7.4 % (ref 0–12)
NEUTROPHILS # BLD AUTO: 3.63 10*3/MM3 (ref 2–6.9)
NEUTROPHILS NFR BLD AUTO: 62.2 % (ref 37–80)
NITRITE UR QL STRIP: NEGATIVE
NRBC BLD MANUAL-RTO: 0 /100 WBC (ref 0–0)
PH UR STRIP.AUTO: 5.5 [PH] (ref 5–8)
PLATELET # BLD AUTO: 318 10*3/MM3 (ref 130–400)
PMV BLD AUTO: 8.8 FL (ref 6–12)
POTASSIUM BLD-SCNC: 4.8 MMOL/L (ref 3.5–5.1)
PROT SERPL-MCNC: 7.1 G/DL (ref 6.3–8.2)
PROT UR QL STRIP: NEGATIVE
PROTHROMBIN TIME: 10.9 SECONDS (ref 9.3–12.1)
RBC # BLD AUTO: 3.04 10*6/MM3 (ref 4.2–5.4)
RBC # UR: ABNORMAL /HPF
REF LAB TEST METHOD: ABNORMAL
SODIUM BLD-SCNC: 140 MMOL/L (ref 137–145)
SP GR UR STRIP: 1.01 (ref 1–1.03)
SQUAMOUS #/AREA URNS HPF: ABNORMAL /HPF
TROPONIN I SERPL-MCNC: <0.012 NG/ML (ref 0–0.03)
UROBILINOGEN UR QL STRIP: ABNORMAL
WBC NRBC COR # BLD: 5.83 10*3/MM3 (ref 4.8–10.8)
WBC UR QL AUTO: ABNORMAL /HPF
WHOLE BLOOD HOLD SPECIMEN: NORMAL
WHOLE BLOOD HOLD SPECIMEN: NORMAL

## 2017-06-07 PROCEDURE — 85730 THROMBOPLASTIN TIME PARTIAL: CPT | Performed by: PHYSICIAN ASSISTANT

## 2017-06-07 PROCEDURE — 81001 URINALYSIS AUTO W/SCOPE: CPT | Performed by: PHYSICIAN ASSISTANT

## 2017-06-07 PROCEDURE — 74176 CT ABD & PELVIS W/O CONTRAST: CPT

## 2017-06-07 PROCEDURE — 87186 SC STD MICRODIL/AGAR DIL: CPT | Performed by: PHYSICIAN ASSISTANT

## 2017-06-07 PROCEDURE — 85025 COMPLETE CBC W/AUTO DIFF WBC: CPT | Performed by: PHYSICIAN ASSISTANT

## 2017-06-07 PROCEDURE — 85610 PROTHROMBIN TIME: CPT | Performed by: PHYSICIAN ASSISTANT

## 2017-06-07 PROCEDURE — 93005 ELECTROCARDIOGRAM TRACING: CPT | Performed by: PHYSICIAN ASSISTANT

## 2017-06-07 PROCEDURE — 99285 EMERGENCY DEPT VISIT HI MDM: CPT

## 2017-06-07 PROCEDURE — 93005 ELECTROCARDIOGRAM TRACING: CPT | Performed by: STUDENT IN AN ORGANIZED HEALTH CARE EDUCATION/TRAINING PROGRAM

## 2017-06-07 PROCEDURE — 71250 CT THORAX DX C-: CPT

## 2017-06-07 PROCEDURE — 80053 COMPREHEN METABOLIC PANEL: CPT | Performed by: PHYSICIAN ASSISTANT

## 2017-06-07 PROCEDURE — 84484 ASSAY OF TROPONIN QUANT: CPT | Performed by: PHYSICIAN ASSISTANT

## 2017-06-07 PROCEDURE — 87086 URINE CULTURE/COLONY COUNT: CPT | Performed by: PHYSICIAN ASSISTANT

## 2017-06-07 PROCEDURE — 87077 CULTURE AEROBIC IDENTIFY: CPT | Performed by: PHYSICIAN ASSISTANT

## 2017-06-07 RX ORDER — GRANULES FOR ORAL 3 G/1
3 POWDER ORAL ONCE
Status: COMPLETED | OUTPATIENT
Start: 2017-06-07 | End: 2017-06-07

## 2017-06-07 RX ORDER — CYCLOBENZAPRINE HCL 10 MG
10 TABLET ORAL 3 TIMES DAILY
Qty: 21 TABLET | Refills: 0 | Status: SHIPPED | OUTPATIENT
Start: 2017-06-07 | End: 2017-06-14

## 2017-06-07 RX ORDER — SODIUM CHLORIDE 0.9 % (FLUSH) 0.9 %
10 SYRINGE (ML) INJECTION AS NEEDED
Status: DISCONTINUED | OUTPATIENT
Start: 2017-06-07 | End: 2017-06-07 | Stop reason: HOSPADM

## 2017-06-07 RX ORDER — CYCLOBENZAPRINE HCL 10 MG
10 TABLET ORAL ONCE
Status: DISCONTINUED | OUTPATIENT
Start: 2017-06-07 | End: 2017-06-07

## 2017-06-07 RX ADMIN — FOSFOMYCIN TROMETHAMINE 3 G: 3 POWDER ORAL at 18:26

## 2017-06-07 NOTE — ED NOTES
Laura Acosta PA-C, notified of pts drop in blood pressure, stated she would be in to see the patient.        Jessy Weems, JEWELS  06/07/17 1500       Christina Roland RN  06/07/17 1504

## 2017-06-07 NOTE — DISCHARGE INSTRUCTIONS
No heavy lifting, tylonel and ibuprofen, lortab as needed, flexeril for pain.  Follow with primary care return for worsening sympotms.

## 2017-06-07 NOTE — ED PROVIDER NOTES
"Subjective   HPI Comments: Patient is 66-year-old female who is here today complaining of acute onset of low back pain this morning.  Patient has longer than one year history of \"back problems\" patient was seen by her primary care doctor Dr. Srinivasan yesterday and referred to orthopedics.  She states that this morning the pain was 10 out of 10 causing her difficulty with moving secondary to pain.  Patient states pain with stabbing aching burning in nature shot down both legs, worse than other times prior.  Patient currently states the pain has significantly diminished without treatment and is only a 2 out of 10 here in the emergency room.  Patient denies loss of bowel or bladder, perineal numbness, weakness in bilateral lower extremities.  She does have significant history of peripheral artery disease to include multiple bilateral lower extremity vascular procedures and carotid stent, subclavian stent  She denies chest pain, abdominal pain, shortness of breath      History provided by:  Patient      Review of Systems   Musculoskeletal: Positive for back pain.       Past Medical History:   Diagnosis Date   • Anemia     Due to low folic acid   • Arthritis    • Back pain    • Carotid stenosis    • Cerebrovascular disease 7/21/2016    Amaurosis fugax, OD.  Stent 70% LJ stenosis, April 2014:  Questionable recurrent symptoms “early” following stent, treated with reinstitution Plavix.   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  High-degree left subclavian artery stenosis, treated with stenting, April 2014.      • Chronic back pain 7/22/2016   • Colon cancer screening    • COPD (chronic obstructive pulmonary disease)    • Coronary artery disease    • Diarrhea    • Frequent UTI    • History of chest x-ray 07/22/2015    post inflammatory scarring noted in right apical region, stable since CT of 10/10/2014; COPD changes with no acute findings   • History of chest x-ray 04/22/2014    no acute cardiopulmonary " disease; calcified granuloma present in the left midlung; mild scarring in right upper lobe.    • Hyperlipidemia 7/21/2016   • Hypertension 7/21/2016   • Parathyroid abnormality    • Peripheral vascular disease 7/21/2016    1. Angiography the left subclavian artery.  2. Placement of a single, 4.0 mm diameter x 28 mm length, Xience Alpine everolimus-eluting stent in the proximal left subclavian artery in an area of in-stent restenosis. 7-22-15 3.  Femoral-femoral bypass 2009 4.  Aortobifemoral bypass 1995   • Thoracic aortic aneurysm    • TIA (transient ischemic attack)     h/o        Allergies   Allergen Reactions   • Sulfa Antibiotics Rash     Last as a baby.   • Percodan [Oxycodone-Aspirin] Mental Status Change       Past Surgical History:   Procedure Laterality Date   • AORTA - FEMORAL ARTERY BYPASS GRAFT     • APPENDECTOMY     • BREAST BIOPSY Bilateral    • CAROTID ENDARTERECTOMY      Right. 2010   • CAROTID STENT      Right common carotid artery, 2015   • CHOLECYSTECTOMY     • EYE SURGERY      lasik   • FEMORAL ARTERY - FEMORAL ARTERY BYPASS GRAFT     • HYSTERECTOMY      bleeding, uterine cyst   • INCONTINENCE SURGERY     • AR EXPLORE PARATHYROID GLANDS Bilateral 2/1/2017    Procedure: PARATHYROIDECTOMY;  Surgeon: Isaac CORONA MD;  Location: [facility];  Service: ENT   • REFRACTIVE SURGERY     • SALIVARY GLAND SURGERY Left    • SUBCLAVIAN ARTERY STENT      7/15   • THROMBOENDARTERECTOMY      f Subclavian    • THROMBOENDARTERECTOMY       History of Carotid Thromboendarterectomy       Family History   Problem Relation Age of Onset   • Alzheimer's disease Mother    • Heart disease Mother    • Heart attack Father    • Heart disease Father    • Heart disease Paternal Grandfather    • Colon cancer Other      Possible       Social History     Social History   • Marital status:      Spouse name: N/A   • Number of children: 3   • Years of education: N/A     Occupational History   • Disabled RN      Social  History Main Topics   • Smoking status: Former Smoker     Packs/day: 1.00     Years: 35.00     Types: Cigarettes     Quit date: 2006   • Smokeless tobacco: Never Used   • Alcohol use Yes      Comment: occasional   • Drug use: No   • Sexual activity: Defer     Other Topics Concern   • None     Social History Narrative    First   when children young in an accident.  to 2nd  24 yrs           Objective   Physical Exam   Constitutional: She is oriented to person, place, and time. She appears well-developed and well-nourished. No distress.   HENT:   Head: Normocephalic and atraumatic.   Eyes: EOM are normal. Pupils are equal, round, and reactive to light.   Cardiovascular: Normal rate.    Pulmonary/Chest: Effort normal.   Abdominal: Soft. Bowel sounds are normal. She exhibits no distension. There is no tenderness.   Musculoskeletal:   There is tenderness to palpation reproducible of the lumbar spine centrally   Neurological: She is alert and oriented to person, place, and time.   No decreased strength bilateral lower extremities   Skin: Skin is warm. She is not diaphoretic.   Psychiatric: She has a normal mood and affect. Her behavior is normal. Thought content normal.   Nursing note and vitals reviewed.      Procedures         ED Course  ED Course   Value Comment By Time   eGFR Non  Amer: (!) 45 (Reviewed) Kenyatta Acosta PA-C  1641      Patient was initially hypotensive on exam, given 1 L of fluid, her hypotension completely resolved.  Patient only had 2 out of 10 back pain here in the emergency room.  CT chest abdomen pelvis revealed no changes in her known thoracic aneurysm, no signs of dissection were identified, EKG was normal, troponin ×1 were normal.  Patient had evidence of urinary tract infection, we will give patient fosfomycin here in the emergency room and have her follow with primary care.  Most likely acute on chronic flare of her back pain.  Patient will return for  worsening symptoms. He was extremely difficult to get the blood draw for the second troponin and patient refused second troponin.  I have reviewed with the patient that I cannot guarantee she is not having a cardiac event without a second troponin she understands the risks and agrees with the risks and potential outcomes.            MDM    Final diagnoses:   Bilateral low back pain without sciatica, unspecified chronicity            Kenyatta Acosta PA-C  06/07/17 1298

## 2017-06-07 NOTE — ED NOTES
PT O2 SHOWING 88%, PTS MONITOR WAS LOOSE. RE ADJUSTED, ^96%.      Jessy Weems, RN  06/07/17 8142

## 2017-06-09 LAB — BACTERIA SPEC AEROBE CULT: ABNORMAL

## 2017-07-10 ENCOUNTER — OFFICE VISIT (OUTPATIENT)
Dept: CARDIAC SURGERY | Facility: CLINIC | Age: 67
End: 2017-07-10

## 2017-07-10 VITALS
HEIGHT: 68 IN | DIASTOLIC BLOOD PRESSURE: 73 MMHG | TEMPERATURE: 98 F | HEART RATE: 97 BPM | OXYGEN SATURATION: 98 % | WEIGHT: 146 LBS | BODY MASS INDEX: 22.13 KG/M2 | SYSTOLIC BLOOD PRESSURE: 136 MMHG

## 2017-07-10 DIAGNOSIS — I73.9 PVD (PERIPHERAL VASCULAR DISEASE) (HCC): Primary | ICD-10-CM

## 2017-07-10 DIAGNOSIS — I73.9 PERIPHERAL VASCULAR DISEASE (HCC): ICD-10-CM

## 2017-07-10 PROCEDURE — 93923 UPR/LXTR ART STDY 3+ LVLS: CPT | Performed by: THORACIC SURGERY (CARDIOTHORACIC VASCULAR SURGERY)

## 2017-07-10 PROCEDURE — 99212 OFFICE O/P EST SF 10 MIN: CPT | Performed by: THORACIC SURGERY (CARDIOTHORACIC VASCULAR SURGERY)

## 2017-07-10 RX ORDER — GABAPENTIN 300 MG/1
300 CAPSULE ORAL 3 TIMES DAILY
COMMUNITY
End: 2018-05-30 | Stop reason: HOSPADM

## 2017-07-10 RX ORDER — VENLAFAXINE HYDROCHLORIDE 150 MG/1
150 TABLET, EXTENDED RELEASE ORAL DAILY
COMMUNITY
Start: 2017-06-29 | End: 2018-03-22

## 2017-07-10 NOTE — PROGRESS NOTES
07/10/2017  Patient Information  Kialee Almanza                                                                                          [address] [place] [id number]   1950  'PCP/Referring Physician'  Janette Srinivasan MD  [phone]  No ref. provider found    Chief Complaint   Patient presents with   • Follow-up     Patient complains of severe bilateral leg pain when walking.   • Thoracic Aneurysm   • Peripheral Vascular Disease       History of Present Illness:  The patient is referred at this time back for follow-up of her legs.  She has been having a great deal of  pain in her buttocks and legs on walking.  She cannot walk any great distance.  She is not smoking.      Patient Active Problem List   Diagnosis   • Peripheral vascular disease   • Hypertension   • Cerebrovascular disease   • Hyperlipidemia   • Chronic back pain   • Centrilobular emphysema   • Costal chondritis   • Dyslipidemia   • Pharyngeal dysphagia   • S/P parathyroidectomy 2/1/17   • Anemia   • Fever   • Acute respiratory failure with hypoxia   • Pleural effusion, bilateral   • Aspiration pneumonia of both lower lobes   • Aneurysm of thoracic aorta     Past Medical History:   Diagnosis Date   • Anemia     Due to low folic acid   • Arthritis    • Back pain    • Carotid stenosis    • Cerebrovascular disease 7/21/2016    Amaurosis fugax, OD.  Stent 70% LJ stenosis, April 2014:  Questionable recurrent symptoms “early” following stent, treated with reinstitution Plavix.   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  High-degree left subclavian artery stenosis, treated with stenting, April 2014.      • Chronic back pain 7/22/2016   • Colon cancer screening    • COPD (chronic obstructive pulmonary disease)    • Coronary artery disease    • Diarrhea    • Frequent UTI    • History of chest x-ray 07/22/2015    post inflammatory scarring noted in right apical region, stable since CT of 10/10/2014; COPD changes with no  acute findings   • History of chest x-ray 04/22/2014    no acute cardiopulmonary disease; calcified granuloma present in the left midlung; mild scarring in right upper lobe.    • Hyperlipidemia 7/21/2016   • Hypertension 7/21/2016   • Parathyroid abnormality    • Peripheral vascular disease 7/21/2016    1. Angiography the left subclavian artery.  2. Placement of a single, 4.0 mm diameter x 28 mm length, Xience Alpine everolimus-eluting stent in the proximal left subclavian artery in an area of in-stent restenosis. 7-22-15 3.  Femoral-femoral bypass 2009 4.  Aortobifemoral bypass 1995   • Thoracic aortic aneurysm    • TIA (transient ischemic attack)     h/o      Past Surgical History:   Procedure Laterality Date   • AORTA - FEMORAL ARTERY BYPASS GRAFT     • APPENDECTOMY     • BREAST BIOPSY Bilateral    • CAROTID ENDARTERECTOMY      Right. 2010   • CAROTID STENT      Right common carotid artery, 2015   • CHOLECYSTECTOMY     • EYE SURGERY      lasik   • FEMORAL ARTERY - FEMORAL ARTERY BYPASS GRAFT     • HYSTERECTOMY      bleeding, uterine cyst   • INCONTINENCE SURGERY     • UT EXPLORE PARATHYROID GLANDS Bilateral 2/1/2017    Procedure: PARATHYROIDECTOMY;  Surgeon: Isaac CORONA MD;  Location: [facility];  Service: ENT   • REFRACTIVE SURGERY     • SALIVARY GLAND SURGERY Left    • SUBCLAVIAN ARTERY STENT      7/15   • THROMBOENDARTERECTOMY      f Subclavian    • THROMBOENDARTERECTOMY       History of Carotid Thromboendarterectomy       Current Outpatient Prescriptions:   •  albuterol (PROVENTIL HFA;VENTOLIN HFA) 108 (90 BASE) MCG/ACT inhaler, Inhale 2 puffs Every 4 (Four) Hours As Needed for wheezing., Disp: 1 inhaler, Rfl: 0  •  aspirin 81 MG tablet, Take 81 mg by mouth Daily., Disp: , Rfl:   •  atorvastatin (LIPITOR) 80 MG tablet, TAKE 1 TABLET AT BEDTIME, Disp: 90 tablet, Rfl: 3  •  calcium carbonate 1250 (500 CA) MG/5ML, 5 ml po TID AC, Disp: 450 mL, Rfl: 3  •  clopidogrel (PLAVIX) 75 MG tablet, TAKE 1 TABLET  DAILY, Disp: 90 tablet, Rfl: 2  •  folic acid (FOLVITE) 1 MG tablet, Take 1 mg by mouth Daily., Disp: , Rfl:   •  gabapentin (NEURONTIN) 300 MG capsule, Take 300 mg by mouth 3 (Three) Times a Day., Disp: , Rfl:   •  HYDROcodone-acetaminophen (NORCO)  MG per tablet, Take 1 tablet by mouth Every 4 (Four) Hours As Needed for moderate pain (4-6)., Disp: , Rfl:   •  lisinopril (PRINIVIL,ZESTRIL) 40 MG tablet, Take 40 mg by mouth Daily., Disp: , Rfl:   •  meclizine 25 MG chewable tablet chewable tablet, Chew 25 mg Every 6 (Six) Hours As Needed., Disp: , Rfl:   •  nabumetone (RELAFEN) 750 MG tablet, Take 1 tablet by mouth 2 (Two) Times a Day., Disp: 30 tablet, Rfl: 1  •  PredniSONE (DELTASONE) 10 MG (21) tablet pack, Daily taper- 6///3/, Disp: 21 tablet, Rfl: 0  •  venlafaxine (EFFEXOR) 150 MG tablet sustained-release 24 hour 24 hr tablet, Take 150 mg by mouth Daily., Disp: , Rfl:   •  venlafaxine XR (EFFEXOR-XR) 150 MG 24 hr capsule, Take 150 mg by mouth Every Other Day., Disp: , Rfl:   Allergies   Allergen Reactions   • Sulfa Antibiotics Rash     Last as a baby.   • Percodan [Oxycodone-Aspirin] Mental Status Change     Social History     Social History   • Marital status:      Spouse name: N/A   • Number of children: 3   • Years of education: N/A     Occupational History   • Disabled RN      Social History Main Topics   • Smoking status: Former Smoker     Packs/day: 1.00     Years: 35.00     Types: Cigarettes     Quit date: 2006   • Smokeless tobacco: Never Used   • Alcohol use Yes      Comment: occasional   • Drug use: No   • Sexual activity: Defer     Other Topics Concern   • Not on file     Social History Narrative    First   when children young in an accident.  to 2nd  24 yrs     Family History   Problem Relation Age of Onset   • Alzheimer's disease Mother    • Heart disease Mother    • Heart attack Father    • Heart disease Father    • Heart disease Paternal  "Grandfather    • Colon cancer Other      Possible     Review of Systems   Constitution: Positive for malaise/fatigue. Negative for chills, fever, night sweats and weight loss.   HENT: Negative for headaches, hearing loss, odynophagia and sore throat.    Eyes: Negative.    Cardiovascular: Positive for claudication. Negative for chest pain, dyspnea on exertion, leg swelling, orthopnea and palpitations.   Respiratory: Negative.  Negative for cough and hemoptysis.    Endocrine: Negative.  Negative for cold intolerance, heat intolerance, polydipsia, polyphagia and polyuria.   Hematologic/Lymphatic: Bruises/bleeds easily.   Skin: Negative.  Negative for itching and rash.   Musculoskeletal: Positive for back pain and muscle cramps (feet). Negative for joint pain, joint swelling and myalgias.   Gastrointestinal: Negative.  Negative for abdominal pain, constipation, diarrhea, hematemesis, hematochezia, melena, nausea and vomiting.   Genitourinary: Negative.  Negative for dysuria, frequency and hematuria.   Neurological: Negative.  Negative for focal weakness, numbness and seizures.   Psychiatric/Behavioral: Negative.  Negative for suicidal ideas.   All other systems reviewed and are negative.    Vitals:    07/10/17 1255   BP: 136/73   BP Location: Right arm   Pulse: 97   Temp: 98 °F (36.7 °C)   SpO2: 98%   Weight: 146 lb (66.2 kg)   Height: 68\" (172.7 cm)      Physical Exam   Neck: Normal range of motion. Neck supple. No JVD present. No tracheal deviation present. No thyromegaly present.   Cardiovascular: Normal rate and regular rhythm.  Exam reveals no gallop and no friction rub.    No murmur heard.  Pulses:       Dorsalis pedis pulses are 0 on the right side, and 0 on the left side.        Posterior tibial pulses are 0 on the right side, and 0 on the left side.   Pulmonary/Chest: No stridor. No respiratory distress. She has no wheezes. She has no rales. She exhibits no tenderness.   Abdominal: Soft. Bowel sounds are " normal. There is no tenderness.   Lymphadenopathy:     She has no cervical adenopathy.       Labs/Imaging:   PERIPHERAL VASCULAR STUDY    RIGHT SIDE:  The right brachial pressure is 116, right thigh 110, right above knee 107, right below knee 143, right ankle 94. Right MAHSA of 0.70.    LEFT SIDE:  The left brachial pressure is 134, left thigh 107, left above knee 102, left below knee 98, left ankle 88. Left MAHSA of 0.66.    IMPRESSION:    The pressures in the right leg ankle-brachial index of 0.7 with abnormal waveforms.  She has evidence of aortoiliac and femoral distal occlusive disease, moderate nature in her right leg.  Her left leg has ankle-brachial index of 0.66.  She has similar moderate degree of aortoiliac and femoral distal occlusive disease of her left leg.    Assessment/Plan:   The patient has evidence of moderate bilateral aortoiliac femoral distal occlusive disease.  At this point she is on Plavix and aspirin.  I would not study for any further.  I reviewed her CT scan today as well.  We will see her back in 4 months and get a CTA of her abdominal and pelvis area with contrast.         Patient Active Problem List   Diagnosis   • Peripheral vascular disease   • Hypertension   • Cerebrovascular disease   • Hyperlipidemia   • Chronic back pain   • Centrilobular emphysema   • Costal chondritis   • Dyslipidemia   • Pharyngeal dysphagia   • S/P parathyroidectomy 2/1/17   • Anemia   • Fever   • Acute respiratory failure with hypoxia   • Pleural effusion, bilateral   • Aspiration pneumonia of both lower lobes   • Aneurysm of thoracic aorta     Signed by: Patrice Richmond M.D.    7/10/2017    CC:  MD Amelia Beck, , editing for Patrice Richmond M.D.    I, Patrice Richmond MD, have read and agree with the editing done by Ayleen Santiago, .

## 2017-11-06 ENCOUNTER — TELEPHONE (OUTPATIENT)
Dept: CARDIAC SURGERY | Facility: CLINIC | Age: 67
End: 2017-11-06

## 2018-03-22 ENCOUNTER — OFFICE VISIT (OUTPATIENT)
Dept: INTERNAL MEDICINE | Facility: CLINIC | Age: 68
End: 2018-03-22

## 2018-03-22 VITALS
TEMPERATURE: 98.5 F | WEIGHT: 158 LBS | OXYGEN SATURATION: 98 % | SYSTOLIC BLOOD PRESSURE: 90 MMHG | BODY MASS INDEX: 23.95 KG/M2 | HEART RATE: 108 BPM | DIASTOLIC BLOOD PRESSURE: 42 MMHG | HEIGHT: 68 IN

## 2018-03-22 DIAGNOSIS — I10 ESSENTIAL HYPERTENSION: ICD-10-CM

## 2018-03-22 DIAGNOSIS — D52.8 OTHER FOLATE DEFICIENCY ANEMIAS: ICD-10-CM

## 2018-03-22 DIAGNOSIS — M79.605 CHRONIC PAIN OF BOTH LOWER EXTREMITIES: ICD-10-CM

## 2018-03-22 DIAGNOSIS — I67.9 CEREBROVASCULAR DISEASE: ICD-10-CM

## 2018-03-22 DIAGNOSIS — J43.2 CENTRILOBULAR EMPHYSEMA (HCC): ICD-10-CM

## 2018-03-22 DIAGNOSIS — I71.20 THORACIC AORTIC ANEURYSM WITHOUT RUPTURE (HCC): ICD-10-CM

## 2018-03-22 DIAGNOSIS — G89.29 CHRONIC PAIN OF BOTH LOWER EXTREMITIES: ICD-10-CM

## 2018-03-22 DIAGNOSIS — I73.9 PERIPHERAL VASCULAR DISEASE (HCC): Primary | ICD-10-CM

## 2018-03-22 DIAGNOSIS — M79.604 CHRONIC PAIN OF BOTH LOWER EXTREMITIES: ICD-10-CM

## 2018-03-22 DIAGNOSIS — G47.01 INSOMNIA DUE TO MEDICAL CONDITION: ICD-10-CM

## 2018-03-22 DIAGNOSIS — E89.2 S/P PARATHYROIDECTOMY (HCC): ICD-10-CM

## 2018-03-22 DIAGNOSIS — Z12.31 SCREENING MAMMOGRAM, ENCOUNTER FOR: ICD-10-CM

## 2018-03-22 DIAGNOSIS — R23.2 HOT FLASHES: ICD-10-CM

## 2018-03-22 DIAGNOSIS — E78.2 MIXED HYPERLIPIDEMIA: ICD-10-CM

## 2018-03-22 PROBLEM — J96.01 ACUTE RESPIRATORY FAILURE WITH HYPOXIA (HCC): Status: RESOLVED | Noted: 2017-02-10 | Resolved: 2018-03-22

## 2018-03-22 PROBLEM — J90 PLEURAL EFFUSION, BILATERAL: Status: RESOLVED | Noted: 2017-02-10 | Resolved: 2018-03-22

## 2018-03-22 PROBLEM — J69.0 ASPIRATION PNEUMONIA OF BOTH LOWER LOBES (HCC): Status: RESOLVED | Noted: 2017-02-11 | Resolved: 2018-03-22

## 2018-03-22 PROCEDURE — 99204 OFFICE O/P NEW MOD 45 MIN: CPT | Performed by: PHYSICIAN ASSISTANT

## 2018-03-22 RX ORDER — CLOPIDOGREL BISULFATE 75 MG/1
75 TABLET ORAL DAILY
Qty: 90 TABLET | Refills: 3 | Status: CANCELLED | OUTPATIENT
Start: 2018-03-22

## 2018-03-22 RX ORDER — VENLAFAXINE HYDROCHLORIDE 150 MG/1
150 CAPSULE, EXTENDED RELEASE ORAL DAILY
Qty: 90 CAPSULE | Refills: 3 | Status: SHIPPED | OUTPATIENT
Start: 2018-03-22 | End: 2018-06-01

## 2018-03-22 RX ORDER — ALBUTEROL SULFATE 90 UG/1
2 AEROSOL, METERED RESPIRATORY (INHALATION) EVERY 4 HOURS PRN
Qty: 1 INHALER | Refills: 11 | Status: SHIPPED | OUTPATIENT
Start: 2018-03-22 | End: 2018-03-22 | Stop reason: SDUPTHER

## 2018-03-22 RX ORDER — CLOPIDOGREL BISULFATE 75 MG/1
75 TABLET ORAL DAILY
Qty: 90 TABLET | Refills: 2 | Status: SHIPPED | OUTPATIENT
Start: 2018-03-22 | End: 2018-04-09 | Stop reason: HOSPADM

## 2018-03-22 RX ORDER — LISINOPRIL 40 MG/1
40 TABLET ORAL DAILY
Qty: 90 TABLET | Refills: 3 | Status: SHIPPED | OUTPATIENT
Start: 2018-03-22 | End: 2018-06-01 | Stop reason: SDUPTHER

## 2018-03-22 RX ORDER — ALBUTEROL SULFATE 90 UG/1
2 AEROSOL, METERED RESPIRATORY (INHALATION) EVERY 4 HOURS PRN
Qty: 1 INHALER | Refills: 0 | Status: SHIPPED | OUTPATIENT
Start: 2018-03-22 | End: 2019-08-09 | Stop reason: SDUPTHER

## 2018-03-22 RX ORDER — FOLIC ACID 1 MG/1
1 TABLET ORAL DAILY
Qty: 90 TABLET | Refills: 3 | Status: SHIPPED | OUTPATIENT
Start: 2018-03-22 | End: 2018-08-29 | Stop reason: HOSPADM

## 2018-03-22 RX ORDER — TRAZODONE HYDROCHLORIDE 50 MG/1
TABLET ORAL
Qty: 180 TABLET | Refills: 3 | Status: SHIPPED | OUTPATIENT
Start: 2018-03-22 | End: 2018-05-15 | Stop reason: SDUPTHER

## 2018-03-22 RX ORDER — ATORVASTATIN CALCIUM 80 MG/1
80 TABLET, FILM COATED ORAL
Qty: 90 TABLET | Refills: 3 | Status: SHIPPED | OUTPATIENT
Start: 2018-03-22

## 2018-03-22 RX ORDER — ROPINIROLE 0.5 MG/1
0.5 TABLET, FILM COATED ORAL NIGHTLY PRN
Qty: 30 TABLET | Refills: 0 | Status: SHIPPED | OUTPATIENT
Start: 2018-03-22 | End: 2018-04-19

## 2018-03-22 NOTE — PROGRESS NOTES
Subjective   Kailee Almanza is a 67 y.o. female who presents to \[facility]\"" care.  Her previous PCP was Dr. Janette Srinivasan where she was last seen in summer 2017.     Chief Complaint:  Thoracic level aortic aneurysm manages by Dr. Richmond, vascular surgeon. She will follow up for than in June.  Dr. Richmond has also been consulted regarding peripheral vascular disease and she has been told that there is nothing that be done going forward for her extreme leg pain and she is on the road to losing both legs.  She quit smoking on 6/1/2013 via e-cigarettes with nicotine which was gradually decreased to 0 nicotine use about 1 year ago. She has about a 35 pack year smoking history. Legs stay restless at night which she was prescribed Gabapentin for which never helped so she no longer uses. She has trouble ambulating due to extreme leg pain.   When her pain is severe she gets very anxious, her mind races and she cannot sleep. She has tried Trazodone in the past which worked most of the time.     HTN- dizzy this morning which is not typical for her. Typically BP runs 120/70 and is well controlled with Lisinopril. Today her blood pressure is low and she admits to not drinking much yesterday and very little so far this morning.  Patient denies chest pain, orthopnea, palpitations, lower extremity edema, headaches, visual disturbances or confusion.  Has dyspnea when she has increased pain and with exertion.     HLD- has been taking atorvastatin 80 mg nightly for the last several years.         The following portions of the patient's history were reviewed and updated as appropriate: She  has a past medical history of Anemia; Arthritis; Back pain; Carotid stenosis; Cerebrovascular disease (7/21/2016); Chronic back pain (7/22/2016); Colon cancer screening; COPD (chronic obstructive pulmonary disease); Coronary artery disease; Diarrhea; Frequent UTI; History of chest x-ray (07/22/2015); History of chest x-ray (04/22/2014);  Hyperlipidemia (7/21/2016); Hypertension (7/21/2016); Parathyroid abnormality; Peripheral vascular disease (7/21/2016); Thoracic aortic aneurysm; and TIA (transient ischemic attack).  She  does not have any pertinent problems on file.  She  has a past surgical history that includes Cholecystectomy; Appendectomy; Incontinence surgery; Hysterectomy; Aorta - femoral artery bypass graft; Femoral artery - femoral artery bypass graft; Subclavian artery stent; Carotid endarterectomy; Carotid stent; Refractive surgery; Breast biopsy (Bilateral); Eye surgery; Salivary gland surgery (Left); pr explore parathyroid glands (Bilateral, 2/1/2017); Thromboendarterectomy; and Thromboendarterectomy.  Her family history includes Alzheimer's disease in her mother; Colon cancer in her other; Heart attack in her father; Heart disease in her father, mother, and paternal grandfather.  She  reports that she quit smoking about 11 years ago. Her smoking use included Cigarettes. She has a 35.00 pack-year smoking history. She has never used smokeless tobacco. She reports that she drinks alcohol. She reports that she does not use drugs.  Current Outpatient Prescriptions   Medication Sig Dispense Refill   • albuterol (PROVENTIL HFA;VENTOLIN HFA) 108 (90 Base) MCG/ACT inhaler Inhale 2 puffs Every 4 (Four) Hours As Needed for Wheezing. 1 inhaler 0   • aspirin 81 MG tablet Take 81 mg by mouth Daily.     • atorvastatin (LIPITOR) 80 MG tablet Take 1 tablet by mouth every night at bedtime. 90 tablet 3   • clopidogrel (PLAVIX) 75 MG tablet Take 1 tablet by mouth Daily. 90 tablet 2   • folic acid (FOLVITE) 1 MG tablet Take 1 tablet by mouth Daily. 90 tablet 3   • lisinopril (PRINIVIL,ZESTRIL) 40 MG tablet Take 1 tablet by mouth Daily. 90 tablet 3   • meclizine 25 MG chewable tablet chewable tablet Chew 25 mg Every 6 (Six) Hours As Needed.     • venlafaxine XR (EFFEXOR-XR) 150 MG 24 hr capsule Take 1 capsule by mouth Daily. 90 capsule 3   • gabapentin  (NEURONTIN) 300 MG capsule Take 300 mg by mouth 3 (Three) Times a Day.     • HYDROcodone-acetaminophen (NORCO)  MG per tablet Take 1 tablet by mouth Every 4 (Four) Hours As Needed for moderate pain (4-6).     • rOPINIRole (REQUIP) 0.5 MG tablet Take 1 tablet by mouth At Night As Needed (restless legs). Take 1 hour before bedtime. 30 tablet 0   • traZODone (DESYREL) 50 MG tablet Take 1-2 tablets at bedtime as needed for sleep. 180 tablet 3     No current facility-administered medications for this visit.        Review of Systems  Review of Systems   Constitutional: Negative for appetite change, chills, diaphoresis, fatigue, fever and unexpected weight change.        No malaise   HENT: Negative for ear pain, hearing loss, nosebleeds, rhinorrhea, sore throat, trouble swallowing and voice change.    Eyes: Negative for pain, discharge, redness, itching and visual disturbance.        No dry eyes   Respiratory: Positive for shortness of breath and wheezing. Negative for cough.         No SOB with exertion  No SOB lying down   Cardiovascular: Negative for chest pain, palpitations and leg swelling.        No leg cramps     Gastrointestinal: Negative for abdominal pain, blood in stool, constipation, diarrhea, nausea and vomiting.        No change in bowel habits  No heartburn   Endocrine: Negative for cold intolerance, heat intolerance, polydipsia, polyphagia and polyuria.        Hot flashes related to menopause- controlled by Effexor.   No muscle weakness  No feeling of weakness   Genitourinary: Negative for difficulty urinating, dyspareunia, dysuria, frequency, hematuria, menstrual problem, pelvic pain, urgency, vaginal discharge and vaginal pain.        No urinary incontinence  No change in periods   Musculoskeletal: Positive for gait problem (due to leg pain). Negative for arthralgias, joint swelling, myalgias and neck pain.        Chronic bilateral leg pain  No limb swelling   Skin: Negative for color change, rash  "and wound.        No rash  No lesions  No change in mole  No itching   Allergic/Immunologic: Negative.    Neurological: Positive for dizziness (today only). Negative for seizures, syncope, weakness, numbness and headaches.        No confusion  No tingling  No trouble walking   Hematological: Negative for adenopathy. Does not bruise/bleed easily.   Psychiatric/Behavioral: Negative for agitation, behavioral problems, confusion, decreased concentration, dysphoric mood, sleep disturbance and suicidal ideas. The patient is not nervous/anxious and is not hyperactive.         No change in personality         Objective    BP 90/42 (BP Location: Right arm, Patient Position: Sitting)   Pulse 108   Temp 98.5 °F (36.9 °C)   Ht 172.7 cm (67.99\")   Wt 71.7 kg (158 lb)   SpO2 98%   BMI 24.03 kg/m²   Physical Exam   Constitutional: She is oriented to person, place, and time. She appears well-developed and well-nourished. No distress.   HENT:   Head: Normocephalic and atraumatic.   Right Ear: External ear normal.   Left Ear: External ear normal.   Nose: Nose normal.   Mouth/Throat: Oropharynx is clear and moist.   Eyes: EOM are normal. Pupils are equal, round, and reactive to light.   Neck: Normal range of motion. Neck supple. No thyromegaly present.   Cardiovascular: Normal rate, regular rhythm and normal heart sounds.  Exam reveals no gallop and no friction rub.    No murmur heard.  Unable to obtain pedal pulses bilaterally.     Pulmonary/Chest: Effort normal and breath sounds normal. No respiratory distress. She has no wheezes. She has no rales. She exhibits no tenderness.   Abdominal: Soft. Bowel sounds are normal. She exhibits no distension and no mass. There is no tenderness.   Musculoskeletal: Normal range of motion. She exhibits no edema, tenderness or deformity.   Lymphadenopathy:     She has no cervical adenopathy.   Neurological: She is alert and oriented to person, place, and time. No cranial nerve deficit. She " exhibits abnormal muscle tone (decreased bilateral legs).   Ambulating via wheelchair pushed by her  today.    Skin: Skin is warm and dry. No rash noted. She is not diaphoretic.   Psychiatric: She has a normal mood and affect. Her behavior is normal. Judgment and thought content normal.   Nursing note and vitals reviewed.        Assessment/Plan      Diagnosis Plan   1. Peripheral vascular disease  Continue: clopidogrel (PLAVIX) 75 MG tablet    Walker     2. Centrilobular emphysema  Prn use: albuterol (PROVENTIL HFA;VENTOLIN HFA) 108 (90 Base) MCG/ACT inhaler     3. Thoracic aortic aneurysm without rupture  Managed by vascular surgeon, Dr. Richmond.      4. Cerebrovascular disease  Continue: atorvastatin (LIPITOR) 80 MG tablet     5. Essential hypertension  Blood pressure low today due to patient's admission of poor PO fluid intake yesterday. She will increase fluid intake. BP otherwise well controlled, continue: lisinopril (PRINIVIL,ZESTRIL) 40 MG tablet    Comprehensive Metabolic Panel     6. Insomnia due to medical condition  Begin nightly prn for sleep: traZODone (DESYREL) 50 MG tablet     7. Other folate deficiency anemias  Continue: folic acid (FOLVITE) 1 MG tablet    CBC & Differential    Vitamin B12    Folate     8. Hot flashes  Well controlled, continue: venlafaxine XR (EFFEXOR-XR) 150 MG 24 hr capsule   9. S/P parathyroidectomy 2/1/17  PTH, Intact   10. Mixed hyperlipidemia  Lipid Panel  Continue Lipitor 80 mg regardless of lipid panel outcome due to extent of peripheral vascular disease.      11. Screening mammogram, encounter for  Mammo Screening Digital Tomosynthesis Bilateral With CAD     12. Chronic pain of both lower extremities  Walker with seat attachment would be beneficial to patient due to her impaired mobility from chronic bilateral leg pain/claudication related to peripheral vascular disease. She is unable to walk more than a few steps before pain is unbearable. She has decreased muscle  tone of bilateral legs.      Obtain records from vascular surgeon at [facility] as well as Dr. Srinivasan.       Return for as scheduled.

## 2018-03-26 ENCOUNTER — TELEPHONE (OUTPATIENT)
Dept: INTERNAL MEDICINE | Facility: CLINIC | Age: 68
End: 2018-03-26

## 2018-03-26 DIAGNOSIS — M79.604 CHRONIC PAIN OF BOTH LOWER EXTREMITIES: ICD-10-CM

## 2018-03-26 DIAGNOSIS — M79.605 CHRONIC PAIN OF BOTH LOWER EXTREMITIES: ICD-10-CM

## 2018-03-26 DIAGNOSIS — Z74.09 LIMITED MOBILITY: ICD-10-CM

## 2018-03-26 DIAGNOSIS — G89.29 CHRONIC PAIN OF BOTH LOWER EXTREMITIES: ICD-10-CM

## 2018-03-26 DIAGNOSIS — I73.9 PERIPHERAL VASCULAR DISEASE (HCC): Primary | ICD-10-CM

## 2018-03-26 NOTE — TELEPHONE ENCOUNTER
Patient is calling concerning a script that Desirae wrote for her to get a walker with a seat. She states that Medicare is requiring that it be written by a Md. There has been a death in the family and patient states that she desperately needs the walker due to her walking limitation. She is requesting a return call. [phone]    [facility] has one in stock   Phone: [phone]  She could not get an answer at [employer]  These are the only two companies in [place] that the patient states Medicare is in network with for the walker.    Patient is scheduled 5/1/18 to see Dr. Atkinson.

## 2018-04-05 ENCOUNTER — APPOINTMENT (OUTPATIENT)
Dept: GENERAL RADIOLOGY | Facility: HOSPITAL | Age: 68
End: 2018-04-05

## 2018-04-05 ENCOUNTER — HOSPITAL ENCOUNTER (INPATIENT)
Facility: HOSPITAL | Age: 68
LOS: 3 days | Discharge: HOME | End: 2018-04-09
Attending: EMERGENCY MEDICINE | Admitting: INTERNAL MEDICINE

## 2018-04-05 DIAGNOSIS — I73.9 PERIPHERAL VASCULAR DISEASE (HCC): ICD-10-CM

## 2018-04-05 DIAGNOSIS — R07.9 CHEST PAIN, UNSPECIFIED TYPE: Primary | ICD-10-CM

## 2018-04-05 DIAGNOSIS — D64.9 ANEMIA, UNSPECIFIED TYPE: ICD-10-CM

## 2018-04-05 DIAGNOSIS — D50.0 IRON DEFICIENCY ANEMIA DUE TO CHRONIC BLOOD LOSS: ICD-10-CM

## 2018-04-05 LAB
ALBUMIN SERPL-MCNC: 4.4 G/DL (ref 3.2–4.8)
ALBUMIN/GLOB SERPL: 1.5 G/DL (ref 1.5–2.5)
ALP SERPL-CCNC: 149 U/L (ref 25–100)
ALT SERPL W P-5'-P-CCNC: 9 U/L (ref 7–40)
ANION GAP SERPL CALCULATED.3IONS-SCNC: 10 MMOL/L (ref 3–11)
AST SERPL-CCNC: 14 U/L (ref 0–33)
BASOPHILS # BLD AUTO: 0.02 10*3/MM3 (ref 0–0.2)
BASOPHILS NFR BLD AUTO: 0.2 % (ref 0–1)
BILIRUB SERPL-MCNC: 0.2 MG/DL (ref 0.3–1.2)
BNP SERPL-MCNC: 48 PG/ML (ref 0–100)
BUN BLD-MCNC: 11 MG/DL (ref 9–23)
BUN/CREAT SERPL: 10 (ref 7–25)
CALCIUM SPEC-SCNC: 8.7 MG/DL (ref 8.7–10.4)
CHLORIDE SERPL-SCNC: 103 MMOL/L (ref 99–109)
CO2 SERPL-SCNC: 23 MMOL/L (ref 20–31)
CREAT BLD-MCNC: 1.1 MG/DL (ref 0.6–1.3)
DEPRECATED RDW RBC AUTO: 56.7 FL (ref 37–54)
EOSINOPHIL # BLD AUTO: 0.28 10*3/MM3 (ref 0–0.3)
EOSINOPHIL NFR BLD AUTO: 3.5 % (ref 0–3)
ERYTHROCYTE [DISTWIDTH] IN BLOOD BY AUTOMATED COUNT: 20.5 % (ref 11.3–14.5)
GFR SERPL CREATININE-BSD FRML MDRD: 50 ML/MIN/1.73
GLOBULIN UR ELPH-MCNC: 3 GM/DL
GLUCOSE BLD-MCNC: 100 MG/DL (ref 70–100)
HCT VFR BLD AUTO: 21.3 % (ref 34.5–44)
HGB BLD-MCNC: 5.5 G/DL (ref 11.5–15.5)
HOLD SPECIMEN: NORMAL
HOLD SPECIMEN: NORMAL
IMM GRANULOCYTES # BLD: 0.03 10*3/MM3 (ref 0–0.03)
IMM GRANULOCYTES NFR BLD: 0.4 % (ref 0–0.6)
LIPASE SERPL-CCNC: 69 U/L (ref 6–51)
LYMPHOCYTES # BLD AUTO: 1.17 10*3/MM3 (ref 0.6–4.8)
LYMPHOCYTES NFR BLD AUTO: 14.6 % (ref 24–44)
MCH RBC QN AUTO: 19.3 PG (ref 27–31)
MCHC RBC AUTO-ENTMCNC: 25.8 G/DL (ref 32–36)
MCV RBC AUTO: 74.7 FL (ref 80–99)
MONOCYTES # BLD AUTO: 0.51 10*3/MM3 (ref 0–1)
MONOCYTES NFR BLD AUTO: 6.4 % (ref 0–12)
NEUTROPHILS # BLD AUTO: 6.01 10*3/MM3 (ref 1.5–8.3)
NEUTROPHILS NFR BLD AUTO: 74.9 % (ref 41–71)
PLATELET # BLD AUTO: 482 10*3/MM3 (ref 150–450)
PMV BLD AUTO: 7.9 FL (ref 6–12)
POTASSIUM BLD-SCNC: 4.1 MMOL/L (ref 3.5–5.5)
PROT SERPL-MCNC: 7.4 G/DL (ref 5.7–8.2)
RBC # BLD AUTO: 2.85 10*6/MM3 (ref 3.89–5.14)
SODIUM BLD-SCNC: 136 MMOL/L (ref 132–146)
TROPONIN I SERPL-MCNC: 0 NG/ML (ref 0–0.07)
WBC NRBC COR # BLD: 8.02 10*3/MM3 (ref 3.5–10.8)
WHOLE BLOOD HOLD SPECIMEN: NORMAL
WHOLE BLOOD HOLD SPECIMEN: NORMAL

## 2018-04-05 PROCEDURE — 93005 ELECTROCARDIOGRAM TRACING: CPT

## 2018-04-05 PROCEDURE — 71045 X-RAY EXAM CHEST 1 VIEW: CPT

## 2018-04-05 PROCEDURE — 86900 BLOOD TYPING SEROLOGIC ABO: CPT | Performed by: EMERGENCY MEDICINE

## 2018-04-05 PROCEDURE — 83880 ASSAY OF NATRIURETIC PEPTIDE: CPT

## 2018-04-05 PROCEDURE — 86850 RBC ANTIBODY SCREEN: CPT | Performed by: EMERGENCY MEDICINE

## 2018-04-05 PROCEDURE — 83690 ASSAY OF LIPASE: CPT

## 2018-04-05 PROCEDURE — 86920 COMPATIBILITY TEST SPIN: CPT

## 2018-04-05 PROCEDURE — 84484 ASSAY OF TROPONIN QUANT: CPT

## 2018-04-05 PROCEDURE — 85025 COMPLETE CBC W/AUTO DIFF WBC: CPT

## 2018-04-05 PROCEDURE — 99285 EMERGENCY DEPT VISIT HI MDM: CPT

## 2018-04-05 PROCEDURE — 80053 COMPREHEN METABOLIC PANEL: CPT

## 2018-04-05 PROCEDURE — 83550 IRON BINDING TEST: CPT | Performed by: EMERGENCY MEDICINE

## 2018-04-05 PROCEDURE — 83540 ASSAY OF IRON: CPT | Performed by: EMERGENCY MEDICINE

## 2018-04-05 PROCEDURE — 86901 BLOOD TYPING SEROLOGIC RH(D): CPT | Performed by: EMERGENCY MEDICINE

## 2018-04-05 RX ORDER — ASPIRIN 81 MG/1
324 TABLET, CHEWABLE ORAL ONCE
Status: DISCONTINUED | OUTPATIENT
Start: 2018-04-05 | End: 2018-04-09 | Stop reason: HOSPADM

## 2018-04-05 RX ORDER — SODIUM CHLORIDE 9 MG/ML
125 INJECTION, SOLUTION INTRAVENOUS CONTINUOUS
Status: DISCONTINUED | OUTPATIENT
Start: 2018-04-05 | End: 2018-04-06

## 2018-04-05 RX ORDER — SODIUM CHLORIDE 0.9 % (FLUSH) 0.9 %
10 SYRINGE (ML) INJECTION AS NEEDED
Status: DISCONTINUED | OUTPATIENT
Start: 2018-04-05 | End: 2018-04-09 | Stop reason: HOSPADM

## 2018-04-05 RX ADMIN — SODIUM CHLORIDE 500 ML: 9 INJECTION, SOLUTION INTRAVENOUS at 23:32

## 2018-04-06 ENCOUNTER — APPOINTMENT (OUTPATIENT)
Dept: CT IMAGING | Facility: HOSPITAL | Age: 68
End: 2018-04-06

## 2018-04-06 ENCOUNTER — APPOINTMENT (OUTPATIENT)
Dept: GENERAL RADIOLOGY | Facility: HOSPITAL | Age: 68
End: 2018-04-06

## 2018-04-06 DIAGNOSIS — K90.9 IRON MALABSORPTION: ICD-10-CM

## 2018-04-06 DIAGNOSIS — D50.9 IRON DEFICIENCY ANEMIA, UNSPECIFIED IRON DEFICIENCY ANEMIA TYPE: ICD-10-CM

## 2018-04-06 PROBLEM — R91.1 LUNG NODULE: Status: ACTIVE | Noted: 2018-04-06

## 2018-04-06 PROBLEM — D52.8 OTHER FOLATE DEFICIENCY ANEMIAS: Status: RESOLVED | Noted: 2017-02-07 | Resolved: 2018-04-06

## 2018-04-06 PROBLEM — D50.0 IRON DEFICIENCY ANEMIA DUE TO CHRONIC BLOOD LOSS: Status: ACTIVE | Noted: 2018-04-05

## 2018-04-06 PROBLEM — R07.9 CHEST PAIN: Status: ACTIVE | Noted: 2018-04-06

## 2018-04-06 LAB
ABO GROUP BLD: NORMAL
ANION GAP SERPL CALCULATED.3IONS-SCNC: 10 MMOL/L (ref 3–11)
BLD GP AB SCN SERPL QL: NEGATIVE
BUN BLD-MCNC: 11 MG/DL (ref 9–23)
BUN/CREAT SERPL: 11 (ref 7–25)
CALCIUM SPEC-SCNC: 8.1 MG/DL (ref 8.7–10.4)
CHLORIDE SERPL-SCNC: 106 MMOL/L (ref 99–109)
CK MB SERPL-CCNC: 0.92 NG/ML (ref 0–5)
CK SERPL-CCNC: 42 U/L (ref 26–174)
CO2 SERPL-SCNC: 18 MMOL/L (ref 20–31)
CREAT BLD-MCNC: 1 MG/DL (ref 0.6–1.3)
GFR SERPL CREATININE-BSD FRML MDRD: 55 ML/MIN/1.73
GLUCOSE BLD-MCNC: 71 MG/DL (ref 70–100)
IRON 24H UR-MRATE: 37 MCG/DL (ref 50–175)
IRON SATN MFR SERPL: 9 % (ref 15–50)
POTASSIUM BLD-SCNC: 4.1 MMOL/L (ref 3.5–5.5)
RH BLD: NEGATIVE
SODIUM BLD-SCNC: 134 MMOL/L (ref 132–146)
T&S EXPIRATION DATE: NORMAL
TIBC SERPL-MCNC: 417 MCG/DL (ref 250–450)
TROPONIN I SERPL-MCNC: <0.006 NG/ML

## 2018-04-06 PROCEDURE — P9016 RBC LEUKOCYTES REDUCED: HCPCS

## 2018-04-06 PROCEDURE — 0 IOPAMIDOL PER 1 ML: Performed by: INTERNAL MEDICINE

## 2018-04-06 PROCEDURE — 99223 1ST HOSP IP/OBS HIGH 75: CPT | Performed by: THORACIC SURGERY (CARDIOTHORACIC VASCULAR SURGERY)

## 2018-04-06 PROCEDURE — 99223 1ST HOSP IP/OBS HIGH 75: CPT | Performed by: INTERNAL MEDICINE

## 2018-04-06 PROCEDURE — 82553 CREATINE MB FRACTION: CPT | Performed by: INTERNAL MEDICINE

## 2018-04-06 PROCEDURE — 25010000002 MORPHINE SULFATE (PF) 2 MG/ML SOLUTION: Performed by: HOSPITALIST

## 2018-04-06 PROCEDURE — 71045 X-RAY EXAM CHEST 1 VIEW: CPT

## 2018-04-06 PROCEDURE — 25010000002 NA FERRIC GLUC CPLX PER 12.5 MG: Performed by: INTERNAL MEDICINE

## 2018-04-06 PROCEDURE — 86900 BLOOD TYPING SEROLOGIC ABO: CPT

## 2018-04-06 PROCEDURE — 25010000002 METHYLPREDNISOLONE PER 125 MG: Performed by: HOSPITALIST

## 2018-04-06 PROCEDURE — 99222 1ST HOSP IP/OBS MODERATE 55: CPT | Performed by: INTERNAL MEDICINE

## 2018-04-06 PROCEDURE — 25010000002 ONDANSETRON PER 1 MG: Performed by: FAMILY MEDICINE

## 2018-04-06 PROCEDURE — 25010000002 FUROSEMIDE PER 20 MG: Performed by: HOSPITALIST

## 2018-04-06 PROCEDURE — 71275 CT ANGIOGRAPHY CHEST: CPT

## 2018-04-06 PROCEDURE — 82550 ASSAY OF CK (CPK): CPT | Performed by: INTERNAL MEDICINE

## 2018-04-06 PROCEDURE — 94799 UNLISTED PULMONARY SVC/PX: CPT

## 2018-04-06 PROCEDURE — 80048 BASIC METABOLIC PNL TOTAL CA: CPT | Performed by: INTERNAL MEDICINE

## 2018-04-06 PROCEDURE — 84484 ASSAY OF TROPONIN QUANT: CPT | Performed by: INTERNAL MEDICINE

## 2018-04-06 PROCEDURE — 36430 TRANSFUSION BLD/BLD COMPNT: CPT

## 2018-04-06 PROCEDURE — 94640 AIRWAY INHALATION TREATMENT: CPT

## 2018-04-06 RX ORDER — ONDANSETRON 2 MG/ML
4 INJECTION INTRAMUSCULAR; INTRAVENOUS EVERY 4 HOURS PRN
Status: DISCONTINUED | OUTPATIENT
Start: 2018-04-06 | End: 2018-04-09 | Stop reason: HOSPADM

## 2018-04-06 RX ORDER — ATORVASTATIN CALCIUM 40 MG/1
80 TABLET, FILM COATED ORAL NIGHTLY
Status: DISCONTINUED | OUTPATIENT
Start: 2018-04-06 | End: 2018-04-09 | Stop reason: HOSPADM

## 2018-04-06 RX ORDER — FUROSEMIDE 10 MG/ML
20 INJECTION INTRAMUSCULAR; INTRAVENOUS ONCE
Status: COMPLETED | OUTPATIENT
Start: 2018-04-06 | End: 2018-04-06

## 2018-04-06 RX ORDER — FAMOTIDINE 10 MG/ML
20 INJECTION, SOLUTION INTRAVENOUS ONCE
Status: CANCELLED | OUTPATIENT
Start: 2018-04-20

## 2018-04-06 RX ORDER — DIPHENHYDRAMINE HCL 25 MG
25 CAPSULE ORAL ONCE
Status: CANCELLED | OUTPATIENT
Start: 2018-04-13

## 2018-04-06 RX ORDER — OXYCODONE HYDROCHLORIDE AND ACETAMINOPHEN 5; 325 MG/1; MG/1
1 TABLET ORAL EVERY 6 HOURS PRN
Status: DISCONTINUED | OUTPATIENT
Start: 2018-04-06 | End: 2018-04-06

## 2018-04-06 RX ORDER — SODIUM CHLORIDE 9 MG/ML
250 INJECTION, SOLUTION INTRAVENOUS ONCE
Status: CANCELLED | OUTPATIENT
Start: 2018-04-20

## 2018-04-06 RX ORDER — METHYLPREDNISOLONE SODIUM SUCCINATE 125 MG/2ML
60 INJECTION, POWDER, LYOPHILIZED, FOR SOLUTION INTRAMUSCULAR; INTRAVENOUS ONCE
Status: COMPLETED | OUTPATIENT
Start: 2018-04-06 | End: 2018-04-06

## 2018-04-06 RX ORDER — MORPHINE SULFATE 2 MG/ML
1 INJECTION, SOLUTION INTRAMUSCULAR; INTRAVENOUS ONCE
Status: COMPLETED | OUTPATIENT
Start: 2018-04-06 | End: 2018-04-06

## 2018-04-06 RX ORDER — ZOLPIDEM TARTRATE 5 MG/1
5 TABLET ORAL NIGHTLY PRN
Status: DISCONTINUED | OUTPATIENT
Start: 2018-04-06 | End: 2018-04-09 | Stop reason: HOSPADM

## 2018-04-06 RX ORDER — DIPHENHYDRAMINE HCL 25 MG
25 CAPSULE ORAL ONCE
Status: CANCELLED | OUTPATIENT
Start: 2018-04-20

## 2018-04-06 RX ORDER — HYDROCODONE BITARTRATE AND ACETAMINOPHEN 5; 325 MG/1; MG/1
1 TABLET ORAL EVERY 6 HOURS PRN
Status: DISCONTINUED | OUTPATIENT
Start: 2018-04-06 | End: 2018-04-09 | Stop reason: HOSPADM

## 2018-04-06 RX ORDER — SODIUM CHLORIDE 0.9 % (FLUSH) 0.9 %
1-10 SYRINGE (ML) INJECTION AS NEEDED
Status: DISCONTINUED | OUTPATIENT
Start: 2018-04-06 | End: 2018-04-09 | Stop reason: HOSPADM

## 2018-04-06 RX ORDER — SODIUM CHLORIDE 9 MG/ML
250 INJECTION, SOLUTION INTRAVENOUS ONCE
Status: CANCELLED | OUTPATIENT
Start: 2018-04-13

## 2018-04-06 RX ORDER — SODIUM FERRIC GLUCONATE COMPLEX IN SUCROSE 12.5 MG/ML
125 INJECTION INTRAVENOUS DAILY
Status: DISCONTINUED | OUTPATIENT
Start: 2018-04-06 | End: 2018-04-06 | Stop reason: CLARIF

## 2018-04-06 RX ORDER — DIPHENHYDRAMINE HCL 12.5MG/5ML
12.5 LIQUID (ML) ORAL DAILY
Status: DISCONTINUED | OUTPATIENT
Start: 2018-04-06 | End: 2018-04-07

## 2018-04-06 RX ORDER — FAMOTIDINE 20 MG/1
20 TABLET, FILM COATED ORAL DAILY
Status: DISCONTINUED | OUTPATIENT
Start: 2018-04-06 | End: 2018-04-07

## 2018-04-06 RX ORDER — FAMOTIDINE 10 MG/ML
20 INJECTION, SOLUTION INTRAVENOUS ONCE
Status: CANCELLED | OUTPATIENT
Start: 2018-04-13

## 2018-04-06 RX ADMIN — ZOLPIDEM TARTRATE 5 MG: 5 TABLET, FILM COATED ORAL at 03:29

## 2018-04-06 RX ADMIN — IPRATROPIUM BROMIDE 0.5 MG: 0.5 SOLUTION RESPIRATORY (INHALATION) at 16:36

## 2018-04-06 RX ADMIN — IPRATROPIUM BROMIDE 0.5 MG: 0.5 SOLUTION RESPIRATORY (INHALATION) at 13:26

## 2018-04-06 RX ADMIN — DIPHENHYDRAMINE HYDROCHLORIDE 12.5 MG: 25 SOLUTION ORAL at 13:06

## 2018-04-06 RX ADMIN — MORPHINE SULFATE 1 MG: 2 INJECTION, SOLUTION INTRAMUSCULAR; INTRAVENOUS at 11:06

## 2018-04-06 RX ADMIN — SODIUM CHLORIDE 125 MG: 9 INJECTION, SOLUTION INTRAVENOUS at 15:39

## 2018-04-06 RX ADMIN — ATORVASTATIN CALCIUM 80 MG: 40 TABLET, FILM COATED ORAL at 20:08

## 2018-04-06 RX ADMIN — IPRATROPIUM BROMIDE 0.5 MG: 0.5 SOLUTION RESPIRATORY (INHALATION) at 10:27

## 2018-04-06 RX ADMIN — HYDROCODONE POLISTIREX AND CHLORPHENIRAMINE POLISTIREX 5 ML: 10; 8 SUSPENSION, EXTENDED RELEASE ORAL at 15:39

## 2018-04-06 RX ADMIN — NITROGLYCERIN 1 INCH: 20 OINTMENT TOPICAL at 00:18

## 2018-04-06 RX ADMIN — HYDROCODONE BITARTRATE AND ACETAMINOPHEN 1 TABLET: 5; 325 TABLET ORAL at 17:46

## 2018-04-06 RX ADMIN — FAMOTIDINE 20 MG: 20 TABLET, FILM COATED ORAL at 13:06

## 2018-04-06 RX ADMIN — FUROSEMIDE 20 MG: 10 INJECTION, SOLUTION INTRAMUSCULAR; INTRAVENOUS at 10:25

## 2018-04-06 RX ADMIN — ZOLPIDEM TARTRATE 5 MG: 5 TABLET, FILM COATED ORAL at 21:40

## 2018-04-06 RX ADMIN — IPRATROPIUM BROMIDE 0.5 MG: 0.5 SOLUTION RESPIRATORY (INHALATION) at 20:00

## 2018-04-06 RX ADMIN — IOPAMIDOL 80 ML: 755 INJECTION, SOLUTION INTRAVENOUS at 02:13

## 2018-04-06 RX ADMIN — ONDANSETRON 4 MG: 2 INJECTION INTRAMUSCULAR; INTRAVENOUS at 17:43

## 2018-04-06 RX ADMIN — METHYLPREDNISOLONE SODIUM SUCCINATE 60 MG: 125 INJECTION, POWDER, FOR SOLUTION INTRAMUSCULAR; INTRAVENOUS at 10:24

## 2018-04-06 NOTE — CONSULTS
[facility]    Referring Provider: No ref. provider found    Primary Care Provider: Machelle Atkinson MD    Reason for Consultation: anemia    Chief complaint : weakness and SOA    History of present illness:  Kailee Almanza is a 67 y.o. female who is admitted with chest pain.  She was found to have significant anemia.  She's had transfusion is feeling better.  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  These apparently are stable.  She denies nausea vomiting hematemesis.  Denies melena.  She does take a baby aspirin.  She also takes Advil 2-3 times a day for a long time.  She had a negative colonoscopy 2 years ago by Dr. Gustafson in [place]. .  Remote family history of colon cancer in her grandfather.  She also complains of dysphagia after having a parathyroidectomy.    Allergies:  Sulfa antibiotics and Percodan [oxycodone-aspirin]    Scheduled Meds:    aspirin 324 mg Oral Once   atorvastatin 80 mg Oral Nightly   diphenhydrAMINE 12.5 mg Oral Daily   famotidine 20 mg Oral Daily   ferric gluconate (FERRLECIT) IVPB 125 mg Intravenous Daily   ipratropium 0.5 mg Nebulization 4x Daily - RT        Infusions:       PRN Meds:  HYDROcod Polst-CPM Polst ER  •  sodium chloride  •  sodium chloride  •  zolpidem    Home Meds:  Prescriptions Prior to Admission   Medication Sig Dispense Refill Last Dose   • albuterol (PROVENTIL HFA;VENTOLIN HFA) 108 (90 Base) MCG/ACT inhaler Inhale 2 puffs Every 4 (Four) Hours As Needed for Wheezing. 1 inhaler 0    • aspirin 81 MG tablet Take 81 mg by mouth Daily.   Taking   • atorvastatin (LIPITOR) 80 MG tablet Take 1 tablet by mouth every night at bedtime. 90 tablet 3    • clopidogrel (PLAVIX) 75 MG tablet Take 1 tablet by mouth Daily. 90 tablet 2    • folic acid (FOLVITE) 1 MG tablet Take 1 tablet by mouth Daily. 90 tablet 3    • gabapentin (NEURONTIN) 300 MG capsule Take 300 mg by mouth 3 (Three) Times a Day.      • lisinopril  (PRINIVIL,ZESTRIL) 40 MG tablet Take 1 tablet by mouth Daily. 90 tablet 3    • traZODone (DESYREL) 50 MG tablet Take 1-2 tablets at bedtime as needed for sleep. 180 tablet 3    • venlafaxine XR (EFFEXOR-XR) 150 MG 24 hr capsule Take 1 capsule by mouth Daily. 90 capsule 3    • HYDROcodone-acetaminophen (NORCO)  MG per tablet Take 1 tablet by mouth Every 4 (Four) Hours As Needed for moderate pain (4-6).   Taking   • meclizine 25 MG chewable tablet chewable tablet Chew 25 mg Every 6 (Six) Hours As Needed.   Taking   • rOPINIRole (REQUIP) 0.5 MG tablet Take 1 tablet by mouth At Night As Needed (restless legs). Take 1 hour before bedtime. 30 tablet 0        ROS: Review of Systems   Constitutional: Positive for activity change and fatigue. Negative for appetite change and unexpected weight change.   Respiratory: Positive for choking, chest tightness and shortness of breath.    Cardiovascular: Positive for chest pain. Negative for leg swelling.   Gastrointestinal: Negative for abdominal distention, abdominal pain, blood in stool, constipation, diarrhea, nausea and vomiting.   Genitourinary: Negative for dysuria.   Musculoskeletal: Positive for arthralgias and back pain.   Skin: Negative for color change.   Neurological: Positive for weakness. Negative for syncope.   Psychiatric/Behavioral: Negative for agitation and confusion.       PAST MED HX: Pt  has a past medical history of Anemia; Arthritis; Back pain; Carotid stenosis; Cerebrovascular disease (7/21/2016); Chronic back pain (7/22/2016); Colon cancer screening; COPD (chronic obstructive pulmonary disease); Coronary artery disease; Diarrhea; Frequent UTI; History of chest x-ray (07/22/2015); History of chest x-ray (04/22/2014); Hyperlipidemia (7/21/2016); Hypertension (7/21/2016); Parathyroid abnormality; Peripheral vascular disease (7/21/2016); Thoracic aortic aneurysm; and TIA (transient ischemic attack).  PAST SURG HX: Pt  has a past surgical history that  "includes Cholecystectomy; Appendectomy; Incontinence surgery; Hysterectomy; Aorta - femoral artery bypass graft; Femoral artery - femoral artery bypass graft; Subclavian artery stent; Carotid endarterectomy; Carotid stent; Refractive surgery; Breast biopsy (Bilateral); Eye surgery; Salivary gland surgery (Left); pr explore parathyroid glands (Bilateral, 2/1/2017); Thromboendarterectomy; and Thromboendarterectomy.  FAM HX: family history includes Alzheimer's disease in her mother; Colon cancer in her other; Heart attack in her father; Heart disease in her father, mother, and paternal grandfather.  SOC HX: Pt  reports that she quit smoking about 11 years ago. Her smoking use included Cigarettes. She has a 35.00 pack-year smoking history. She has never used smokeless tobacco. She reports that she drinks alcohol. She reports that she does not use drugs.    /75 (BP Location: Right arm, Patient Position: Lying)   Pulse 115   Temp 98.4 °F (36.9 °C) (Oral)   Resp 18   Ht 170.2 cm (67\")   Wt 70.8 kg (156 lb)   SpO2 93%   BMI 24.43 kg/m²     Physical Exam  Wt Readings from Last 3 Encounters:   04/05/18 70.8 kg (156 lb)   03/22/18 71.7 kg (158 lb)   07/10/17 66.2 kg (146 lb)   ,body mass index is 24.43 kg/m².    General Well developed; well nourished; no acute distress.   ENT Dentures  Oral mucosa pink & moist without thrush or lesions.    Neck Neck supple; trachea midline. No thyromegaly , scar  Resp CTA; no rhonchi, rales, or wheezes.  Respiration effort normal  CV RRR; ; no M/R/G. No lower extremity edema  GI Abd soft, NT, ND, normal active bowel sounds.  No HSM.  No abd hernia  Skin No rash; no lesions; no bruises.  Skin turgor normal  Musc No clubbing; no cyanosis.    Psych Oriented to time, place, and person.  Appropriate affect      Results Review:   I reviewed the patient's new clinical results.    Lab Results   Component Value Date    WBC 8.02 04/05/2018    HGB 5.5 (C) 04/05/2018    HCT 21.3 (L) " 04/05/2018    MCV 74.7 (L) 04/05/2018     (H) 04/05/2018       Lab Results   Component Value Date    GLUCOSE 71 04/06/2018    BUN 11 04/06/2018    CREATININE 1.00 04/06/2018    EGFRIFNONA 55 (L) 04/06/2018    BCR 11.0 04/06/2018    CO2 18.0 (L) 04/06/2018    CALCIUM 8.1 (L) 04/06/2018    PROTENTOTREF 7.0 10/20/2016    ALBUMIN 4.40 04/05/2018    LABIL2 1.5 04/05/2018    AST 14 04/05/2018    ALT 9 04/05/2018       ASSESSMENTS/PLANS    #1 MARGARETTE  #2 H/o colon polyps  #3 CAD  #4 aortic arch ulcer      Will plan EGD/Dil in am.  IF neg will need opt colonoscopy.  She is getting IV iron infusion today.        I discussed the patients findings and my recommendations with patient and family    Alfonzo Cox MD  04/06/18  3:48 PM

## 2018-04-06 NOTE — CONSULTS
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 female presented to the ED 4/5/18 complaining of substernal chest pain that started 2-3 hours prior. She states it radiated to left shoulder, and associated with SOB and weakness. Denies fever, nausea, bowel changes, leg swelling, abdominal pain.   In ED she was noted to have anemia with hgb 5.5. CT angiogram demonstrated  ulcerated plaques and penetrating ulcer disease along the aortic arch and descending thoracic aorta with pseudoaneurysm of the distal portion.   She has significant medical history including severe multisystem arterial vascular disease.  She has had a previous transient ischemic attack.  She has had a previous carotid endarterectomy and subsequent carotid artery stenting.  Patient had a left subclavian artery stent this narrowed and she had a second left subclavian artery stent.  She has had an aortobifemoral bypass which subsequently the right limb occluded.  She then had a femoral to femoral bypass graft which the patient reports became infected and she had a PICC line with intravenous antibiotics for 6 weeks.  She has been in a wheelchair since June 2013 secondary to her overall leg pain and weakness.  At this time she is completely pain-free.  She described her episode of chest pain as substernal associated with nausea diaphoresis and shortness of breath.  All of her pain symptoms resolved following transfusion have been asked to see regarding her penetrating aortic ulcers      Review of Systems  Constitutional: Negative for malaise/fatigue.  Negative for chills, fever, night sweats and weight loss.  HENT: Negative for hearing loss, odynophagia and sore throat.    Cardiovascular: Positive for chest pain.  Positive for claudication  and dyspnea on exertion.  Negative for leg swelling, orthopnea and palpitations.  Respiratory: Positive for shortness of breath.  Negative for cough and hemoptysis.  Endocrine:  Negative for cold intolerance, heat intolerance, polydipsia, polyphagia and polyuria.  Hematologic/Lymphatic: Negative for easy bruising/bleeding.  Musculoskeletal: Negative for joint pain, joint swelling and myalgias.  Gastrointestinal: Negative for abdominal pain, constipation, diarrhea, hematemesis, hematochezia, melena, nausea and vomiting.  Genitourinary: Negative for dysuria, frequency and hematuria.  Neurological: Negative for focal weakness, headaches, numbness and seizures.  Psychiatric/Behavioral: Negative for depression and suicidal ideas.  The patient is not nervous/anxious.    All other systems are reviewed and are negative.    History  Past Medical History:   Diagnosis Date   • Anemia     Due to low folic acid   • Arthritis    • Back pain    • Carotid stenosis    • Cerebrovascular disease 7/21/2016    Amaurosis fugax, OD.  Stent 70% LJ stenosis, April 2014:  Questionable recurrent symptoms “early” following stent, treated with reinstitution Plavix.   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  High-degree left subclavian artery stenosis, treated with stenting, April 2014.      • Chronic back pain 7/22/2016   • Colon cancer screening    • COPD (chronic obstructive pulmonary disease)    • Coronary artery disease    • Diarrhea    • Frequent UTI    • History of chest x-ray 07/22/2015    post inflammatory scarring noted in right apical region, stable since CT of 10/10/2014; COPD changes with no acute findings   • History of chest x-ray 04/22/2014    no acute cardiopulmonary disease; calcified granuloma present in the left midlung; mild scarring in right upper lobe.    • Hyperlipidemia 7/21/2016   • Hypertension 7/21/2016   • Parathyroid abnormality    • Peripheral vascular disease 7/21/2016    1. Angiography the left  subclavian artery.  2. Placement of a single, 4.0 mm diameter x 28 mm length, Xience Alpine everolimus-eluting stent in the proximal left subclavian artery in an area of in-stent restenosis. 7-22-15 3.  Femoral-femoral bypass 2009 4.  Aortobifemoral bypass 1995   • Thoracic aortic aneurysm    • TIA (transient ischemic attack)     h/o      Past Surgical History:   Procedure Laterality Date   • AORTA - FEMORAL ARTERY BYPASS GRAFT     • APPENDECTOMY     • BREAST BIOPSY Bilateral    • CAROTID ENDARTERECTOMY      Right. 2010   • CAROTID STENT      Right common carotid artery, 2015   • CHOLECYSTECTOMY     • EYE SURGERY      lasik   • FEMORAL ARTERY - FEMORAL ARTERY BYPASS GRAFT     • HYSTERECTOMY      bleeding, uterine cyst   • INCONTINENCE SURGERY     • IL EXPLORE PARATHYROID GLANDS Bilateral 2/1/2017    Procedure: PARATHYROIDECTOMY;  Surgeon: Isaac CORONA MD;  Location: [facility];  Service: ENT   • REFRACTIVE SURGERY     • SALIVARY GLAND SURGERY Left    • SUBCLAVIAN ARTERY STENT      7/15   • THROMBOENDARTERECTOMY      f Subclavian    • THROMBOENDARTERECTOMY       History of Carotid Thromboendarterectomy     Family History   Problem Relation Age of Onset   • Alzheimer's disease Mother    • Heart disease Mother    • Heart attack Father    • Heart disease Father    • Heart disease Paternal Grandfather    • Colon cancer Other      Possible     Social History   Substance Use Topics   • Smoking status: Former Smoker     Packs/day: 1.00     Years: 35.00     Types: Cigarettes     Quit date: 6/1/2006   • Smokeless tobacco: Never Used   • Alcohol use Yes      Comment: occasional     Prescriptions Prior to Admission   Medication Sig Dispense Refill Last Dose   • albuterol (PROVENTIL HFA;VENTOLIN HFA) 108 (90 Base) MCG/ACT inhaler Inhale 2 puffs Every 4 (Four) Hours As Needed for Wheezing. 1 inhaler 0    • aspirin 81 MG tablet Take 81 mg by mouth Daily.   Taking   • atorvastatin (LIPITOR) 80 MG tablet Take 1 tablet by  mouth every night at bedtime. 90 tablet 3    • clopidogrel (PLAVIX) 75 MG tablet Take 1 tablet by mouth Daily. 90 tablet 2    • folic acid (FOLVITE) 1 MG tablet Take 1 tablet by mouth Daily. 90 tablet 3    • gabapentin (NEURONTIN) 300 MG capsule Take 300 mg by mouth 3 (Three) Times a Day.      • lisinopril (PRINIVIL,ZESTRIL) 40 MG tablet Take 1 tablet by mouth Daily. 90 tablet 3    • traZODone (DESYREL) 50 MG tablet Take 1-2 tablets at bedtime as needed for sleep. 180 tablet 3    • venlafaxine XR (EFFEXOR-XR) 150 MG 24 hr capsule Take 1 capsule by mouth Daily. 90 capsule 3    • HYDROcodone-acetaminophen (NORCO)  MG per tablet Take 1 tablet by mouth Every 4 (Four) Hours As Needed for moderate pain (4-6).   Taking   • meclizine 25 MG chewable tablet chewable tablet Chew 25 mg Every 6 (Six) Hours As Needed.   Taking   • rOPINIRole (REQUIP) 0.5 MG tablet Take 1 tablet by mouth At Night As Needed (restless legs). Take 1 hour before bedtime. 30 tablet 0      Allergies:  Sulfa antibiotics and Percodan [oxycodone-aspirin]    Objective    Vital Signs  Temp:  [97.7 °F (36.5 °C)-98.6 °F (37 °C)] 97.9 °F (36.6 °C)  Heart Rate:  [] 108  Resp:  [16-22] 18  BP: (132-177)/(55-91) 156/55    Physical Exam:  General Appearance: alert, appears stated age and cooperative  Head: normocephalic, without obvious abnormality and atraumatic  Throat: gums healthy and no oral lesions  Neck: no adenopathy, suppple, trachea midline, no thyromegaly, no carotid bruit and no JVD  Lungs: clear to auscultation, respirations regular, respirations even and respirations unlabored  Heart: regular rhythm & normal rate, normal S1, S2, no murmur, no keyana, no rub and no click  Abdomen: normal bowel sounds, no masses and soft non-tender  Extremities: moves extremities well and no edema.  Femoral pulses are palpable bilaterally  Pulses: femoral  Pulses palpable and equal bilaterally  Skin: no bleeding, bruising or rash  Neurologic: Mental Status  orientated to person, place, time and situation          Data Review:    Results from last 7 days  Lab Units 04/05/18  2223   WBC 10*3/mm3 8.02   HEMOGLOBIN g/dL 5.5*   HEMATOCRIT % 21.3*   PLATELETS 10*3/mm3 482*       Results from last 7 days  Lab Units 04/05/18  2223   SODIUM mmol/L 136   POTASSIUM mmol/L 4.1   CHLORIDE mmol/L 103   CO2 mmol/L 23.0   BUN mg/dL 11   CREATININE mg/dL 1.10   GLUCOSE mg/dL 100   CALCIUM mg/dL 8.7     Coagulation: No results found for: INR, APTT  Cardiac markers:     ABGs:       Invalid input(s): PO2, PCO2      Imaging Results (last 72 hours)     Procedure Component Value Units Date/Time    XR Chest 1 View [[id number]] Updated:  04/06/18 1041    CT Angiogram Chest With & Without Contrast [[id number]] Collected:  04/06/18 0140     Updated:  04/06/18 0300    Addenda:        3-D images were created and reviewed.    THIS DOCUMENT HAS BEEN ELECTRONICALLY SIGNED BY ISAAC BRANDT MD  Signed:  04/06/18 0300 by Isaac Brandt MD    Narrative:       EXAM:    CT Chest Without and With Intravenous Contrast    CLINICAL HISTORY:    67 years old, female; Anemia, unspecified; Chest pain, unspecified; Condition   or disease; Cardiovascular condition or disease; Aortic aneurysm; Without   rupture; Thoracic; Additional info: Thoracic aortic aneurysm (taa), known,   follow up    TECHNIQUE:    Axial computed tomography images of the chest without and with intravenous   contrast during the arterial phase of enhancement.  All CT scans at this   facility use one or more dose reduction techniques, viz.: automated exposure   control; ma/kV adjustment per patient size (including targeted exams where dose   is matched to indication; i.e. head); or iterative reconstruction technique.    Coronal and sagittal reformatted images were created and reviewed.    CONTRAST:    80 mL of isovue 370 administered intravenously.    COMPARISON:    CT ANGIOGRAM CHEST W WO CONTRAST 2017-02-10 11:55    FINDINGS:    Pulmonary  arteries:  No acute findings. No pulmonary embolism.    Aorta:  Left subclavian artery stent, patency difficult to evaluate within   streak artifact but with flow distally.  Eccentric luminal irregularity along   the aortic arch with ulcerated plaques and likely penetrating ulcer disease,   unchanged.  Diffuse atherosclerotic changes along the descending thoracic aorta   with ulcerated plaques and approximately 0.7 cm right-sided penetrating ulcer,   unchanged.  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  Aorta at this level overall measures   approximately 3.3 x 4.8 cm, unchanged.  There is a new small pocket of   ulceration within the thrombus of the pseudoaneurysm.    Lungs:  Severe lobe centrilobular emphysematous changes.  Band of right   apical scarring, unchanged.  Calcified left upper lobe granuloma.  0.8 cm   nodule inferiorly in the right upper lobe abutting the medial pleura,   previously 0.4 cm.  No mass or consolidation.    Pleural space:  No significant effusion.  No pneumothorax.    Heart:  Unremarkable.    Bones/joints:  No acute fracture.  No dislocation.    Soft tissues:  Unremarkable.    Lymph nodes:  Unremarkable. No enlarged lymph nodes.    Upper abdomen:  Partially visualized infrarenal aortic aneurysm.    Cholecystectomy.      Impression:       1.  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  2.  Right upper lobe 8mm nodule with suspicious interval growth.    THIS DOCUMENT HAS BEEN ELECTRONICALLY SIGNED BY BRIANA BRANDT MD    XR Chest 1 View [[id number]] Collected:  04/05/18 2212     Updated:  04/05/18 6247    Narrative:       EXAM:    XR Chest, 1 View    CLINICAL HISTORY:    67 years  old, female; Anemia, unspecified; Chest pain, unspecified; Type not   specified; Additional info: Chest pain triage protocol    TECHNIQUE:    Frontal view of the chest.    COMPARISON:    CR - XR CHEST PA AND LATERAL 2017-02-09 14:47    FINDINGS:    Lungs:  Hyperinflated with right apical scarring, unchanged.  No   consolidation.    Pleural space:  Unremarkable.  No pneumothorax.    Heart:  Unremarkable.    Mediastinum:  Unremarkable.    Bones/joints:  Unremarkable.      Impression:         No acute findings.    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  She has had a previous carotid endarterectomy and previous carotid artery stenting.  She has had left subclavian artery stenting and a repeat stenting of the left subclavian artery secondary to the stent narrowing.  She's had an aortobifemoral bypass with subsequent occlusion of the right limb of the bypass.  Subsequent to this she had a femoral to femoral bypass which became infected requiring 6 weeks of intravenous antibiotics.  She has essentially been wheelchair-bound when leaving the home since June of last year secondary to leg pain and overall weakness.  At this time I believe her substernal chest pain was probably cardiogenic in origin  Triggered by her profound anemia.  It is not typical or consistent with the pain that  would be found with aortic ulceration.  It appears that the ulcerations of her aorta are essentially unchanged from a previous scan of the thoracic and abdominal aorta over one year ago.  Theoretically, her aorta could be stented with an endograft from the left subclavian artery to the celiac artery to attempt and treat the penetrating ulcerations.  However the risk of paralysis would be significant as a large distance of  aorta would need to be covered.  In addition, such an operation would probably require a left carotid to subclavian bypass on a previously instrumented carotid artery.  It this point we have reviewed the CT scan with radiology and there is no evidence of extravasation from the penetrating ulcers.  They seem to be an incidental finding and are stable from the exams Dr. Richmond had ordered one year ago.  At this point I would correct her anemia and await for her cardiology workup and Dr. Richmond can see and discuss stenting of the pseudoaneurysms and ulcers of the thoracic aorta when he returns to [facility] on Monday.  In regards to the subcentimeter pulmonary nodule this should  be evaluated as an outpatient with a PET scan.  Biopsy of this nodule either with CT guidance for navigational bronchoscopy will be difficult if not undoable secondary to his unfavorable location.  If this pulmonary nodule were hypermetabolic by PET scan, I would proceed with empiric  CyberKnife therapy    Plan:  As indicated above      MARIA ELENA Awan  04/06/18  11:50 AM
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
## 2018-04-06 NOTE — PROGRESS NOTES
[facility] Medicine Services  PROGRESS NOTE    Patient Name: Kailee Almanza  : 1950  MRN: [id number]    Date of Admission: 2018  Length of Stay: 0  Primary Care Physician: Machelle Atkinson MD    Subjective   Subjective     CC:  Chest pain    HPI:  Currently without chest pain. No dyspnea. Cough noted, with clear sputum. No f/c. No n/v. Does admit to occasional alcohol use and ibuprofen use. No dysuria. No abdominal pain. Denies dark or bloody stools.    Review of Systems    Otherwise ROS is negative except as mentioned in the HPI.    Objective   Objective     Vital Signs:   Temp:  [97.7 °F (36.5 °C)-98.6 °F (37 °C)] 98.6 °F (37 °C)  Heart Rate:  [] 104  Resp:  [16-22] 18  BP: (132-177)/(56-91) 139/81        Physical Exam:  NAD, alert and oriented  OP clear, MMM  PERRL  Neck supple  No LAD  Tachy  CTAB  +BS, ND, NT  GALAN  No c/c/e  Pale    Results Reviewed:  I have personally reviewed current lab, radiology, and data and agree.      Results from last 7 days  Lab Units 18  2223   WBC 10*3/mm3 8.02   HEMOGLOBIN g/dL 5.5*   HEMATOCRIT % 21.3*   PLATELETS 10*3/mm3 482*       Results from last 7 days  Lab Units 18  0225 18  2223   SODIUM mmol/L  --  136   POTASSIUM mmol/L  --  4.1   CHLORIDE mmol/L  --  103   CO2 mmol/L  --  23.0   BUN mg/dL  --  11   CREATININE mg/dL  --  1.10   GLUCOSE mg/dL  --  100   CALCIUM mg/dL  --  8.7   ALT (SGPT) U/L  --  9   AST (SGOT) U/L  --  14   TROPONIN I ng/mL <0.006  --      Estimated Creatinine Clearance: 55.5 mL/min (by C-G formula based on SCr of 1.1 mg/dL).  BNP   Date Value Ref Range Status   2018 48.0 0.0 - 100.0 pg/mL Final     Comment:     Results may be falsely decreased if patient taking Biotin.     No results found for: PHART    Microbiology Results Abnormal     None          Imaging Results (last 24 hours)     Procedure Component Value Units Date/Time    CT Angiogram Chest With & Without Contrast  [[id number]] Collected:  04/06/18 0140     Updated:  04/06/18 0300    Addenda:        3-D images were created and reviewed.    THIS DOCUMENT HAS BEEN ELECTRONICALLY SIGNED BY ISAAC BRANDT MD  Signed:  04/06/18 0300 by Isaac Brandt MD    Narrative:       EXAM:    CT Chest Without and With Intravenous Contrast    CLINICAL HISTORY:    67 years old, female; Anemia, unspecified; Chest pain, unspecified; Condition   or disease; Cardiovascular condition or disease; Aortic aneurysm; Without   rupture; Thoracic; Additional info: Thoracic aortic aneurysm (taa), known,   follow up    TECHNIQUE:    Axial computed tomography images of the chest without and with intravenous   contrast during the arterial phase of enhancement.  All CT scans at this   facility use one or more dose reduction techniques, viz.: automated exposure   control; ma/kV adjustment per patient size (including targeted exams where dose   is matched to indication; i.e. head); or iterative reconstruction technique.    Coronal and sagittal reformatted images were created and reviewed.    CONTRAST:    80 mL of isovue 370 administered intravenously.    COMPARISON:    CT ANGIOGRAM CHEST W WO CONTRAST 2017-02-10 11:55    FINDINGS:    Pulmonary arteries:  No acute findings. No pulmonary embolism.    Aorta:  Left subclavian artery stent, patency difficult to evaluate within   streak artifact but with flow distally.  Eccentric luminal irregularity along   the aortic arch with ulcerated plaques and likely penetrating ulcer disease,   unchanged.  Diffuse atherosclerotic changes along the descending thoracic aorta   with ulcerated plaques and approximately 0.7 cm right-sided penetrating ulcer,   unchanged.  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  Aorta at this level overall measures   approximately 3.3 x 4.8 cm, unchanged.   There is a new small pocket of   ulceration within the thrombus of the pseudoaneurysm.    Lungs:  Severe lobe centrilobular emphysematous changes.  Band of right   apical scarring, unchanged.  Calcified left upper lobe granuloma.  0.8 cm   nodule inferiorly in the right upper lobe abutting the medial pleura,   previously 0.4 cm.  No mass or consolidation.    Pleural space:  No significant effusion.  No pneumothorax.    Heart:  Unremarkable.    Bones/joints:  No acute fracture.  No dislocation.    Soft tissues:  Unremarkable.    Lymph nodes:  Unremarkable. No enlarged lymph nodes.    Upper abdomen:  Partially visualized infrarenal aortic aneurysm.    Cholecystectomy.      Impression:       1.  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  2.  Right upper lobe 8mm nodule with suspicious interval growth.    THIS DOCUMENT HAS BEEN ELECTRONICALLY SIGNED BY BRIANA BRANDT MD    XR Chest 1 View [[id number]] Collected:  04/05/18 2219     Updated:  04/05/18 4687    Narrative:       EXAM:    XR Chest, 1 View    CLINICAL HISTORY:    67 years old, female; Anemia, unspecified; Chest pain, unspecified; Type not   specified; Additional info: Chest pain triage protocol    TECHNIQUE:    Frontal view of the chest.    COMPARISON:    CR - XR CHEST PA AND LATERAL 2017-02-09 14:47    FINDINGS:    Lungs:  Hyperinflated with right apical scarring, unchanged.  No   consolidation.    Pleural space:  Unremarkable.  No pneumothorax.    Heart:  Unremarkable.    Mediastinum:  Unremarkable.    Bones/joints:  Unremarkable.      Impression:         No acute findings.    THIS DOCUMENT HAS BEEN ELECTRONICALLY SIGNED BY BRIANA BRANDT MD        Results for orders placed during the hospital encounter of 02/07/17   Adult Transthoracic Echo Complete    Narrative · Left ventricular  function is normal. Estimated EF = 65%.  · All left ventricular wall segments contract normally.          I have reviewed the medications.    Assessment/Plan   Assessment / Plan     Hospital Problem List     * (Principal)Chest pain    Peripheral vascular disease    Overview Addendum 7/27/2016  3:21 PM by Jillian Mclean MD     1. Angiography the left subclavian artery. Stent 4/2014  2. Placement of a single, 4.0 mm diameter x 28 mm length, Xience Alpine  everolimus-eluting stent in the proximal left subclavian artery in an area of  in-stent restenosis. 7-22-15  3.  Femoral-femoral bypass 2009  4.  Aortobifemoral bypass 1995         Hypertension    Hyperlipidemia    Lung nodule    Iron deficiency anemia             Brief Hospital Course to date:  Kailee Almanza is a 67 y.o. female here with chest pain and found to have severe iron deficiency anemia.       Assessment & Plan:  Chest pain  --certainly warrants further investigation with typical angina symptoms, but in setting of severe iron deficiency anemia, likely with LHC when stable, given history of PVD  Severe iron deficiency anemia  --getting 2 Units PRBC  --consult GI, severe iron deficient  --hematology consulted as well  Lung nodule  --pulmonary consulted  Aortic arch/thoracic aorta plaques/with ulceration  --CTS consulted  HL  HL  Hx of subclavian/carotid stent    DVT Prophylaxis:  SCDS only    CODE STATUS: Full Code    Disposition: I expect the patient to be discharged TBD.      Electronically signed by Jam Phan MD, 04/06/18, 8:09 AM.
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
## 2018-04-06 NOTE — PLAN OF CARE
Problem: Patient Care Overview  Goal: Plan of Care Review  Outcome: Ongoing (interventions implemented as appropriate)   04/06/18 0326   Coping/Psychosocial   Plan of Care Reviewed With patient       Problem: Fall Risk (Adult)  Goal: Identify Related Risk Factors and Signs and Symptoms  Outcome: Ongoing (interventions implemented as appropriate)   04/06/18 0326   Fall Risk (Adult)   Related Risk Factors (Fall Risk) age-related changes   Signs and Symptoms (Fall Risk) presence of risk factors       Problem: Skin Injury Risk (Adult)  Goal: Identify Related Risk Factors and Signs and Symptoms  Outcome: Ongoing (interventions implemented as appropriate)   04/06/18 0326   Skin Injury Risk (Adult)   Related Risk Factors (Skin Injury Risk) advanced age
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
## 2018-04-06 NOTE — CONSULTS
Subjective     CHIEF COMPLAINT: Chest pain    HISTORY OF PRESENT ILLNESS:  The patient is a 67 y.o. female, referred by Jam Phan MD for severe anemia.  Patient presented to [facility] emergency room last night with a complaint of chest pain mid chest radiated left side, get worse with activity, associated with shortening of breath.  Patient has also been complaining of progressive fatigue and weakness.  In the emergency room her blood work revealed hemoglobin of 5.  CT chest revealed descending aortic aneurysm.  I was consulted to further assist in her care.  When I saw the patient today she is sitting in the bedside chair, her  and daughter are at the bedside.  She is feeling better after the 2 units of blood.      REVIEW OF SYSTEMS:  A 14 point review of systems was performed and is negative except as noted above.    Past Medical History:   Diagnosis Date   • Anemia     Due to low folic acid   • Arthritis    • Back pain    • Carotid stenosis    • Cerebrovascular disease 7/21/2016    Amaurosis fugax, OD.  Stent 70% LJ stenosis, April 2014:  Questionable recurrent symptoms “early” following stent, treated with reinstitution Plavix.   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  High-degree left subclavian artery stenosis, treated with stenting, April 2014.      • Chronic back pain 7/22/2016   • Colon cancer screening    • COPD (chronic obstructive pulmonary disease)    • Coronary artery disease    • Diarrhea    • Frequent UTI    • History of chest x-ray 07/22/2015    post inflammatory scarring noted in right apical region, stable since CT of 10/10/2014; COPD changes with no acute findings   • History of chest x-ray 04/22/2014    no acute cardiopulmonary disease; calcified granuloma present in the left midlung; mild scarring in right upper lobe.    • Hyperlipidemia 7/21/2016   • Hypertension 7/21/2016   • Parathyroid abnormality    • Peripheral vascular disease 7/21/2016    1.  Angiography the left subclavian artery.  2. Placement of a single, 4.0 mm diameter x 28 mm length, Xience Alpine everolimus-eluting stent in the proximal left subclavian artery in an area of in-stent restenosis. 7-22-15 3.  Femoral-femoral bypass 2009 4.  Aortobifemoral bypass 1995   • Thoracic aortic aneurysm    • TIA (transient ischemic attack)     h/o        No current facility-administered medications on file prior to encounter.      Current Outpatient Prescriptions on File Prior to Encounter   Medication Sig Dispense Refill   • albuterol (PROVENTIL HFA;VENTOLIN HFA) 108 (90 Base) MCG/ACT inhaler Inhale 2 puffs Every 4 (Four) Hours As Needed for Wheezing. 1 inhaler 0   • aspirin 81 MG tablet Take 81 mg by mouth Daily.     • atorvastatin (LIPITOR) 80 MG tablet Take 1 tablet by mouth every night at bedtime. 90 tablet 3   • clopidogrel (PLAVIX) 75 MG tablet Take 1 tablet by mouth Daily. 90 tablet 2   • folic acid (FOLVITE) 1 MG tablet Take 1 tablet by mouth Daily. 90 tablet 3   • gabapentin (NEURONTIN) 300 MG capsule Take 300 mg by mouth 3 (Three) Times a Day.     • lisinopril (PRINIVIL,ZESTRIL) 40 MG tablet Take 1 tablet by mouth Daily. 90 tablet 3   • traZODone (DESYREL) 50 MG tablet Take 1-2 tablets at bedtime as needed for sleep. 180 tablet 3   • venlafaxine XR (EFFEXOR-XR) 150 MG 24 hr capsule Take 1 capsule by mouth Daily. 90 capsule 3   • HYDROcodone-acetaminophen (NORCO)  MG per tablet Take 1 tablet by mouth Every 4 (Four) Hours As Needed for moderate pain (4-6).     • meclizine 25 MG chewable tablet chewable tablet Chew 25 mg Every 6 (Six) Hours As Needed.     • rOPINIRole (REQUIP) 0.5 MG tablet Take 1 tablet by mouth At Night As Needed (restless legs). Take 1 hour before bedtime. 30 tablet 0       Allergies   Allergen Reactions   • Sulfa Antibiotics Rash     Last as a baby.   • Percodan [Oxycodone-Aspirin] Mental Status Change       Past Surgical History:   Procedure Laterality Date   • AORTA -  FEMORAL ARTERY BYPASS GRAFT     • APPENDECTOMY     • BREAST BIOPSY Bilateral    • CAROTID ENDARTERECTOMY      Right.    • CAROTID STENT      Right common carotid artery,    • CHOLECYSTECTOMY     • EYE SURGERY      lasik   • FEMORAL ARTERY - FEMORAL ARTERY BYPASS GRAFT     • HYSTERECTOMY      bleeding, uterine cyst   • INCONTINENCE SURGERY     • CA EXPLORE PARATHYROID GLANDS Bilateral 2017    Procedure: PARATHYROIDECTOMY;  Surgeon: Isaac CORONA MD;  Location: [facility];  Service: ENT   • REFRACTIVE SURGERY     • SALIVARY GLAND SURGERY Left    • SUBCLAVIAN ARTERY STENT      7/15   • THROMBOENDARTERECTOMY      f Subclavian    • THROMBOENDARTERECTOMY       History of Carotid Thromboendarterectomy       OB History    Para Term  AB Living   4 3 3  1    SAB TAB Ectopic Molar Multiple Live Births   1           # Outcome Date GA Lbr Long/2nd Weight Sex Delivery Anes PTL Lv   4 SAB            3 Term            2 Term            1 Term                   Social History     Social History   • Marital status:    • Number of children: 3     Occupational History   • Disabled RN      Social History Main Topics   • Smoking status: Former Smoker     Packs/day: 1.00     Years: 35.00     Types: Cigarettes     Quit date: 2006   • Smokeless tobacco: Never Used   • Alcohol use Yes      Comment: occasional   • Drug use: No   • Sexual activity: Defer     Other Topics Concern   • Not on file     Social History Narrative    First   when children young in an accident.  to 2nd  24 yrs       Family History   Problem Relation Age of Onset   • Alzheimer's disease Mother    • Heart disease Mother    • Heart attack Father    • Heart disease Father    • Heart disease Paternal Grandfather    • Colon cancer Other      Possible       Objective     Vitals:    18 0514 18 0530 18 0630 18 0800   BP: 162/91 177/78 139/81 156/55   BP Location:    Left arm   Patient  Position:    Lying   Pulse: 104 104  108   Resp: 20 22 18 18   Temp: 98.3 °F (36.8 °C) 98.3 °F (36.8 °C) 98.6 °F (37 °C) 97.9 °F (36.6 °C)   TempSrc:    Oral   SpO2: 100% 100%  94%   Weight:       Height:                ECOG Performance Status: 2 - Symptomatic, <50% confined to bed  General: well appearing female in no acute distress  Neuro/Psych: A&O x 3, gait steady, appropriate affect, strength 5/5 in all muscle groups  HEENT: sclera anicteric, oropharynx clear  Lymphatics: no cervical, supraclavicular, or axillary adenopathy  Cardiovascular: regular rate and rhythm, no murmurs  Lungs: clear to auscultation bilaterally  Abdomen: soft, nontender, nondistended.  No palpable organomegaly  Extremeties: no lower extremity edema  Skin: no rashes, lesions, bruising, or petechiae      Admission on 04/05/2018   Component Date Value Ref Range Status   • Glucose 04/05/2018 100  70 - 100 mg/dL Final   • BUN 04/05/2018 11  9 - 23 mg/dL Final   • Creatinine 04/05/2018 1.10  0.60 - 1.30 mg/dL Final   • Sodium 04/05/2018 136  132 - 146 mmol/L Final   • Potassium 04/05/2018 4.1  3.5 - 5.5 mmol/L Final   • Chloride 04/05/2018 103  99 - 109 mmol/L Final   • CO2 04/05/2018 23.0  20.0 - 31.0 mmol/L Final   • Calcium 04/05/2018 8.7  8.7 - 10.4 mg/dL Final   • Total Protein 04/05/2018 7.4  5.7 - 8.2 g/dL Final   • Albumin 04/05/2018 4.40  3.20 - 4.80 g/dL Final   • ALT (SGPT) 04/05/2018 9  7 - 40 U/L Final   • AST (SGOT) 04/05/2018 14  0 - 33 U/L Final   • Alkaline Phosphatase 04/05/2018 149* 25 - 100 U/L Final   • Total Bilirubin 04/05/2018 0.2* 0.3 - 1.2 mg/dL Final   • eGFR Non African Amer 04/05/2018 50* >60 mL/min/1.73 Final   • Globulin 04/05/2018 3.0  gm/dL Final   • A/G Ratio 04/05/2018 1.5  1.5 - 2.5 g/dL Final   • BUN/Creatinine Ratio 04/05/2018 10.0  7.0 - 25.0 Final   • Anion Gap 04/05/2018 10.0  3.0 - 11.0 mmol/L Final   • Lipase 04/05/2018 69* 6 - 51 U/L Final   • BNP 04/05/2018 48.0  0.0 - 100.0 pg/mL Final   • Extra  Tube 04/05/2018 hold for add-on   Final   • Extra Tube 04/05/2018 Hold for add-ons.   Final   • Extra Tube 04/05/2018 hold for add-on   Final   • Extra Tube 04/05/2018 Hold for add-ons.   Final   • WBC 04/05/2018 8.02  3.50 - 10.80 10*3/mm3 Final   • RBC 04/05/2018 2.85* 3.89 - 5.14 10*6/mm3 Final   • Hemoglobin 04/05/2018 5.5* 11.5 - 15.5 g/dL Final   • Hematocrit 04/05/2018 21.3* 34.5 - 44.0 % Final   • MCV 04/05/2018 74.7* 80.0 - 99.0 fL Final   • MCH 04/05/2018 19.3* 27.0 - 31.0 pg Final   • MCHC 04/05/2018 25.8* 32.0 - 36.0 g/dL Final   • RDW 04/05/2018 20.5* 11.3 - 14.5 % Final   • RDW-SD 04/05/2018 56.7* 37.0 - 54.0 fl Final   • MPV 04/05/2018 7.9  6.0 - 12.0 fL Final   • Platelets 04/05/2018 482* 150 - 450 10*3/mm3 Final   • Neutrophil % 04/05/2018 74.9* 41.0 - 71.0 % Final   • Lymphocyte % 04/05/2018 14.6* 24.0 - 44.0 % Final   • Monocyte % 04/05/2018 6.4  0.0 - 12.0 % Final   • Eosinophil % 04/05/2018 3.5* 0.0 - 3.0 % Final   • Basophil % 04/05/2018 0.2  0.0 - 1.0 % Final   • Immature Grans % 04/05/2018 0.4  0.0 - 0.6 % Final   • Neutrophils, Absolute 04/05/2018 6.01  1.50 - 8.30 10*3/mm3 Final   • Lymphocytes, Absolute 04/05/2018 1.17  0.60 - 4.80 10*3/mm3 Final   • Monocytes, Absolute 04/05/2018 0.51  0.00 - 1.00 10*3/mm3 Final   • Eosinophils, Absolute 04/05/2018 0.28  0.00 - 0.30 10*3/mm3 Final   • Basophils, Absolute 04/05/2018 0.02  0.00 - 0.20 10*3/mm3 Final   • Immature Grans, Absolute 04/05/2018 0.03  0.00 - 0.03 10*3/mm3 Final   • Troponin I 04/05/2018 0.00  0.00 - 0.07 ng/mL Final   • ABO Type 04/06/2018 O   Final   • RH type 04/06/2018 Negative   Final   • Antibody Screen 04/06/2018 Negative   Final   • T&S Expiration Date 04/06/2018 4/8/2018 11:59:59 PM   Final   • Iron 04/06/2018 37* 50 - 175 mcg/dL Final   • TIBC 04/06/2018 417  250 - 450 mcg/dL Final   • Iron Saturation 04/06/2018 9* 15 - 50 % Final   • Product Code 04/06/2018 [id number]   Final   • Unit Number 04/06/2018 [id number]    Final   • UNIT  ABO 04/06/2018 O   Final   • UNIT  RH 04/06/2018 NEG   Final   • CROSSMATCH 1 INTERPRETATION 04/06/2018 Compatible   Final   • Dispense Status 04/06/2018 IS   Final   • Blood Type 04/06/2018 ONEG   Final   • Blood Expiration Date 04/06/2018 201804192359   Final   • Blood Type Barcode 04/06/2018 9500   Final   • Product Code 04/06/2018 [id number]   Final   • Unit Number 04/06/2018 [id number]   Final   • UNIT  ABO 04/06/2018 O   Final   • UNIT  RH 04/06/2018 NEG   Final   • CROSSMATCH 1 INTERPRETATION 04/06/2018 Compatible   Final   • Dispense Status 04/06/2018 IS   Final   • Blood Type 04/06/2018 ONEG   Final   • Blood Expiration Date 04/06/2018 [id number]   Final   • Blood Type Barcode 04/06/2018 9500   Final   • Troponin I 04/06/2018 <0.006  <=0.039 ng/mL Final        No results found.    ASSESSMENT 67 years old female with severe anemia    PROBLEM LIST WITH DISCUSSION  1.  Severe anemia: Most likely induced by chronic bleeding.  Part of this is anemia of chronic kidney disease.  2.  Microcytosis: Secondary to iron deficiency.  3.  Iron deficiency: Consistent with chronic blood loss  4.  Chronic kidney disease: Stage III  5.  Right upper lobe 8 mm nodule: Stable compared to a year ago scan.  Most consistent with scar tissue.  With repeat CT chest in 1 year.    PLAN AND RECOMMENDATIONS:  1.  Agree with transfusions given the patient's severe anemia with concurrent symptoms.  2.  I will start patient on daily IV iron while she is inpatient.  3.  Patient will need GI evaluation with colonoscopy as well as EGD.  4.  She will need to have 1 year follow-up CT chest without contrast.  5.  The patient will follow-up with me as an outpatient with 2 doses of IV Feraheme In the [facility].      Dragan Weathers MD    4/6/2018

## 2018-04-06 NOTE — H&P
[facility] Medicine Services  HISTORY AND PHYSICAL    Patient Name: Kailee Almanza  : 1950  MRN: [id number]  Primary Care Physician: Machelle Atkinson MD    Subjective   Subjective     Chief Complaint:  Chest pain.    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  Patient reports that her chest pain started a few hours prior to presentation.  She describes chest pain as pressure in the center of her chest radiating to her left shoulder.  She also reports associated shortness of breath and severe weakness mostly on exertion.  She gets out of breath on the slightest exertion.  Patient states that she has had intermittent chest pain several weeks or months but is severe weakness is a new symptom.  She denies fever, chills, weakness of the extremities or numbness.  No report of nausea, vomiting, abdominal pain or diarrhea.  She has multiple chronic medical conditions which includes folate deficiency, peripheral arterial disease and she uses wheelchair to ambulate, carotid artery disease status post stenting of the right internal carotid artery, severe atherosclerotic disease, descending thoracic aortic aneurysm, COPD, hypertension and TIAs.  On presentation to the emergency room tonight, lab revealed a significant anemia with H&H of 5.5 and 21 respectively.  Stool hemoccult blood was reportedly negative.  Patient denies obvious bleeding.  She denies passage of dark tarry stools or hematochezia.  She denies use of nonsteroidal anti-inflammatory drugs or anticoagulants.  She had a normal colonoscopy 2 years ago.  Initial cardiac enzymes were unremarkable.  Type and screen 2 units packed red process have been ordered.  Patient is admitted for further evaluation and for transfusion of packed red process.  Of note, CT pulmonary angiogram has just reported ulcerated plaques and penetrating ulcer disease  along the aortic arch and descending thoracic aorta with pseudo-aneurysm of the distal portion, grossly unchanged in size with new focus of ulceration within the thrombus.  There is right upper lobe 8 mm nodule with suspicious interval growth.    Review of Systems   Constitutional: Positive for activity change and fatigue. Negative for appetite change, chills, diaphoresis, fever and unexpected weight change.   HENT: Negative for sore throat.    Respiratory: Positive for chest tightness and shortness of breath. Negative for cough and wheezing.    Cardiovascular: Positive for chest pain. Negative for palpitations and leg swelling.   Gastrointestinal: Negative for abdominal distention, abdominal pain, blood in stool, diarrhea, nausea, rectal pain and vomiting.   Genitourinary: Negative for dysuria, flank pain, frequency, hematuria, urgency and vaginal bleeding.   Musculoskeletal: Negative for back pain, neck pain and neck stiffness.   Skin: Positive for pallor. Negative for rash.   Neurological: Positive for dizziness and weakness. Negative for tremors, seizures, syncope and headaches.   Psychiatric/Behavioral: Negative for agitation, confusion and hallucinations.   All other systems reviewed and are negative.         Personal History     Past Medical History:   Diagnosis Date   • Anemia     Due to low folic acid   • Arthritis    • Back pain    • Carotid stenosis    • Cerebrovascular disease 7/21/2016    Amaurosis fugax, OD.  Stent 70% LJ stenosis, April 2014:  Questionable recurrent symptoms “early” following stent, treated with reinstitution Plavix.   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  High-degree left subclavian artery stenosis, treated with stenting, April 2014.      • Chronic back pain 7/22/2016   • Colon cancer screening    • COPD (chronic obstructive pulmonary disease)    • Coronary artery disease    • Diarrhea    • Frequent UTI    • History of chest x-ray 07/22/2015    post inflammatory  scarring noted in right apical region, stable since CT of 10/10/2014; COPD changes with no acute findings   • History of chest x-ray 04/22/2014    no acute cardiopulmonary disease; calcified granuloma present in the left midlung; mild scarring in right upper lobe.    • Hyperlipidemia 7/21/2016   • Hypertension 7/21/2016   • Parathyroid abnormality    • Peripheral vascular disease 7/21/2016    1. Angiography the left subclavian artery.  2. Placement of a single, 4.0 mm diameter x 28 mm length, Xience Alpine everolimus-eluting stent in the proximal left subclavian artery in an area of in-stent restenosis. 7-22-15 3.  Femoral-femoral bypass 2009 4.  Aortobifemoral bypass 1995   • Thoracic aortic aneurysm    • TIA (transient ischemic attack)     h/o        Past Surgical History:   Procedure Laterality Date   • AORTA - FEMORAL ARTERY BYPASS GRAFT     • APPENDECTOMY     • BREAST BIOPSY Bilateral    • CAROTID ENDARTERECTOMY      Right. 2010   • CAROTID STENT      Right common carotid artery, 2015   • CHOLECYSTECTOMY     • EYE SURGERY      lasik   • FEMORAL ARTERY - FEMORAL ARTERY BYPASS GRAFT     • HYSTERECTOMY      bleeding, uterine cyst   • INCONTINENCE SURGERY     • CT EXPLORE PARATHYROID GLANDS Bilateral 2/1/2017    Procedure: PARATHYROIDECTOMY;  Surgeon: Isaac CORONA MD;  Location: [facility];  Service: ENT   • REFRACTIVE SURGERY     • SALIVARY GLAND SURGERY Left    • SUBCLAVIAN ARTERY STENT      7/15   • THROMBOENDARTERECTOMY      f Subclavian    • THROMBOENDARTERECTOMY       History of Carotid Thromboendarterectomy       Family History: family history includes Alzheimer's disease in her mother; Colon cancer in her other; Heart attack in her father; Heart disease in her father, mother, and paternal grandfather.     Social History:  reports that she quit smoking about 11 years ago. Her smoking use included Cigarettes. She has a 35.00 pack-year smoking history. She has never used smokeless tobacco. She reports  that she drinks alcohol. She reports that she does not use drugs.  Social History     Social History Narrative    First   when children young in an accident.  to 2nd  24 yrs       Medications:  Prescriptions Prior to Admission   Medication Sig Dispense Refill Last Dose   • albuterol (PROVENTIL HFA;VENTOLIN HFA) 108 (90 Base) MCG/ACT inhaler Inhale 2 puffs Every 4 (Four) Hours As Needed for Wheezing. 1 inhaler 0    • aspirin 81 MG tablet Take 81 mg by mouth Daily.   Taking   • atorvastatin (LIPITOR) 80 MG tablet Take 1 tablet by mouth every night at bedtime. 90 tablet 3    • clopidogrel (PLAVIX) 75 MG tablet Take 1 tablet by mouth Daily. 90 tablet 2    • folic acid (FOLVITE) 1 MG tablet Take 1 tablet by mouth Daily. 90 tablet 3    • gabapentin (NEURONTIN) 300 MG capsule Take 300 mg by mouth 3 (Three) Times a Day.      • lisinopril (PRINIVIL,ZESTRIL) 40 MG tablet Take 1 tablet by mouth Daily. 90 tablet 3    • traZODone (DESYREL) 50 MG tablet Take 1-2 tablets at bedtime as needed for sleep. 180 tablet 3    • venlafaxine XR (EFFEXOR-XR) 150 MG 24 hr capsule Take 1 capsule by mouth Daily. 90 capsule 3    • HYDROcodone-acetaminophen (NORCO)  MG per tablet Take 1 tablet by mouth Every 4 (Four) Hours As Needed for moderate pain (4-6).   Taking   • meclizine 25 MG chewable tablet chewable tablet Chew 25 mg Every 6 (Six) Hours As Needed.   Taking   • rOPINIRole (REQUIP) 0.5 MG tablet Take 1 tablet by mouth At Night As Needed (restless legs). Take 1 hour before bedtime. 30 tablet 0        Allergies   Allergen Reactions   • Sulfa Antibiotics Rash     Last as a baby.   • Percodan [Oxycodone-Aspirin] Mental Status Change       Objective   Objective     Vital Signs:   Temp:  [98 °F (36.7 °C)] 98 °F (36.7 °C)  Heart Rate:  [86-98] 86  Resp:  [16] 16  BP: (143-173)/(59-84) 144/68        Physical Exam   Constitutional: No acute distress, awake, alert and oriented ×4   Eyes: PERRLA, sclerae anicteric, no  conjunctival injection  HENT: NCAT, mucous membranes moist  Neck: Supple, no thyromegaly, no lymphadenopathy, trachea midline  Respiratory: Clear to auscultation bilaterally, nonlabored respirations   Cardiovascular: RRR, no murmurs, rubs, or gallops, palpable pedal pulses bilaterally  Gastrointestinal: Positive bowel sounds, soft, nontender, nondistended  Musculoskeletal: No bilateral ankle edema, no clubbing or cyanosis to extremities  Psychiatric: Appropriate affect, cooperative  Neurologic: Oriented x 3, strength symmetric in all extremities, Cranial Nerves grossly intact to confrontation, speech clear  Skin: No rashes    Results Reviewed:  I have personally reviewed current lab, radiology, and data and agree.      Results from last 7 days  Lab Units 04/05/18  2223   WBC 10*3/mm3 8.02   HEMOGLOBIN g/dL 5.5*   HEMATOCRIT % 21.3*   PLATELETS 10*3/mm3 482*       Results from last 7 days  Lab Units 04/05/18  2223   SODIUM mmol/L 136   POTASSIUM mmol/L 4.1   CHLORIDE mmol/L 103   CO2 mmol/L 23.0   BUN mg/dL 11   CREATININE mg/dL 1.10   GLUCOSE mg/dL 100   CALCIUM mg/dL 8.7   ALT (SGPT) U/L 9   AST (SGOT) U/L 14     Estimated Creatinine Clearance: 55.5 mL/min (by C-G formula based on SCr of 1.1 mg/dL).  Brief Urine Lab Results  (Last result in the past 365 days)      Color   Clarity   Blood   Leuk Est   Nitrite   Protein   CREAT   Urine HCG        06/07/17 1528 Yellow Cloudy(A) Trace(A) Moderate (2+)(A) Negative Negative             BNP   Date Value Ref Range Status   04/05/2018 48.0 0.0 - 100.0 pg/mL Final     Comment:     Results may be falsely decreased if patient taking Biotin.     No results found for: PHART  Imaging Results (last 24 hours)     Procedure Component Value Units Date/Time    XR Chest 1 View [[id number]] Collected:  04/05/18 2219     Updated:  04/05/18 2336    Narrative:       EXAM:    XR Chest, 1 View    CLINICAL HISTORY:    67 years old, female; Anemia, unspecified; Chest pain, unspecified; Type not    specified; Additional info: Chest pain triage protocol    TECHNIQUE:    Frontal view of the chest.    COMPARISON:    [facility] - XR CHEST PA AND LATERAL 2017-02-09 14:47    FINDINGS:    Lungs:  Hyperinflated with right apical scarring, unchanged.  No   consolidation.    Pleural space:  Unremarkable.  No pneumothorax.    Heart:  Unremarkable.    Mediastinum:  Unremarkable.    Bones/joints:  Unremarkable.      Impression:         No acute findings.    THIS DOCUMENT HAS BEEN ELECTRONICALLY SIGNED BY BRIANA BRANDT MD        Results for orders placed during the hospital encounter of 02/07/17   Adult Transthoracic Echo Complete    Narrative · Left ventricular function is normal. Estimated EF = 65%.  · All left ventricular wall segments contract normally.          Assessment/Plan   Assessment / Plan     Hospital Problem List     Chest pain            Assessment & Plan:  1.  Chest pain, unspecified  2.  Severe symptomatic microcytic anemia  3.  Generalized weakness  4.  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  5.  COPD  6.  Severe atherosclerotic disease.  7.  Right upper lobe 8 mm nodule  with suspicious interval growth.    Patient is admitted to telemetry and will continue serial cardiac enzymes trending.  Transfusion of packed red process is in progress.  Blood was obtained for anemia workup in the ER.  Though the cause of severe microcytic anemia is unclear, there is concern about slowly leaking thoracic aneurysm.  CT surgery will be consulted in the morning.  Also, urology, hematology and pulmonary consults will be requested for further evaluation of growing pulmonary noting, unspecified microcytic anemia as well as chest pain.  She will be made nothing by mouth in the meantime.  She will be monitored very closely.  Plan was discussed with her and her .    DVT prophylaxis: bilateral SCDs.    CODE STATUS:  Full Code      Electronically signed by Rowdy Beltre MD, 04/06/18, 2:03 AM.

## 2018-04-07 ENCOUNTER — ANESTHESIA EVENT (OUTPATIENT)
Dept: GASTROENTEROLOGY | Facility: HOSPITAL | Age: 68
End: 2018-04-07

## 2018-04-07 ENCOUNTER — APPOINTMENT (OUTPATIENT)
Dept: CARDIOLOGY | Facility: HOSPITAL | Age: 68
End: 2018-04-07
Attending: HOSPITALIST

## 2018-04-07 ENCOUNTER — ANESTHESIA (OUTPATIENT)
Dept: GASTROENTEROLOGY | Facility: HOSPITAL | Age: 68
End: 2018-04-07

## 2018-04-07 ENCOUNTER — APPOINTMENT (OUTPATIENT)
Dept: GENERAL RADIOLOGY | Facility: HOSPITAL | Age: 68
End: 2018-04-07

## 2018-04-07 LAB
ABO + RH BLD: NORMAL
ABO + RH BLD: NORMAL
ANION GAP SERPL CALCULATED.3IONS-SCNC: 14 MMOL/L (ref 3–11)
BACTERIA UR QL AUTO: ABNORMAL /HPF
BASOPHILS # BLD AUTO: 0.01 10*3/MM3 (ref 0–0.2)
BASOPHILS NFR BLD AUTO: 0.1 % (ref 0–1)
BH BB BLOOD EXPIRATION DATE: NORMAL
BH BB BLOOD EXPIRATION DATE: NORMAL
BH BB BLOOD TYPE BARCODE: 9500
BH BB BLOOD TYPE BARCODE: 9500
BH BB DISPENSE STATUS: NORMAL
BH BB DISPENSE STATUS: NORMAL
BH BB PRODUCT CODE: NORMAL
BH BB PRODUCT CODE: NORMAL
BH BB UNIT NUMBER: NORMAL
BH BB UNIT NUMBER: NORMAL
BH CV ECHO MEAS - AO MAX PG (FULL): 6.5 MMHG
BH CV ECHO MEAS - AO MAX PG: 11 MMHG
BH CV ECHO MEAS - AO MEAN PG (FULL): 2.3 MMHG
BH CV ECHO MEAS - AO MEAN PG: 4.6 MMHG
BH CV ECHO MEAS - AO ROOT AREA (BSA CORRECTED): 1.5
BH CV ECHO MEAS - AO ROOT AREA: 6 CM^2
BH CV ECHO MEAS - AO ROOT DIAM: 2.8 CM
BH CV ECHO MEAS - AO V2 MAX: 161.8 CM/SEC
BH CV ECHO MEAS - AO V2 MEAN: 93.3 CM/SEC
BH CV ECHO MEAS - AO V2 VTI: 35.8 CM
BH CV ECHO MEAS - ASC AORTA: 2.5 CM
BH CV ECHO MEAS - AVA(I,A): 1.7 CM^2
BH CV ECHO MEAS - AVA(I,D): 1.7 CM^2
BH CV ECHO MEAS - AVA(V,A): 1.5 CM^2
BH CV ECHO MEAS - AVA(V,D): 1.5 CM^2
BH CV ECHO MEAS - BSA(HAYCOCK): 1.8 M^2
BH CV ECHO MEAS - BSA: 1.8 M^2
BH CV ECHO MEAS - BZI_BMI: 24.4 KILOGRAMS/M^2
BH CV ECHO MEAS - BZI_METRIC_HEIGHT: 170.2 CM
BH CV ECHO MEAS - BZI_METRIC_WEIGHT: 70.8 KG
BH CV ECHO MEAS - EDV(CUBED): 57.5 ML
BH CV ECHO MEAS - EDV(TEICH): 64.3 ML
BH CV ECHO MEAS - EF(CUBED): 76.6 %
BH CV ECHO MEAS - EF(TEICH): 69.3 %
BH CV ECHO MEAS - ESV(CUBED): 13.5 ML
BH CV ECHO MEAS - ESV(TEICH): 19.7 ML
BH CV ECHO MEAS - FS: 38.3 %
BH CV ECHO MEAS - IVS/LVPW: 1
BH CV ECHO MEAS - IVSD: 0.9 CM
BH CV ECHO MEAS - LA DIMENSION: 3.3 CM
BH CV ECHO MEAS - LA/AO: 1.2
BH CV ECHO MEAS - LV MASS(C)D: 99.4 GRAMS
BH CV ECHO MEAS - LV MASS(C)DI: 54.6 GRAMS/M^2
BH CV ECHO MEAS - LV MAX PG: 4.5 MMHG
BH CV ECHO MEAS - LV MEAN PG: 2.3 MMHG
BH CV ECHO MEAS - LV V1 MAX: 106.6 CM/SEC
BH CV ECHO MEAS - LV V1 MEAN: 68.9 CM/SEC
BH CV ECHO MEAS - LV V1 VTI: 25.2 CM
BH CV ECHO MEAS - LVIDD: 3.9 CM
BH CV ECHO MEAS - LVIDS: 2.4 CM
BH CV ECHO MEAS - LVOT AREA (M): 2.3 CM^2
BH CV ECHO MEAS - LVOT AREA: 2.3 CM^2
BH CV ECHO MEAS - LVOT DIAM: 1.7 CM
BH CV ECHO MEAS - LVPWD: 0.9 CM
BH CV ECHO MEAS - MV A MAX VEL: 99.2 CM/SEC
BH CV ECHO MEAS - MV DEC TIME: 0.11 SEC
BH CV ECHO MEAS - MV E MAX VEL: 85.9 CM/SEC
BH CV ECHO MEAS - MV E/A: 0.87
BH CV ECHO MEAS - PA ACC SLOPE: 734.9 CM/SEC^2
BH CV ECHO MEAS - PA ACC TIME: 0.11 SEC
BH CV ECHO MEAS - PA MAX PG: 5.2 MMHG
BH CV ECHO MEAS - PA PR(ACCEL): 29.9 MMHG
BH CV ECHO MEAS - PA V2 MAX: 114 CM/SEC
BH CV ECHO MEAS - RAP SYSTOLE: 8 MMHG
BH CV ECHO MEAS - RVDD: 1.7 CM
BH CV ECHO MEAS - RVSP: 40 MMHG
BH CV ECHO MEAS - SI(AO): 118.5 ML/M^2
BH CV ECHO MEAS - SI(CUBED): 24.2 ML/M^2
BH CV ECHO MEAS - SI(LVOT): 32.6 ML/M^2
BH CV ECHO MEAS - SI(TEICH): 24.5 ML/M^2
BH CV ECHO MEAS - SV(AO): 215.5 ML
BH CV ECHO MEAS - SV(CUBED): 44 ML
BH CV ECHO MEAS - SV(LVOT): 59.3 ML
BH CV ECHO MEAS - SV(TEICH): 44.5 ML
BH CV ECHO MEAS - TAPSE (>1.6): 2.1 CM2
BH CV ECHO MEAS - TR MAX VEL: 242.2 CM/SEC
BH CV XLRA - RV BASE: 3.1 CM
BH CV XLRA - RV LENGTH: 5.3 CM
BH CV XLRA - RV MID: 2.6 CM
BH CV XLRA - TDI S': 13.3 CM/SEC
BILIRUB UR QL STRIP: NEGATIVE
BUN BLD-MCNC: 28 MG/DL (ref 9–23)
BUN/CREAT SERPL: 14.7 (ref 7–25)
CALCIUM SPEC-SCNC: 8.2 MG/DL (ref 8.7–10.4)
CHLORIDE SERPL-SCNC: 101 MMOL/L (ref 99–109)
CLARITY UR: CLEAR
CO2 SERPL-SCNC: 18 MMOL/L (ref 20–31)
COLOR UR: YELLOW
CREAT BLD-MCNC: 1.9 MG/DL (ref 0.6–1.3)
CROSSMATCH INTERPRETATION: NORMAL
CROSSMATCH INTERPRETATION: NORMAL
DEPRECATED RDW RBC AUTO: 58.5 FL (ref 37–54)
EOSINOPHIL # BLD AUTO: 0 10*3/MM3 (ref 0–0.3)
EOSINOPHIL NFR BLD AUTO: 0 % (ref 0–3)
ERYTHROCYTE [DISTWIDTH] IN BLOOD BY AUTOMATED COUNT: 20.2 % (ref 11.3–14.5)
GFR SERPL CREATININE-BSD FRML MDRD: 26 ML/MIN/1.73
GLUCOSE BLD-MCNC: 124 MG/DL (ref 70–100)
GLUCOSE UR STRIP-MCNC: NEGATIVE MG/DL
HCT VFR BLD AUTO: 33.9 % (ref 34.5–44)
HGB BLD-MCNC: 10 G/DL (ref 11.5–15.5)
HGB UR QL STRIP.AUTO: NEGATIVE
HYALINE CASTS UR QL AUTO: ABNORMAL /LPF
IMM GRANULOCYTES # BLD: 0.06 10*3/MM3 (ref 0–0.03)
IMM GRANULOCYTES NFR BLD: 0.3 % (ref 0–0.6)
KETONES UR QL STRIP: NEGATIVE
LEUKOCYTE ESTERASE UR QL STRIP.AUTO: ABNORMAL
LV EF 2D ECHO EST: 68 %
LYMPHOCYTES # BLD AUTO: 1.06 10*3/MM3 (ref 0.6–4.8)
LYMPHOCYTES NFR BLD AUTO: 6.2 % (ref 24–44)
MCH RBC QN AUTO: 23.3 PG (ref 27–31)
MCHC RBC AUTO-ENTMCNC: 29.5 G/DL (ref 32–36)
MCV RBC AUTO: 78.8 FL (ref 80–99)
MONOCYTES # BLD AUTO: 0.94 10*3/MM3 (ref 0–1)
MONOCYTES NFR BLD AUTO: 5.5 % (ref 0–12)
NEUTROPHILS # BLD AUTO: 15.09 10*3/MM3 (ref 1.5–8.3)
NEUTROPHILS NFR BLD AUTO: 87.9 % (ref 41–71)
NITRITE UR QL STRIP: NEGATIVE
PH UR STRIP.AUTO: <=5 [PH] (ref 5–8)
PLATELET # BLD AUTO: 484 10*3/MM3 (ref 150–450)
PMV BLD AUTO: 8.2 FL (ref 6–12)
POTASSIUM BLD-SCNC: 4.5 MMOL/L (ref 3.5–5.5)
PROT UR QL STRIP: NEGATIVE
RBC # BLD AUTO: 4.3 10*6/MM3 (ref 3.89–5.14)
RBC # UR: ABNORMAL /HPF
REF LAB TEST METHOD: ABNORMAL
SODIUM BLD-SCNC: 133 MMOL/L (ref 132–146)
SP GR UR STRIP: 1.02 (ref 1–1.03)
SQUAMOUS #/AREA URNS HPF: ABNORMAL /HPF
UNIT  ABO: NORMAL
UNIT  ABO: NORMAL
UNIT  RH: NORMAL
UNIT  RH: NORMAL
UROBILINOGEN UR QL STRIP: ABNORMAL
WBC NRBC COR # BLD: 17.16 10*3/MM3 (ref 3.5–10.8)
WBC UR QL AUTO: ABNORMAL /HPF

## 2018-04-07 PROCEDURE — 80048 BASIC METABOLIC PNL TOTAL CA: CPT | Performed by: HOSPITALIST

## 2018-04-07 PROCEDURE — 71045 X-RAY EXAM CHEST 1 VIEW: CPT

## 2018-04-07 PROCEDURE — 81001 URINALYSIS AUTO W/SCOPE: CPT | Performed by: HOSPITALIST

## 2018-04-07 PROCEDURE — 25010000002 PROPOFOL 10 MG/ML EMULSION: Performed by: ANESTHESIOLOGY

## 2018-04-07 PROCEDURE — 85025 COMPLETE CBC W/AUTO DIFF WBC: CPT | Performed by: HOSPITALIST

## 2018-04-07 PROCEDURE — 94799 UNLISTED PULMONARY SVC/PX: CPT

## 2018-04-07 PROCEDURE — 87086 URINE CULTURE/COLONY COUNT: CPT | Performed by: HOSPITALIST

## 2018-04-07 PROCEDURE — 93306 TTE W/DOPPLER COMPLETE: CPT

## 2018-04-07 PROCEDURE — 0DB98ZX EXCISION OF DUODENUM, VIA NATURAL OR ARTIFICIAL OPENING ENDOSCOPIC, DIAGNOSTIC: ICD-10-PCS | Performed by: INTERNAL MEDICINE

## 2018-04-07 PROCEDURE — 0DB68ZX EXCISION OF STOMACH, VIA NATURAL OR ARTIFICIAL OPENING ENDOSCOPIC, DIAGNOSTIC: ICD-10-PCS | Performed by: INTERNAL MEDICINE

## 2018-04-07 PROCEDURE — 99232 SBSQ HOSP IP/OBS MODERATE 35: CPT | Performed by: INTERNAL MEDICINE

## 2018-04-07 PROCEDURE — 0DB48ZX EXCISION OF ESOPHAGOGASTRIC JUNCTION, VIA NATURAL OR ARTIFICIAL OPENING ENDOSCOPIC, DIAGNOSTIC: ICD-10-PCS | Performed by: INTERNAL MEDICINE

## 2018-04-07 PROCEDURE — 99233 SBSQ HOSP IP/OBS HIGH 50: CPT | Performed by: HOSPITALIST

## 2018-04-07 PROCEDURE — 88305 TISSUE EXAM BY PATHOLOGIST: CPT | Performed by: INTERNAL MEDICINE

## 2018-04-07 PROCEDURE — 94760 N-INVAS EAR/PLS OXIMETRY 1: CPT

## 2018-04-07 PROCEDURE — 93306 TTE W/DOPPLER COMPLETE: CPT | Performed by: INTERNAL MEDICINE

## 2018-04-07 PROCEDURE — 99231 SBSQ HOSP IP/OBS SF/LOW 25: CPT | Performed by: THORACIC SURGERY (CARDIOTHORACIC VASCULAR SURGERY)

## 2018-04-07 PROCEDURE — 94640 AIRWAY INHALATION TREATMENT: CPT

## 2018-04-07 RX ORDER — VENLAFAXINE HYDROCHLORIDE 75 MG/1
150 CAPSULE, EXTENDED RELEASE ORAL NIGHTLY
Status: DISCONTINUED | OUTPATIENT
Start: 2018-04-07 | End: 2018-04-09 | Stop reason: HOSPADM

## 2018-04-07 RX ORDER — PROPOFOL 10 MG/ML
VIAL (ML) INTRAVENOUS AS NEEDED
Status: DISCONTINUED | OUTPATIENT
Start: 2018-04-07 | End: 2018-04-07 | Stop reason: SURG

## 2018-04-07 RX ORDER — IPRATROPIUM BROMIDE AND ALBUTEROL SULFATE 2.5; .5 MG/3ML; MG/3ML
3 SOLUTION RESPIRATORY (INHALATION) ONCE AS NEEDED
Status: DISCONTINUED | OUTPATIENT
Start: 2018-04-07 | End: 2018-04-07 | Stop reason: HOSPADM

## 2018-04-07 RX ORDER — ACETAMINOPHEN 325 MG/1
650 TABLET ORAL EVERY 6 HOURS PRN
Status: DISCONTINUED | OUTPATIENT
Start: 2018-04-07 | End: 2018-04-09 | Stop reason: HOSPADM

## 2018-04-07 RX ORDER — FENTANYL CITRATE 50 UG/ML
50 INJECTION, SOLUTION INTRAMUSCULAR; INTRAVENOUS
Status: DISCONTINUED | OUTPATIENT
Start: 2018-04-07 | End: 2018-04-07 | Stop reason: HOSPADM

## 2018-04-07 RX ORDER — SODIUM CHLORIDE 9 MG/ML
50 INJECTION, SOLUTION INTRAVENOUS CONTINUOUS
Status: DISCONTINUED | OUTPATIENT
Start: 2018-04-07 | End: 2018-04-09 | Stop reason: HOSPADM

## 2018-04-07 RX ORDER — HYDROMORPHONE HCL 110MG/55ML
0.5 PATIENT CONTROLLED ANALGESIA SYRINGE INTRAVENOUS
Status: DISCONTINUED | OUTPATIENT
Start: 2018-04-07 | End: 2018-04-07 | Stop reason: HOSPADM

## 2018-04-07 RX ORDER — BENZONATATE 100 MG/1
200 CAPSULE ORAL 3 TIMES DAILY PRN
Status: DISCONTINUED | OUTPATIENT
Start: 2018-04-07 | End: 2018-04-09 | Stop reason: HOSPADM

## 2018-04-07 RX ORDER — DOXYCYCLINE 100 MG/1
100 CAPSULE ORAL EVERY 12 HOURS SCHEDULED
Status: DISCONTINUED | OUTPATIENT
Start: 2018-04-07 | End: 2018-04-08

## 2018-04-07 RX ORDER — PANTOPRAZOLE SODIUM 40 MG/1
40 TABLET, DELAYED RELEASE ORAL
Status: CANCELLED | OUTPATIENT
Start: 2018-04-07

## 2018-04-07 RX ADMIN — DOXYCYCLINE 100 MG: 100 CAPSULE ORAL at 20:43

## 2018-04-07 RX ADMIN — IPRATROPIUM BROMIDE 0.5 MG: 0.5 SOLUTION RESPIRATORY (INHALATION) at 08:49

## 2018-04-07 RX ADMIN — PROPOFOL 50 MG: 10 INJECTION, EMULSION INTRAVENOUS at 08:25

## 2018-04-07 RX ADMIN — ZOLPIDEM TARTRATE 5 MG: 5 TABLET, FILM COATED ORAL at 20:46

## 2018-04-07 RX ADMIN — ATORVASTATIN CALCIUM 80 MG: 40 TABLET, FILM COATED ORAL at 20:43

## 2018-04-07 RX ADMIN — VENLAFAXINE HYDROCHLORIDE 150 MG: 75 CAPSULE, EXTENDED RELEASE ORAL at 20:43

## 2018-04-07 RX ADMIN — SODIUM CHLORIDE 50 ML/HR: 9 INJECTION, SOLUTION INTRAVENOUS at 11:04

## 2018-04-07 RX ADMIN — IPRATROPIUM BROMIDE 0.5 MG: 0.5 SOLUTION RESPIRATORY (INHALATION) at 12:33

## 2018-04-07 RX ADMIN — HYDROCODONE POLISTIREX AND CHLORPHENIRAMINE POLISTIREX 5 ML: 10; 8 SUSPENSION, EXTENDED RELEASE ORAL at 03:52

## 2018-04-07 RX ADMIN — IPRATROPIUM BROMIDE 0.5 MG: 0.5 SOLUTION RESPIRATORY (INHALATION) at 20:26

## 2018-04-07 RX ADMIN — DOXYCYCLINE 100 MG: 100 CAPSULE ORAL at 11:04

## 2018-04-07 RX ADMIN — BENZONATATE 200 MG: 100 CAPSULE, LIQUID FILLED ORAL at 11:04

## 2018-04-07 RX ADMIN — FAMOTIDINE 20 MG: 20 TABLET, FILM COATED ORAL at 09:30

## 2018-04-07 RX ADMIN — HYDROCODONE POLISTIREX AND CHLORPHENIRAMINE POLISTIREX 5 ML: 10; 8 SUSPENSION, EXTENDED RELEASE ORAL at 18:27

## 2018-04-07 RX ADMIN — PROPOFOL 50 MG: 10 INJECTION, EMULSION INTRAVENOUS at 08:18

## 2018-04-07 RX ADMIN — IPRATROPIUM BROMIDE 0.5 MG: 0.5 SOLUTION RESPIRATORY (INHALATION) at 16:27

## 2018-04-07 RX ADMIN — BENZONATATE 200 MG: 100 CAPSULE, LIQUID FILLED ORAL at 20:43

## 2018-04-07 RX ADMIN — ACETAMINOPHEN 650 MG: 325 TABLET ORAL at 12:45

## 2018-04-07 NOTE — ANESTHESIA PREPROCEDURE EVALUATION
Anesthesia Evaluation     Patient summary reviewed and Nursing notes reviewed   NPO Solid Status: > 8 hours  NPO Liquid Status: > 2 hours           Airway   Mallampati: II  TM distance: >3 FB  Neck ROM: full  No difficulty expected  Dental    (+) edentulous, upper dentures and lower dentures    Pulmonary    (+) COPD, decreased breath sounds,   Cardiovascular     Rhythm: regular  Rate: normal    (+) hypertension, CAD, PVD, hyperlipidemia,  carotid artery disease    ROS comment: EF 65%    Neuro/Psych  (+) TIA, psychiatric history Depression,     GI/Hepatic/Renal/Endo      Musculoskeletal     (+) back pain,   Abdominal   (+) obese,     Abdomen: soft.   Substance History      OB/GYN          Other   (+) blood dyscrasia, arthritis     ROS/Med Hx Other: ANEMIA                  Anesthesia Plan    ASA 3 - emergent     general     intravenous induction   Anesthetic plan and risks discussed with patient.    Plan discussed with CRNA.

## 2018-04-07 NOTE — PROGRESS NOTES
CTS Progress Note       LOS: 1 day   Patient Care Team:  Machelle Atkinson MD as PCP - General (Family Medicine)  Janette Srinivasan MD as PCP - Family Medicine    Chief Complaint   Patient presents with   • Chest Pain       Vital Signs:  Temp:  [97.8 °F (36.6 °C)-98.8 °F (37.1 °C)] 98.2 °F (36.8 °C)  Heart Rate:  [] 114  Resp:  [16-19] 18  BP: ()/(50-82) 123/55    Physical Exam:No further chest pain.  To my exam I think the right femoral pulse is significantly weaker than the left femoral pulse       Results:     Results from last 7 days  Lab Units 04/07/18  0425   WBC 10*3/mm3 17.16*   HEMOGLOBIN g/dL 10.0*   HEMATOCRIT % 33.9*   PLATELETS 10*3/mm3 484*       Results from last 7 days  Lab Units 04/07/18  0425   SODIUM mmol/L 133   POTASSIUM mmol/L 4.5   CHLORIDE mmol/L 101   CO2 mmol/L 18.0*   BUN mg/dL 28*   CREATININE mg/dL 1.90*   GLUCOSE mg/dL 124*   CALCIUM mg/dL 8.2*           Imaging Results (last 24 hours)     Procedure Component Value Units Date/Time    XR Chest 1 View [[id number]] Collected:  04/06/18 1407     Updated:  04/06/18 1424    Narrative:       EXAMINATION: XR CHEST 1 VW- 04/06/2018     INDICATION: dyspnea; R07.9-Chest pain, unspecified; D64.9-Anemia,  unspecified      COMPARISON: 04/05/2018     FINDINGS: Cardiac size within normal limits. No pulmonary vascularity  increase or focal consolidation. Chronic lung changes are noted with  biapical pleural-parenchymal scarring and emphysema. No pneumothorax or  pleural effusion. Degenerative changes of the spine.           Impression:       Chronic lung changes without acute cardiopulmonary process.     D:  04/06/2018  E:  04/06/2018     This report was finalized on 4/6/2018 2:21 PM by Dr. Con Dior.             Assessment    Principal Problem:    Chest pain  Active Problems:    Peripheral vascular disease    Hypertension    Hyperlipidemia    Lung nodule    Iron deficiency anemia    Iron deficiency anemia due to chronic blood  loss        Plan   CT angiogram of the abdominal aorta with complete runoff and 3-dimensional reconstruction today    Please note that portions of this note were completed with a voice recognition program. Efforts were made to edit the dictations, but occasionally words are mistranscribed.    Luis Liu MD  04/07/18  8:43 AM

## 2018-04-07 NOTE — ANESTHESIA POSTPROCEDURE EVALUATION
Patient: Kailee Almanza    Procedure Summary     Date:  04/07/18 Room / Location:  [facility]COPY 1 /  [facility]    Anesthesia Start:  0814 Anesthesia Stop:  0838    Procedure:  ESOPHAGOGASTRODUODENOSCOPY (N/A Esophagus) Diagnosis:       Iron deficiency anemia due to chronic blood loss      (Iron deficiency anemia due to chronic blood loss [D50.0])    Surgeon:  Alfonzo Cox MD Provider:      Anesthesia Type:  general ASA Status:  3 - Emergent          Anesthesia Type: general  Last vitals  BP   123/55 (04/07/18 0735)   Temp   98.8 °F (37.1 °C) (04/07/18 0735)   Pulse   98 (04/07/18 0735)   Resp   16 (04/07/18 0735)     SpO2   97 % (04/07/18 0735)     Post Anesthesia Care and Evaluation    Patient location during evaluation: PACU  Patient participation: complete - patient participated  Level of consciousness: awake and alert  Pain score: 0  Pain management: adequate  Airway patency: patent  Anesthetic complications: No anesthetic complications  PONV Status: none  Cardiovascular status: hemodynamically stable and acceptable  Respiratory status: nonlabored ventilation, acceptable and nasal cannula  Hydration status: acceptable

## 2018-04-07 NOTE — PROGRESS NOTES
EGD  HH with Schatzki's ring.  Mild gastritis    REC  Begin iron.  Needs opt colonoscopy  FU path

## 2018-04-07 NOTE — PROGRESS NOTES
[facility]  PROGRESS NOTE    Patient Name: Kailee Almanza  : 1950  MRN: [id number]    Date of Admission: 2018  Length of Stay: 1  Primary Care Physician: Machelle Atkinson MD    Subjective   Subjective     CC:  Chest pain    HPI:  Less short of breath today, but now with increased cough and yellow sputum. No f/c. No n/v. Notes sore throat. Decreased PO intake over the past 24 hours and decreased UOP. No dizziness/lightheadedness. Denies chest pain today.    Review of Systems    Otherwise ROS is negative except as mentioned in the HPI.    Objective   Objective     Vital Signs:   Temp:  [97.8 °F (36.6 °C)-98.8 °F (37.1 °C)] 98.1 °F (36.7 °C)  Heart Rate:  [] 110  Resp:  [16-19] 18  BP: ()/(50-82) 114/58        Physical Exam:  NAD, alert and oriented  OP clear, MMM  PERRL  Neck supple  No LAD  Tachy  CTAB  +BS, ND, NT  GALAN  No c/c/e  Color better  No change  otherwise    Results Reviewed:  I have personally reviewed current lab, radiology, and data and agree.      Results from last 7 days  Lab Units 18  04218  2223   WBC 10*3/mm3 17.16* 8.02   HEMOGLOBIN g/dL 10.0* 5.5*   HEMATOCRIT % 33.9* 21.3*   PLATELETS 10*3/mm3 484* 482*       Results from last 7 days  Lab Units 18  0425 18  0225 18  2223   SODIUM mmol/L 133 134 136   POTASSIUM mmol/L 4.5 4.1 4.1   CHLORIDE mmol/L 101 106 103   CO2 mmol/L 18.0* 18.0* 23.0   BUN mg/dL 28* 11 11   CREATININE mg/dL 1.90* 1.00 1.10   GLUCOSE mg/dL 124* 71 100   CALCIUM mg/dL 8.2* 8.1* 8.7   ALT (SGPT) U/L  --   --  9   AST (SGOT) U/L  --   --  14   TROPONIN I ng/mL  --  <0.006  --      Estimated Creatinine Clearance: 32.1 mL/min (by C-G formula based on SCr of 1.9 mg/dL (H)).  BNP   Date Value Ref Range Status   2018 48.0 0.0 - 100.0 pg/mL Final     Comment:     Results may be falsely decreased if patient taking Biotin.     No results found for: PHART    Microbiology  Results Abnormal     None          Imaging Results (last 24 hours)     Procedure Component Value Units Date/Time    XR Chest 1 View [[id number]] Collected:  04/06/18 1407     Updated:  04/06/18 1424    Narrative:       EXAMINATION: XR CHEST 1 VW- 04/06/2018     INDICATION: dyspnea; R07.9-Chest pain, unspecified; D64.9-Anemia,  unspecified      COMPARISON: 04/05/2018     FINDINGS: Cardiac size within normal limits. No pulmonary vascularity  increase or focal consolidation. Chronic lung changes are noted with  biapical pleural-parenchymal scarring and emphysema. No pneumothorax or  pleural effusion. Degenerative changes of the spine.           Impression:       Chronic lung changes without acute cardiopulmonary process.     D:  04/06/2018  E:  04/06/2018     This report was finalized on 4/6/2018 2:21 PM by Dr. Con Dior.           Results for orders placed during the hospital encounter of 02/07/17   Adult Transthoracic Echo Complete    Narrative · Left ventricular function is normal. Estimated EF = 65%.  · All left ventricular wall segments contract normally.          I have reviewed the medications.    Assessment/Plan   Assessment / Plan     Hospital Problem List     * (Principal)Chest pain    Peripheral vascular disease    Overview Addendum 7/27/2016  3:21 PM by Jillian Mclean MD     1. Angiography the left subclavian artery. Stent 4/2014  2. Placement of a single, 4.0 mm diameter x 28 mm length, Xience Alpine  everolimus-eluting stent in the proximal left subclavian artery in an area of  in-stent restenosis. 7-22-15  3.  Femoral-femoral bypass 2009  4.  Aortobifemoral bypass 1995         Hypertension    Hyperlipidemia    Lung nodule    Iron deficiency anemia    Iron deficiency anemia due to chronic blood loss    Overview Signed 4/6/2018  3:49 PM by Alfonzo Cox MD     Added automatically from request for surgery [id number]                  Brief Hospital Course to date:  Kailee Delilah Archie is a 67 y.o.  female here with chest pain and found to have severe iron deficiency anemia.       Assessment & Plan:  Chest pain  --most likely angina given other PVD, needs LHC when deemed ready by cardiology  Severe iron deficiency anemia  --s/p 2 units PRBC  --better  --IV iron per hematology  --s/p EGD, findings noted, on PPI  --needs outpatient colonoscopy  Lung nodule  --pulmonary consulted/outpatient CT follow up  Aortic arch/thoracic aorta plaques/with ulceration  --CTS notes reviewed  MANJIT  --gentle IV hydration/check UA  HL  Hx of subclavian/carotid stent  Hx of fem-fem bypass    DVT Prophylaxis:  SCDS only    CODE STATUS: Full Code    Disposition: I expect the patient to be discharged TBD.      Electronically signed by Jam Pahn MD, 04/07/18, 10:27 AM.

## 2018-04-07 NOTE — PROGRESS NOTES
DATE OF VISIT: 4/7/2018    Chief Complain: Followup for anemia     SUBJECTIVE: The patient had allergic reaction to IV iron yesterday.  She  denied any fever or  chills, no night sweats, denied any headaches    REVIEW OF SYSTEMS: All the other 9 systems are reviewed by me and negative  except what is mentioned in HPI.     PAST MEDICAL HISTORY/SOCIAL HISTORY/FAMILY HISTORY: Unchanged from my prior documentation done on 4/6/18      Current Facility-Administered Medications:   •  [MAR Hold] aspirin chewable tablet 324 mg, 324 mg, Oral, Once, Triage Protocol Emergency, MD  •  [MAR Hold] atorvastatin (LIPITOR) tablet 80 mg, 80 mg, Oral, Nightly, GET [facility], 80 mg at 04/06/18 2008  •  [MAR Hold] diphenhydrAMINE (BENADRYL) 12.5 MG/5ML elixir 12.5 mg, 12.5 mg, Oral, Daily, Dragan Weathers MD, 12.5 mg at 04/06/18 1306  •  famotidine (PEPCID) tablet 20 mg, 20 mg, Oral, Daily, Dragan Weathers MD, 20 mg at 04/06/18 1306  •  [MAR Hold] ferric gluconate (FERRLECIT) 125 mg in sodium chloride 0.9 % 110 mL IVPB, 125 mg, Intravenous, Daily, Dragan Weathers MD, Last Rate: 55 mL/hr at 04/06/18 1539, 125 mg at 04/06/18 1539  •  [MAR Hold] HYDROcod Polst-CPM Polst ER (TUSSIONEX PENNKINETIC) 10-8 MG/5ML ER suspension 5 mL, 5 mL, Oral, Q12H PRN, Jam Phan MD, 5 mL at 04/07/18 0352  •  [MAR Hold] HYDROcodone-acetaminophen (NORCO) 5-325 MG per tablet 1 tablet, 1 tablet, Oral, Q6H PRN, Jorge Reid DO, 1 tablet at 04/06/18 1746  •  [MAR Hold] ipratropium (ATROVENT) nebulizer solution 0.5 mg, 0.5 mg, Nebulization, 4x Daily - RT, Jam Phan MD, 0.5 mg at 04/07/18 0849  •  [MAR Hold] ondansetron (ZOFRAN) injection 4 mg, 4 mg, Intravenous, Q4H PRN, Jorge Reid DO, 4 mg at 04/06/18 1743  •  [MAR Hold] sodium chloride 0.9 % flush 1-10 mL, 1-10 mL, Intravenous, PRN, Rowdy Beltre MD  •  [MAR Hold] sodium chloride 0.9 % flush 10 mL, 10 mL, Intravenous, PRN, Triage Protocol Emergency, MD  •  [MAR Hold] zolpidem  "(AMBIEN) tablet 5 mg, 5 mg, Oral, Nightly PRN, Rowdy Beltre MD, 5 mg at 04/06/18 2140    PHYSICAL EXAMINATION:   /58   Pulse 110   Temp 98.1 °F (36.7 °C) (Temporal Artery )   Resp 18   Ht 170.2 cm (67\")   Wt 70.8 kg (156 lb)   SpO2 93%   BMI 24.43 kg/m²    ECOG Performance Status: 2 - Symptomatic, <50% confined to bed  GENERAL: Age appropriate. No acute distress.   NEURO/PSYCH: A&O x 3, strength 5/5 in all muscle groups  HEENT: Head atraumatic, normocephalic.   NECK: Supple. No JVD. No lymphadenopathy.   LUNGS: Clear to auscultation bilaterally. No wheezing. No rhonchi.   HEART: Regular rate and rhythm. S1, S2, no murmurs.   ABDOMEN: Soft, nontender, nondistended. Bowel sounds positive. No  hepatosplenomegaly.   EXTREMITIES: No clubbing, cyanosis, or edema.   SKIN: No rashes. No purpura.       Admission on 04/05/2018   Component Date Value Ref Range Status   • Glucose 04/05/2018 100  70 - 100 mg/dL Final   • BUN 04/05/2018 11  9 - 23 mg/dL Final   • Creatinine 04/05/2018 1.10  0.60 - 1.30 mg/dL Final   • Sodium 04/05/2018 136  132 - 146 mmol/L Final   • Potassium 04/05/2018 4.1  3.5 - 5.5 mmol/L Final   • Chloride 04/05/2018 103  99 - 109 mmol/L Final   • CO2 04/05/2018 23.0  20.0 - 31.0 mmol/L Final   • Calcium 04/05/2018 8.7  8.7 - 10.4 mg/dL Final   • Total Protein 04/05/2018 7.4  5.7 - 8.2 g/dL Final   • Albumin 04/05/2018 4.40  3.20 - 4.80 g/dL Final   • ALT (SGPT) 04/05/2018 9  7 - 40 U/L Final   • AST (SGOT) 04/05/2018 14  0 - 33 U/L Final   • Alkaline Phosphatase 04/05/2018 149* 25 - 100 U/L Final   • Total Bilirubin 04/05/2018 0.2* 0.3 - 1.2 mg/dL Final   • eGFR Non African Amer 04/05/2018 50* >60 mL/min/1.73 Final   • Globulin 04/05/2018 3.0  gm/dL Final   • A/G Ratio 04/05/2018 1.5  1.5 - 2.5 g/dL Final   • BUN/Creatinine Ratio 04/05/2018 10.0  7.0 - 25.0 Final   • Anion Gap 04/05/2018 10.0  3.0 - 11.0 mmol/L Final   • Lipase 04/05/2018 69* 6 - 51 U/L Final   • BNP 04/05/2018 48.0  " 0.0 - 100.0 pg/mL Final   • Extra Tube 04/05/2018 hold for add-on   Final   • Extra Tube 04/05/2018 Hold for add-ons.   Final   • Extra Tube 04/05/2018 hold for add-on   Final   • Extra Tube 04/05/2018 Hold for add-ons.   Final   • WBC 04/05/2018 8.02  3.50 - 10.80 10*3/mm3 Final   • RBC 04/05/2018 2.85* 3.89 - 5.14 10*6/mm3 Final   • Hemoglobin 04/05/2018 5.5* 11.5 - 15.5 g/dL Final   • Hematocrit 04/05/2018 21.3* 34.5 - 44.0 % Final   • MCV 04/05/2018 74.7* 80.0 - 99.0 fL Final   • MCH 04/05/2018 19.3* 27.0 - 31.0 pg Final   • MCHC 04/05/2018 25.8* 32.0 - 36.0 g/dL Final   • RDW 04/05/2018 20.5* 11.3 - 14.5 % Final   • RDW-SD 04/05/2018 56.7* 37.0 - 54.0 fl Final   • MPV 04/05/2018 7.9  6.0 - 12.0 fL Final   • Platelets 04/05/2018 482* 150 - 450 10*3/mm3 Final   • Neutrophil % 04/05/2018 74.9* 41.0 - 71.0 % Final   • Lymphocyte % 04/05/2018 14.6* 24.0 - 44.0 % Final   • Monocyte % 04/05/2018 6.4  0.0 - 12.0 % Final   • Eosinophil % 04/05/2018 3.5* 0.0 - 3.0 % Final   • Basophil % 04/05/2018 0.2  0.0 - 1.0 % Final   • Immature Grans % 04/05/2018 0.4  0.0 - 0.6 % Final   • Neutrophils, Absolute 04/05/2018 6.01  1.50 - 8.30 10*3/mm3 Final   • Lymphocytes, Absolute 04/05/2018 1.17  0.60 - 4.80 10*3/mm3 Final   • Monocytes, Absolute 04/05/2018 0.51  0.00 - 1.00 10*3/mm3 Final   • Eosinophils, Absolute 04/05/2018 0.28  0.00 - 0.30 10*3/mm3 Final   • Basophils, Absolute 04/05/2018 0.02  0.00 - 0.20 10*3/mm3 Final   • Immature Grans, Absolute 04/05/2018 0.03  0.00 - 0.03 10*3/mm3 Final   • Troponin I 04/05/2018 0.00  0.00 - 0.07 ng/mL Final   • ABO Type 04/06/2018 O   Final   • RH type 04/06/2018 Negative   Final   • Antibody Screen 04/06/2018 Negative   Final   • T&S Expiration Date 04/06/2018 4/8/2018 11:59:59 PM   Final   • Iron 04/06/2018 37* 50 - 175 mcg/dL Final   • TIBC 04/06/2018 417  250 - 450 mcg/dL Final   • Iron Saturation 04/06/2018 9* 15 - 50 % Final   • Glucose 04/06/2018 71  70 - 100 mg/dL Final   • BUN  04/06/2018 11  9 - 23 mg/dL Final   • Creatinine 04/06/2018 1.00  0.60 - 1.30 mg/dL Final   • Sodium 04/06/2018 134  132 - 146 mmol/L Final   • Potassium 04/06/2018 4.1  3.5 - 5.5 mmol/L Final   • Chloride 04/06/2018 106  99 - 109 mmol/L Final   • CO2 04/06/2018 18.0* 20.0 - 31.0 mmol/L Final   • Calcium 04/06/2018 8.1* 8.7 - 10.4 mg/dL Final   • eGFR Non African Amer 04/06/2018 55* >60 mL/min/1.73 Final   • BUN/Creatinine Ratio 04/06/2018 11.0  7.0 - 25.0 Final   • Anion Gap 04/06/2018 10.0  3.0 - 11.0 mmol/L Final   • CKMB 04/06/2018 0.92  0.00 - 5.00 ng/mL Final   • Creatine Kinase 04/06/2018 42  26 - 174 U/L Final   • Product Code 04/06/2018 E2543L61   Final   • Unit Number 04/06/2018 [id number]   Final   • UNIT  ABO 04/06/2018 O   Final   • UNIT  RH 04/06/2018 NEG   Final   • CROSSMATCH 1 INTERPRETATION 04/06/2018 Compatible   Final   • Dispense Status 04/06/2018 IS   Final   • Blood Type 04/06/2018 ONEG   Final   • Blood Expiration Date 04/06/2018 201804192359   Final   • Blood Type Barcode 04/06/2018 9500   Final   • Product Code 04/06/2018 [id number]   Final   • Unit Number 04/06/2018 [id number]   Final   • UNIT  ABO 04/06/2018 O   Final   • UNIT  RH 04/06/2018 NEG   Final   • CROSSMATCH 1 INTERPRETATION 04/06/2018 Compatible   Final   • Dispense Status 04/06/2018 IS   Final   • Blood Type 04/06/2018 ONEG   Final   • Blood Expiration Date 04/06/2018 201804182359   Final   • Blood Type Barcode 04/06/2018 9500   Final   • Troponin I 04/06/2018 <0.006  <=0.039 ng/mL Final   • Glucose 04/07/2018 124* 70 - 100 mg/dL Final   • BUN 04/07/2018 28* 9 - 23 mg/dL Final   • Creatinine 04/07/2018 1.90* 0.60 - 1.30 mg/dL Final   • Sodium 04/07/2018 133  132 - 146 mmol/L Final   • Potassium 04/07/2018 4.5  3.5 - 5.5 mmol/L Final   • Chloride 04/07/2018 101  99 - 109 mmol/L Final   • CO2 04/07/2018 18.0* 20.0 - 31.0 mmol/L Final   • Calcium 04/07/2018 8.2* 8.7 - 10.4 mg/dL Final   • eGFR Non African Amer 04/07/2018  26* >60 mL/min/1.73 Final   • BUN/Creatinine Ratio 04/07/2018 14.7  7.0 - 25.0 Final   • Anion Gap 04/07/2018 14.0* 3.0 - 11.0 mmol/L Final   • WBC 04/07/2018 17.16* 3.50 - 10.80 10*3/mm3 Final   • RBC 04/07/2018 4.30  3.89 - 5.14 10*6/mm3 Final   • Hemoglobin 04/07/2018 10.0* 11.5 - 15.5 g/dL Final   • Hematocrit 04/07/2018 33.9* 34.5 - 44.0 % Final   • MCV 04/07/2018 78.8* 80.0 - 99.0 fL Final   • MCH 04/07/2018 23.3* 27.0 - 31.0 pg Final   • MCHC 04/07/2018 29.5* 32.0 - 36.0 g/dL Final   • RDW 04/07/2018 20.2* 11.3 - 14.5 % Final   • RDW-SD 04/07/2018 58.5* 37.0 - 54.0 fl Final   • MPV 04/07/2018 8.2  6.0 - 12.0 fL Final   • Platelets 04/07/2018 484* 150 - 450 10*3/mm3 Final   • Neutrophil % 04/07/2018 87.9* 41.0 - 71.0 % Final   • Lymphocyte % 04/07/2018 6.2* 24.0 - 44.0 % Final   • Monocyte % 04/07/2018 5.5  0.0 - 12.0 % Final   • Eosinophil % 04/07/2018 0.0  0.0 - 3.0 % Final   • Basophil % 04/07/2018 0.1  0.0 - 1.0 % Final   • Immature Grans % 04/07/2018 0.3  0.0 - 0.6 % Final   • Neutrophils, Absolute 04/07/2018 15.09* 1.50 - 8.30 10*3/mm3 Final   • Lymphocytes, Absolute 04/07/2018 1.06  0.60 - 4.80 10*3/mm3 Final   • Monocytes, Absolute 04/07/2018 0.94  0.00 - 1.00 10*3/mm3 Final   • Eosinophils, Absolute 04/07/2018 0.00  0.00 - 0.30 10*3/mm3 Final   • Basophils, Absolute 04/07/2018 0.01  0.00 - 0.20 10*3/mm3 Final   • Immature Grans, Absolute 04/07/2018 0.06* 0.00 - 0.03 10*3/mm3 Final       No results found.    ASSESSMENT: The patient is a very pleasant 67 y.o. female  with anemia      PLAN:  1. Will stop daily IV iron.  2. Will give Feraheme out patient.  3. Colonoscopy outpatient.  4. Aneurysm evaluation.  Discussed with patient and her  at the bedside.     Dragan Weathers MD  4/7/2018

## 2018-04-07 NOTE — PLAN OF CARE
Problem: Patient Care Overview  Goal: Plan of Care Review  Outcome: Ongoing (interventions implemented as appropriate)    Goal: Individualization and Mutuality  Outcome: Ongoing (interventions implemented as appropriate)    Goal: Discharge Needs Assessment  Outcome: Ongoing (interventions implemented as appropriate)      Problem: Fall Risk (Adult)  Goal: Absence of Fall  Outcome: Ongoing (interventions implemented as appropriate)      Problem: Skin Injury Risk (Adult)  Goal: Skin Health and Integrity  Outcome: Ongoing (interventions implemented as appropriate)

## 2018-04-08 LAB
ANION GAP SERPL CALCULATED.3IONS-SCNC: 11 MMOL/L (ref 3–11)
BUN BLD-MCNC: 23 MG/DL (ref 9–23)
BUN/CREAT SERPL: 19.2 (ref 7–25)
CALCIUM SPEC-SCNC: 8.1 MG/DL (ref 8.7–10.4)
CHLORIDE SERPL-SCNC: 110 MMOL/L (ref 99–109)
CO2 SERPL-SCNC: 20 MMOL/L (ref 20–31)
CREAT BLD-MCNC: 1.2 MG/DL (ref 0.6–1.3)
GFR SERPL CREATININE-BSD FRML MDRD: 45 ML/MIN/1.73
GLUCOSE BLD-MCNC: 95 MG/DL (ref 70–100)
POTASSIUM BLD-SCNC: 4.2 MMOL/L (ref 3.5–5.5)
SODIUM BLD-SCNC: 141 MMOL/L (ref 132–146)

## 2018-04-08 PROCEDURE — 87070 CULTURE OTHR SPECIMN AEROBIC: CPT | Performed by: HOSPITALIST

## 2018-04-08 PROCEDURE — 99233 SBSQ HOSP IP/OBS HIGH 50: CPT | Performed by: HOSPITALIST

## 2018-04-08 PROCEDURE — 25010000002 CEFTRIAXONE PER 250 MG: Performed by: HOSPITALIST

## 2018-04-08 PROCEDURE — 25010000002 METHYLPREDNISOLONE PER 40 MG: Performed by: HOSPITALIST

## 2018-04-08 PROCEDURE — 94799 UNLISTED PULMONARY SVC/PX: CPT

## 2018-04-08 PROCEDURE — 87205 SMEAR GRAM STAIN: CPT | Performed by: HOSPITALIST

## 2018-04-08 PROCEDURE — 80048 BASIC METABOLIC PNL TOTAL CA: CPT | Performed by: HOSPITALIST

## 2018-04-08 PROCEDURE — 99231 SBSQ HOSP IP/OBS SF/LOW 25: CPT | Performed by: THORACIC SURGERY (CARDIOTHORACIC VASCULAR SURGERY)

## 2018-04-08 PROCEDURE — 94640 AIRWAY INHALATION TREATMENT: CPT

## 2018-04-08 RX ORDER — SACCHAROMYCES BOULARDII 250 MG
250 CAPSULE ORAL 2 TIMES DAILY
Status: DISCONTINUED | OUTPATIENT
Start: 2018-04-08 | End: 2018-04-09 | Stop reason: HOSPADM

## 2018-04-08 RX ORDER — METHYLPREDNISOLONE SODIUM SUCCINATE 40 MG/ML
40 INJECTION, POWDER, LYOPHILIZED, FOR SOLUTION INTRAMUSCULAR; INTRAVENOUS ONCE
Status: COMPLETED | OUTPATIENT
Start: 2018-04-08 | End: 2018-04-08

## 2018-04-08 RX ORDER — CEFTRIAXONE SODIUM 1 G/50ML
1 INJECTION, SOLUTION INTRAVENOUS EVERY 24 HOURS
Status: DISCONTINUED | OUTPATIENT
Start: 2018-04-08 | End: 2018-04-09 | Stop reason: HOSPADM

## 2018-04-08 RX ADMIN — SODIUM CHLORIDE 50 ML/HR: 9 INJECTION, SOLUTION INTRAVENOUS at 20:26

## 2018-04-08 RX ADMIN — ACETAMINOPHEN 650 MG: 325 TABLET ORAL at 16:06

## 2018-04-08 RX ADMIN — VENLAFAXINE HYDROCHLORIDE 150 MG: 75 CAPSULE, EXTENDED RELEASE ORAL at 20:26

## 2018-04-08 RX ADMIN — IPRATROPIUM BROMIDE 0.5 MG: 0.5 SOLUTION RESPIRATORY (INHALATION) at 20:56

## 2018-04-08 RX ADMIN — BENZONATATE 200 MG: 100 CAPSULE, LIQUID FILLED ORAL at 08:12

## 2018-04-08 RX ADMIN — ATORVASTATIN CALCIUM 80 MG: 40 TABLET, FILM COATED ORAL at 20:26

## 2018-04-08 RX ADMIN — IPRATROPIUM BROMIDE 0.5 MG: 0.5 SOLUTION RESPIRATORY (INHALATION) at 07:36

## 2018-04-08 RX ADMIN — Medication 250 MG: at 08:12

## 2018-04-08 RX ADMIN — IPRATROPIUM BROMIDE 0.5 MG: 0.5 SOLUTION RESPIRATORY (INHALATION) at 16:15

## 2018-04-08 RX ADMIN — ZOLPIDEM TARTRATE 5 MG: 5 TABLET, FILM COATED ORAL at 20:26

## 2018-04-08 RX ADMIN — CEFTRIAXONE SODIUM 1 G: 1 INJECTION, SOLUTION INTRAVENOUS at 09:51

## 2018-04-08 RX ADMIN — Medication 250 MG: at 20:26

## 2018-04-08 RX ADMIN — BENZONATATE 200 MG: 100 CAPSULE, LIQUID FILLED ORAL at 16:06

## 2018-04-08 RX ADMIN — METHYLPREDNISOLONE SODIUM SUCCINATE 40 MG: 40 INJECTION, POWDER, FOR SOLUTION INTRAMUSCULAR; INTRAVENOUS at 08:12

## 2018-04-08 RX ADMIN — IPRATROPIUM BROMIDE 0.5 MG: 0.5 SOLUTION RESPIRATORY (INHALATION) at 12:21

## 2018-04-08 RX ADMIN — HYDROCODONE BITARTRATE AND ACETAMINOPHEN 1 TABLET: 5; 325 TABLET ORAL at 23:45

## 2018-04-08 RX ADMIN — BENZONATATE 200 MG: 100 CAPSULE, LIQUID FILLED ORAL at 20:40

## 2018-04-08 NOTE — PROGRESS NOTES
LOS: 2 days   Patient Care Team:  Machelle Atkinson MD as PCP - General (Family Medicine)  Janette Srinivasan MD as PCP - Family Medicine    Chief complaint:    Subjective   Denies chest pain, denies shortness of breath  EGD done yesterday  Objective    Vital Signs  Temp:  [98 °F (36.7 °C)-99 °F (37.2 °C)] 98 °F (36.7 °C)  Heart Rate:  [] 99  Resp:  [16-18] 18  BP: (114-162)/(48-82) 160/82    Physical Exam:   General Appearance: alert, appears stated age and cooperative   Lungs: clear bilaterally   Heart: Regular rate and rhythm      Results     Results from last 7 days  Lab Units 04/07/18  0425   WBC 10*3/mm3 17.16*   HEMOGLOBIN g/dL 10.0*   HEMATOCRIT % 33.9*   PLATELETS 10*3/mm3 484*       Results from last 7 days  Lab Units 04/07/18  0425   SODIUM mmol/L 133   POTASSIUM mmol/L 4.5   CHLORIDE mmol/L 101   CO2 mmol/L 18.0*   BUN mg/dL 28*   CREATININE mg/dL 1.90*   GLUCOSE mg/dL 124*   CALCIUM mg/dL 8.2*               Assessment    Principal Problem:    Chest pain  Active Problems:    Peripheral vascular disease    Hypertension    Hyperlipidemia    Lung nodule    Iron deficiency anemia    Iron deficiency anemia due to chronic blood loss        Plan   Creatinine was too high yesterday to preform the CT scan with dye.  Dr. Richmond back in the morning.   Continue to follow renal function    Saravanan Porras PA-C  04/08/18  8:22 AM

## 2018-04-08 NOTE — PROGRESS NOTES
CTS Progress Note       LOS: 2 days   Patient Care Team:  Machelle Atkinson MD as PCP - General (Family Medicine)  Janette Srinivasan MD as PCP - Family Medicine    Chief Complaint   Patient presents with   • Chest Pain       Vital Signs:  Temp:  [98 °F (36.7 °C)-99 °F (37.2 °C)] 98 °F (36.7 °C)  Heart Rate:  [] 99  Resp:  [16-18] 18  BP: (114-162)/(48-82) 160/82    Physical Exam:Unchanged no further reports of angina       Results:     Results from last 7 days  Lab Units 04/07/18  0425   WBC 10*3/mm3 17.16*   HEMOGLOBIN g/dL 10.0*   HEMATOCRIT % 33.9*   PLATELETS 10*3/mm3 484*       Results from last 7 days  Lab Units 04/07/18  0425   SODIUM mmol/L 133   POTASSIUM mmol/L 4.5   CHLORIDE mmol/L 101   CO2 mmol/L 18.0*   BUN mg/dL 28*   CREATININE mg/dL 1.90*   GLUCOSE mg/dL 124*   CALCIUM mg/dL 8.2*           Imaging Results (last 24 hours)     Procedure Component Value Units Date/Time    XR Chest 1 View [[id number]] Collected:  04/07/18 1122     Updated:  04/07/18 1509    Narrative:          EXAMINATION: XR CHEST, SINGLE VIEW - 04/07/2018     INDICATION: R07.9-Chest pain, unspecified; D64.9-Anemia, unspecified;  I73.9-Peripheral vascular disease, unspecified; D50.0-Iron deficiency  anemia secondary to blood loss (chronic).      COMPARISON: 04/06/2018.     FINDINGS: Chronic lung changes without acute cardiopulmonary process,  specifically no focal consolidation. Lung volumes unchanged. Cardiac  silhouette unchanged. No pneumothorax or pleural effusion. Degenerative  changes of the thoracic spine.           Impression:       No significant interval change with persistent chronic lung  findings, however, no acute parenchymal disease process.     DICTATED:     04/07/2018  EDITED/ls :     04/07/2018      This report was finalized on 4/7/2018 3:06 PM by Dr. Con Dior.             Assessment    Principal Problem:    Chest pain  Active Problems:    Peripheral vascular disease    Hypertension    Hyperlipidemia     Lung nodule    Iron deficiency anemia    Iron deficiency anemia due to chronic blood loss    As per my note yesterday, I think the right femoral pulse is subjectively weaker than the left.  Also she has complained of right leg weakness for a number of months.  My suspicion is the femoral to femoral bypass graft has occluded.  I ordered a CT angiogram yesterday to assess this, however her creatinine was elevated and we canceled the scan.  This could be assessed in the next day or 2 or even in the next week as an outpatient by Dr. Richmond in the office    Plan   As indicated above    Please note that portions of this note were completed with a voice recognition program. Efforts were made to edit the dictations, but occasionally words are mistranscribed.    Luis Liu MD  04/08/18  8:22 AM

## 2018-04-08 NOTE — PROGRESS NOTES
[facility] Medicine Services  PROGRESS NOTE    Patient Name: Kailee Almanza  : 1950  MRN: [id number]    Date of Admission: 2018  Length of Stay: 2  Primary Care Physician: Machelle Atkinson MD    Subjective   Subjective     CC:  Chest pain    HPI:  Less short of breath today, but now with increased cough and yellow sputum. No f/c. Abd sore from coughing. No n/v. No dysuria. Increased UOP.    Review of Systems    Otherwise ROS is negative except as mentioned in the HPI.    Objective   Objective     Vital Signs:   Temp:  [98 °F (36.7 °C)-99 °F (37.2 °C)] 98.4 °F (36.9 °C)  Heart Rate:  [] 113  Resp:  [16-18] 18  BP: (114-162)/(48-72) 138/48        Physical Exam:  NAD, alert and oriented  OP clear, MMM  PERRL  Neck supple  No LAD  Tachy  Mild exp wheeze  +BS, ND, NT  GALAN  No c/c/e  Color better  No change  otherwise    Results Reviewed:  I have personally reviewed current lab, radiology, and data and agree.      Results from last 7 days  Lab Units 18  0425 18  2223   WBC 10*3/mm3 17.16* 8.02   HEMOGLOBIN g/dL 10.0* 5.5*   HEMATOCRIT % 33.9* 21.3*   PLATELETS 10*3/mm3 484* 482*       Results from last 7 days  Lab Units 18  0425 18  0225 18  2223   SODIUM mmol/L 133 134 136   POTASSIUM mmol/L 4.5 4.1 4.1   CHLORIDE mmol/L 101 106 103   CO2 mmol/L 18.0* 18.0* 23.0   BUN mg/dL 28* 11 11   CREATININE mg/dL 1.90* 1.00 1.10   GLUCOSE mg/dL 124* 71 100   CALCIUM mg/dL 8.2* 8.1* 8.7   ALT (SGPT) U/L  --   --  9   AST (SGOT) U/L  --   --  14   TROPONIN I ng/mL  --  <0.006  --      Estimated Creatinine Clearance: 32.1 mL/min (by C-G formula based on SCr of 1.9 mg/dL (H)).  BNP   Date Value Ref Range Status   2018 48.0 0.0 - 100.0 pg/mL Final     Comment:     Results may be falsely decreased if patient taking Biotin.     No results found for: PHART    Microbiology Results Abnormal     Procedure Component Value - Date/Time    Urine Culture -  Urine, Urine, Clean Catch [[id number]]  (Normal) Collected:  04/07/18 1536    Lab Status:  Preliminary result Specimen:  Urine from Urine, Clean Catch Updated:  04/08/18 0725     Urine Culture No growth at 24 hours          Imaging Results (last 24 hours)     Procedure Component Value Units Date/Time    XR Chest 1 View [[id number]] Collected:  04/07/18 1122     Updated:  04/07/18 1509    Narrative:          EXAMINATION: XR CHEST, SINGLE VIEW - 04/07/2018     INDICATION: R07.9-Chest pain, unspecified; D64.9-Anemia, unspecified;  I73.9-Peripheral vascular disease, unspecified; D50.0-Iron deficiency  anemia secondary to blood loss (chronic).      COMPARISON: 04/06/2018.     FINDINGS: Chronic lung changes without acute cardiopulmonary process,  specifically no focal consolidation. Lung volumes unchanged. Cardiac  silhouette unchanged. No pneumothorax or pleural effusion. Degenerative  changes of the thoracic spine.           Impression:       No significant interval change with persistent chronic lung  findings, however, no acute parenchymal disease process.     DICTATED:     04/07/2018  EDITED/ls :     04/07/2018      This report was finalized on 4/7/2018 3:06 PM by Dr. Con Dior.           Results for orders placed during the hospital encounter of 04/05/18   Adult Transthoracic Echo Complete W/ Cont if Necessary Per Protocol    Narrative · Mild tricuspid valve regurgitation is present.  · Left ventricular systolic function is normal. Estimated EF = 68%.  · Left ventricular diastolic dysfunction (grade I) consistent with   impaired relaxation.  · There is no evidence of pericardial effusion.  · Mild pulmonary hypertension is present.  · Normal right ventricular cavity size, wall thickness, systolic function   and septal motion noted.  · No significant structural valvular abnormality demonstrated.          I have reviewed the medications.    Assessment/Plan   Assessment / Plan     Hospital Problem List     *  (Principal)Chest pain    Peripheral vascular disease    Overview Addendum 7/27/2016  3:21 PM by Jillian Mclean MD     1. Angiography the left subclavian artery. Stent 4/2014  2. Placement of a single, 4.0 mm diameter x 28 mm length, Xience Alpine  everolimus-eluting stent in the proximal left subclavian artery in an area of  in-stent restenosis. 7-22-15  3.  Femoral-femoral bypass 2009  4.  Aortobifemoral bypass 1995         Hypertension    Hyperlipidemia    Lung nodule    Iron deficiency anemia    Iron deficiency anemia due to chronic blood loss    Overview Signed 4/6/2018  3:49 PM by Alfonzo Cox MD     Added automatically from request for surgery [id number]                  Brief Hospital Course to date:  Kailee Almanza is a 67 y.o. female here with chest pain and found to have severe iron deficiency anemia.       Assessment & Plan:  Chest pain  --most likely angina given other PVD, needs LHC when deemed ready by cardiology, hopefully tomorrow 4/9  Severe iron deficiency anemia  --s/p 2 units PRBC  --better  --IV iron given  --s/p EGD, findings noted, on PPI  --needs outpatient colonoscopy  Lung nodule  --pulmonary consulted/outpatient CT follow up  Aortic arch/thoracic aorta plaques/with ulceration  --CTS notes reviewed, CT angio of abdominal aorta/LE pending  MANJIT  --gentle IV hydration/checked UA  HL  Hx of subclavian/carotid stent  Hx of fem-fem bypass    DVT Prophylaxis:  SCDS only    CODE STATUS: Full Code    Disposition: I expect the patient to be discharged TBD.      Electronically signed by Jam Phan MD, 04/08/18, 7:27 AM.

## 2018-04-09 VITALS
TEMPERATURE: 98.4 F | SYSTOLIC BLOOD PRESSURE: 155 MMHG | HEIGHT: 67 IN | OXYGEN SATURATION: 96 % | HEART RATE: 99 BPM | DIASTOLIC BLOOD PRESSURE: 86 MMHG | BODY MASS INDEX: 24.48 KG/M2 | WEIGHT: 156 LBS | RESPIRATION RATE: 18 BRPM

## 2018-04-09 DIAGNOSIS — R91.1 LUNG NODULE: Primary | ICD-10-CM

## 2018-04-09 LAB
ANION GAP SERPL CALCULATED.3IONS-SCNC: 8 MMOL/L (ref 3–11)
ARTICHOKE IGE QN: 80 MG/DL (ref 0–130)
BACTERIA SPEC AEROBE CULT: NORMAL
BACTERIA SPEC AEROBE CULT: NORMAL
BASOPHILS # BLD AUTO: 0.03 10*3/MM3 (ref 0–0.2)
BASOPHILS NFR BLD AUTO: 0.3 % (ref 0–1)
BUN BLD-MCNC: 16 MG/DL (ref 9–23)
BUN/CREAT SERPL: 17.8 (ref 7–25)
CALCIUM SPEC-SCNC: 8 MG/DL (ref 8.7–10.4)
CHLORIDE SERPL-SCNC: 109 MMOL/L (ref 99–109)
CHOLEST SERPL-MCNC: 139 MG/DL (ref 0–200)
CO2 SERPL-SCNC: 21 MMOL/L (ref 20–31)
CREAT BLD-MCNC: 0.9 MG/DL (ref 0.6–1.3)
CYTO UR: NORMAL
DEPRECATED RDW RBC AUTO: 63 FL (ref 37–54)
EOSINOPHIL # BLD AUTO: 0.12 10*3/MM3 (ref 0–0.3)
EOSINOPHIL NFR BLD AUTO: 1.3 % (ref 0–3)
ERYTHROCYTE [DISTWIDTH] IN BLOOD BY AUTOMATED COUNT: 21.4 % (ref 11.3–14.5)
GFR SERPL CREATININE-BSD FRML MDRD: 62 ML/MIN/1.73
GLUCOSE BLD-MCNC: 85 MG/DL (ref 70–100)
HCT VFR BLD AUTO: 28.7 % (ref 34.5–44)
HDLC SERPL-MCNC: 38 MG/DL (ref 40–60)
HGB BLD-MCNC: 8.2 G/DL (ref 11.5–15.5)
IMM GRANULOCYTES # BLD: 0.02 10*3/MM3 (ref 0–0.03)
IMM GRANULOCYTES NFR BLD: 0.2 % (ref 0–0.6)
LAB AP CASE REPORT: NORMAL
LAB AP CLINICAL INFORMATION: NORMAL
LYMPHOCYTES # BLD AUTO: 1.22 10*3/MM3 (ref 0.6–4.8)
LYMPHOCYTES NFR BLD AUTO: 13.3 % (ref 24–44)
Lab: NORMAL
MCH RBC QN AUTO: 23 PG (ref 27–31)
MCHC RBC AUTO-ENTMCNC: 28.6 G/DL (ref 32–36)
MCV RBC AUTO: 80.6 FL (ref 80–99)
MONOCYTES # BLD AUTO: 0.9 10*3/MM3 (ref 0–1)
MONOCYTES NFR BLD AUTO: 9.8 % (ref 0–12)
NEUTROPHILS # BLD AUTO: 6.88 10*3/MM3 (ref 1.5–8.3)
NEUTROPHILS NFR BLD AUTO: 75.1 % (ref 41–71)
PATH REPORT.FINAL DX SPEC: NORMAL
PATH REPORT.GROSS SPEC: NORMAL
PLATELET # BLD AUTO: 319 10*3/MM3 (ref 150–450)
PMV BLD AUTO: 8.1 FL (ref 6–12)
POTASSIUM BLD-SCNC: 4 MMOL/L (ref 3.5–5.5)
RBC # BLD AUTO: 3.56 10*6/MM3 (ref 3.89–5.14)
SODIUM BLD-SCNC: 138 MMOL/L (ref 132–146)
TRIGL SERPL-MCNC: 142 MG/DL (ref 0–150)
WBC NRBC COR # BLD: 9.17 10*3/MM3 (ref 3.5–10.8)

## 2018-04-09 PROCEDURE — 94799 UNLISTED PULMONARY SVC/PX: CPT

## 2018-04-09 PROCEDURE — 80061 LIPID PANEL: CPT | Performed by: PHYSICIAN ASSISTANT

## 2018-04-09 PROCEDURE — 25010000002 CEFTRIAXONE PER 250 MG: Performed by: HOSPITALIST

## 2018-04-09 PROCEDURE — 25010000002 ONDANSETRON PER 1 MG: Performed by: FAMILY MEDICINE

## 2018-04-09 PROCEDURE — 63710000001 PREDNISONE PER 1 MG: Performed by: HOSPITALIST

## 2018-04-09 PROCEDURE — 85025 COMPLETE CBC W/AUTO DIFF WBC: CPT | Performed by: HOSPITALIST

## 2018-04-09 PROCEDURE — 94640 AIRWAY INHALATION TREATMENT: CPT

## 2018-04-09 PROCEDURE — 99232 SBSQ HOSP IP/OBS MODERATE 35: CPT | Performed by: INTERNAL MEDICINE

## 2018-04-09 PROCEDURE — 99239 HOSP IP/OBS DSCHRG MGMT >30: CPT | Performed by: HOSPITALIST

## 2018-04-09 PROCEDURE — 80048 BASIC METABOLIC PNL TOTAL CA: CPT | Performed by: HOSPITALIST

## 2018-04-09 RX ORDER — PREDNISONE 10 MG/1
10 TABLET ORAL SEE ADMIN INSTRUCTIONS
Qty: 7 TABLET | Refills: 0 | Status: SHIPPED | OUTPATIENT
Start: 2018-04-09 | End: 2018-04-19

## 2018-04-09 RX ORDER — PREDNISONE 20 MG/1
20 TABLET ORAL
Status: DISCONTINUED | OUTPATIENT
Start: 2018-04-09 | End: 2018-04-09 | Stop reason: HOSPADM

## 2018-04-09 RX ORDER — PANTOPRAZOLE SODIUM 40 MG/1
40 TABLET, DELAYED RELEASE ORAL
Status: DISCONTINUED | OUTPATIENT
Start: 2018-04-09 | End: 2018-04-09 | Stop reason: HOSPADM

## 2018-04-09 RX ORDER — FERROUS SULFATE 325(65) MG
325 TABLET ORAL
Qty: 30 TABLET | Refills: 2 | Status: SHIPPED | OUTPATIENT
Start: 2018-04-09 | End: 2019-04-16

## 2018-04-09 RX ORDER — CEFUROXIME AXETIL 250 MG/1
250 TABLET ORAL 2 TIMES DAILY
Qty: 4 TABLET | Refills: 0 | Status: SHIPPED | OUTPATIENT
Start: 2018-04-09 | End: 2018-04-11

## 2018-04-09 RX ORDER — BENZONATATE 200 MG/1
200 CAPSULE ORAL 3 TIMES DAILY PRN
Qty: 21 CAPSULE | Refills: 1 | Status: SHIPPED | OUTPATIENT
Start: 2018-04-09 | End: 2018-04-20

## 2018-04-09 RX ORDER — PANTOPRAZOLE SODIUM 40 MG/1
40 TABLET, DELAYED RELEASE ORAL DAILY
Qty: 30 TABLET | Refills: 2 | Status: SHIPPED | OUTPATIENT
Start: 2018-04-09 | End: 2018-05-30 | Stop reason: HOSPADM

## 2018-04-09 RX ADMIN — BENZONATATE 200 MG: 100 CAPSULE, LIQUID FILLED ORAL at 08:09

## 2018-04-09 RX ADMIN — ONDANSETRON 4 MG: 2 INJECTION INTRAMUSCULAR; INTRAVENOUS at 10:18

## 2018-04-09 RX ADMIN — CEFTRIAXONE SODIUM 1 G: 1 INJECTION, SOLUTION INTRAVENOUS at 08:09

## 2018-04-09 RX ADMIN — PREDNISONE 20 MG: 20 TABLET ORAL at 08:09

## 2018-04-09 RX ADMIN — PANTOPRAZOLE SODIUM 40 MG: 40 TABLET, DELAYED RELEASE ORAL at 08:15

## 2018-04-09 RX ADMIN — ACETAMINOPHEN 650 MG: 325 TABLET ORAL at 09:11

## 2018-04-09 RX ADMIN — Medication 250 MG: at 08:09

## 2018-04-09 RX ADMIN — IPRATROPIUM BROMIDE 0.5 MG: 0.5 SOLUTION RESPIRATORY (INHALATION) at 12:20

## 2018-04-09 RX ADMIN — IPRATROPIUM BROMIDE 0.5 MG: 0.5 SOLUTION RESPIRATORY (INHALATION) at 15:49

## 2018-04-09 RX ADMIN — IPRATROPIUM BROMIDE 0.5 MG: 0.5 SOLUTION RESPIRATORY (INHALATION) at 06:59

## 2018-04-09 NOTE — PROGRESS NOTES
Appropriate rise in Hgb after 2 units PRBC's on 4/6/18.    >> Needs OP colonoscopy. Will arrange in 4-6 weeks    Will sign off. Please call with questions or concerns.

## 2018-04-09 NOTE — PROGRESS NOTES
[facility] Medicine Services  PROGRESS NOTE    Patient Name: Kailee Almanza  : 1950  MRN: [id number]    Date of Admission: 2018  Length of Stay: 3  Primary Care Physician: Machelle Atkinson MD    Subjective   Subjective     CC:  Chest pain    HPI:  Less short of breath today, cough better and decreased sputum. Doesn't like the food, and can't get the temperature in her room right. No f/c. No n/v. No diarrhea. Denies dysuria.    Review of Systems    Otherwise ROS is negative except as mentioned in the HPI.    Objective   Objective     Vital Signs:   Temp:  [97.8 °F (36.6 °C)-98.7 °F (37.1 °C)] 98.1 °F (36.7 °C)  Heart Rate:  [] 100  Resp:  [16-18] 18  BP: (159-180)/(72-91) 159/91        Physical Exam:  NAD, alert and oriented  OP clear, MMM  PERRL  Neck supple  No LAD  Tachy  Mild exp wheeze  +BS, ND, NT  GALAN  No c/c/e  Color better  No change  otherwise    Results Reviewed:  I have personally reviewed current lab, radiology, and data and agree.      Results from last 7 days  Lab Units 18  0425 18  2223   WBC 10*3/mm3 17.16* 8.02   HEMOGLOBIN g/dL 10.0* 5.5*   HEMATOCRIT % 33.9* 21.3*   PLATELETS 10*3/mm3 484* 482*       Results from last 7 days  Lab Units 18  0734 18  0425 18  0225 18  2223   SODIUM mmol/L 141 133 134 136   POTASSIUM mmol/L 4.2 4.5 4.1 4.1   CHLORIDE mmol/L 110* 101 106 103   CO2 mmol/L 20.0 18.0* 18.0* 23.0   BUN mg/dL 23 28* 11 11   CREATININE mg/dL 1.20 1.90* 1.00 1.10   GLUCOSE mg/dL 95 124* 71 100   CALCIUM mg/dL 8.1* 8.2* 8.1* 8.7   ALT (SGPT) U/L  --   --   --  9   AST (SGOT) U/L  --   --   --  14   TROPONIN I ng/mL  --   --  <0.006  --      Estimated Creatinine Clearance: 50.8 mL/min (by C-G formula based on SCr of 1.2 mg/dL).  No results found for: BNP  No results found for: PHART    Microbiology Results Abnormal     Procedure Component Value - Date/Time    Respiratory Culture - Sputum, Cough [[id number]]  Collected:  04/08/18 0730    Lab Status:  Preliminary result Specimen:  Sputum from Bronchus Updated:  04/08/18 1444     Gram Stain Result Many (4+) WBCs per low power field      Few (2+) Epithelial cells per low power field      Occasional Gram positive cocci in pairs      Occasional Gram positive cocci in chains    Urine Culture - Urine, Urine, Clean Catch [[id number]]  (Normal) Collected:  04/07/18 1536    Lab Status:  Preliminary result Specimen:  Urine from Urine, Clean Catch Updated:  04/08/18 0725     Urine Culture No growth at 24 hours          Imaging Results (last 24 hours)     ** No results found for the last 24 hours. **        Results for orders placed during the hospital encounter of 04/05/18   Adult Transthoracic Echo Complete W/ Cont if Necessary Per Protocol    Narrative · Mild tricuspid valve regurgitation is present.  · Left ventricular systolic function is normal. Estimated EF = 68%.  · Left ventricular diastolic dysfunction (grade I) consistent with   impaired relaxation.  · There is no evidence of pericardial effusion.  · Mild pulmonary hypertension is present.  · Normal right ventricular cavity size, wall thickness, systolic function   and septal motion noted.  · No significant structural valvular abnormality demonstrated.          I have reviewed the medications.    Assessment/Plan   Assessment / Plan     Hospital Problem List     * (Principal)Chest pain    Peripheral vascular disease    Overview Addendum 7/27/2016  3:21 PM by Jillian Mclean MD     1. Angiography the left subclavian artery. Stent 4/2014  2. Placement of a single, 4.0 mm diameter x 28 mm length, Xience Alpine  everolimus-eluting stent in the proximal left subclavian artery in an area of  in-stent restenosis. 7-22-15  3.  Femoral-femoral bypass 2009  4.  Aortobifemoral bypass 1995         Hypertension    Hyperlipidemia    Lung nodule    Iron deficiency anemia    Iron deficiency anemia due to chronic blood loss    Overview  Signed 4/6/2018  3:49 PM by Alfonzo Cox MD     Added automatically from request for surgery [id number]                  Brief Hospital Course to date:  Kailee Almanza is a 67 y.o. female here with chest pain and found to have severe iron deficiency anemia.       Assessment & Plan:  Chest pain  --most likely angina given other PVD, needs C when deemed ready by cardiology, hopefully today 4/9  Severe iron deficiency anemia  --s/p 2 units PRBC  --better  --IV iron given  --s/p EGD, findings noted, on PPI  --needs outpatient colonoscopy  --likely chronic, stable here so far  Lung nodule  --pulmonary consulted/outpatient CT follow up  Aortic arch/thoracic aorta plaques/with ulceration  --CTS notes reviewed, CT angio of abdominal aorta/LE pending  MANJIT  --resolved  Cough/dyspnea  --bronchospasm  --nebs, short low dose steroid taper  HL  Hx of subclavian/carotid stent  Hx of fem-fem bypass    DVT Prophylaxis:  SCDS only    CODE STATUS: Full Code    Disposition: I expect the patient to be discharged TBD.      Electronically signed by Jam Phan MD, 04/09/18, 7:32 AM.

## 2018-04-09 NOTE — NURSING NOTE
Spoke with Jaclyn with Dr. Gupta, said OK for patient to discharge and will see patient in 3-4 months. Jaclyn to make appt. All other appts that could be made at this time have been.

## 2018-04-09 NOTE — PROGRESS NOTES
"  [facility]  PROGRESS NOTE    Date of Admission: 4/5/2018  Length of Stay: 3  Primary Care Physician: Machelle Atkinson MD    Chief Complaint: f/u chest pain, anemia  Problem List:  1. Cerebrovascular disease.  a. Amaurosis fugax, OD.  b. History of right CEA   c. Stent 70% LJ stenosis, April 2014:  Questionable recurrent symptoms “early” following stent, treated with reinstitution Plavix.    d. 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   e. High-degree left subclavian artery stenosis, treated with stenting, April 2014.    1. In-stent re-stenosis of left subclavian with placement of 4.0x28mm Xience EES in proximal left subclavian 07/2015  2. Peripheral vascular disease.   a. Aorta bi fem bypass by Dr. David Gordon, 1995.  b. Fem-fem bypass  3. Hypertension.   4. Dyslipidemia.   5. Remote tobacco abuse.   6. Chronic back pain.    7. Microcytic anemia  a. Severe anemia 04/2018 s/p 2 units PRBCs  b. EGD with mild gastritis, colonoscopy pending   8. COPD  9. Right upper lobe lung nodule, noted on CT scan 4/2018 to be increasing  10. History of TIA  11. Surgeries:  a. Appendectomy  b. Breast biopsy  c. Hysterectomy   d. Bladder surgery  e. lasik surgery  f. Oral surgery  g. Parathyroidectomy  h. Cholecystectomy      Subjective      Patient denies chest pain since admission and being transfused. She reports remote previous normal heart cath in [place] as well as normal stress tests since. She has ambulated to bathroom and denies leg pain/weakness.    Objective   Vitals: /75 (BP Location: Right arm, Patient Position: Lying)   Pulse 98   Temp 98.2 °F (36.8 °C) (Oral)   Resp 16   Ht 170.2 cm (67\")   Wt 70.8 kg (156 lb)   SpO2 100%   BMI 24.43 kg/m²     Physical Exam:  GENERAL: Alert, cooperative, in no acute distress.   HEENT: Fundoscopic deferred, otherwise unremarkable.  NECK: No Jugular venous distention, adenopathy, or thyromegaly noted.   HEART: No discrete PMI " is noted. Regular rhythm, normal rate, and no murmur  LUNGS: Clear to auscultation bilaterally. No wheezing, rales or ronchi.  ABDOMEN: Flat without evidence of organomegaly, masses, or tenderness.  NEUROLOGIC: No focal abnormalities involving strength or sensation are noted.   EXTREMITIES: No clubbing, cyanosis, or edema noted.     Results:    Results from last 7 days  Lab Units 04/09/18  0642 04/07/18  0425 04/05/18  2223   WBC 10*3/mm3 9.17 17.16* 8.02   HEMOGLOBIN g/dL 8.2* 10.0* 5.5*   HEMATOCRIT % 28.7* 33.9* 21.3*   PLATELETS 10*3/mm3 319 484* 482*       Results from last 7 days  Lab Units 04/09/18  0642 04/08/18  0734 04/07/18  0425   SODIUM mmol/L 138 141 133   POTASSIUM mmol/L 4.0 4.2 4.5   CHLORIDE mmol/L 109 110* 101   CO2 mmol/L 21.0 20.0 18.0*   BUN mg/dL 16 23 28*   CREATININE mg/dL 0.90 1.20 1.90*   GLUCOSE mg/dL 85 95 124*      Lab Results   Component Value Date    CHOL 178 10/20/2016    TRIG 179 (H) 10/20/2016    HDL 53 10/20/2016    LDL 99 10/20/2016    AST 14 04/05/2018    ALT 9 04/05/2018                   Results from last 7 days  Lab Units 04/05/18  2223   BNP pg/mL 48.0           Results from last 7 days  Lab Units 04/06/18  0225   CK TOTAL U/L 42   TROPONIN I ng/mL <0.006       Intake/Output Summary (Last 24 hours) at 04/09/18 1216  Last data filed at 04/09/18 1100   Gross per 24 hour   Intake             2878 ml   Output             3075 ml   Net             -197 ml     I personally reviewed the patient's EKG/Telemetry data    Radiology Data:   CTA Chest 4/6/2018:  IMPRESSION:  1.  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  2.  Right upper lobe 8mm nodule with suspicious interval growth.    Current Medications:    aspirin 324 mg Oral Once   atorvastatin 80 mg Oral Nightly   ceftriaxone 1 g Intravenous Q24H    ipratropium 0.5 mg Nebulization 4x Daily - RT   pantoprazole 40 mg Oral Q AM   predniSONE 20 mg Oral Daily With Breakfast   saccharomyces boulardii 250 mg Oral BID   venlafaxine  mg Oral Nightly       sodium chloride 50 mL/hr Last Rate: 50 mL/hr (04/08/18 2026)       Assessment and Plan:     1. Angina in the setting of severe anemia on admission  - EKG and enzymes have surprisingly been normal. She has had a remote normal heart cath in [place], as well as normal stress MPS since then, will try to find records   - asymptomatic since admission/transfusion  - agree with discontinuation Plavix  - we'll followup; NO ischemic evaluation needed at this time.    2. Thoracic aortic aneurysm with ulcerated plaques and penetrating ulcer disease.  - stable, followed by Dr. Richmond    3. Severe anemia  - H&H 5.5/21 on admission, s/p 2 units PRBC's   - Dr. Weathers/ GI following  - Negative EGD with exception of mild gastritis, needs colonoscopy as OP  - THIS needs followup.    4. Right upper lobe nodule  - Pulmonary following, plan for PET scan as OP    5. Severe COPD, FEV1 less than 50% predicted.  6. PAD  7. Dyslipidemia, will resume statin  8. Hypertension, controlled    I, Luis Miguel Gupta MD, personally performed the services described as documented by the above named individual. I have made any necessary edits and it is both accurate and complete 4/9/2018  4:57 PM      Scribed for Luis Miguel Gupta MD by Jaclyn Michaels PA-C.

## 2018-04-09 NOTE — PROGRESS NOTES
CTS Progress Note             LOS: 3 days   Patient Care Team:  Machelle Atkinson MD as PCP - General (Family Medicine)  Janette Srinivasan MD as PCP - Family Medicine    Subjective  Sore from coughing, but better    CC:Ulcerated aortic arch and descending thoracic aortic pseudoaneurysm  , lung nodule    Objective    Vital Signs  Temp:  [97.8 °F (36.6 °C)-98.7 °F (37.1 °C)] 98.1 °F (36.7 °C)  Heart Rate:  [] 100  Resp:  [16-18] 18  BP: (159-180)/(72-91) 159/91    Physical Exam:   General Appearance: alert, appears stated age and cooperative   Lungs: clear to auscultation, respirations regular, respirations even and respirations unlabored   Heart: regular rhythm & normal rate, normal S1, S2, no murmur, no keyana, no rub and no click   Skin: warm dry     Results     Results from last 7 days  Lab Units 04/07/18  0425   WBC 10*3/mm3 17.16*   HEMOGLOBIN g/dL 10.0*   HEMATOCRIT % 33.9*   PLATELETS 10*3/mm3 484*       Results from last 7 days  Lab Units 04/08/18  0734   SODIUM mmol/L 141   POTASSIUM mmol/L 4.2   CHLORIDE mmol/L 110*   CO2 mmol/L 20.0   BUN mg/dL 23   CREATININE mg/dL 1.20   GLUCOSE mg/dL 95   CALCIUM mg/dL 8.1*           Imaging Results (last 24 hours)     ** No results found for the last 24 hours. **          Assessment    Principal Problem:    Chest pain  Active Problems:    Peripheral vascular disease    Hypertension    Hyperlipidemia    Lung nodule    Iron deficiency anemia    Iron deficiency anemia due to chronic blood loss  steady improvement      Plan   Need scope and cath, will follow with you forUlcerated aortic arch and descending thoracic aortic pseudoaneurysm   MARIA ELENA Patel  04/09/18  7:31 AM

## 2018-04-09 NOTE — PROGRESS NOTES
"Kailee Almanza  [id number]  1950     LOS: 3 days   Patient Care Team:  Machelle Atkinson MD as PCP - General (Family Medicine)  Janette Srinivasan MD as PCP - Family Medicine    Chief Complaint: Chest Pain       Subjective: Alert, no chest pain noted.    Objective:     Vital Sign Min/Max for last 24 hours  Temp  Min: 97.8 °F (36.6 °C)  Max: 98.7 °F (37.1 °C)   BP  Min: 159/91  Max: 180/88   Pulse  Min: 92  Max: 108   Resp  Min: 16  Max: 18   SpO2  Min: 95 %  Max: 99 %   No Data Recorded   No Data Recorded     Flowsheet Rows    Flowsheet Row First Filed Value   Admission Height 170.2 cm (67\") Documented at 04/05/2018 2226   Admission Weight 70.8 kg (156 lb) Documented at 04/05/2018 2226          Physical Exam:Breathing easily    Wound:    Pulses:     Mediastinal and Chest Tube Drainage:       Results Review:     Results from last 7 days  Lab Units 04/07/18  0425   WBC 10*3/mm3 17.16*   HEMOGLOBIN g/dL 10.0*   HEMATOCRIT % 33.9*   PLATELETS 10*3/mm3 484*       Results from last 7 days  Lab Units 04/08/18  0734   SODIUM mmol/L 141   POTASSIUM mmol/L 4.2   CHLORIDE mmol/L 110*   CO2 mmol/L 20.0   BUN mg/dL 23   CREATININE mg/dL 1.20   GLUCOSE mg/dL 95   CALCIUM mg/dL 8.1*             Assessment    Principal Problem:    Chest pain  Active Problems:    Peripheral vascular disease    Hypertension    Hyperlipidemia    Lung nodule    Iron deficiency anemia    Iron deficiency anemia due to chronic blood loss    Condition doing satisfactory at this time, do not think that the aneurysm is symptomatic.    Patrice Richmond MD  04/09/18  7:36 AM      Please note that portions of this note were completed with a voice recognition program. Efforts were made to edit the dictations, but words may be mistranscribed  "

## 2018-04-09 NOTE — PROGRESS NOTES
DATE OF VISIT: 4/9/2018    Chief Complain: Followup for anemia     SUBJECTIVE: The patient has been stable.  She  denied any fever or  chills, no night sweats, denied any headaches    REVIEW OF SYSTEMS: All the other 9 systems are reviewed by me and negative  except what is mentioned in HPI.     PAST MEDICAL HISTORY/SOCIAL HISTORY/FAMILY HISTORY: Unchanged from my prior documentation done on 4/6/18      Current Facility-Administered Medications:   •  acetaminophen (TYLENOL) tablet 650 mg, 650 mg, Oral, Q6H PRN, Jam Phan MD, 650 mg at 04/08/18 1606  •  aspirin chewable tablet 324 mg, 324 mg, Oral, Once, Triage Protocol Emergency, MD  •  atorvastatin (LIPITOR) tablet 80 mg, 80 mg, Oral, Nightly, GET Branch, 80 mg at 04/08/18 2026  •  benzonatate (TESSALON) capsule 200 mg, 200 mg, Oral, TID PRN, Jam Phan MD, 200 mg at 04/09/18 0809  •  cefTRIAXone (ROCEPHIN) IVPB 1 g, 1 g, Intravenous, Q24H, Jam Phan MD, Last Rate: 100 mL/hr at 04/09/18 0809, 1 g at 04/09/18 0809  •  HYDROcod Polst-CPM Polst ER (TUSSIONEX PENNKINETIC) 10-8 MG/5ML ER suspension 5 mL, 5 mL, Oral, Q12H PRN, Jam Phan MD, 5 mL at 04/07/18 1827  •  HYDROcodone-acetaminophen (NORCO) 5-325 MG per tablet 1 tablet, 1 tablet, Oral, Q6H PRN, Jorge Reid DO, 1 tablet at 04/08/18 2345  •  ipratropium (ATROVENT) nebulizer solution 0.5 mg, 0.5 mg, Nebulization, 4x Daily - RT, Jam Phan MD, 0.5 mg at 04/09/18 0659  •  ondansetron (ZOFRAN) injection 4 mg, 4 mg, Intravenous, Q4H PRN, Jorge Reid DO, 4 mg at 04/06/18 1743  •  pantoprazole (PROTONIX) EC tablet 40 mg, 40 mg, Oral, Q AM, Jam Phan MD, 40 mg at 04/09/18 0815  •  predniSONE (DELTASONE) tablet 20 mg, 20 mg, Oral, Daily With Breakfast, Jam Phan MD, 20 mg at 04/09/18 0809  •  saccharomyces boulardii (FLORASTOR) capsule 250 mg, 250 mg, Oral, BID, Jam Phan MD, 250 mg at 04/09/18 0809  •  sodium chloride 0.9 % flush 1-10 mL, 1-10 mL,  "Intravenous, PRN, Rowdy Beltre MD  •  sodium chloride 0.9 % flush 10 mL, 10 mL, Intravenous, PRN, Triage Protocol Emergency, MD  •  sodium chloride 0.9 % infusion, 50 mL/hr, Intravenous, Continuous, Jam Phan MD, Last Rate: 50 mL/hr at 04/08/18 2026, 50 mL/hr at 04/08/18 2026  •  venlafaxine XR (EFFEXOR-XR) 24 hr capsule 150 mg, 150 mg, Oral, Nightly, Marcellus Miranda MD, 150 mg at 04/08/18 2026  •  zolpidem (AMBIEN) tablet 5 mg, 5 mg, Oral, Nightly PRN, Rowdy Beltre MD, 5 mg at 04/08/18 2026    PHYSICAL EXAMINATION:   /69 (BP Location: Right arm, Patient Position: Sitting)   Pulse 100   Temp 98.3 °F (36.8 °C) (Oral)   Resp 16   Ht 170.2 cm (67\")   Wt 70.8 kg (156 lb)   SpO2 98%   BMI 24.43 kg/m²    ECOG Performance Status: 2 - Symptomatic, <50% confined to bed  GENERAL: Age appropriate. No acute distress.   NEURO/PSYCH: A&O x 3, strength 5/5 in all muscle groups  HEENT: Head atraumatic, normocephalic.   NECK: Supple. No JVD. No lymphadenopathy.   LUNGS: Clear to auscultation bilaterally. No wheezing. No rhonchi.   HEART: Regular rate and rhythm. S1, S2, no murmurs.   ABDOMEN: Soft, nontender, nondistended. Bowel sounds positive. No  hepatosplenomegaly.   EXTREMITIES: No clubbing, cyanosis, or edema.   SKIN: No rashes. No purpura.       Admission on 04/05/2018   No results displayed because visit has over 200 results.          No results found.    ASSESSMENT: The patient is a very pleasant 67 y.o. female  with anemia      PLAN:  1. Okay to discharge patient home from hematology perspective.  She will follow-up with me in 2 weeks, [place] office.  2. Will give Feraheme out patient.  3. Colonoscopy outpatient.  4. Aneurysm evaluation.  Discussed with patient and her  at the bedside.     Dragan Weathers MD  4/9/2018  "

## 2018-04-09 NOTE — PLAN OF CARE
Problem: Patient Care Overview  Goal: Plan of Care Review   04/09/18 0254   Plan of Care Review   Progress improving   OTHER   Outcome Summary Awaiting scans when creatinine improves. VSS. Patient complains of leg pain during the shift but otherwise no complaints.        Problem: Fall Risk (Adult)  Goal: Absence of Fall  Outcome: Ongoing (interventions implemented as appropriate)   04/09/18 0254   Fall Risk (Adult)   Absence of Fall making progress toward outcome       Problem: Skin Injury Risk (Adult)  Goal: Skin Health and Integrity  Outcome: Ongoing (interventions implemented as appropriate)   04/09/18 0254   Skin Injury Risk (Adult)   Skin Health and Integrity making progress toward outcome

## 2018-04-09 NOTE — PROGRESS NOTES
Continued Stay Note  KARINA [facility]     Patient Name: Kailee Almanza  MRN: [id number]  Today's Date: 4/9/2018    Admit Date: 4/5/2018          Discharge Plan     Row Name 04/09/18 1622       Plan    Plan Home    Patient/Family in Agreement with Plan yes    Plan Comments Met with patient in the room to update the discharge plan. Patient states she would like to do outpatient physical therapy. Order for outpatient PT placed in [facility] for MD to cosign. RN, please print the order at discharge to send with patient. Thank you. Patient denies other discharge needs. CM will continue to follow.     Final Discharge Disposition Code 01 - home or self-care              Discharge Codes    No documentation.       Expected Discharge Date and Time     Expected Discharge Date Expected Discharge Time    Apr 9, 2018             Meka Bermudez

## 2018-04-10 ENCOUNTER — TRANSITIONAL CARE MANAGEMENT TELEPHONE ENCOUNTER (OUTPATIENT)
Dept: INTERNAL MEDICINE | Facility: CLINIC | Age: 68
End: 2018-04-10

## 2018-04-10 LAB
BACTERIA SPEC RESP CULT: NORMAL
BACTERIA SPEC RESP CULT: NORMAL
GRAM STN SPEC: NORMAL

## 2018-04-10 NOTE — DISCHARGE SUMMARY
[facility] Medicine Services  DISCHARGE SUMMARY    Patient Name: Kailee Almanza  : 1950  MRN: [id number]    Date of Admission: 2018  Date of Discharge:  2018  Primary Care Physician: Machelle Atkinson MD    Consults     Date and Time Order Name Status Description    2018 0809 Inpatient Gastroenterology Consult Completed     2018 033 Inpatient Pulmonology Consult Completed     2018 032 Inpatient Cardiothoracic Surgery Consult Completed     2018 014 Inpatient Cardiology Consult Completed     2018 014 Hematology & Oncology Inpatient Consult Completed         Hospital Course     Presenting Problem:   Chest pain, unspecified type [R07.9]    Active Hospital Problems (** Indicates Principal Problem)    Diagnosis Date Noted   • **Chest pain [R07.9] 2018   • Lung nodule [R91.1] 2018   • Iron deficiency anemia [D50.9] 2018   • Iron deficiency anemia due to chronic blood loss [D50.0] 2018   • Hyperlipidemia [E78.5] 2016   • Hypertension [I10] 2016   • Peripheral vascular disease [I73.9] 2016      Resolved Hospital Problems    Diagnosis Date Noted Date Resolved   • Other folate deficiency anemias [D52.8] 2017          Hospital Course:  Kailee Almanza is a 67 y.o. female that presented here with severe dyspnea and chest pain. She was found to be profoundly anemic with iron deficiency. She was transfused 2 Units PRBC with resolution of her dyspnea and chest pain. She underwent EGD which revealed only mild gastritis and his been placed on a PPI. She has an extensive history of PVD and was evaluated by cardiology for angina. At this time cardiology recommended outpatient follow up and no acute ischemic evaluation was ordered. She will follow up with Dr. Gupta.    Regarding her anemia, again, she underwent EGD and was only found to have mild gastritis. H. Pylori testing was negative. She needs a  colonoscopy and this will be arranged as an outpatient procedure.    She underwent CT imaging of her chest and was found to have a pulmonary nodule as well as a thoracic aortic aneurysm with ulcerations.     Regarding her pulmonary nodule, she will follow up with Dr. Drew Zapata, and a PET scan will need to be arranged for her.    Regarding the thoracic aortic aneurysm, she was evaluated by Dr. Richmond and Dr. Liu and nothing further was recommended at this time.    She did develop mild MANJIT following her contrast imaging that resolved with intravenous fluids.    She was found to have an UTI and treated with antibiotics for which she will complete a PO course.    She developed post-transfusion bronchospasm and cough that is resolving with steroids. She will complete a short course of steroids.    She has a history of a subclavian stent, carotid stent, and fem-fem bypass. She will remain on aspirin therapy, but her plavix will be held pending further investigation of her anemia with future colonoscopy.     Arrangements have been made for outpatient IV iron infusions, per Dr. Weathers.    Procedure(s):  ESOPHAGOGASTRODUODENOSCOPY   04/07 0759 UPPER GI ENDOSCOPY   Day of Discharge     HPI:   She has a mild cough. Her dyspnea is better. She wants to go home.    Review of Systems      Otherwise ROS is negative except as mentioned in the HPI.    Vital Signs:   Temp:  [98.1 °F (36.7 °C)-98.4 °F (36.9 °C)] 98.4 °F (36.9 °C)  Heart Rate:  [] 99  Resp:  [16-18] 18  BP: (146-172)/(67-91) 155/86     Physical Exam:  NAD, alert and oriented  OP clear, MMM  PERRL  Neck supple  No LAD  Tachy  Mild exp wheeze  +BS, ND, NT  GALAN  No c/c/e  Color better      Pertinent  and/or Most Recent Results       Results from last 7 days  Lab Units 04/09/18  0642 04/08/18  0734 04/07/18  0425 04/06/18  0225 04/05/18  2223   WBC 10*3/mm3 9.17  --  17.16*  --  8.02   HEMOGLOBIN g/dL 8.2*  --  10.0*  --  5.5*   HEMATOCRIT % 28.7*  --  33.9*   --  21.3*   PLATELETS 10*3/mm3 319  --  484*  --  482*   SODIUM mmol/L 138 141 133 134 136   POTASSIUM mmol/L 4.0 4.2 4.5 4.1 4.1   CHLORIDE mmol/L 109 110* 101 106 103   CO2 mmol/L 21.0 20.0 18.0* 18.0* 23.0   BUN mg/dL 16 23 28* 11 11   CREATININE mg/dL 0.90 1.20 1.90* 1.00 1.10   GLUCOSE mg/dL 85 95 124* 71 100   CALCIUM mg/dL 8.0* 8.1* 8.2* 8.1* 8.7       Results from last 7 days  Lab Units 04/05/18  2223   BILIRUBIN mg/dL 0.2*   ALK PHOS U/L 149*   ALT (SGPT) U/L 9   AST (SGOT) U/L 14       Results from last 7 days  Lab Units 04/09/18  0642   CHOLESTEROL mg/dL 139   TRIGLYCERIDES mg/dL 142   HDL CHOL mg/dL 38*       Results from last 7 days  Lab Units 04/06/18  0225 04/05/18  2223   BNP pg/mL  --  48.0   TROPONIN I ng/mL <0.006  --      Brief Urine Lab Results  (Last result in the past 365 days)      Color   Clarity   Blood   Leuk Est   Nitrite   Protein   CREAT   Urine HCG        04/07/18 1536 Yellow Clear Negative Small (1+)(A) Negative Negative               Microbiology Results Abnormal     Procedure Component Value - Date/Time    Urine Culture - Urine, Urine, Clean Catch [[id number]] Collected:  04/07/18 1536    Lab Status:  Final result Specimen:  Urine from Urine, Clean Catch Updated:  04/09/18 0943     Urine Culture --      30,000-40,000 CFU/mL Normal Urogenital Gillian    Respiratory Culture - Sputum, Cough [[id number]] Collected:  04/08/18 0730    Lab Status:  Preliminary result Specimen:  Sputum from Bronchus Updated:  04/09/18 0749     Respiratory Culture --      Light growth (2+) Normal Respiratory Gillian     Gram Stain Result Many (4+) WBCs per low power field      Few (2+) Epithelial cells per low power field      Occasional Gram positive cocci in pairs      Occasional Gram positive cocci in chains          Imaging Results (all)     Procedure Component Value Units Date/Time    XR Chest 1 View [[id number]] Collected:  04/07/18 1122     Updated:  04/07/18 1509    Narrative:          EXAMINATION: XR  CHEST, SINGLE VIEW - 04/07/2018     INDICATION: R07.9-Chest pain, unspecified; D64.9-Anemia, unspecified;  I73.9-Peripheral vascular disease, unspecified; D50.0-Iron deficiency  anemia secondary to blood loss (chronic).      COMPARISON: 04/06/2018.     FINDINGS: Chronic lung changes without acute cardiopulmonary process,  specifically no focal consolidation. Lung volumes unchanged. Cardiac  silhouette unchanged. No pneumothorax or pleural effusion. Degenerative  changes of the thoracic spine.           Impression:       No significant interval change with persistent chronic lung  findings, however, no acute parenchymal disease process.     DICTATED:     04/07/2018  EDITED/ls :     04/07/2018      This report was finalized on 4/7/2018 3:06 PM by Dr. Con Dior.       XR Chest 1 View [[id number]] Collected:  04/06/18 1407     Updated:  04/06/18 1424    Narrative:       EXAMINATION: XR CHEST 1 VW- 04/06/2018     INDICATION: dyspnea; R07.9-Chest pain, unspecified; D64.9-Anemia,  unspecified      COMPARISON: 04/05/2018     FINDINGS: Cardiac size within normal limits. No pulmonary vascularity  increase or focal consolidation. Chronic lung changes are noted with  biapical pleural-parenchymal scarring and emphysema. No pneumothorax or  pleural effusion. Degenerative changes of the spine.           Impression:       Chronic lung changes without acute cardiopulmonary process.     D:  04/06/2018  E:  04/06/2018     This report was finalized on 4/6/2018 2:21 PM by Dr. Con Dior.       CT Angiogram Chest With & Without Contrast [[id number]] Collected:  04/06/18 0140     Updated:  04/06/18 0300    Addenda:        3-D images were created and reviewed.    THIS DOCUMENT HAS BEEN ELECTRONICALLY SIGNED BY ISAAC BRANDT MD  Signed:  04/06/18 0300 by Isaac Brandt MD    Narrative:       EXAM:    CT Chest Without and With Intravenous Contrast    CLINICAL HISTORY:    67 years old, female; Anemia, unspecified; Chest pain,  unspecified; Condition   or disease; Cardiovascular condition or disease; Aortic aneurysm; Without   rupture; Thoracic; Additional info: Thoracic aortic aneurysm (taa), known,   follow up    TECHNIQUE:    Axial computed tomography images of the chest without and with intravenous   contrast during the arterial phase of enhancement.  All CT scans at this   facility use one or more dose reduction techniques, viz.: automated exposure   control; ma/kV adjustment per patient size (including targeted exams where dose   is matched to indication; i.e. head); or iterative reconstruction technique.    Coronal and sagittal reformatted images were created and reviewed.    CONTRAST:    80 mL of isovue 370 administered intravenously.    COMPARISON:    CT ANGIOGRAM CHEST W WO CONTRAST 2017-02-10 11:55    FINDINGS:    Pulmonary arteries:  No acute findings. No pulmonary embolism.    Aorta:  Left subclavian artery stent, patency difficult to evaluate within   streak artifact but with flow distally.  Eccentric luminal irregularity along   the aortic arch with ulcerated plaques and likely penetrating ulcer disease,   unchanged.  Diffuse atherosclerotic changes along the descending thoracic aorta   with ulcerated plaques and approximately 0.7 cm right-sided penetrating ulcer,   unchanged.  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  Aorta at this level overall measures   approximately 3.3 x 4.8 cm, unchanged.  There is a new small pocket of   ulceration within the thrombus of the pseudoaneurysm.    Lungs:  Severe lobe centrilobular emphysematous changes.  Band of right   apical scarring, unchanged.  Calcified left upper lobe granuloma.  0.8 cm   nodule inferiorly in the right upper lobe abutting the medial pleura,   previously 0.4 cm.  No mass or consolidation.    Pleural space:  No significant effusion.   No pneumothorax.    Heart:  Unremarkable.    Bones/joints:  No acute fracture.  No dislocation.    Soft tissues:  Unremarkable.    Lymph nodes:  Unremarkable. No enlarged lymph nodes.    Upper abdomen:  Partially visualized infrarenal aortic aneurysm.    Cholecystectomy.      Impression:       1.  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  2.  Right upper lobe 8mm nodule with suspicious interval growth.    THIS DOCUMENT HAS BEEN ELECTRONICALLY SIGNED BY BRIANA BRANDT MD    XR Chest 1 View [[id number]] Collected:  04/05/18 2219     Updated:  04/05/18 2336    Narrative:       EXAM:    XR Chest, 1 View    CLINICAL HISTORY:    67 years old, female; Anemia, unspecified; Chest pain, unspecified; Type not   specified; Additional info: Chest pain triage protocol    TECHNIQUE:    Frontal view of the chest.    COMPARISON:    CR - XR CHEST PA AND LATERAL 2017-02-09 14:47    FINDINGS:    Lungs:  Hyperinflated with right apical scarring, unchanged.  No   consolidation.    Pleural space:  Unremarkable.  No pneumothorax.    Heart:  Unremarkable.    Mediastinum:  Unremarkable.    Bones/joints:  Unremarkable.      Impression:         No acute findings.    THIS DOCUMENT HAS BEEN ELECTRONICALLY SIGNED BY BRIANA BRANDT MD                    Results for orders placed during the hospital encounter of 04/05/18   Adult Transthoracic Echo Complete W/ Cont if Necessary Per Protocol    Narrative · Mild tricuspid valve regurgitation is present.  · Left ventricular systolic function is normal. Estimated EF = 68%.  · Left ventricular diastolic dysfunction (grade I) consistent with   impaired relaxation.  · There is no evidence of pericardial effusion.  · Mild pulmonary hypertension is present.  · Normal right ventricular cavity size, wall thickness, systolic function    and septal motion noted.  · No significant structural valvular abnormality demonstrated.           Order Current Status    Respiratory Culture - Sputum, Cough Preliminary result        Discharge Details      Kailee Almanza   Home Medication Instructions LUIS:[id number]    Printed on:04/09/18 2050   Medication Information                      albuterol (PROVENTIL HFA;VENTOLIN HFA) 108 (90 Base) MCG/ACT inhaler  Inhale 2 puffs Every 4 (Four) Hours As Needed for Wheezing.             aspirin 81 MG tablet  Take 81 mg by mouth Daily.             atorvastatin (LIPITOR) 80 MG tablet  Take 1 tablet by mouth every night at bedtime.             benzonatate (TESSALON) 200 MG capsule  Take 1 capsule by mouth 3 (Three) Times a Day As Needed for Cough.             cefuroxime (CEFTIN) 250 MG tablet  Take 1 tablet by mouth 2 (Two) Times a Day for 2 days.             ferrous sulfate 325 (65 FE) MG tablet  Take 1 tablet by mouth Daily With Breakfast.             folic acid (FOLVITE) 1 MG tablet  Take 1 tablet by mouth Daily.             gabapentin (NEURONTIN) 300 MG capsule  Take 300 mg by mouth 3 (Three) Times a Day.             HYDROcodone-acetaminophen (NORCO)  MG per tablet  Take 1 tablet by mouth Every 4 (Four) Hours As Needed for moderate pain (4-6).             lisinopril (PRINIVIL,ZESTRIL) 40 MG tablet  Take 1 tablet by mouth Daily.             meclizine 25 MG chewable tablet chewable tablet  Chew 25 mg Every 6 (Six) Hours As Needed.             pantoprazole (PROTONIX) 40 MG EC tablet  Take 1 tablet by mouth Daily.             predniSONE (DELTASONE) 10 MG tablet  Take 1 tablet by mouth See Admin Instructions. 2 pills daily for 2 days, then 1 pill daily for 3 days then stop             rOPINIRole (REQUIP) 0.5 MG tablet  Take 1 tablet by mouth At Night As Needed (restless legs). Take 1 hour before bedtime.             traZODone (DESYREL) 50 MG tablet  Take 1-2 tablets at bedtime as needed for sleep.              venlafaxine XR (EFFEXOR-XR) 150 MG 24 hr capsule  Take 1 capsule by mouth Daily.                   Discharge Disposition:  Home or Self Care    Discharge Diet:  Diet Instructions     Advance Diet As Tolerated             Discharge Activity:   Activity Instructions     Activity as Tolerated               Future Appointments  Date Time Provider Department Center   4/18/2018 2:00 PM Dragan Weathers MD [facility]   4/18/2018 2:30 PM RM 2 - BED 1 [facility]   4/23/2018 1:30 PM Patrice Richmond MD [facility] None   4/25/2018 1:00 PM RM 1 - CHAIR 1 [facility]   4/30/2018 2:45 PM [facility]M 1 [facility]   5/1/2018 3:15 PM Machelle Atkinson MD [facility] None   5/22/2018 11:15 AM Luis Miguel Gupta MD [facility] None   7/30/2018 11:30 AM Drew Zapata MD [facility] None       Additional Instructions for the Follow-ups that You Need to Schedule     Ambulatory Referral to Physical Therapy Evaluate and treat    As directed      Specialty needed:  Evaluate and treat         Discharge Follow-up with PCP    As directed      Follow Up Details:  1-2 weeks         Discharge Follow-up with Specialty: wilberto Weathers outpatient IV iron infusion; 1 Month    As directed      Specialty:  wilberto Weathers outpatient IV iron infusion    Follow Up:  1 Month         Discharge Follow-up with Specialty: Alonso, for possible outpatient stress/LHC    As directed      Specialty:  Alonso, for possible outpatient stress/LHC         Discharge Follow-up with Specified Provider: Drew Zapata, wilberto PET scan, and follow up with pulmonary    As directed      To:  Drew Zapata, nataliyaange PET scan, and follow up with pulmonary         Discharge Follow-up with Specified Provider: [facility] 6 weeks for colonoscopy    As directed      To:  [facility] 6 weeks for colonoscopy               Time Spent on Discharge:  40 minutes    Electronically signed by Jam Phan MD,  04/09/18, 8:50 PM.

## 2018-04-10 NOTE — OUTREACH NOTE
NIDHI call completed.  Please refer to TCM call flowsheet for call documentation.    *Please see appt request and patient msg in routing comments and call patient back at [phone] or [phone]*

## 2018-04-13 DIAGNOSIS — R91.1 LUNG NODULE: Primary | ICD-10-CM

## 2018-04-16 ENCOUNTER — APPOINTMENT (OUTPATIENT)
Dept: PET IMAGING | Facility: HOSPITAL | Age: 68
End: 2018-04-16
Attending: INTERNAL MEDICINE

## 2018-04-16 ENCOUNTER — HOSPITAL ENCOUNTER (OUTPATIENT)
Dept: PET IMAGING | Facility: HOSPITAL | Age: 68
End: 2018-04-16
Attending: INTERNAL MEDICINE

## 2018-04-16 ENCOUNTER — TELEPHONE (OUTPATIENT)
Dept: ONCOLOGY | Facility: CLINIC | Age: 68
End: 2018-04-16

## 2018-04-16 ENCOUNTER — TELEPHONE (OUTPATIENT)
Dept: INTERNAL MEDICINE | Facility: CLINIC | Age: 68
End: 2018-04-16

## 2018-04-16 RX ORDER — AZITHROMYCIN 250 MG/1
TABLET, FILM COATED ORAL
Qty: 6 TABLET | Refills: 0 | Status: SHIPPED | OUTPATIENT
Start: 2018-04-16 | End: 2018-04-20

## 2018-04-16 NOTE — TELEPHONE ENCOUNTER
----- Message from Megan Munguia sent at 4/16/2018 10:21 AM EDT -----  Regarding: [facility] - LAB QUESTION   Contact: [phone]  PATIENT RECEIVED 2 UNITS OF BLOOD A WEEK AGO FRIDAY, 04/13/2018. SHE WANTS TO MAKE SURE HER HEMOGLOBIN WILL BE CHECKED BEFORE THE FEREHEME  INFUSION WEDNESDAY.     WILL SHE HAVE BLOOD WORK TO DO BEFORE?

## 2018-04-16 NOTE — TELEPHONE ENCOUNTER
Spoke with patient. Asked her how she was feeling post blood transfusion- has she noticed a decrease in fatigue/ sob?    She said she had noticed an increase in energy level, but the past couple days has felt fatigued again.     Advised that we typically do not check labs before ferriheme infusions, but we can add a cbc to be drawn and check her blood counts.

## 2018-04-16 NOTE — TELEPHONE ENCOUNTER
PLEASE RETURN CALL. PATIENT SAID SHE WAS IN [facility] AND THEY TOLD HER SHE HAD PNEUMONIA AND WAS GIVEN ANTIBIOTICS (ORAL) FOR 2 DAYS, BUT SHE SAID SHE'S HURTING IN HER RIBS AGAIN, AND WOULD LIKE TO KNOW IF ANTIBIOTICS CAN BE STARTED AGAIN, UNTIL SHE SEES [facility] ON Thursday FOR HER HOSPITAL FOLLOW Up? I MADE HER AWARE M[facility] AND Dr. CONTEH AREN'T IN THE OFFICE TODAY, SO SHE WAS WONDERING IF ANOTHER PROVIDER CAN PRESCRIBE?

## 2018-04-17 ENCOUNTER — APPOINTMENT (OUTPATIENT)
Dept: MAMMOGRAPHY | Facility: HOSPITAL | Age: 68
End: 2018-04-17

## 2018-04-18 ENCOUNTER — APPOINTMENT (OUTPATIENT)
Dept: INFUSION THERAPY | Facility: HOSPITAL | Age: 68
End: 2018-04-18

## 2018-04-18 ENCOUNTER — OFFICE VISIT (OUTPATIENT)
Dept: ONCOLOGY | Facility: CLINIC | Age: 68
End: 2018-04-18

## 2018-04-18 ENCOUNTER — HOSPITAL ENCOUNTER (OUTPATIENT)
Dept: INFUSION THERAPY | Facility: HOSPITAL | Age: 68
Setting detail: INFUSION SERIES
Discharge: HOME | End: 2018-04-18

## 2018-04-18 VITALS
RESPIRATION RATE: 18 BRPM | TEMPERATURE: 98.9 F | HEART RATE: 96 BPM | DIASTOLIC BLOOD PRESSURE: 65 MMHG | SYSTOLIC BLOOD PRESSURE: 150 MMHG | OXYGEN SATURATION: 98 %

## 2018-04-18 VITALS
TEMPERATURE: 97.6 F | WEIGHT: 157 LBS | HEART RATE: 107 BPM | SYSTOLIC BLOOD PRESSURE: 136 MMHG | DIASTOLIC BLOOD PRESSURE: 63 MMHG | RESPIRATION RATE: 15 BRPM | BODY MASS INDEX: 24.64 KG/M2 | HEIGHT: 67 IN

## 2018-04-18 DIAGNOSIS — D50.0 IRON DEFICIENCY ANEMIA DUE TO CHRONIC BLOOD LOSS: Primary | ICD-10-CM

## 2018-04-18 DIAGNOSIS — D50.9 IRON DEFICIENCY ANEMIA, UNSPECIFIED IRON DEFICIENCY ANEMIA TYPE: ICD-10-CM

## 2018-04-18 DIAGNOSIS — K90.9 IRON MALABSORPTION: ICD-10-CM

## 2018-04-18 LAB
ANISOCYTOSIS BLD QL: NORMAL
BASOPHILS # BLD AUTO: 0.05 10*3/MM3 (ref 0–0.2)
BASOPHILS NFR BLD AUTO: 0.5 % (ref 0–2.5)
BURR CELLS BLD QL SMEAR: NORMAL
DACRYOCYTES BLD QL SMEAR: NORMAL
DEPRECATED RDW RBC AUTO: 77.4 FL (ref 37–54)
ELLIPTOCYTES BLD QL SMEAR: NORMAL
EOSINOPHIL # BLD AUTO: 0.28 10*3/MM3 (ref 0–0.7)
EOSINOPHIL NFR BLD AUTO: 3 % (ref 0–7)
ERYTHROCYTE [DISTWIDTH] IN BLOOD BY AUTOMATED COUNT: 24.7 % (ref 11.5–14.5)
HCT VFR BLD AUTO: 33.9 % (ref 37–47)
HGB BLD-MCNC: 9.7 G/DL (ref 12–16)
HYPOCHROMIA BLD QL: NORMAL
IMM GRANULOCYTES # BLD: 0.04 10*3/MM3 (ref 0–0.06)
IMM GRANULOCYTES NFR BLD: 0.4 % (ref 0–0.6)
LYMPHOCYTES # BLD AUTO: 1.13 10*3/MM3 (ref 0.6–3.4)
LYMPHOCYTES NFR BLD AUTO: 12.1 % (ref 10–50)
MCH RBC QN AUTO: 24.6 PG (ref 27–31)
MCHC RBC AUTO-ENTMCNC: 28.6 G/DL (ref 30–37)
MCV RBC AUTO: 85.8 FL (ref 81–99)
MONOCYTES # BLD AUTO: 0.77 10*3/MM3 (ref 0–0.9)
MONOCYTES NFR BLD AUTO: 8.3 % (ref 0–12)
NEUTROPHILS # BLD AUTO: 7.04 10*3/MM3 (ref 2–6.9)
NEUTROPHILS NFR BLD AUTO: 75.7 % (ref 37–80)
NRBC BLD MANUAL-RTO: 0 /100 WBC (ref 0–0)
OVALOCYTES BLD QL SMEAR: NORMAL
PLATELET # BLD AUTO: 368 10*3/MM3 (ref 130–400)
PMV BLD AUTO: 9.4 FL (ref 6–12)
POIKILOCYTOSIS BLD QL SMEAR: NORMAL
RBC # BLD AUTO: 3.95 10*6/MM3 (ref 4.2–5.4)
SMALL PLATELETS BLD QL SMEAR: ADEQUATE
WBC MORPH BLD: NORMAL
WBC NRBC COR # BLD: 9.31 10*3/MM3 (ref 4.8–10.8)

## 2018-04-18 PROCEDURE — 25010000002 FERUMOXYTOL 510 MG/17ML SOLUTION 510 MG VIAL: Performed by: INTERNAL MEDICINE

## 2018-04-18 PROCEDURE — 99214 OFFICE O/P EST MOD 30 MIN: CPT | Performed by: NURSE PRACTITIONER

## 2018-04-18 PROCEDURE — 85007 BL SMEAR W/DIFF WBC COUNT: CPT | Performed by: INTERNAL MEDICINE

## 2018-04-18 PROCEDURE — 36415 COLL VENOUS BLD VENIPUNCTURE: CPT

## 2018-04-18 PROCEDURE — 85025 COMPLETE CBC W/AUTO DIFF WBC: CPT | Performed by: INTERNAL MEDICINE

## 2018-04-18 PROCEDURE — 96374 THER/PROPH/DIAG INJ IV PUSH: CPT

## 2018-04-18 PROCEDURE — 96375 TX/PRO/DX INJ NEW DRUG ADDON: CPT

## 2018-04-18 PROCEDURE — 63710000001 DIPHENHYDRAMINE PER 50 MG: Performed by: INTERNAL MEDICINE

## 2018-04-18 RX ORDER — SODIUM CHLORIDE 9 MG/ML
250 INJECTION, SOLUTION INTRAVENOUS ONCE
Status: CANCELLED | OUTPATIENT
Start: 2018-04-18

## 2018-04-18 RX ORDER — DIPHENHYDRAMINE HCL 25 MG
25 CAPSULE ORAL ONCE
Status: CANCELLED | OUTPATIENT
Start: 2018-04-18

## 2018-04-18 RX ORDER — FAMOTIDINE 10 MG/ML
20 INJECTION, SOLUTION INTRAVENOUS ONCE
Status: COMPLETED | OUTPATIENT
Start: 2018-04-18 | End: 2018-04-18

## 2018-04-18 RX ORDER — SODIUM CHLORIDE 9 MG/ML
250 INJECTION, SOLUTION INTRAVENOUS ONCE
Status: DISCONTINUED | OUTPATIENT
Start: 2018-04-18 | End: 2018-04-20 | Stop reason: HOSPADM

## 2018-04-18 RX ORDER — DIPHENHYDRAMINE HCL 25 MG
25 CAPSULE ORAL ONCE
Status: COMPLETED | OUTPATIENT
Start: 2018-04-18 | End: 2018-04-18

## 2018-04-18 RX ORDER — FAMOTIDINE 10 MG/ML
20 INJECTION, SOLUTION INTRAVENOUS ONCE
Status: CANCELLED | OUTPATIENT
Start: 2018-04-18

## 2018-04-18 RX ADMIN — FAMOTIDINE 20 MG: 10 INJECTION, SOLUTION INTRAVENOUS at 14:58

## 2018-04-18 RX ADMIN — FERUMOXYTOL 510 MG: 510 INJECTION INTRAVENOUS at 15:08

## 2018-04-18 RX ADMIN — DIPHENHYDRAMINE HYDROCHLORIDE 25 MG: 25 CAPSULE ORAL at 14:51

## 2018-04-18 NOTE — PROGRESS NOTES
DATE OF VISIT: 4/18/2018    REASON FOR VISIT: Followup for iron deficiency anemia.      HISTORY OF PRESENT ILLNESS: The patient is a very pleasant 67 y.o. female  with past medical history significant for iron deficiency anemia diagnosed 4/6/2018. She presented with shortness of breath and chest pain to [facility] emergency department. She was found to be profoundly anemic with hemoglobin of 5. She received two units PRBC. She had evidence of iron deficiency and received IV iron during her hospitalization. She had EGD completed as an inpatient that showed acute gastritis. She also had CT chest that showed descending aortic aneurysm as well as right upper lobe 8 mm lung nodule. The patient is here today for scheduled follow up visit with IV Feraheme.      SUBJECTIVE: The patient is doing fair. She has improved since being discharged from the hospital, but still has complaints of fatiuge and weakness. She has had a persistent cough, and called her primary care office who gave her a prescription for Z-pack. She is taking this currently. She has had some residual low grade temps since discharge. She has had no episodes of acute bleeding or abdominal pain. She is scheduled to see Dr. Hussein this Friday for discussion regarding further GI evaluation.     PAST MEDICAL HISTORY/SOCIAL HISTORY/FAMILY HISTORY: Reviewed by me and unchanged from my documentation done on 04/18/18.    Review of Systems   Constitutional: Positive for fatigue. Negative for activity change, appetite change, chills, fever and unexpected weight change.   HENT: Negative for hearing loss, mouth sores, nosebleeds, sore throat and trouble swallowing.    Eyes: Negative for visual disturbance.   Respiratory: Positive for cough and shortness of breath. Negative for chest tightness and wheezing.    Cardiovascular: Negative for chest pain, palpitations and leg swelling.   Gastrointestinal: Negative for abdominal distention, abdominal pain, blood in stool,  constipation, diarrhea, nausea, rectal pain and vomiting.   Endocrine: Negative for cold intolerance and heat intolerance.   Genitourinary: Negative for difficulty urinating, dysuria, frequency and urgency.   Musculoskeletal: Negative for arthralgias, back pain, gait problem, joint swelling and myalgias.   Skin: Negative for rash.   Neurological: Negative for dizziness, tremors, syncope, weakness, light-headedness, numbness and headaches.   Hematological: Negative for adenopathy. Does not bruise/bleed easily.   Psychiatric/Behavioral: Negative for confusion, sleep disturbance and suicidal ideas. The patient is not nervous/anxious.          Current Outpatient Prescriptions:   •  albuterol (PROVENTIL HFA;VENTOLIN HFA) 108 (90 Base) MCG/ACT inhaler, Inhale 2 puffs Every 4 (Four) Hours As Needed for Wheezing., Disp: 1 inhaler, Rfl: 0  •  aspirin 81 MG tablet, Take 81 mg by mouth Daily., Disp: , Rfl:   •  atorvastatin (LIPITOR) 80 MG tablet, Take 1 tablet by mouth every night at bedtime., Disp: 90 tablet, Rfl: 3  •  azithromycin (ZITHROMAX Z-BUTCH) 250 MG tablet, Take 2 tablets the first day, then 1 tablet daily for 4 days., Disp: 6 tablet, Rfl: 0  •  benzonatate (TESSALON) 200 MG capsule, Take 1 capsule by mouth 3 (Three) Times a Day As Needed for Cough., Disp: 21 capsule, Rfl: 1  •  ferrous sulfate 325 (65 FE) MG tablet, Take 1 tablet by mouth Daily With Breakfast., Disp: 30 tablet, Rfl: 2  •  folic acid (FOLVITE) 1 MG tablet, Take 1 tablet by mouth Daily., Disp: 90 tablet, Rfl: 3  •  gabapentin (NEURONTIN) 300 MG capsule, Take 300 mg by mouth 3 (Three) Times a Day., Disp: , Rfl:   •  HYDROcodone-acetaminophen (NORCO)  MG per tablet, Take 1 tablet by mouth Every 4 (Four) Hours As Needed for moderate pain (4-6)., Disp: , Rfl:   •  lisinopril (PRINIVIL,ZESTRIL) 40 MG tablet, Take 1 tablet by mouth Daily., Disp: 90 tablet, Rfl: 3  •  meclizine 25 MG chewable tablet chewable tablet, Chew 25 mg Every 6 (Six) Hours As  Needed., Disp: , Rfl:   •  pantoprazole (PROTONIX) 40 MG EC tablet, Take 1 tablet by mouth Daily., Disp: 30 tablet, Rfl: 2  •  predniSONE (DELTASONE) 10 MG tablet, Take 1 tablet by mouth See Admin Instructions. 2 pills daily for 2 days, then 1 pill daily for 3 days then stop, Disp: 7 tablet, Rfl: 0  •  rOPINIRole (REQUIP) 0.5 MG tablet, Take 1 tablet by mouth At Night As Needed (restless legs). Take 1 hour before bedtime., Disp: 30 tablet, Rfl: 0  •  traZODone (DESYREL) 50 MG tablet, Take 1-2 tablets at bedtime as needed for sleep., Disp: 180 tablet, Rfl: 3  •  venlafaxine XR (EFFEXOR-XR) 150 MG 24 hr capsule, Take 1 capsule by mouth Daily., Disp: 90 capsule, Rfl: 3    PHYSICAL EXAMINATION:   There were no vitals taken for this visit.   ECOG Performance Status: 1 - Symptomatic but completely ambulatory  General Appearance:  alert, cooperative, no apparent distress and appears stated age   Neurologic/Psychiatric: A&O x 3, gait steady, appropriate affect, strength 5/5 in all muscle groups   HEENT:  Normocephalic, without obvious abnormality, mucous membranes moist   Neck: Supple, symmetrical, trachea midline, no adenopathy;  No thyromegaly, masses, or tenderness   Lungs:   Clear to auscultation bilaterally; respirations regular, even, and unlabored bilaterally   Heart:  Regular rate and rhythm, no murmurs appreciated   Abdomen:   Soft, non-tender, non-distended and no organomegaly   Lymph nodes: No cervical, supraclavicular, inguinal or axillary adenopathy noted   Extremities: Normal, atraumatic; no clubbing, cyanosis, or edema    Skin: No rashes, ulcers, or suspicious lesions noted     Admission on 04/05/2018, Discharged on 04/09/2018   No results displayed because visit has over 200 results.           Ct Angiogram Chest With & Without Contrast    Addendum Date: 4/6/2018 Addendum:   3-D images were created and reviewed. THIS DOCUMENT HAS BEEN ELECTRONICALLY SIGNED BY BRIANA BRANDT MD    Result Date:  4/6/2018  Narrative: EXAM:   CT Chest Without and With Intravenous Contrast CLINICAL HISTORY:   67 years old, female; Anemia, unspecified; Chest pain, unspecified; Condition or disease; Cardiovascular condition or disease; Aortic aneurysm; Without rupture; Thoracic; Additional info: Thoracic aortic aneurysm (taa), known, follow up TECHNIQUE:   Axial computed tomography images of the chest without and with intravenous contrast during the arterial phase of enhancement.  All CT scans at this facility use one or more dose reduction techniques, viz.: automated exposure control; ma/kV adjustment per patient size (including targeted exams where dose is matched to indication; i.e. head); or iterative reconstruction technique.   Coronal and sagittal reformatted images were created and reviewed. CONTRAST:   80 mL of isovue 370 administered intravenously. COMPARISON:   CT ANGIOGRAM CHEST W WO CONTRAST 2017-02-10 11:55 FINDINGS:   Pulmonary arteries:  No acute findings. No pulmonary embolism.   Aorta:  Left subclavian artery stent, patency difficult to evaluate within streak artifact but with flow distally.  Eccentric luminal irregularity along the aortic arch with ulcerated plaques and likely penetrating ulcer disease, unchanged.  Diffuse atherosclerotic changes along the descending thoracic aorta with ulcerated plaques and approximately 0.7 cm right-sided penetrating ulcer, unchanged.  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  Aorta at this level overall measures approximately 3.3 x 4.8 cm, unchanged.  There is a new small pocket of ulceration within the thrombus of the pseudoaneurysm.   Lungs:  Severe lobe centrilobular emphysematous changes.  Band of right apical scarring, unchanged.  Calcified left upper lobe granuloma.  0.8 cm nodule inferiorly in the right upper lobe abutting the medial  pleura, previously 0.4 cm.  No mass or consolidation.   Pleural space:  No significant effusion.  No pneumothorax.   Heart:  Unremarkable.   Bones/joints:  No acute fracture.  No dislocation.   Soft tissues:  Unremarkable.   Lymph nodes:  Unremarkable. No enlarged lymph nodes.   Upper abdomen:  Partially visualized infrarenal aortic aneurysm.  Cholecystectomy.     Impression: 1.  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 2.  Right upper lobe 8mm nodule with suspicious interval growth. THIS DOCUMENT HAS BEEN ELECTRONICALLY SIGNED BY BRIANA BRANDT MD    Xr Chest 1 View    Result Date: 4/7/2018  Narrative:  EXAMINATION: XR CHEST, SINGLE VIEW - 04/07/2018  INDICATION: R07.9-Chest pain, unspecified; D64.9-Anemia, unspecified; I73.9-Peripheral vascular disease, unspecified; D50.0-Iron deficiency anemia secondary to blood loss (chronic).  COMPARISON: 04/06/2018.  FINDINGS: Chronic lung changes without acute cardiopulmonary process, specifically no focal consolidation. Lung volumes unchanged. Cardiac silhouette unchanged. No pneumothorax or pleural effusion. Degenerative changes of the thoracic spine.         Impression: No significant interval change with persistent chronic lung findings, however, no acute parenchymal disease process.  DICTATED:     04/07/2018 EDITED/ls :     04/07/2018  This report was finalized on 4/7/2018 3:06 PM by Dr. Con Dior.      Xr Chest 1 View    Result Date: 4/6/2018  Narrative: EXAMINATION: XR CHEST 1 VW- 04/06/2018  INDICATION: dyspnea; R07.9-Chest pain, unspecified; D64.9-Anemia, unspecified  COMPARISON: 04/05/2018  FINDINGS: Cardiac size within normal limits. No pulmonary vascularity increase or focal consolidation. Chronic lung changes are noted with biapical pleural-parenchymal scarring and emphysema. No  pneumothorax or pleural effusion. Degenerative changes of the spine.         Impression: Chronic lung changes without acute cardiopulmonary process.  D:  04/06/2018 E:  04/06/2018  This report was finalized on 4/6/2018 2:21 PM by Dr. Con Dior.      Xr Chest 1 View    Result Date: 4/5/2018  Narrative: EXAM:   XR Chest, 1 View CLINICAL HISTORY:   67 years old, female; Anemia, unspecified; Chest pain, unspecified; Type not specified; Additional info: Chest pain triage protocol TECHNIQUE:   Frontal view of the chest. COMPARISON:   CR - XR CHEST PA AND LATERAL 2017-02-09 14:47 FINDINGS:   Lungs:  Hyperinflated with right apical scarring, unchanged.  No consolidation.   Pleural space:  Unremarkable.  No pneumothorax.   Heart:  Unremarkable.   Mediastinum:  Unremarkable.   Bones/joints:  Unremarkable.     Impression:   No acute findings. THIS DOCUMENT HAS BEEN ELECTRONICALLY SIGNED BY BRIANA BRANDT MD      ASSESSMENT: The patient is a very pleasant 67 y.o. female  with iron deficiency anemia.     PROBLEM LIST:  1. Iron deficiency anemia  A. Likely related to chronic blood loss.   B. Component of chronic kidney disease.   C. Status post IV iron as inpatient with allergic reaction.   D. Status post EGD done inpatient that showed mild gastritis.   2.  Chronic kidney disease: Stage III  3. Right upper lobe 8 mm lung nodule.   4. Thoracic descending aortic aneurysm.   5. Peripheral vascular disease with recent angina.   6. Recent upper respiratory infection.     PLAN:  1.We will proceed today with Feraheme day 1. She will receive second dose of IV iron on day 8.   2. I reviewed potential side effects of this medication with the patient including nausea, vomiting, constipation, muscle pain, and allergic reaction. She will receive appropriate pre-medications with each dose of IV iron including Pepcid and Benadryl.   3. The patient still needs to have colonoscopy to evaluate for source of blood loss. She is scheduled to see  Dr. Hussein 4/20/2018 for consult regarding colonoscopy scheduling.   4. The patient will follow up with Dr. Zapata in July with repeat CAT scan of chest to evaluate right upper lobe lung nodule.   5. She will continue cardiology follow up with Dr. Gupta secondary to history of PVD with prior subclavian and carotid stent as well as femoral bypass. She will continue current aspirin and plavix therapy.   6. We will see the patient back in 6 weeks with repeat labs including CBC and iron studies.   7. She will complete Z-pack for upper respiratory infection with scheduled follow up with her primary care physician tomorrow.   8. We follow up on her lab results from today. If she has continued anemia with hemoglobin less than 8 we will consider blood transfusion is needed.     Kennedi Guillory, APRN  4/18/2018

## 2018-04-19 ENCOUNTER — OFFICE VISIT (OUTPATIENT)
Dept: INTERNAL MEDICINE | Facility: CLINIC | Age: 68
End: 2018-04-19

## 2018-04-19 ENCOUNTER — TELEPHONE (OUTPATIENT)
Dept: INTERNAL MEDICINE | Facility: CLINIC | Age: 68
End: 2018-04-19

## 2018-04-19 ENCOUNTER — HOSPITAL ENCOUNTER (OUTPATIENT)
Dept: GENERAL RADIOLOGY | Facility: HOSPITAL | Age: 68
Discharge: HOME | End: 2018-04-19
Admitting: PHYSICIAN ASSISTANT

## 2018-04-19 VITALS
SYSTOLIC BLOOD PRESSURE: 126 MMHG | DIASTOLIC BLOOD PRESSURE: 56 MMHG | HEART RATE: 92 BPM | HEIGHT: 67 IN | OXYGEN SATURATION: 98 % | TEMPERATURE: 98.3 F | WEIGHT: 156 LBS | BODY MASS INDEX: 24.48 KG/M2

## 2018-04-19 DIAGNOSIS — I73.9 PERIPHERAL VASCULAR DISEASE (HCC): ICD-10-CM

## 2018-04-19 DIAGNOSIS — K90.9 IRON MALABSORPTION: ICD-10-CM

## 2018-04-19 DIAGNOSIS — D50.0 IRON DEFICIENCY ANEMIA DUE TO CHRONIC BLOOD LOSS: ICD-10-CM

## 2018-04-19 DIAGNOSIS — R05.9 COUGH: ICD-10-CM

## 2018-04-19 DIAGNOSIS — J43.2 CENTRILOBULAR EMPHYSEMA (HCC): ICD-10-CM

## 2018-04-19 DIAGNOSIS — R10.9 BILATERAL FLANK PAIN: Primary | ICD-10-CM

## 2018-04-19 DIAGNOSIS — R91.1 LUNG NODULE: ICD-10-CM

## 2018-04-19 LAB
BILIRUB BLD-MCNC: NEGATIVE MG/DL
CLARITY, POC: CLEAR
COLOR UR: YELLOW
GLUCOSE UR STRIP-MCNC: NEGATIVE MG/DL
KETONES UR QL: NEGATIVE
LEUKOCYTE EST, POC: NEGATIVE
NITRITE UR-MCNC: NEGATIVE MG/ML
PH UR: 5 [PH] (ref 5–8)
PROT UR STRIP-MCNC: NEGATIVE MG/DL
RBC # UR STRIP: NEGATIVE /UL
SP GR UR: 1 (ref 1–1.03)
UROBILINOGEN UR QL: NORMAL

## 2018-04-19 PROCEDURE — 99495 TRANSJ CARE MGMT MOD F2F 14D: CPT | Performed by: PHYSICIAN ASSISTANT

## 2018-04-19 PROCEDURE — 71046 X-RAY EXAM CHEST 2 VIEWS: CPT

## 2018-04-19 PROCEDURE — 81003 URINALYSIS AUTO W/O SCOPE: CPT | Performed by: PHYSICIAN ASSISTANT

## 2018-04-19 RX ORDER — ALBUTEROL SULFATE 2.5 MG/3ML
2.5 SOLUTION RESPIRATORY (INHALATION) EVERY 4 HOURS PRN
Qty: 90 VIAL | Refills: 12 | Status: SHIPPED | OUTPATIENT
Start: 2018-04-19 | End: 2018-05-01

## 2018-04-19 NOTE — PROGRESS NOTES
Transitional Care Follow Up Visit  Subjective     Kailee Almanza is a 67 y.o. female who presents for a transitional care management visit.    Within 48 business hours after discharge our office contacted her via telephone to coordinate her care and needs.      I reviewed and discussed the details of that call along with the discharge summary, hospital problems, inpatient lab results, inpatient diagnostic studies, and consultation reports with Kailee.     Current outpatient and discharge medications have been reconciled for the patient.    Date of TCM Phone Call 4/10/2018   [facility]   Date of Admission 4/6/2018   Date of Discharge 4/9/2018   Discharge Disposition Home or Self Care     Risk for Readmission (LACE) Score: 11 (4/9/2018  6:00 AM)    History of Present Illness   Course During Hospital Stay:  Presented to [facility] with severe dyspnea and chest pain. She was found to be profoundly anemic with iron deficiency. She was transfused 2 Units PRBC with resolution of her dyspnea and chest pain. She underwent EGD which revealed only mild gastritis and his been placed on a PPI. She has an extensive history of PVD and was evaluated by cardiology for angina. At this time cardiology recommended outpatient follow up and no acute ischemic evaluation was ordered. She will follow up with Dr. Gupta.     Regarding her anemia, again, she underwent EGD and was only found to have mild gastritis. H. Pylori testing was negative. She needs a colonoscopy and this will be arranged as an outpatient procedure.  She has an appointment with Dr. Hussein tomorrow.     She underwent CT imaging of her chest and was found to have a pulmonary nodule as well as a thoracic aortic aneurysm with ulcerations.      Regarding her pulmonary nodule, she will follow up with Dr. Drew Zapata.  On 5/4 she will have CT chest.  PET scan was denied by her insurance company due to nodule being 8 mm and their cutoff being minimum of  "10 mm.      Regarding the thoracic aortic aneurysm, she was evaluated by Dr. Richmond and Dr. Liu and nothing further was recommended at this time.     She did develop mild MANJIT following her contrast imaging that resolved with intravenous fluids.     She was found to have an UTI and treated with antibiotics for which she will complete a PO course.     She developed post-transfusion bronchospasm and cough that is resolving with steroids. She will complete a short course of steroids.     She has a history of a subclavian stent, carotid stent, and fem-fem bypass. She will remain on aspirin therapy, but her plavix will be held pending further investigation of her anemia with future colonoscopy.      Arrangements have been made for outpatient IV iron infusions, per Dr. Weathers.    Following Hospital Discharge:  She has had fever up to 101.2 intermittently for the last 3 days. Tylenol reduces fever.  Her  heard her \"gurgling\" when breathing while sleeping this morning. She reports bilateral flank pain as well as pain in bilateral shoulders. She was prescribed Zithromax after leaving the hospital by Dr. Mac when she called reporting cough and pain in ribs. Zithromax has not been improving symptoms.          The following portions of the patient's history were reviewed and updated as appropriate: allergies, current medications, past family history, past medical history, past social history, past surgical history and problem list.    Review of Systems   Constitutional: Positive for fatigue and fever. Negative for appetite change, chills and unexpected weight change.   HENT: Negative for congestion, ear pain, hearing loss, nosebleeds, postnasal drip, rhinorrhea, sore throat, tinnitus and trouble swallowing.    Eyes: Negative for photophobia, discharge and visual disturbance.   Respiratory: Positive for cough and wheezing. Negative for choking, chest tightness and shortness of breath.         Pain in bilateral lower " ribs.    Cardiovascular: Negative for chest pain, palpitations and leg swelling.   Gastrointestinal: Negative for abdominal distention, abdominal pain, anal bleeding, blood in stool, constipation, diarrhea, nausea, rectal pain and vomiting.   Endocrine: Negative for cold intolerance, heat intolerance, polydipsia, polyphagia and polyuria.   Musculoskeletal: Positive for arthralgias (shoulders) and back pain (bilateral lower posterior ribs). Negative for joint swelling, myalgias, neck pain and neck stiffness.   Skin: Negative for color change, pallor, rash and wound.   Allergic/Immunologic: Negative for environmental allergies, food allergies and immunocompromised state.   Neurological: Negative for dizziness, tremors, seizures, weakness, numbness and headaches.   Hematological: Negative for adenopathy. Does not bruise/bleed easily.   Psychiatric/Behavioral: Negative for agitation, behavioral problems, confusion, hallucinations, self-injury and suicidal ideas. The patient is not nervous/anxious.        Objective   Physical Exam   Constitutional: She is oriented to person, place, and time. She appears well-developed and well-nourished. No distress.   HENT:   Head: Normocephalic and atraumatic.   Right Ear: External ear normal.   Left Ear: External ear normal.   Nose: Nose normal.   Mouth/Throat: Oropharynx is clear and moist. No oropharyngeal exudate.   Eyes: EOM are normal. Pupils are equal, round, and reactive to light.   Neck: Normal range of motion. Neck supple. No thyromegaly present.   Cardiovascular: Normal rate, regular rhythm and normal heart sounds.  Exam reveals no gallop and no friction rub.    No murmur heard.  Pulmonary/Chest: Effort normal. No respiratory distress. She has wheezes. She has no rales. She exhibits no tenderness.   Diminished breath sounds in the bases. Wheezing in the left upper lobe.     Abdominal: Soft. Bowel sounds are normal. She exhibits no distension and no mass. There is no  tenderness.   Musculoskeletal: Normal range of motion. She exhibits tenderness (Bilateral CVA tenderness.). She exhibits no edema or deformity.   Lymphadenopathy:     She has no cervical adenopathy.   Neurological: She is alert and oriented to person, place, and time. She displays normal reflexes. No cranial nerve deficit. She exhibits normal muscle tone. Coordination normal.   Skin: Skin is warm and dry. No rash noted. She is not diaphoretic.   Psychiatric: She has a normal mood and affect. Her behavior is normal. Judgment and thought content normal.   Nursing note and vitals reviewed.      Assessment/Plan   Kailee was seen today for follow-up.    Diagnoses and all orders for this visit:    Bilateral flank pain  -     POCT urinalysis dipstick, automated  Brief Urine Lab Results  (Last result in the past 365 days)      Color   Clarity   Blood   Leuk Est   Nitrite   Protein   CREAT   Urine HCG        04/19/18 1450 Yellow Clear Negative Negative Negative Negative               Iron deficiency anemia due to chronic blood loss  Iron malabsorption  Follow up with hematology for iron infusions.   Follow up with Dr. Hussein for colonoscopy.     Lung nodule  -     XR Chest PA & Lateral- recheck due to complaint of cough and fever.     Centrilobular emphysema  Cough  -     XR Chest PA & Lateral  -     albuterol (PROVENTIL) (2.5 MG/3ML) 0.083% nebulizer solution; Take 2.5 mg by nebulization Every 4 (Four) Hours As Needed for Wheezing.  Provided with nebulizer.   Follow up with pulmonologist as scheduled.       ED and hospital admission record reviewed.               F/u with Dr. Atkinson on 5/1 as scheduled.

## 2018-04-20 ENCOUNTER — OFFICE VISIT (OUTPATIENT)
Dept: SURGERY | Facility: CLINIC | Age: 68
End: 2018-04-20

## 2018-04-20 VITALS
TEMPERATURE: 98.2 F | HEIGHT: 67 IN | HEART RATE: 109 BPM | BODY MASS INDEX: 24.58 KG/M2 | OXYGEN SATURATION: 93 % | SYSTOLIC BLOOD PRESSURE: 112 MMHG | DIASTOLIC BLOOD PRESSURE: 62 MMHG | WEIGHT: 156.6 LBS

## 2018-04-20 DIAGNOSIS — D50.0 IRON DEFICIENCY ANEMIA DUE TO CHRONIC BLOOD LOSS: Primary | ICD-10-CM

## 2018-04-20 PROCEDURE — 99203 OFFICE O/P NEW LOW 30 MIN: CPT | Performed by: SURGERY

## 2018-04-20 RX ORDER — AZITHROMYCIN 1 G
1 PACKET (EA) ORAL ONCE
COMMUNITY
End: 2018-05-01

## 2018-04-20 RX ORDER — LEVOFLOXACIN 500 MG/1
500 TABLET, FILM COATED ORAL DAILY
Qty: 7 TABLET | Refills: 0 | Status: SHIPPED | OUTPATIENT
Start: 2018-04-20 | End: 2018-04-20

## 2018-04-20 NOTE — PROGRESS NOTES
Patient: Kailee Almanza    YOB: 1950    Date: 04/20/2018    Primary Care Provider: Machelle Atkinson MD    Chief Complaint   Patient presents with   • Anemia       Subjective .     History of present illness:  I saw the patient in the office today as a consultation for evaluation and treatment of anemia.  She went to the emergency room last week and had to have 2 units of blood due to anemia.  She complains of  Cramping abdominal pain throughout her abdomen.  She did have diarrhea but it has stopped now.  She is having normal bowel movements.  She is having iron infusions right now.  She denies constipation, nausea, vomiting, or rectal bleeding.  Her hemoglobin was 5.Patient had a negative EGD less than 2 weeks ago, colonoscopy to have years ago revealed 2 polyps are removed.  Patient on folate treatment with no improvement in anemia.  Currently on IV iron therapy.    The following portions of the patient's history were reviewed and updated as appropriate: allergies, current medications, past family history, past medical history, past social history, past surgical history and problem list.      Review of Systems   Constitutional: Negative for chills, fever and unexpected weight change.   HENT: Negative for hearing loss, trouble swallowing and voice change.    Eyes: Negative for visual disturbance.   Respiratory: Negative for apnea, cough, chest tightness, shortness of breath and wheezing.    Cardiovascular: Negative for chest pain, palpitations and leg swelling.   Gastrointestinal: Positive for abdominal pain and diarrhea. Negative for abdominal distention, anal bleeding, blood in stool, constipation, nausea, rectal pain and vomiting.   Endocrine: Negative for cold intolerance and heat intolerance.   Genitourinary: Negative for difficulty urinating, dysuria and flank pain.   Musculoskeletal: Negative for back pain and gait problem.   Skin: Negative for color change, rash and wound.   Neurological:  Negative for dizziness, syncope, speech difficulty, weakness, light-headedness, numbness and headaches.   Hematological: Negative for adenopathy. Does not bruise/bleed easily.   Psychiatric/Behavioral: Negative for confusion. The patient is not nervous/anxious.        History:  Past Medical History:   Diagnosis Date   • Anemia     Due to low folic acid   • Arthritis    • Back pain    • Carotid stenosis    • Cerebrovascular disease 7/21/2016    Amaurosis fugax, OD.  Stent 70% LJ stenosis, April 2014:  Questionable recurrent symptoms “early” following stent, treated with reinstitution Plavix.   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  High-degree left subclavian artery stenosis, treated with stenting, April 2014.      • Chronic back pain 7/22/2016   • Colon cancer screening    • COPD (chronic obstructive pulmonary disease)    • Coronary artery disease    • Diarrhea    • Frequent UTI    • History of chest x-ray 07/22/2015    post inflammatory scarring noted in right apical region, stable since CT of 10/10/2014; COPD changes with no acute findings   • History of chest x-ray 04/22/2014    no acute cardiopulmonary disease; calcified granuloma present in the left midlung; mild scarring in right upper lobe.    • Hyperlipidemia 7/21/2016   • Hypertension 7/21/2016   • Parathyroid abnormality    • Peripheral vascular disease 7/21/2016    1. Angiography the left subclavian artery.  2. Placement of a single, 4.0 mm diameter x 28 mm length, Xience Alpine everolimus-eluting stent in the proximal left subclavian artery in an area of in-stent restenosis. 7-22-15 3.  Femoral-femoral bypass 2009 4.  Aortobifemoral bypass 1995   • Thoracic aortic aneurysm    • TIA (transient ischemic attack)     h/o        Past Surgical History:   Procedure Laterality Date   • AORTA - FEMORAL ARTERY BYPASS GRAFT     • APPENDECTOMY     • BREAST BIOPSY Bilateral    • CAROTID ENDARTERECTOMY      Right. 2010   • CAROTID STENT      Right common  carotid artery, 2015   • CHOLECYSTECTOMY     • ENDOSCOPY N/A 4/7/2018    Procedure: ESOPHAGOGASTRODUODENOSCOPY;  Surgeon: Alfonzo Cox MD;  Location:  [facility];  Service: Gastroenterology   • EYE SURGERY      lasik   • FEMORAL ARTERY - FEMORAL ARTERY BYPASS GRAFT     • HYSTERECTOMY      bleeding, uterine cyst   • INCONTINENCE SURGERY     • LA EXPLORE PARATHYROID GLANDS Bilateral 2/1/2017    Procedure: PARATHYROIDECTOMY;  Surgeon: Isaac CORONA MD;  Location:  [facility];  Service: ENT   • REFRACTIVE SURGERY     • SALIVARY GLAND SURGERY Left    • SUBCLAVIAN ARTERY STENT      7/15   • THROMBOENDARTERECTOMY      f Subclavian    • THROMBOENDARTERECTOMY       History of Carotid Thromboendarterectomy       Family History   Problem Relation Age of Onset   • Alzheimer's disease Mother    • Heart disease Mother    • Heart attack Father    • Heart disease Father    • Heart disease Paternal Grandfather    • Colon cancer Other      Possible       Social History   Substance Use Topics   • Smoking status: Former Smoker     Packs/day: 1.00     Years: 35.00     Types: Cigarettes     Quit date: 6/1/2006   • Smokeless tobacco: Never Used   • Alcohol use Yes      Comment: occasional       Medications:   Current Outpatient Prescriptions:   •  azithromycin (ZITHROMAX) 1 g powder, Take 1 packet by mouth 1 (One) Time., Disp: , Rfl:   •  albuterol (PROVENTIL HFA;VENTOLIN HFA) 108 (90 Base) MCG/ACT inhaler, Inhale 2 puffs Every 4 (Four) Hours As Needed for Wheezing., Disp: 1 inhaler, Rfl: 0  •  albuterol (PROVENTIL) (2.5 MG/3ML) 0.083% nebulizer solution, Take 2.5 mg by nebulization Every 4 (Four) Hours As Needed for Wheezing., Disp: 90 vial, Rfl: 12  •  aspirin 81 MG tablet, Take 81 mg by mouth Daily., Disp: , Rfl:   •  atorvastatin (LIPITOR) 80 MG tablet, Take 1 tablet by mouth every night at bedtime., Disp: 90 tablet, Rfl: 3  •  ferrous sulfate 325 (65 FE) MG tablet, Take 1 tablet by mouth Daily With Breakfast., Disp: 30  tablet, Rfl: 2  •  folic acid (FOLVITE) 1 MG tablet, Take 1 tablet by mouth Daily., Disp: 90 tablet, Rfl: 3  •  gabapentin (NEURONTIN) 300 MG capsule, Take 300 mg by mouth 3 (Three) Times a Day., Disp: , Rfl:   •  lisinopril (PRINIVIL,ZESTRIL) 40 MG tablet, Take 1 tablet by mouth Daily., Disp: 90 tablet, Rfl: 3  •  meclizine 25 MG chewable tablet chewable tablet, Chew 25 mg Every 6 (Six) Hours As Needed., Disp: , Rfl:   •  pantoprazole (PROTONIX) 40 MG EC tablet, Take 1 tablet by mouth Daily., Disp: 30 tablet, Rfl: 2  •  traZODone (DESYREL) 50 MG tablet, Take 1-2 tablets at bedtime as needed for sleep., Disp: 180 tablet, Rfl: 3  •  venlafaxine XR (EFFEXOR-XR) 150 MG 24 hr capsule, Take 1 capsule by mouth Daily., Disp: 90 capsule, Rfl: 3  No current facility-administered medications for this visit.        Allergies:   Allergies   Allergen Reactions   • Ferrlecit [Na Ferric Gluc Cplx In Sucrose] Diarrhea and Nausea And Vomiting   • Sulfa Antibiotics Rash     Last as a baby.   • Percodan [Oxycodone-Aspirin] Mental Status Change       Objective     Vital Signs:  Temp:  [98.2 °F (36.8 °C)] 98.2 °F (36.8 °C)  Heart Rate:  [109] 109  BP: (112)/(62) 112/62    Physical Exam:   General Appearance:    Alert, cooperative, in no acute distress   Head:    Normocephalic, without obvious abnormality, atraumatic   Eyes:            Lids and lashes normal, conjunctivae and sclerae normal, no   icterus, no pallor, corneas clear, PERRL   Ears:    Ears appear intact with no abnormalities noted   Throat:   No oral lesions, no thrush, oral mucosa moist   Neck:   No adenopathy, supple, trachea midline, no thyromegaly,  no JVD   Lungs:     Clear to auscultation,respirations regular, even and                  unlabored    Heart:    Regular rhythm and normal rate, normal S1 and S2, no            murmur   Abdomen:     no masses, no organomegaly, soft non-tender, non-distended, no guarding   Extremities:   Moves all extremities well, no edema,  no cyanosis, no             redness   Pulses:   Pulses palpable and equal bilaterally   Skin:   No bleeding, bruising or rash   Lymph nodes:   No palpable adenopathy   Neurologic:   Cranial nerves 2 - 12 grossly intact, sensation intact  Psychiatric: No evidence of depression or anxiety   Results Review:   I reviewed the patient's new clinical results.    Review of Systems was reviewed and confirmed as accurate today.    Assessment/Plan :    1. Iron deficiency anemia due to chronic blood loss        I recommend a colonoscopy for further evaluation. The procedure was explained as well as the risks which include but are not limited to bleeding, infection, perforation, abdominal pain etc. The patient understands these risks and the procedure and wishes to proceed. If colonoscopy negative, would consider patient swallow the camera for small bowel evaluation.     Electronically signed by Lena Hussein MD  04/20/18  2:43 PM    Scribed for Lena Hussein MD by Michelle Milligan. 4/20/2018  2:57 PM

## 2018-04-24 ENCOUNTER — OUTSIDE FACILITY SERVICE (OUTPATIENT)
Dept: SURGERY | Facility: CLINIC | Age: 68
End: 2018-04-24

## 2018-04-24 DIAGNOSIS — K90.9 IRON MALABSORPTION: ICD-10-CM

## 2018-04-24 DIAGNOSIS — D50.9 IRON DEFICIENCY ANEMIA, UNSPECIFIED IRON DEFICIENCY ANEMIA TYPE: ICD-10-CM

## 2018-04-24 PROCEDURE — 45380 COLONOSCOPY AND BIOPSY: CPT | Performed by: SURGERY

## 2018-04-24 RX ORDER — SODIUM CHLORIDE 9 MG/ML
250 INJECTION, SOLUTION INTRAVENOUS ONCE
Status: CANCELLED | OUTPATIENT
Start: 2018-04-25

## 2018-04-24 RX ORDER — DIPHENHYDRAMINE HCL 25 MG
25 CAPSULE ORAL ONCE
Status: CANCELLED | OUTPATIENT
Start: 2018-04-25

## 2018-04-24 RX ORDER — FAMOTIDINE 10 MG/ML
20 INJECTION, SOLUTION INTRAVENOUS ONCE
Status: CANCELLED | OUTPATIENT
Start: 2018-04-25

## 2018-04-25 ENCOUNTER — HOSPITAL ENCOUNTER (OUTPATIENT)
Dept: INFUSION THERAPY | Facility: HOSPITAL | Age: 68
Setting detail: INFUSION SERIES
Discharge: HOME | End: 2018-04-25

## 2018-04-25 VITALS
RESPIRATION RATE: 16 BRPM | DIASTOLIC BLOOD PRESSURE: 45 MMHG | OXYGEN SATURATION: 99 % | TEMPERATURE: 97.8 F | SYSTOLIC BLOOD PRESSURE: 102 MMHG | HEART RATE: 71 BPM

## 2018-04-25 DIAGNOSIS — D50.9 IRON DEFICIENCY ANEMIA, UNSPECIFIED IRON DEFICIENCY ANEMIA TYPE: ICD-10-CM

## 2018-04-25 DIAGNOSIS — K90.9 IRON MALABSORPTION: ICD-10-CM

## 2018-04-25 PROCEDURE — 63710000001 DIPHENHYDRAMINE PER 50 MG: Performed by: INTERNAL MEDICINE

## 2018-04-25 PROCEDURE — 96374 THER/PROPH/DIAG INJ IV PUSH: CPT

## 2018-04-25 PROCEDURE — 96375 TX/PRO/DX INJ NEW DRUG ADDON: CPT

## 2018-04-25 PROCEDURE — 25010000002 FERUMOXYTOL 510 MG/17ML SOLUTION 510 MG VIAL: Performed by: INTERNAL MEDICINE

## 2018-04-25 RX ORDER — DIPHENHYDRAMINE HCL 25 MG
25 CAPSULE ORAL ONCE
Status: COMPLETED | OUTPATIENT
Start: 2018-04-25 | End: 2018-04-25

## 2018-04-25 RX ORDER — FAMOTIDINE 10 MG/ML
20 INJECTION, SOLUTION INTRAVENOUS ONCE
Status: COMPLETED | OUTPATIENT
Start: 2018-04-25 | End: 2018-04-25

## 2018-04-25 RX ORDER — SODIUM CHLORIDE 9 MG/ML
250 INJECTION, SOLUTION INTRAVENOUS ONCE
Status: DISCONTINUED | OUTPATIENT
Start: 2018-04-25 | End: 2018-04-27 | Stop reason: HOSPADM

## 2018-04-25 RX ADMIN — DIPHENHYDRAMINE HYDROCHLORIDE 25 MG: 25 CAPSULE ORAL at 13:15

## 2018-04-25 RX ADMIN — FERUMOXYTOL 510 MG: 510 INJECTION INTRAVENOUS at 13:42

## 2018-04-25 RX ADMIN — FAMOTIDINE 20 MG: 10 INJECTION INTRAVENOUS at 13:15

## 2018-04-30 ENCOUNTER — HOSPITAL ENCOUNTER (OUTPATIENT)
Dept: MAMMOGRAPHY | Facility: HOSPITAL | Age: 68
Discharge: HOME | End: 2018-04-30
Admitting: PHYSICIAN ASSISTANT

## 2018-04-30 PROCEDURE — 77063 BREAST TOMOSYNTHESIS BI: CPT

## 2018-04-30 PROCEDURE — 77067 SCR MAMMO BI INCL CAD: CPT

## 2018-05-01 ENCOUNTER — OFFICE VISIT (OUTPATIENT)
Dept: INTERNAL MEDICINE | Facility: CLINIC | Age: 68
End: 2018-05-01

## 2018-05-01 VITALS
OXYGEN SATURATION: 98 % | BODY MASS INDEX: 24.21 KG/M2 | TEMPERATURE: 98.4 F | WEIGHT: 154.25 LBS | SYSTOLIC BLOOD PRESSURE: 102 MMHG | HEIGHT: 67 IN | DIASTOLIC BLOOD PRESSURE: 68 MMHG | HEART RATE: 95 BPM

## 2018-05-01 DIAGNOSIS — E83.51 HYPOCALCEMIA: ICD-10-CM

## 2018-05-01 DIAGNOSIS — I10 ESSENTIAL HYPERTENSION: Primary | ICD-10-CM

## 2018-05-01 DIAGNOSIS — D50.0 IRON DEFICIENCY ANEMIA DUE TO CHRONIC BLOOD LOSS: ICD-10-CM

## 2018-05-01 DIAGNOSIS — G47.01 INSOMNIA DUE TO MEDICAL CONDITION: ICD-10-CM

## 2018-05-01 PROBLEM — F51.04 PSYCHOPHYSIOLOGICAL INSOMNIA: Status: ACTIVE | Noted: 2018-05-01

## 2018-05-01 PROCEDURE — 99214 OFFICE O/P EST MOD 30 MIN: CPT | Performed by: FAMILY MEDICINE

## 2018-05-01 RX ORDER — BETAMETHASONE DIPROPIONATE 0.5 MG/G
OINTMENT TOPICAL DAILY
Qty: 45 G | Refills: 1 | Status: SHIPPED | OUTPATIENT
Start: 2018-05-01 | End: 2019-04-16

## 2018-05-01 RX ORDER — CLOPIDOGREL BISULFATE 75 MG/1
75 TABLET ORAL DAILY
COMMUNITY

## 2018-05-01 RX ORDER — BETAMETHASONE DIPROPIONATE 0.5 MG/ML
LOTION, AUGMENTED TOPICAL DAILY
Qty: 60 ML | Refills: 3 | Status: SHIPPED | OUTPATIENT
Start: 2018-05-01 | End: 2019-04-16

## 2018-05-01 NOTE — PROGRESS NOTES
Subjective   Kailee Almanza is a 67 y.o. female who presents to establish care with me.    Chief Complaint:      Hypertension  Patient is here for follow-up of elevated blood pressure. She is not exercising and is not adherent to a low-salt diet. She does check blood pressure at home, and it  is well controlled at home. Cardiac symptoms: none. Patient denies chest pain, chest pressure/discomfort and exertional chest pressure/discomfort. Cardiovascular risk factors: advanced age (older than 55 for men, 65 for women), hypertension and smoking/ tobacco exposure. Use of agents associated with hypertension: none. History of target organ damage: peripheral artery disease.      Flank pain: having increased right flank pain - was severe while she was in hospital but better now.  Also having right shoulder pain now, radiating up neck.  Flank pain gets worse when moving, better with resting.      The following portions of the patient's history were reviewed and updated as appropriate: She  has a past medical history of Anemia; Carotid stenosis; Cerebrovascular disease (7/21/2016); Coronary artery disease; Frequent UTI; Hyperlipidemia (7/21/2016); Hypertension (7/21/2016); and TIA (transient ischemic attack).  She has Peripheral vascular disease; Essential hypertension; Hyperlipidemia; Chronic bilateral low back pain without sciatica; Centrilobular emphysema; Aneurysm of thoracic aorta; Lung nodule; Iron deficiency anemia due to chronic blood loss; Iron malabsorption; and Psychophysiological insomnia on her pertinent problem list.  She  has a past surgical history that includes Cholecystectomy; Appendectomy; Aorta - femoral artery bypass graft (Bilateral, 1996); Femoral artery - femoral artery bypass graft (2008); Subclavian artery stent (Left); Carotid endarterectomy; Carotid stent; Refractive surgery; Eye surgery; pr explore parathyroid glands (Bilateral, 2/1/2017); Thromboendarterectomy (Left); Esophagogastroduodenoscopy  (N/A, 4/7/2018); Hysterectomy; Salivary gland surgery (Left); Breast biopsy (Bilateral); and Cystocele repair.  Her family history includes Alzheimer's disease in her mother; Colon cancer in her other; Heart attack in her father; Heart disease in her father, mother, and paternal grandfather; No Known Problems in her brother.  She  reports that she quit smoking about 11 years ago. Her smoking use included Cigarettes. She has a 35.00 pack-year smoking history. She has never used smokeless tobacco. She reports that she drinks alcohol. She reports that she does not use drugs.  Current Outpatient Prescriptions   Medication Sig Dispense Refill   • aspirin 81 MG tablet Take 81 mg by mouth Daily.     • atorvastatin (LIPITOR) 80 MG tablet Take 1 tablet by mouth every night at bedtime. 90 tablet 3   • clopidogrel (PLAVIX) 75 MG tablet Take 75 mg by mouth Daily.     • ferrous sulfate 325 (65 FE) MG tablet Take 1 tablet by mouth Daily With Breakfast. 30 tablet 2   • folic acid (FOLVITE) 1 MG tablet Take 1 tablet by mouth Daily. 90 tablet 3   • gabapentin (NEURONTIN) 300 MG capsule Take 300 mg by mouth 3 (Three) Times a Day.     • lisinopril (PRINIVIL,ZESTRIL) 40 MG tablet Take 1 tablet by mouth Daily. 90 tablet 3   • meclizine 25 MG chewable tablet chewable tablet Chew 25 mg Every 6 (Six) Hours As Needed.     • pantoprazole (PROTONIX) 40 MG EC tablet Take 1 tablet by mouth Daily. 30 tablet 2   • traZODone (DESYREL) 50 MG tablet Take 1-2 tablets at bedtime as needed for sleep. 180 tablet 3   • venlafaxine XR (EFFEXOR-XR) 150 MG 24 hr capsule Take 1 capsule by mouth Daily. 90 capsule 3   • albuterol (PROVENTIL HFA;VENTOLIN HFA) 108 (90 Base) MCG/ACT inhaler Inhale 2 puffs Every 4 (Four) Hours As Needed for Wheezing. 1 inhaler 0   • betamethasone, augmented, (DIPROLENE) 0.05 % lotion Apply  topically Daily. 60 mL 3   • betamethasone, augmented, (DIPROLENE) 0.05 % ointment Apply  topically Daily. 45 g 1     No current  "facility-administered medications for this visit.    .    Review of Systems  Review of Systems   Constitutional: Positive for fatigue.   Respiratory: Negative.    Cardiovascular: Negative for chest pain, palpitations and leg swelling.   Gastrointestinal: Negative.    Musculoskeletal: Positive for arthralgias and neck pain.   Neurological: Negative for dizziness and light-headedness.   Psychiatric/Behavioral: Positive for sleep disturbance.         Objective    /68   Pulse 95   Temp 98.4 °F (36.9 °C)   Ht 170.2 cm (67\")   Wt 70 kg (154 lb 4 oz)   SpO2 98%   BMI 24.16 kg/m²   Physical Exam   Constitutional: She appears well-developed and well-nourished. No distress.         Assessment/Plan      Diagnosis Plan   1. Essential hypertension  Comprehensive Metabolic Panel   2. Iron deficiency anemia due to chronic blood loss  CBC (No Diff)    Ferritin    Iron Profile    Vitamin B12    POC Occult Blood X 3, Stool   3. Hypocalcemia  PTH, Intact    Calcium, Ionized   4. Insomnia due to medical condition  traZODone (DESYREL) 50 MG tablet           I, Machelle Atkinson MD, hereby attest that the medical record entry above accurately reflects signatures/notations that I made in my capacity as Machelle Atkinson MD when I treated and diagnosed the above patient.  I do hereby attest that his information is true, accurate, and complete to the best of my knowledge and I understand that any falsification, omission, or concealment of material fact may subject me to administrative, civil, or criminal liability.                       "

## 2018-05-02 ENCOUNTER — PATIENT MESSAGE (OUTPATIENT)
Dept: INTERNAL MEDICINE | Facility: CLINIC | Age: 68
End: 2018-05-02

## 2018-05-02 LAB
ALBUMIN SERPL-MCNC: 4.2 G/DL (ref 3.5–5)
ALBUMIN/GLOB SERPL: 1.4 G/DL (ref 1–2)
ALP SERPL-CCNC: 157 U/L (ref 38–126)
ALT SERPL-CCNC: 24 U/L (ref 13–69)
AST SERPL-CCNC: 21 U/L (ref 15–46)
BILIRUB SERPL-MCNC: 0.3 MG/DL (ref 0.2–1.3)
BUN SERPL-MCNC: 19 MG/DL (ref 7–20)
BUN/CREAT SERPL: 15.8 (ref 7.1–23.5)
CA-I SERPL ISE-MCNC: 4.9 MG/DL (ref 4.5–5.6)
CALCIUM SERPL-MCNC: 9.3 MG/DL (ref 8.4–10.2)
CHLORIDE SERPL-SCNC: 102 MMOL/L (ref 98–107)
CO2 SERPL-SCNC: 21 MMOL/L (ref 26–30)
CREAT SERPL-MCNC: 1.2 MG/DL (ref 0.6–1.3)
ERYTHROCYTE [DISTWIDTH] IN BLOOD BY AUTOMATED COUNT: 24 % (ref 11.5–14.5)
FERRITIN SERPL-MCNC: ABNORMAL NG/ML (ref 11.1–264)
GFR SERPLBLD CREATININE-BSD FMLA CKD-EPI: 45 ML/MIN/1.73
GFR SERPLBLD CREATININE-BSD FMLA CKD-EPI: 54 ML/MIN/1.73
GLOBULIN SER CALC-MCNC: 3.1 GM/DL
GLUCOSE SERPL-MCNC: 110 MG/DL (ref 74–98)
HCT VFR BLD AUTO: 36.1 % (ref 37–47)
HGB BLD-MCNC: 10.8 G/DL (ref 12–16)
IRON SATN MFR SERPL: 79 % (ref 11–46)
IRON SERPL-MCNC: 188 MCG/DL (ref 37–181)
MCH RBC QN AUTO: 26.4 PG (ref 27–31)
MCHC RBC AUTO-ENTMCNC: 29.9 G/DL (ref 30–37)
MCV RBC AUTO: 88.3 FL (ref 81–99)
PLATELET # BLD AUTO: 281 10*3/MM3 (ref 130–400)
POTASSIUM SERPL-SCNC: 4.6 MMOL/L (ref 3.5–5.1)
PROT SERPL-MCNC: 7.3 G/DL (ref 6.3–8.2)
PTH-INTACT SERPL-MCNC: 42 PG/ML (ref 15–65)
RBC # BLD AUTO: 4.09 10*6/MM3 (ref 4.2–5.4)
SODIUM SERPL-SCNC: 140 MMOL/L (ref 137–145)
TIBC SERPL-MCNC: 238 MCG/DL (ref 261–497)
UIBC SERPL-MCNC: 50 MCG/DL
VIT B12 SERPL-MCNC: 967 PG/ML (ref 239–931)
WBC # BLD AUTO: 7.06 10*3/MM3 (ref 4.8–10.8)

## 2018-05-04 ENCOUNTER — CLINICAL SUPPORT (OUTPATIENT)
Dept: INTERNAL MEDICINE | Facility: CLINIC | Age: 68
End: 2018-05-04

## 2018-05-04 ENCOUNTER — HOSPITAL ENCOUNTER (OUTPATIENT)
Dept: CT IMAGING | Facility: HOSPITAL | Age: 68
Discharge: HOME | End: 2018-05-04
Attending: INTERNAL MEDICINE | Admitting: INTERNAL MEDICINE

## 2018-05-04 DIAGNOSIS — R91.1 LUNG NODULE: ICD-10-CM

## 2018-05-04 DIAGNOSIS — D50.0 IRON DEFICIENCY ANEMIA DUE TO CHRONIC BLOOD LOSS: ICD-10-CM

## 2018-05-04 DIAGNOSIS — K92.1 BLOOD IN STOOL: Primary | ICD-10-CM

## 2018-05-04 PROCEDURE — 71250 CT THORAX DX C-: CPT

## 2018-05-08 ENCOUNTER — TELEPHONE (OUTPATIENT)
Dept: INTERNAL MEDICINE | Facility: CLINIC | Age: 68
End: 2018-05-08

## 2018-05-08 NOTE — TELEPHONE ENCOUNTER
Please reassure her - she got an iron infusion from the hematology doctor.  This just means that her body responded really well to it and increased the iron in her blood cells and her iron stores (ferritin).  Her kidney function is a touch decreased from normal, but not much.  We will keep an eye on that.  Sent a longer too.me message about it as well.

## 2018-05-08 NOTE — TELEPHONE ENCOUNTER
PATIENT CONCERNED ABOUT THE FOLLOWING THREE LAB RESULTS     RBW 4.09     FERRITIN LEVEL 1240.0      eGFR 45    CT SCAN RESULTS - NO CHANGE - REPEAT IN THREE MONTHS     BP LAST NIGHT 167/105

## 2018-05-08 NOTE — TELEPHONE ENCOUNTER
----- Message from Kailee Almanza sent at 5/6/2018  1:40 PM EDT -----  Regarding: Test Results Question  Contact: [phone]  I had some questions about my lab results.

## 2018-05-09 DIAGNOSIS — R91.1 LUNG NODULE: Primary | ICD-10-CM

## 2018-05-09 NOTE — TELEPHONE ENCOUNTER
Nova Mcpherson MA 5/4/2018 5:28 PM EDT        ----- Message -----  From: Kailee Almanza  Sent: 5/2/2018 11:19 PM  To: Catracho Silva  Clinical Pool  Subject: Test Results Question     My GFR was low.

## 2018-05-09 NOTE — TELEPHONE ENCOUNTER
Nova Mcpherson MA 5/4/2018 5:27 PM EDT        ----- Message -----  From: Kailee Almanza  Sent: 5/2/2018 11:17 PM  To: Catracho Silva  Clinical Pool  Subject: Test Results Question     My Ferritan Level was 1240, should I be worried and what is that?

## 2018-05-14 LAB
EXPIRATION DATE 2: NORMAL
EXPIRATION DATE 3: NORMAL
EXPIRATION DATE: NORMAL
GASTROCULT GAST QL: NEGATIVE
HEMOCCULT SP2 STL QL: NEGATIVE
HEMOCCULT SP3 STL QL: NEGATIVE
Lab: NORMAL

## 2018-05-14 PROCEDURE — 82274 ASSAY TEST FOR BLOOD FECAL: CPT | Performed by: FAMILY MEDICINE

## 2018-05-15 PROBLEM — G47.01 INSOMNIA DUE TO MEDICAL CONDITION: Status: ACTIVE | Noted: 2018-05-15

## 2018-05-15 RX ORDER — TRAZODONE HYDROCHLORIDE 50 MG/1
TABLET ORAL
Qty: 180 TABLET | Refills: 3
Start: 2018-05-15

## 2018-05-29 ENCOUNTER — LAB (OUTPATIENT)
Dept: LAB | Facility: HOSPITAL | Age: 68
End: 2018-05-29

## 2018-05-29 DIAGNOSIS — D50.0 IRON DEFICIENCY ANEMIA DUE TO CHRONIC BLOOD LOSS: ICD-10-CM

## 2018-05-29 DIAGNOSIS — K90.9 IRON MALABSORPTION: ICD-10-CM

## 2018-05-29 LAB
ANISOCYTOSIS BLD QL: NORMAL
BASOPHILS # BLD AUTO: 0.02 10*3/MM3 (ref 0–0.2)
BASOPHILS NFR BLD AUTO: 0.4 % (ref 0–2.5)
DEPRECATED RDW RBC AUTO: 70.9 FL (ref 37–54)
EOSINOPHIL # BLD AUTO: 0.2 10*3/MM3 (ref 0–0.7)
EOSINOPHIL NFR BLD AUTO: 3.7 % (ref 0–7)
ERYTHROCYTE [DISTWIDTH] IN BLOOD BY AUTOMATED COUNT: 20.5 % (ref 11.5–14.5)
FERRITIN SERPL-MCNC: 360 NG/ML (ref 11.1–264)
HCT VFR BLD AUTO: 37 % (ref 37–47)
HGB BLD-MCNC: 11.7 G/DL (ref 12–16)
IMM GRANULOCYTES # BLD: 0.01 10*3/MM3 (ref 0–0.06)
IMM GRANULOCYTES NFR BLD: 0.2 % (ref 0–0.6)
IRON 24H UR-MRATE: 89 MCG/DL (ref 37–181)
IRON SATN MFR SERPL: 37 % (ref 11–46)
LYMPHOCYTES # BLD AUTO: 1.07 10*3/MM3 (ref 0.6–3.4)
LYMPHOCYTES NFR BLD AUTO: 19.6 % (ref 10–50)
MCH RBC QN AUTO: 29.8 PG (ref 27–31)
MCHC RBC AUTO-ENTMCNC: 31.6 G/DL (ref 30–37)
MCV RBC AUTO: 94.4 FL (ref 81–99)
MONOCYTES # BLD AUTO: 0.34 10*3/MM3 (ref 0–0.9)
MONOCYTES NFR BLD AUTO: 6.2 % (ref 0–12)
NEUTROPHILS # BLD AUTO: 3.81 10*3/MM3 (ref 2–6.9)
NEUTROPHILS NFR BLD AUTO: 69.9 % (ref 37–80)
NRBC BLD MANUAL-RTO: 0 /100 WBC (ref 0–0)
PLATELET # BLD AUTO: 295 10*3/MM3 (ref 130–400)
PMV BLD AUTO: 8.7 FL (ref 6–12)
RBC # BLD AUTO: 3.92 10*6/MM3 (ref 4.2–5.4)
SMALL PLATELETS BLD QL SMEAR: ADEQUATE
TIBC SERPL-MCNC: 238 MCG/DL (ref 261–497)
WBC MORPH BLD: NORMAL
WBC NRBC COR # BLD: 5.45 10*3/MM3 (ref 4.8–10.8)

## 2018-05-29 PROCEDURE — 83550 IRON BINDING TEST: CPT

## 2018-05-29 PROCEDURE — 85025 COMPLETE CBC W/AUTO DIFF WBC: CPT

## 2018-05-29 PROCEDURE — 85007 BL SMEAR W/DIFF WBC COUNT: CPT

## 2018-05-29 PROCEDURE — 36415 COLL VENOUS BLD VENIPUNCTURE: CPT

## 2018-05-29 PROCEDURE — 82728 ASSAY OF FERRITIN: CPT

## 2018-05-29 PROCEDURE — 83540 ASSAY OF IRON: CPT

## 2018-05-30 ENCOUNTER — OFFICE VISIT (OUTPATIENT)
Dept: ONCOLOGY | Facility: CLINIC | Age: 68
End: 2018-05-30

## 2018-05-30 VITALS
BODY MASS INDEX: 23.86 KG/M2 | WEIGHT: 152 LBS | TEMPERATURE: 97.8 F | DIASTOLIC BLOOD PRESSURE: 64 MMHG | HEIGHT: 67 IN | HEART RATE: 77 BPM | SYSTOLIC BLOOD PRESSURE: 135 MMHG | RESPIRATION RATE: 21 BRPM

## 2018-05-30 DIAGNOSIS — D50.0 IRON DEFICIENCY ANEMIA DUE TO CHRONIC BLOOD LOSS: Primary | ICD-10-CM

## 2018-05-30 DIAGNOSIS — K90.9 IRON MALABSORPTION: ICD-10-CM

## 2018-05-30 PROCEDURE — 99214 OFFICE O/P EST MOD 30 MIN: CPT | Performed by: NURSE PRACTITIONER

## 2018-05-30 NOTE — PROGRESS NOTES
DATE OF VISIT: 5/30/2018    REASON FOR VISIT: Followup for iron deficiency anemia.      HISTORY OF PRESENT ILLNESS: The patient is a very pleasant 67 y.o. female  with past medical history significant for iron deficiency anemia diagnosed 4/6/2018. She presented with shortness of breath and chest pain to [facility] emergency department. She was found to be profoundly anemic with hemoglobin of 5. She received two units PRBC. She had evidence of iron deficiency and received IV iron during her hospitalization. She had EGD completed as an inpatient that showed acute gastritis. She also had CT chest that showed descending aortic aneurysm as well as right upper lobe 8 mm lung nodule. The patient is here today for scheduled follow up visit with IV Feraheme.      SUBJECTIVE: The patient is doing fairly well. She has continued to  improve since being discharged from the hospital. Her fatigue has improved. She continues to tire easily. Denies headaches, frequent illness, and night sweats.  She does bruise easily but is on blood thinner     PAST MEDICAL HISTORY/SOCIAL HISTORY/FAMILY HISTORY: Reviewed by me and unchanged from my documentation done on 05/30/18.    Review of Systems   Constitutional: Positive for fatigue. Negative for activity change, appetite change, chills, fever and unexpected weight change.   HENT: Negative for hearing loss, mouth sores, nosebleeds, sore throat and trouble swallowing.    Eyes: Negative for visual disturbance.   Respiratory: Negative for chest tightness and wheezing.    Cardiovascular: Negative for chest pain, palpitations and leg swelling.   Gastrointestinal: Negative for abdominal distention, abdominal pain, blood in stool, constipation, diarrhea, nausea, rectal pain and vomiting.   Endocrine: Negative for cold intolerance and heat intolerance.   Genitourinary: Negative for difficulty urinating, dysuria, frequency and urgency.   Musculoskeletal: Negative for arthralgias, back pain, gait  problem, joint swelling and myalgias.   Skin: Negative for rash.   Neurological: Negative for dizziness, tremors, syncope, weakness, light-headedness, numbness and headaches.   Hematological: Negative for adenopathy. Does not bruise/bleed easily.   Psychiatric/Behavioral: Negative for confusion, sleep disturbance and suicidal ideas. The patient is not nervous/anxious.          Current Outpatient Prescriptions:   •  albuterol (PROVENTIL HFA;VENTOLIN HFA) 108 (90 Base) MCG/ACT inhaler, Inhale 2 puffs Every 4 (Four) Hours As Needed for Wheezing., Disp: 1 inhaler, Rfl: 0  •  aspirin 81 MG tablet, Take 81 mg by mouth Daily., Disp: , Rfl:   •  atorvastatin (LIPITOR) 80 MG tablet, Take 1 tablet by mouth every night at bedtime., Disp: 90 tablet, Rfl: 3  •  betamethasone, augmented, (DIPROLENE) 0.05 % lotion, Apply  topically Daily., Disp: 60 mL, Rfl: 3  •  betamethasone, augmented, (DIPROLENE) 0.05 % ointment, Apply  topically Daily., Disp: 45 g, Rfl: 1  •  clopidogrel (PLAVIX) 75 MG tablet, Take 75 mg by mouth Daily., Disp: , Rfl:   •  ferrous sulfate 325 (65 FE) MG tablet, Take 1 tablet by mouth Daily With Breakfast., Disp: 30 tablet, Rfl: 2  •  folic acid (FOLVITE) 1 MG tablet, Take 1 tablet by mouth Daily., Disp: 90 tablet, Rfl: 3  •  gabapentin (NEURONTIN) 300 MG capsule, Take 300 mg by mouth 3 (Three) Times a Day., Disp: , Rfl:   •  lisinopril (PRINIVIL,ZESTRIL) 40 MG tablet, Take 1 tablet by mouth Daily., Disp: 90 tablet, Rfl: 3  •  meclizine 25 MG chewable tablet chewable tablet, Chew 25 mg Every 6 (Six) Hours As Needed., Disp: , Rfl:   •  pantoprazole (PROTONIX) 40 MG EC tablet, Take 1 tablet by mouth Daily., Disp: 30 tablet, Rfl: 2  •  traZODone (DESYREL) 50 MG tablet, Take 1-2 tablets at bedtime as needed for sleep., Disp: 180 tablet, Rfl: 3  •  venlafaxine XR (EFFEXOR-XR) 150 MG 24 hr capsule, Take 1 capsule by mouth Daily., Disp: 90 capsule, Rfl: 3    PHYSICAL EXAMINATION:   There were no vitals taken for this  visit.   ECOG Performance Status: 1 - Symptomatic but completely ambulatory  General Appearance:  alert, cooperative, no apparent distress and appears stated age   Neurologic/Psychiatric: A&O x 3, gait steady, appropriate affect, strength 5/5 in all muscle groups   HEENT:  Normocephalic, without obvious abnormality, mucous membranes moist   Neck: Supple, symmetrical, trachea midline, no adenopathy;  No thyromegaly, masses, or tenderness   Lungs:   Clear to auscultation bilaterally; respirations regular, even, and unlabored bilaterally   Heart:  Regular rate and rhythm, no murmurs appreciated   Abdomen:   Soft, non-tender, non-distended and no organomegaly   Lymph nodes: No cervical, supraclavicular, inguinal or axillary adenopathy noted   Extremities: Normal, atraumatic; no clubbing, cyanosis, or edema    Skin: No rashes, ulcers, or suspicious lesions noted     Lab on 05/29/2018   Component Date Value Ref Range Status   • Ferritin 05/29/2018 360.00* 11.10 - 264.00 ng/mL Final   • Iron 05/29/2018 89  37 - 181 mcg/dL Final   • TIBC 05/29/2018 238* 261 - 497 mcg/dL Final   • Iron Saturation 05/29/2018 37  11 - 46 % Final   • WBC 05/29/2018 5.45  4.80 - 10.80 10*3/mm3 Final   • RBC 05/29/2018 3.92* 4.20 - 5.40 10*6/mm3 Final   • Hemoglobin 05/29/2018 11.7* 12.0 - 16.0 g/dL Final   • Hematocrit 05/29/2018 37.0  37.0 - 47.0 % Final   • MCV 05/29/2018 94.4  81.0 - 99.0 fL Final   • MCH 05/29/2018 29.8  27.0 - 31.0 pg Final   • MCHC 05/29/2018 31.6  30.0 - 37.0 g/dL Final   • RDW 05/29/2018 20.5* 11.5 - 14.5 % Final   • RDW-SD 05/29/2018 70.9* 37.0 - 54.0 fl Final   • MPV 05/29/2018 8.7  6.0 - 12.0 fL Final   • Platelets 05/29/2018 295  130 - 400 10*3/mm3 Final   • Neutrophil % 05/29/2018 69.9  37.0 - 80.0 % Final   • Lymphocyte % 05/29/2018 19.6  10.0 - 50.0 % Final   • Monocyte % 05/29/2018 6.2  0.0 - 12.0 % Final   • Eosinophil % 05/29/2018 3.7  0.0 - 7.0 % Final   • Basophil % 05/29/2018 0.4  0.0 - 2.5 % Final   •  Immature Grans % 05/29/2018 0.2  0.0 - 0.6 % Final   • Neutrophils, Absolute 05/29/2018 3.81  2.00 - 6.90 10*3/mm3 Final   • Lymphocytes, Absolute 05/29/2018 1.07  0.60 - 3.40 10*3/mm3 Final   • Monocytes, Absolute 05/29/2018 0.34  0.00 - 0.90 10*3/mm3 Final   • Eosinophils, Absolute 05/29/2018 0.20  0.00 - 0.70 10*3/mm3 Final   • Basophils, Absolute 05/29/2018 0.02  0.00 - 0.20 10*3/mm3 Final   • Immature Grans, Absolute 05/29/2018 0.01  0.00 - 0.06 10*3/mm3 Final   • nRBC 05/29/2018 0.0  0.0 - 0.0 /100 WBC Final   • Anisocytosis 05/29/2018 Slight/1+  None Seen Final   • WBC Morphology 05/29/2018 Normal  Normal Final   • Platelet Estimate 05/29/2018 Adequate  Normal Final        Ct Chest Without Contrast    Result Date: 5/7/2018  Narrative: EXAMINATION: CT CHEST WO CONTRAST - 05/04/2018  INDICATION: R91.1-Solitary pulmonary nodule.  TECHNIQUE: CT chest without intravenous contrast administration.  The radiation dose reduction device was turned on for each scan per the ALARA (As Low as Reasonably Achievable) protocol.  COMPARISON: CT dated 04/06/2018.  FINDINGS: Thyroid is unremarkable. No bulky mediastinal adenopathy. Left subclavian stent noted with atherosclerotic and irregular descending thoracic aorta again noted. This is better evaluated on recent contrast-enhanced examination. Cardiac size within normal limits and without pericardial effusion. Central airways are patent. Esophagus in normal course and caliber. Extended lung windows demonstrate moderate to severe upper lobe predominant centrilobular emphysema with stable 8 mm noncalcified pulmonary nodule medial portion right upper lobe. No new dominant nodule or parenchymal disease. Stable area of nodular scarring left upper lobe lingular segment along with calcified granuloma adjacent to the site. No significant pleural effusion. Generative changes of the spine without aggressive osseous or soft tissue body wall lesions of concern. Visualized portions of the  upper abdomen demonstrate prior cholecystectomy.      Impression: 1. Stable 8 mm noncalcified pulmonary nodule right upper lobe. Recommend short-interval follow-up within 3-6 months at minimum. 2. Irregular atherosclerotic thoracic aorta with outpouchings and ulcerations better evaluated on recent contrast enhanced examination.  DICTATED:     05/04/2018 EDITED/ls :     05/04/2018  This report was finalized on 5/7/2018 12:27 PM by Dr. Con Dior.      Mammo Screening Digital Tomosynthesis Bilateral With Cad    Result Date: 5/1/2018  Narrative: MAMMO SCREENING DIGITAL TOMOSYNTHESIS BILATERAL WITH CAD-  CLINICAL INDICATION: Routine screening.       TECHNIQUE: Bilateral CC and MLO views were obtained. The study was read with the assistance of CAD.  Tomosynthesis was performed.  COMPARISON: 10/09/2014  DENSITY:  There are scattered areas of fibroglandular density  FINDINGS:  There are no spiculated masses, areas of distortion or suspicious calcifications. Benign bilateral breast calcifications are noted.      Impression: No mammographic evidence of malignancy   BI-RADS CATEGORY: 2 , BENIGN FINDING(S).    RECOMMENDED FOLLOW-UP: . Annual screening mammography.    NOTES: Mammography does not detect approximately 10-15% of breast cancers. Physical examination of the breasts by a physician and regular monthly breast self examinations are integral parts of breast cancer screening.   A normal mammogram does not exclude breast cancer if there is an abnormal finding on physical examination. When clinically indicated, a biopsy should not be postponed because of a normal mammogram report.   Please allow this report to serve as a order for additional imaging follow-up  [place]. [id number]  NOTE: If a biopsy is performed on this patient, a copy of the pathology report would be appreciated.  This report was finalized on 5/1/2018 8:28 AM by Teddy Damico MD.      ASSESSMENT: The  patient is a very pleasant 67 y.o. female  with iron deficiency anemia.     PROBLEM LIST:  1. Iron deficiency anemia  A. Likely related to chronic blood loss.   B. Component of chronic kidney disease.   C. Status post IV iron as inpatient with allergic reaction.   D. Status post EGD done inpatient that showed mild gastritis.   2.  Chronic kidney disease: Stage III  3. Right upper lobe 8 mm lung nodule.   4. Thoracic descending aortic aneurysm.   5. Peripheral vascular disease with recent angina.   6. HTN    PLAN:  1. I reviewed with the patient and her family that her ferritin level was stable. She is slightly anemic and for now we will continue to follow closely. If ferritin level is below 30 we will plan for feraheme.    2. I reviewed potential side effects of this medication with the patient including nausea, vomiting, constipation, muscle pain, and allergic reaction.   3. The patient saw Dr. Hussein 4/20/2018 for consult regarding colonoscopy scheduling. Colonoscopy showed diverticulosis with no bleeding. Follow up in 5 years.   4. The patient will follow up with Dr. Zapata in August with repeat CAT scan of chest to evaluate right upper lobe lung nodule.   5. She will continue cardiology follow up with Dr. Gupta secondary to history of PVD with prior subclavian and carotid stent as well as femoral bypass. She will continue current aspirin and plavix therapy.   6. We will see the patient back in 3 months with repeat labs including CBC and iron studies.   7.  If she has hemoglobin less than 8 we will consider blood transfusion is needed.   8. I will continue lisinopril for HTN.     Janette DEUTSCH  5/30/2018

## 2018-06-01 ENCOUNTER — OFFICE VISIT (OUTPATIENT)
Dept: INTERNAL MEDICINE | Facility: CLINIC | Age: 68
End: 2018-06-01

## 2018-06-01 VITALS
TEMPERATURE: 97.6 F | HEIGHT: 67 IN | SYSTOLIC BLOOD PRESSURE: 104 MMHG | OXYGEN SATURATION: 96 % | WEIGHT: 151.13 LBS | BODY MASS INDEX: 23.72 KG/M2 | HEART RATE: 70 BPM | DIASTOLIC BLOOD PRESSURE: 50 MMHG

## 2018-06-01 DIAGNOSIS — E89.2 S/P PARATHYROIDECTOMY (HCC): ICD-10-CM

## 2018-06-01 DIAGNOSIS — F41.1 GENERALIZED ANXIETY DISORDER: ICD-10-CM

## 2018-06-01 DIAGNOSIS — R74.8 ELEVATED ALKALINE PHOSPHATASE LEVEL: ICD-10-CM

## 2018-06-01 DIAGNOSIS — E55.9 VITAMIN D DEFICIENCY: ICD-10-CM

## 2018-06-01 DIAGNOSIS — R23.2 HOT FLASHES: ICD-10-CM

## 2018-06-01 DIAGNOSIS — N95.9 MENOPAUSAL DISORDER: ICD-10-CM

## 2018-06-01 DIAGNOSIS — I10 ESSENTIAL HYPERTENSION: Primary | ICD-10-CM

## 2018-06-01 PROCEDURE — 99214 OFFICE O/P EST MOD 30 MIN: CPT | Performed by: FAMILY MEDICINE

## 2018-06-01 RX ORDER — VENLAFAXINE HYDROCHLORIDE 75 MG/1
75 CAPSULE, EXTENDED RELEASE ORAL DAILY
Qty: 10 CAPSULE | Refills: 0 | Status: SHIPPED | OUTPATIENT
Start: 2018-06-01 | End: 2018-06-21 | Stop reason: DRUGHIGH

## 2018-06-01 RX ORDER — VENLAFAXINE HYDROCHLORIDE 225 MG/1
225 TABLET, EXTENDED RELEASE ORAL
Qty: 90 TABLET | Refills: 1 | Status: SHIPPED | OUTPATIENT
Start: 2018-06-01 | End: 2022-07-07

## 2018-06-01 RX ORDER — LISINOPRIL 40 MG/1
20 TABLET ORAL DAILY
Start: 2018-06-01 | End: 2019-04-16 | Stop reason: SDUPTHER

## 2018-06-01 RX ORDER — HYDROCODONE BITARTRATE AND ACETAMINOPHEN 7.5; 325 MG/1; MG/1
1 TABLET ORAL DAILY PRN
Qty: 15 TABLET | Refills: 0 | Status: SHIPPED | OUTPATIENT
Start: 2018-06-01

## 2018-06-01 NOTE — PROGRESS NOTES
SUBJECTIVE: Kailee Almanza is a 67 y.o. female seen for a follow up visit;       Hypertension  Patient is here for follow-up of elevated blood pressure. She is not exercising and is adherent to a low-salt diet. She does check blood pressure at home, and it  is well controlled at home. Cardiac symptoms: none. Patient denies chest pain, chest pressure/discomfort and exertional chest pressure/discomfort. Cardiovascular risk factors: advanced age (older than 55 for men, 65 for women), dyslipidemia and smoking/ tobacco exposure. Use of agents associated with hypertension: none. History of target organ damage: none.        The following portions of the patient's history were reviewed and updated as appropriate: She  has a past medical history of Anemia; Carotid stenosis; Cerebrovascular disease (7/21/2016); Coronary artery disease; Frequent UTI; Hyperlipidemia (7/21/2016); Hypertension (7/21/2016); and TIA (transient ischemic attack).  She has Peripheral vascular disease; Essential hypertension; Hyperlipidemia; Chronic bilateral low back pain without sciatica; Centrilobular emphysema; Aneurysm of thoracic aorta; Lung nodule; Iron deficiency anemia due to chronic blood loss; Iron malabsorption; and Psychophysiological insomnia on her pertinent problem list.  She  has a past surgical history that includes Cholecystectomy; Appendectomy; Aorta - femoral artery bypass graft (Bilateral, 1996); Femoral artery - femoral artery bypass graft (2008); Subclavian artery stent (Left); Carotid endarterectomy; Carotid stent; Refractive surgery; Eye surgery; pr explore parathyroid glands (Bilateral, 2/1/2017); Thromboendarterectomy (Left); Esophagogastroduodenoscopy (N/A, 4/7/2018); Hysterectomy; Salivary gland surgery (Left); Breast biopsy (Bilateral); and Cystocele repair.  Her family history includes Alzheimer's disease in her mother; Colon cancer in her other; Heart attack in her father; Heart disease in her father, mother, and  "paternal grandfather; No Known Problems in her brother.  She  reports that she quit smoking about 12 years ago. Her smoking use included Cigarettes. She has a 35.00 pack-year smoking history. She has never used smokeless tobacco. She reports that she drinks alcohol. She reports that she does not use drugs.  She has a current medication list which includes the following prescription(s): albuterol, aspirin, atorvastatin, betamethasone (augmented), betamethasone (augmented), clopidogrel, ferrous sulfate, folic acid, lisinopril, meclizine, trazodone, hydrocodone-acetaminophen, venlafaxine, and venlafaxine xr..    Review of Systems   Constitutional: Positive for fatigue.   Respiratory: Negative.    Cardiovascular: Negative.    Musculoskeletal: Positive for arthralgias.   Neurological: Positive for light-headedness.   Psychiatric/Behavioral: Negative.          OBJECTIVE:  /50   Pulse 70   Temp 97.6 °F (36.4 °C)   Ht 170.2 cm (67\")   Wt 68.5 kg (151 lb 2 oz)   SpO2 96%   BMI 23.67 kg/m²      Physical Exam   Constitutional: She appears well-developed and well-nourished. No distress.           ASSESSMENT:   Diagnosis Plan   1. Essential hypertension  lisinopril (PRINIVIL,ZESTRIL) 40 MG tablet   2. Hot flashes  venlafaxine 225 MG tablet sustained-release 24 hour 24 hr tablet   3. Generalized anxiety disorder  venlafaxine 225 MG tablet sustained-release 24 hour 24 hr tablet   4. S/P parathyroidectomy 2/1/17  DEXA Bone Density Axial   5. Menopausal disorder  DEXA Bone Density Axial   6. Elevated alkaline phosphatase level  DEXA Bone Density Axial    Comprehensive Metabolic Panel    Vitamin D 25 Hydroxy    Alkaline Phosphatase, Isoenzymes   7. Vitamin D deficiency   Vitamin D 25 Hydroxy       Medications Discontinued During This Encounter   Medication Reason   • lisinopril (PRINIVIL,ZESTRIL) 40 MG tablet Reorder   • Venlafaxine HCl (EFFEXOR XR PO)    • venlafaxine XR (EFFEXOR-XR) 150 MG 24 hr capsule        Will cut " lisinopril in half - 20 mg, pt to call if BP running to high and will send in 30 mg lisinopril.        I, Machelle Atkinson MD, hereby attest that the medical record entry above accurately reflects signatures/notations that I made in my capacity as Machelle Atkinson MD when I treated and diagnosed the above patient.  I do hereby attest that his information is true, accurate, and complete to the best of my knowledge and I understand that any falsification, omission, or concealment of material fact may subject me to administrative, civil, or criminal liability.

## 2018-06-04 ENCOUNTER — APPOINTMENT (OUTPATIENT)
Dept: BONE DENSITY | Facility: HOSPITAL | Age: 68
End: 2018-06-04

## 2018-06-04 DIAGNOSIS — N95.9 MENOPAUSAL DISORDER: ICD-10-CM

## 2018-06-04 DIAGNOSIS — R74.8 ELEVATED ALKALINE PHOSPHATASE LEVEL: ICD-10-CM

## 2018-06-04 DIAGNOSIS — E89.2 S/P PARATHYROIDECTOMY (HCC): ICD-10-CM

## 2018-06-04 LAB
25(OH)D3+25(OH)D2 SERPL-MCNC: 28.6 NG/ML
ALBUMIN SERPL-MCNC: 4.4 G/DL (ref 3.5–5)
ALBUMIN/GLOB SERPL: 1.4 G/DL (ref 1–2)
ALP BONE CFR SERPL: 25 % (ref 14–68)
ALP INTEST CFR SERPL: 1 % (ref 0–18)
ALP LIVER CFR SERPL: 74 % (ref 18–85)
ALP SERPL-CCNC: 159 U/L (ref 38–126)
ALT SERPL-CCNC: 24 U/L (ref 13–69)
AST SERPL-CCNC: 24 U/L (ref 15–46)
BILIRUB SERPL-MCNC: 0.5 MG/DL (ref 0.2–1.3)
BUN SERPL-MCNC: 13 MG/DL (ref 7–20)
BUN/CREAT SERPL: 13 (ref 7.1–23.5)
CALCIUM SERPL-MCNC: 9.5 MG/DL (ref 8.4–10.2)
CHLORIDE SERPL-SCNC: 103 MMOL/L (ref 98–107)
CO2 SERPL-SCNC: 22 MMOL/L (ref 26–30)
CREAT SERPL-MCNC: 1 MG/DL (ref 0.6–1.3)
GFR SERPLBLD CREATININE-BSD FMLA CKD-EPI: 55 ML/MIN/1.73
GFR SERPLBLD CREATININE-BSD FMLA CKD-EPI: 67 ML/MIN/1.73
GLOBULIN SER CALC-MCNC: 3.1 GM/DL
GLUCOSE SERPL-MCNC: 101 MG/DL (ref 74–98)
POTASSIUM SERPL-SCNC: 4.9 MMOL/L (ref 3.5–5.1)
PROT SERPL-MCNC: 7.5 G/DL (ref 6.3–8.2)
SODIUM SERPL-SCNC: 142 MMOL/L (ref 137–145)

## 2018-06-04 PROCEDURE — 77080 DXA BONE DENSITY AXIAL: CPT

## 2018-06-05 ENCOUNTER — PATIENT MESSAGE (OUTPATIENT)
Dept: INTERNAL MEDICINE | Facility: CLINIC | Age: 68
End: 2018-06-05

## 2018-06-06 ENCOUNTER — PATIENT MESSAGE (OUTPATIENT)
Dept: INTERNAL MEDICINE | Facility: CLINIC | Age: 68
End: 2018-06-06

## 2018-06-13 NOTE — TELEPHONE ENCOUNTER
Georgina Christianson MA 6/8/2018 9:15 AM EDT        ----- Message -----  From: Kailee Almanza  Sent: 6/7/2018 7:49 PM  To: [facility]  Clinical [place]  Subject: RE: Test Results Question     Thank you.    ----- Message -----  From: Machelle Atkinson MD  Sent: 6/7/2018 7:34 PM EDT  To: Kailee Almanza  Subject: RE: Test Results Question  Your dexa scan shows thinning bones, but not osteoporosis and not bad enough to need a medication other than vitamin D replacement. We will need to recheck it in 2 years though.       ----- Message -----   From: Kailee Almanza   Sent: 6/5/2018 9:40 PM EDT   To: Machelle Atkinson MD  Subject: Test Results Question    Do you have the results of my bone density scan?

## 2018-06-13 NOTE — TELEPHONE ENCOUNTER
Georgina Christianson MA 6/8/2018 9:14 AM EDT        ----- Message -----  From: Kailee Almanza  Sent: 6/7/2018 7:48 PM  To: [facility]  Subject: RE: Non-Urgent Medical Question     I'm glad you will be staying in the [facility]. If you will have us we will follow you.    ----- Message -----  From: Machelle Atkinson MD  Sent: 6/7/2018 7:29 PM EDT  To: Kailee Almanza  Subject: RE: Non-Urgent Medical Question  Thank you so much, I am planning on continuing in the [facility], but I don't have finalized plans yet. I will let you know when I do, should know by July.       ----- Message -----   From: Kailee Almanza   Sent: 6/6/2018 5:17 PM EDT   To: Machelle Atkinson MD  Subject: Non-Urgent Medical Question    Oh my goodness, Doctor China Cuello and I received a letter saying you are leaving [facility]. We had finally found a doctor that we really liked. Where are you going? If it is within 30 miles of here, we will follow you.

## 2018-06-21 ENCOUNTER — OFFICE VISIT (OUTPATIENT)
Dept: INTERNAL MEDICINE | Facility: CLINIC | Age: 68
End: 2018-06-21

## 2018-06-21 ENCOUNTER — TELEPHONE (OUTPATIENT)
Dept: INTERNAL MEDICINE | Facility: CLINIC | Age: 68
End: 2018-06-21

## 2018-06-21 VITALS
WEIGHT: 150 LBS | OXYGEN SATURATION: 97 % | BODY MASS INDEX: 23.54 KG/M2 | SYSTOLIC BLOOD PRESSURE: 104 MMHG | HEIGHT: 67 IN | DIASTOLIC BLOOD PRESSURE: 62 MMHG | TEMPERATURE: 98.1 F | HEART RATE: 77 BPM

## 2018-06-21 DIAGNOSIS — R39.9 UTI SYMPTOMS: Primary | ICD-10-CM

## 2018-06-21 DIAGNOSIS — N30.00 ACUTE CYSTITIS WITHOUT HEMATURIA: ICD-10-CM

## 2018-06-21 LAB
BILIRUB BLD-MCNC: NEGATIVE MG/DL
CLARITY, POC: CLEAR
COLOR UR: YELLOW
GLUCOSE UR STRIP-MCNC: NEGATIVE MG/DL
KETONES UR QL: NEGATIVE
LEUKOCYTE EST, POC: ABNORMAL
NITRITE UR-MCNC: NEGATIVE MG/ML
PH UR: 5 [PH] (ref 5–8)
PROT UR STRIP-MCNC: NEGATIVE MG/DL
RBC # UR STRIP: NEGATIVE /UL
SP GR UR: 1.01 (ref 1–1.03)
UROBILINOGEN UR QL: NORMAL

## 2018-06-21 PROCEDURE — 99213 OFFICE O/P EST LOW 20 MIN: CPT | Performed by: FAMILY MEDICINE

## 2018-06-21 PROCEDURE — 81003 URINALYSIS AUTO W/O SCOPE: CPT | Performed by: FAMILY MEDICINE

## 2018-06-21 RX ORDER — PHENAZOPYRIDINE HYDROCHLORIDE 200 MG/1
200 TABLET, FILM COATED ORAL 3 TIMES DAILY PRN
Qty: 30 TABLET | Refills: 0 | Status: SHIPPED | OUTPATIENT
Start: 2018-06-21 | End: 2018-08-29 | Stop reason: HOSPADM

## 2018-06-21 RX ORDER — CEFDINIR 300 MG/1
300 CAPSULE ORAL 2 TIMES DAILY
Qty: 20 CAPSULE | Refills: 0 | Status: SHIPPED | OUTPATIENT
Start: 2018-06-21 | End: 2018-08-29 | Stop reason: HOSPADM

## 2018-06-24 LAB
BACTERIA UR CULT: NORMAL
BACTERIA UR CULT: NORMAL

## 2018-07-02 ENCOUNTER — OFFICE VISIT (OUTPATIENT)
Dept: CARDIAC SURGERY | Facility: CLINIC | Age: 68
End: 2018-07-02

## 2018-07-02 VITALS
BODY MASS INDEX: 23.45 KG/M2 | TEMPERATURE: 97.7 F | SYSTOLIC BLOOD PRESSURE: 99 MMHG | HEIGHT: 67 IN | DIASTOLIC BLOOD PRESSURE: 51 MMHG | OXYGEN SATURATION: 97 % | WEIGHT: 149.4 LBS | HEART RATE: 100 BPM

## 2018-07-02 DIAGNOSIS — I71.20 THORACIC AORTIC ANEURYSM WITHOUT RUPTURE (HCC): Primary | ICD-10-CM

## 2018-07-02 DIAGNOSIS — R91.1 LUNG NODULE: ICD-10-CM

## 2018-07-02 PROCEDURE — 99213 OFFICE O/P EST LOW 20 MIN: CPT | Performed by: PHYSICIAN ASSISTANT

## 2018-07-02 NOTE — PROGRESS NOTES
07/02/2018  Patient Information  Kailee Almanza                                                                                          [address] [place] [id number]   1950  'PCP/Referring Physician'  Machelle Atkinson MD  [phone]  No ref. provider found    Chief Complaint   Patient presents with   • Pseudoaneurysm     Hospital follow-up. 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  Patient was last seen in office on 7/10/17 and denies any recent shortness of breath, chest pain, abdominal pain, back pain, hemoptysis or weight loss.  The patient has been followed for thoracic aortic aneurysm, but she presented to emergency department in April 2018 where she was found to have lung nodule.  Her most recent CT scan performed on 5/4/18 shows stable pulmonary nodule and unchanged thoracic aortic aneurysm.  She is followed by Dr. Weathers, with hematology/oncology and will see him again in August 2018.  She is also scheduled to have follow-up CT scan of the chest in early August.  Patient is otherwise doing well.    Patient Active Problem List   Diagnosis   • Peripheral vascular disease   • Essential hypertension   • Cerebrovascular disease   • Hyperlipidemia   • Chronic bilateral low back pain without sciatica   • Centrilobular emphysema   • S/P parathyroidectomy 2/1/17   • Aneurysm of thoracic aorta   • Lung nodule   • Iron deficiency anemia due to chronic blood loss   • Iron malabsorption   • Psychophysiological insomnia   • Carotid stenosis   • Insomnia due to medical condition     Past Medical History:   Diagnosis Date   • Anemia     Due to low folic acid   • Carotid stenosis    • Cerebrovascular disease 7/21/2016    Amaurosis fugax, OD.  Stent 70% LJ  stenosis, April 2014:  Questionable recurrent symptoms “early” following stent, treated with reinstitution Plavix.   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  High-degree left subclavian artery stenosis, treated with stenting, April 2014.      • Coronary artery disease    • Frequent UTI    • Hyperlipidemia 7/21/2016   • Hypertension 7/21/2016   • TIA (transient ischemic attack)     h/o      Past Surgical History:   Procedure Laterality Date   • AORTA - FEMORAL ARTERY BYPASS GRAFT Bilateral 1996   • APPENDECTOMY     • BREAST BIOPSY Bilateral    • CAROTID ENDARTERECTOMY      Right. 2010   • CAROTID STENT      Right common carotid artery, 2015   • CHOLECYSTECTOMY     • CYSTOCELE REPAIR     • ENDOSCOPY N/A 4/7/2018    Procedure: ESOPHAGOGASTRODUODENOSCOPY;  Surgeon: Alfonzo Cox MD;  Location: [facility] ENDOSCOPY;  Service: Gastroenterology   • EYE SURGERY      lasik   • FEMORAL ARTERY - FEMORAL ARTERY BYPASS GRAFT  2008    left to right   • HYSTERECTOMY      bleeding, uterine cyst   • IA EXPLORE PARATHYROID GLANDS Bilateral 2/1/2017    Procedure: PARATHYROIDECTOMY;  Surgeon: Isaac CORONA MD;  Location: [facility] OR;  Service: ENT   • REFRACTIVE SURGERY     • SALIVARY GLAND SURGERY Left    • SUBCLAVIAN ARTERY STENT Left     7/15   • THROMBOENDARTERECTOMY Left     f Subclavian        Current Outpatient Prescriptions:   •  albuterol (PROVENTIL HFA;VENTOLIN HFA) 108 (90 Base) MCG/ACT inhaler, Inhale 2 puffs Every 4 (Four) Hours As Needed for Wheezing., Disp: 1 inhaler, Rfl: 0  •  aspirin 81 MG tablet, Take 81 mg by mouth Daily., Disp: , Rfl:   •  atorvastatin (LIPITOR) 80 MG tablet, Take 1 tablet by mouth every night at bedtime., Disp: 90 tablet, Rfl: 3  •  betamethasone, augmented, (DIPROLENE) 0.05 % lotion, Apply  topically Daily., Disp: 60 mL, Rfl: 3  •  betamethasone, augmented, (DIPROLENE) 0.05 % ointment, Apply  topically Daily., Disp: 45 g, Rfl: 1  •  clopidogrel (PLAVIX) 75 MG tablet, Take 75 mg by  mouth Daily., Disp: , Rfl:   •  ferrous sulfate 325 (65 FE) MG tablet, Take 1 tablet by mouth Daily With Breakfast., Disp: 30 tablet, Rfl: 2  •  folic acid (FOLVITE) 1 MG tablet, Take 1 tablet by mouth Daily., Disp: 90 tablet, Rfl: 3  •  HYDROcodone-acetaminophen (NORCO) 7.5-325 MG per tablet, Take 1 tablet by mouth Daily As Needed for Moderate Pain ., Disp: 15 tablet, Rfl: 0  •  lisinopril (PRINIVIL,ZESTRIL) 40 MG tablet, Take 0.5 tablets by mouth Daily., Disp: , Rfl:   •  meclizine 25 MG chewable tablet chewable tablet, Chew 25 mg Every 6 (Six) Hours As Needed., Disp: , Rfl:   •  traZODone (DESYREL) 50 MG tablet, Take 1-2 tablets at bedtime as needed for sleep., Disp: 180 tablet, Rfl: 3  •  venlafaxine 225 MG tablet sustained-release 24 hour 24 hr tablet, Take 1 tablet by mouth Daily With Breakfast., Disp: 90 tablet, Rfl: 1  •  cefdinir (OMNICEF) 300 MG capsule, Take 1 capsule by mouth 2 (Two) Times a Day., Disp: 20 capsule, Rfl: 0  •  phenazopyridine (PYRIDIUM) 200 MG tablet, Take 1 tablet by mouth 3 (Three) Times a Day As Needed for bladder spasms., Disp: 30 tablet, Rfl: 0  Allergies   Allergen Reactions   • Ferrlecit [Na Ferric Gluc Cplx In Sucrose] Diarrhea and Nausea And Vomiting   • Sulfa Antibiotics Rash     Last as a baby.   • Percodan [Oxycodone-Aspirin] Mental Status Change     Social History     Social History   • Marital status:      Spouse name: N/A   • Number of children: 3   • Years of education: N/A     Occupational History   • Disabled RN      Social History Main Topics   • Smoking status: Former Smoker     Packs/day: 1.00     Years: 35.00     Types: Cigarettes     Quit date: 2006   • Smokeless tobacco: Never Used   • Alcohol use Yes      Comment: occasional   • Drug use: No   • Sexual activity: Yes     Partners: Male     Other Topics Concern   • Not on file     Social History Narrative    First   when children young in an accident.  to 2nd  24 yrs     Family  "History   Problem Relation Age of Onset   • Alzheimer's disease Mother    • Heart disease Mother    • Heart attack Father    • Heart disease Father    • No Known Problems Brother    • Heart disease Paternal Grandfather    • Colon cancer Other         Possible     Review of Systems   Constitution: Negative for chills, fever, malaise/fatigue, night sweats and weight loss.   HENT: Negative for hearing loss, odynophagia and sore throat.    Cardiovascular: Positive for dyspnea on exertion and palpitations. Negative for leg swelling and orthopnea.   Respiratory: Negative for cough, hemoptysis and wheezing.    Endocrine: Negative for cold intolerance, heat intolerance, polydipsia, polyphagia and polyuria.   Hematologic/Lymphatic: Bruises/bleeds easily.   Skin: Negative for itching and rash.   Musculoskeletal: Positive for back pain. Negative for joint pain, joint swelling and myalgias.   Gastrointestinal: Negative for abdominal pain, constipation, diarrhea, hematemesis, hematochezia, melena, nausea and vomiting.   Genitourinary: Negative for dysuria, frequency and hematuria.   Neurological: Negative for focal weakness, headaches, numbness and seizures.   Psychiatric/Behavioral: Negative for depression and suicidal ideas. The patient is nervous/anxious.    All other systems reviewed and are negative.    Vitals:    07/02/18 1336   BP: 99/51   BP Location: Right arm   Patient Position: Sitting   Pulse: 100   Temp: 97.7 °F (36.5 °C)   SpO2: 97%   Weight: 67.8 kg (149 lb 6.4 oz)   Height: 170.2 cm (67\")      Physical Exam:  Gen - NAD, pleasant, cooperative  CV - Regular rate and rhythm, no murmur gallop or rub  Pulm - Lungs clear to auscultation without wheeze or rhonchi   GI - Soft, normoactive bowel sounds, non-tender  Ext - Without edema    Labs/Imaging:  I have reviewed the most recent CT scan.    Assessment/Plan:  Patient is a 67-year-old  female with history of known thoracic aortic aneurysm and lung nodule who " presents to office today for hospital follow-up and review of recent CT scan.  I reviewed the recent CT scans, which showed a thoracic aortic aneurysm is unchanged in caliber and the right upper lobe lung nodule, which was recently found in April 2018, is stable at 8 mm.  The patient was recently seen by Dr. Weathers, with hematology/oncology and will follow up again with him in August 2018 after CT scan of the chest is been performed.  The patient is currently on aspirin and states that she is actively trying to improve her diet.  I would like for the patient to follow-up with our office in mid August 2018, after she has seen Dr. Weathers, and her CT scan of chest is been performed.  If she develops any acutely worsening symptoms, she should present to the emergency department immediately.  She has any questions or concerns, she may call our office at any time.        Patient Active Problem List   Diagnosis   • Peripheral vascular disease   • Essential hypertension   • Cerebrovascular disease   • Hyperlipidemia   • Chronic bilateral low back pain without sciatica   • Centrilobular emphysema   • S/P parathyroidectomy 2/1/17   • Aneurysm of thoracic aorta   • Lung nodule   • Iron deficiency anemia due to chronic blood loss   • Iron malabsorption   • Psychophysiological insomnia   • Carotid stenosis   • Insomnia due to medical condition     Signed by:

## 2018-08-02 ENCOUNTER — HOSPITAL ENCOUNTER (OUTPATIENT)
Dept: CT IMAGING | Facility: HOSPITAL | Age: 68
Discharge: HOME | End: 2018-08-02
Attending: INTERNAL MEDICINE | Admitting: INTERNAL MEDICINE

## 2018-08-02 DIAGNOSIS — R91.1 LUNG NODULE: ICD-10-CM

## 2018-08-02 PROCEDURE — 71250 CT THORAX DX C-: CPT

## 2018-08-07 DIAGNOSIS — R91.1 LUNG NODULE: Primary | ICD-10-CM

## 2018-08-22 ENCOUNTER — LAB (OUTPATIENT)
Dept: LAB | Facility: HOSPITAL | Age: 68
End: 2018-08-22

## 2018-08-22 DIAGNOSIS — K90.9 IRON MALABSORPTION: ICD-10-CM

## 2018-08-22 DIAGNOSIS — D50.0 IRON DEFICIENCY ANEMIA DUE TO CHRONIC BLOOD LOSS: ICD-10-CM

## 2018-08-22 LAB
BASOPHILS # BLD AUTO: 0.03 10*3/MM3 (ref 0–0.2)
BASOPHILS NFR BLD AUTO: 0.4 % (ref 0–2.5)
DEPRECATED RDW RBC AUTO: 48.5 FL (ref 37–54)
EOSINOPHIL # BLD AUTO: 0.2 10*3/MM3 (ref 0–0.7)
EOSINOPHIL NFR BLD AUTO: 2.8 % (ref 0–7)
ERYTHROCYTE [DISTWIDTH] IN BLOOD BY AUTOMATED COUNT: 13.3 % (ref 11.5–14.5)
FERRITIN SERPL-MCNC: 110 NG/ML (ref 11.1–264)
HCT VFR BLD AUTO: 41.6 % (ref 37–47)
HGB BLD-MCNC: 13.2 G/DL (ref 12–16)
IMM GRANULOCYTES # BLD: 0.02 10*3/MM3 (ref 0–0.06)
IMM GRANULOCYTES NFR BLD: 0.3 % (ref 0–0.6)
IRON 24H UR-MRATE: 73 MCG/DL (ref 37–181)
IRON SATN MFR SERPL: 27 % (ref 11–46)
LYMPHOCYTES # BLD AUTO: 1.69 10*3/MM3 (ref 0.6–3.4)
LYMPHOCYTES NFR BLD AUTO: 23.6 % (ref 10–50)
MCH RBC QN AUTO: 31.4 PG (ref 27–31)
MCHC RBC AUTO-ENTMCNC: 31.7 G/DL (ref 30–37)
MCV RBC AUTO: 99 FL (ref 81–99)
MONOCYTES # BLD AUTO: 0.61 10*3/MM3 (ref 0–0.9)
MONOCYTES NFR BLD AUTO: 8.5 % (ref 0–12)
NEUTROPHILS # BLD AUTO: 4.62 10*3/MM3 (ref 2–6.9)
NEUTROPHILS NFR BLD AUTO: 64.4 % (ref 37–80)
NRBC BLD MANUAL-RTO: 0 /100 WBC (ref 0–0)
PLATELET # BLD AUTO: 283 10*3/MM3 (ref 130–400)
PMV BLD AUTO: 8.8 FL (ref 6–12)
RBC # BLD AUTO: 4.2 10*6/MM3 (ref 4.2–5.4)
TIBC SERPL-MCNC: 274 MCG/DL (ref 261–497)
WBC NRBC COR # BLD: 7.17 10*3/MM3 (ref 4.8–10.8)

## 2018-08-22 PROCEDURE — 36415 COLL VENOUS BLD VENIPUNCTURE: CPT

## 2018-08-22 PROCEDURE — 85025 COMPLETE CBC W/AUTO DIFF WBC: CPT

## 2018-08-22 PROCEDURE — 82728 ASSAY OF FERRITIN: CPT

## 2018-08-22 PROCEDURE — 83550 IRON BINDING TEST: CPT

## 2018-08-22 PROCEDURE — 83540 ASSAY OF IRON: CPT

## 2018-08-29 ENCOUNTER — OFFICE VISIT (OUTPATIENT)
Dept: ONCOLOGY | Facility: CLINIC | Age: 68
End: 2018-08-29

## 2018-08-29 VITALS
DIASTOLIC BLOOD PRESSURE: 69 MMHG | RESPIRATION RATE: 22 BRPM | WEIGHT: 147 LBS | HEIGHT: 67 IN | BODY MASS INDEX: 23.07 KG/M2 | SYSTOLIC BLOOD PRESSURE: 151 MMHG | TEMPERATURE: 97.9 F | HEART RATE: 88 BPM

## 2018-08-29 DIAGNOSIS — R91.1 LUNG NODULE: ICD-10-CM

## 2018-08-29 DIAGNOSIS — D50.0 IRON DEFICIENCY ANEMIA DUE TO CHRONIC BLOOD LOSS: Primary | ICD-10-CM

## 2018-08-29 PROCEDURE — 99214 OFFICE O/P EST MOD 30 MIN: CPT | Performed by: NURSE PRACTITIONER

## 2018-08-29 NOTE — PROGRESS NOTES
DATE OF VISIT: 8/29/2018    REASON FOR VISIT: Followup for iron deficiency anemia.      HISTORY OF PRESENT ILLNESS: The patient is a very pleasant 67 y.o. female with past medical history significant for iron deficiency anemia diagnosed 4/6/2018. She presented with shortness of breath and chest pain to [facility] emergency department. She was found to be profoundly anemic with hemoglobin of 5. She received two units PRBC. She had evidence of iron deficiency and received IV iron during her hospitalization. She had EGD completed as an inpatient that showed acute gastritis. She also had CT chest that showed descending aortic aneurysm as well as right upper lobe 8 mm lung nodule. The patient is here today for scheduled follow up visit.     SUBJECTIVE: The patient is doing fair.  Her fatigue has improved since last infusion. However, she continues to have fatigue and unable to do things she did a year ago. Denies headaches and frequent illness.  She does bruise easily but is on blood thinner.  She is complaining about night sweats.  She has developed a cough since last visit. Denies green or yellow mucus.     PAST MEDICAL HISTORY/SOCIAL HISTORY/FAMILY HISTORY: Reviewed by me and unchanged from my documentation done on 08/29/18.    Review of Systems   Constitutional: Positive for fatigue. Negative for activity change, appetite change, chills, fever and unexpected weight change.   HENT: Negative for hearing loss, mouth sores, nosebleeds, sore throat and trouble swallowing.    Eyes: Negative for visual disturbance.   Respiratory: Negative for chest tightness and wheezing.    Cardiovascular: Negative for chest pain, palpitations and leg swelling.   Gastrointestinal: Negative for abdominal distention, abdominal pain, blood in stool, constipation, diarrhea, nausea, rectal pain and vomiting.   Endocrine: Negative for cold intolerance and heat intolerance.   Genitourinary: Negative for difficulty urinating, dysuria,  frequency and urgency.   Musculoskeletal: Negative for arthralgias, back pain, gait problem, joint swelling and myalgias.   Skin: Negative for rash.   Neurological: Negative for dizziness, tremors, syncope, weakness, light-headedness, numbness and headaches.   Hematological: Negative for adenopathy. Does not bruise/bleed easily.   Psychiatric/Behavioral: Negative for confusion, sleep disturbance and suicidal ideas. The patient is not nervous/anxious.          Current Outpatient Prescriptions:   •  albuterol (PROVENTIL HFA;VENTOLIN HFA) 108 (90 Base) MCG/ACT inhaler, Inhale 2 puffs Every 4 (Four) Hours As Needed for Wheezing., Disp: 1 inhaler, Rfl: 0  •  aspirin 81 MG tablet, Take 81 mg by mouth Daily., Disp: , Rfl:   •  atorvastatin (LIPITOR) 80 MG tablet, Take 1 tablet by mouth every night at bedtime., Disp: 90 tablet, Rfl: 3  •  betamethasone, augmented, (DIPROLENE) 0.05 % lotion, Apply  topically Daily., Disp: 60 mL, Rfl: 3  •  betamethasone, augmented, (DIPROLENE) 0.05 % ointment, Apply  topically Daily., Disp: 45 g, Rfl: 1  •  clopidogrel (PLAVIX) 75 MG tablet, Take 75 mg by mouth Daily., Disp: , Rfl:   •  ferrous sulfate 325 (65 FE) MG tablet, Take 1 tablet by mouth Daily With Breakfast., Disp: 30 tablet, Rfl: 2  •  HYDROcodone-acetaminophen (NORCO) 7.5-325 MG per tablet, Take 1 tablet by mouth Daily As Needed for Moderate Pain ., Disp: 15 tablet, Rfl: 0  •  lisinopril (PRINIVIL,ZESTRIL) 40 MG tablet, Take 0.5 tablets by mouth Daily., Disp: , Rfl:   •  meclizine 25 MG chewable tablet chewable tablet, Chew 25 mg Every 6 (Six) Hours As Needed., Disp: , Rfl:   •  traZODone (DESYREL) 50 MG tablet, Take 1-2 tablets at bedtime as needed for sleep., Disp: 180 tablet, Rfl: 3  •  venlafaxine 225 MG tablet sustained-release 24 hour 24 hr tablet, Take 1 tablet by mouth Daily With Breakfast., Disp: 90 tablet, Rfl: 1    PHYSICAL EXAMINATION:   /69   Pulse 88   Temp 97.9 °F (36.6 °C) (Temporal Artery )   Resp 22    "Ht 170.2 cm (67\")   Wt 66.7 kg (147 lb)   BMI 23.02 kg/m²    ECOG Performance Status: 1 - Symptomatic but completely ambulatory  General Appearance:  alert, cooperative, no apparent distress and appears stated age   Neurologic/Psychiatric: A&O x 3, gait steady, appropriate affect, strength 5/5 in all muscle groups   HEENT:  Normocephalic, without obvious abnormality, mucous membranes moist   Neck: Supple, symmetrical, trachea midline, no adenopathy;  No thyromegaly, masses, or tenderness   Lungs:   Clear to auscultation bilaterally; respirations regular, even, and unlabored bilaterally   Heart:  Regular rate and rhythm, no murmurs appreciated   Abdomen:   Soft, non-tender, non-distended and no organomegaly   Lymph nodes: No cervical, supraclavicular, inguinal or axillary adenopathy noted   Extremities: Normal, atraumatic; no clubbing, cyanosis, or edema    Skin: No rashes, ulcers, or suspicious lesions noted     Lab on 08/22/2018   Component Date Value Ref Range Status   • Iron 08/22/2018 73  37 - 181 mcg/dL Final   • TIBC 08/22/2018 274  261 - 497 mcg/dL Final   • Iron Saturation 08/22/2018 27  11 - 46 % Final   • Ferritin 08/22/2018 110.00  11.10 - 264.00 ng/mL Final   • WBC 08/22/2018 7.17  4.80 - 10.80 10*3/mm3 Final   • RBC 08/22/2018 4.20  4.20 - 5.40 10*6/mm3 Final   • Hemoglobin 08/22/2018 13.2  12.0 - 16.0 g/dL Final   • Hematocrit 08/22/2018 41.6  37.0 - 47.0 % Final   • MCV 08/22/2018 99.0  81.0 - 99.0 fL Final   • MCH 08/22/2018 31.4* 27.0 - 31.0 pg Final   • MCHC 08/22/2018 31.7  30.0 - 37.0 g/dL Final   • RDW 08/22/2018 13.3  11.5 - 14.5 % Final   • RDW-SD 08/22/2018 48.5  37.0 - 54.0 fl Final   • MPV 08/22/2018 8.8  6.0 - 12.0 fL Final   • Platelets 08/22/2018 283  130 - 400 10*3/mm3 Final   • Neutrophil % 08/22/2018 64.4  37.0 - 80.0 % Final   • Lymphocyte % 08/22/2018 23.6  10.0 - 50.0 % Final   • Monocyte % 08/22/2018 8.5  0.0 - 12.0 % Final   • Eosinophil % 08/22/2018 2.8  0.0 - 7.0 % Final   • " Basophil % 08/22/2018 0.4  0.0 - 2.5 % Final   • Immature Grans % 08/22/2018 0.3  0.0 - 0.6 % Final   • Neutrophils, Absolute 08/22/2018 4.62  2.00 - 6.90 10*3/mm3 Final   • Lymphocytes, Absolute 08/22/2018 1.69  0.60 - 3.40 10*3/mm3 Final   • Monocytes, Absolute 08/22/2018 0.61  0.00 - 0.90 10*3/mm3 Final   • Eosinophils, Absolute 08/22/2018 0.20  0.00 - 0.70 10*3/mm3 Final   • Basophils, Absolute 08/22/2018 0.03  0.00 - 0.20 10*3/mm3 Final   • Immature Grans, Absolute 08/22/2018 0.02  0.00 - 0.06 10*3/mm3 Final   • nRBC 08/22/2018 0.0  0.0 - 0.0 /100 WBC Final        Ct Chest Without Contrast    Result Date: 8/2/2018  Narrative: EXAMINATION: CT CHEST WO CONTRAST- 08/02/2018  INDICATION: R91.1-Solitary pulmonary nodule  TECHNIQUE: Multiple axial CT imaging was obtained of the chest without the administration of intravenous contrast.  The radiation dose reduction device was turned on for each scan per the ALARA (As Low as Reasonably Achievable) protocol.  COMPARISON: 05/04/2018  FINDINGS: There is stable scarring identified within the right lung apex. Extensive emphysematous changes seen within the upper lung fields. Calcified granuloma identified within the left upper lobe. There is a nodule identified within the medial aspect of the right lung measuring 8 mm on today's examination and previously measured 8 mm. No significant change in size or appearance in the interval. Close interval follow-up is recommended. Consider PET CT scan for further evaluation if clinically indicated. The remainder of the lung bases are clear. Degenerative changes seen within the spine. Visualized portions of the upper abdomen are unremarkable. Patient status post grafting of the abdominal aorta.      Impression: Stable 8 mm noncalcified nodule seen within the right midlung anteriorly. PET CT scan can be performed for further evaluation if clinically indicated. Close interval follow-up is recommended. Scarring identified at the right  lung apex. Old healed granulomatous disease seen within the chest with underlying bullous emphysematous changes seen in the upper lung fields.  D:  08/02/2018 E:  08/02/2018   This report was finalized on 8/2/2018 1:37 PM by Dr. Tessa Beal MD.        ASSESSMENT: The patient is a very pleasant 67 y.o. female  with iron deficiency anemia.     PROBLEM LIST:  1. Iron deficiency anemia  A. Likely related to chronic blood loss.   B. Component of chronic kidney disease.   C. Status post IV iron as inpatient with allergic reaction.   D. Status post EGD done inpatient that showed mild gastritis.   2.  Chronic kidney disease: Stage III  3. Right upper lobe 8 mm lung nodule.   4. Thoracic descending aortic aneurysm.   5. Peripheral vascular disease with recent angina.   6. HTN  7. Hyperlipidemia  8. Insomnia    PLAN:  1. I reviewed with the patient and her family that her ferritin level was stable. W will continue to follow closely. If ferritin level is below 30 we will plan for injectafer.    2. I reviewed potential side effects of this medication with the patient including nausea, vomiting, constipation, muscle pain, and allergic reaction.   3. The patient will be due a colonoscopy in April 2023 with Dr. Hussein.Last Colonoscopy showed diverticulosis with no bleeding.    4. The patient followed up with Dr. Ventura in September.for evaluation of Right upper lobe lung nodule.   5. I reviewed with the patient that her CT scan showed a stable 8 mm noncalcified nodule seen within the right midlung anteriorly. PET CT scan can be performed for further evaluation if clinically indicated. Close interval follow-up is recommended. In light of the new and worsening cough and night sweats since last visit.  We will order a PET scan for further evaluation.   6. She will continue cardiology follow up with Dr. Gupta secondary to history of PVD with prior subclavian and carotid stent as well as femoral bypass. She will continue current  aspirin and plavix therapy.   7. We will see the patient back in 2 weeks if we can get PET approved.  If not we will plan for 3 months with repeat CT scan and repeat labs including CBC and iron studies.   8.  If she has hemoglobin less than 8 we will consider blood transfusion is needed.   9. I will continue lisinopril for HTN.   10. I will continue atorvastatin for hyperlipidemia.   11. I will continue trazodone for insomnia.     Janette Slade APRN  8/29/2018

## 2018-09-07 DIAGNOSIS — R91.1 LUNG NODULE: Primary | ICD-10-CM

## 2018-09-10 ENCOUNTER — HOSPITAL ENCOUNTER (OUTPATIENT)
Dept: PET IMAGING | Facility: HOSPITAL | Age: 68
End: 2018-09-10

## 2018-09-10 ENCOUNTER — APPOINTMENT (OUTPATIENT)
Dept: PET IMAGING | Facility: HOSPITAL | Age: 68
End: 2018-09-10

## 2018-11-05 ENCOUNTER — TELEPHONE (OUTPATIENT)
Dept: ONCOLOGY | Facility: CLINIC | Age: 68
End: 2018-11-05

## 2018-11-05 DIAGNOSIS — K90.9 IRON MALABSORPTION: ICD-10-CM

## 2018-11-05 DIAGNOSIS — D50.0 IRON DEFICIENCY ANEMIA DUE TO CHRONIC BLOOD LOSS: Primary | ICD-10-CM

## 2018-11-05 LAB
BASOPHILS # BLD AUTO: 0.02 10*3/MM3 (ref 0–0.2)
BASOPHILS NFR BLD AUTO: 0.3 % (ref 0–2.5)
EOSINOPHIL # BLD AUTO: 0.17 10*3/MM3 (ref 0–0.7)
EOSINOPHIL NFR BLD AUTO: 2.7 % (ref 0–7)
ERYTHROCYTE [DISTWIDTH] IN BLOOD BY AUTOMATED COUNT: 13.8 % (ref 11.5–14.5)
FERRITIN SERPL-MCNC: 140 NG/ML (ref 11.1–264)
FOLATE SERPL-MCNC: 4.18 NG/ML
HCT VFR BLD AUTO: 38.1 % (ref 37–47)
HGB BLD-MCNC: 12.1 G/DL (ref 12–16)
IMM GRANULOCYTES # BLD: 0.02 10*3/MM3 (ref 0–0.06)
IMM GRANULOCYTES NFR BLD: 0.3 % (ref 0–0.6)
IRON SATN MFR SERPL: 32 % (ref 11–46)
IRON SERPL-MCNC: 93 MCG/DL (ref 37–181)
LYMPHOCYTES # BLD AUTO: 0.99 10*3/MM3 (ref 0.6–3.4)
LYMPHOCYTES NFR BLD AUTO: 15.8 % (ref 10–50)
MCH RBC QN AUTO: 31.2 PG (ref 27–31)
MCHC RBC AUTO-ENTMCNC: 31.8 G/DL (ref 30–37)
MCV RBC AUTO: 98.2 FL (ref 81–99)
MONOCYTES # BLD AUTO: 0.38 10*3/MM3 (ref 0–0.9)
MONOCYTES NFR BLD AUTO: 6.1 % (ref 0–12)
NEUTROPHILS # BLD AUTO: 4.69 10*3/MM3 (ref 2–6.9)
NEUTROPHILS NFR BLD AUTO: 74.8 % (ref 37–80)
NRBC BLD AUTO-RTO: 0 /100 WBC (ref 0–0)
PLATELET # BLD AUTO: 296 10*3/MM3 (ref 130–400)
RBC # BLD AUTO: 3.88 10*6/MM3 (ref 4.2–5.4)
RETICS/RBC NFR AUTO: 1.76 % (ref 0.5–1.5)
TIBC SERPL-MCNC: 290 MCG/DL (ref 261–497)
UIBC SERPL-MCNC: 197 MCG/DL
VIT B12 SERPL-MCNC: 679 PG/ML (ref 239–931)
WBC # BLD AUTO: 6.27 10*3/MM3 (ref 4.8–10.8)

## 2018-11-05 NOTE — TELEPHONE ENCOUNTER
Received call from patient through triage nurse line. She c/o fatigue and weakness. Pt reports having a blood transfusion in April. She does not feel as poorly as she did at that time, but this is how she felt at the begenning of that incident, so she wanted to go ahead and have h&h checked. I asked if pt has had recent colonoscopy/ egd. She said they tested all of that and the only abnormality found was irritable bowel. Pt denies any dark tarry stools or blood in stool/ urine. She is scheduled to see Dr Weathers on the 28th.  Discussed with GET [facility].  Orders placed for cbc, ferritin, and iron profile. JEWELS Sheth will call with results and further instruction. Pt would like to have these drawn at [facility] lab. I will call to make sure they can see order. Lab orders faxed to [phone] with instructions to fax results to our office.

## 2018-11-05 NOTE — TELEPHONE ENCOUNTER
----- Message from Janeth Obando sent at 11/5/2018 12:23 PM EST -----  Regarding: MISSY/PATRICK - LAB ORDERS NOT RECEIVED  PT CALLED BECAUSE SHE IS AT THE [facility] LAB AND THEY STATED THEY DIDN'T RECEIVE THE ORDERS.  CONFIRMED THE FAX NUMBER YOU SENT THEM TO IS CORRECT.  PLEASE RESEND ORDERS.

## 2018-11-06 ENCOUNTER — TELEPHONE (OUTPATIENT)
Dept: ONCOLOGY | Facility: CLINIC | Age: 68
End: 2018-11-06

## 2018-11-06 NOTE — TELEPHONE ENCOUNTER
Pt informed of normal lab results. Sent copy to PCP as requested since patient is currently in her office

## 2018-11-08 ENCOUNTER — TRANSCRIBE ORDERS (OUTPATIENT)
Dept: CT IMAGING | Facility: HOSPITAL | Age: 68
End: 2018-11-08

## 2018-11-08 DIAGNOSIS — R10.813 RIGHT LOWER QUADRANT ABDOMINAL TENDERNESS, REBOUND TENDERNESS PRESENCE NOT SPECIFIED: ICD-10-CM

## 2018-11-08 DIAGNOSIS — R10.31 ABDOMINAL PAIN, RIGHT LOWER QUADRANT: Primary | ICD-10-CM

## 2018-11-14 ENCOUNTER — HOSPITAL ENCOUNTER (OUTPATIENT)
Dept: CT IMAGING | Facility: HOSPITAL | Age: 68
Discharge: HOME | End: 2018-11-14
Admitting: FAMILY MEDICINE

## 2018-11-14 DIAGNOSIS — R10.813 RIGHT LOWER QUADRANT ABDOMINAL TENDERNESS, REBOUND TENDERNESS PRESENCE NOT SPECIFIED: ICD-10-CM

## 2018-11-14 DIAGNOSIS — R10.31 ABDOMINAL PAIN, RIGHT LOWER QUADRANT: ICD-10-CM

## 2018-11-14 PROCEDURE — A9270 NON-COVERED ITEM OR SERVICE: HCPCS | Performed by: FAMILY MEDICINE

## 2018-11-14 PROCEDURE — 74176 CT ABD & PELVIS W/O CONTRAST: CPT

## 2018-11-14 PROCEDURE — 63710000001 BARIUM 2 % SUSPENSION: Performed by: FAMILY MEDICINE

## 2018-11-14 RX ADMIN — BARIUM SULFATE 450 ML: 21 SUSPENSION ORAL at 14:07

## 2018-11-26 ENCOUNTER — HOSPITAL ENCOUNTER (OUTPATIENT)
Dept: CT IMAGING | Facility: HOSPITAL | Age: 68
Discharge: HOME | End: 2018-11-26
Admitting: NURSE PRACTITIONER

## 2018-11-26 DIAGNOSIS — R91.1 LUNG NODULE: ICD-10-CM

## 2018-11-26 LAB — CREAT BLDA-MCNC: 1 MG/DL (ref 0.6–1.3)

## 2018-11-26 PROCEDURE — 71260 CT THORAX DX C+: CPT

## 2018-11-26 PROCEDURE — 25010000002 IOPAMIDOL 61 % SOLUTION: Performed by: NURSE PRACTITIONER

## 2018-11-26 PROCEDURE — 82565 ASSAY OF CREATININE: CPT

## 2018-11-26 RX ADMIN — IOPAMIDOL 95 ML: 612 INJECTION, SOLUTION INTRAVENOUS at 14:10

## 2018-11-27 LAB
BASOPHILS # BLD AUTO: 0.03 10*3/MM3 (ref 0–0.2)
BASOPHILS NFR BLD AUTO: 0.4 % (ref 0–2.5)
EOSINOPHIL # BLD AUTO: 0.21 10*3/MM3 (ref 0–0.7)
EOSINOPHIL NFR BLD AUTO: 2.6 % (ref 0–7)
ERYTHROCYTE [DISTWIDTH] IN BLOOD BY AUTOMATED COUNT: 13.4 % (ref 11.5–14.5)
FERRITIN SERPL-MCNC: 116 NG/ML (ref 11.1–264)
HCT VFR BLD AUTO: 38.2 % (ref 37–47)
HGB BLD-MCNC: 12 G/DL (ref 12–16)
IMM GRANULOCYTES # BLD: 0.02 10*3/MM3 (ref 0–0.06)
IMM GRANULOCYTES NFR BLD: 0.3 % (ref 0–0.6)
IRON SATN MFR SERPL: 28 % (ref 11–46)
IRON SERPL-MCNC: 67 MCG/DL (ref 37–181)
LYMPHOCYTES # BLD AUTO: 1.54 10*3/MM3 (ref 0.6–3.4)
LYMPHOCYTES NFR BLD AUTO: 19.3 % (ref 10–50)
MCH RBC QN AUTO: 31.2 PG (ref 27–31)
MCHC RBC AUTO-ENTMCNC: 31.4 G/DL (ref 30–37)
MCV RBC AUTO: 99.2 FL (ref 81–99)
MONOCYTES # BLD AUTO: 0.56 10*3/MM3 (ref 0–0.9)
MONOCYTES NFR BLD AUTO: 7 % (ref 0–12)
NEUTROPHILS # BLD AUTO: 5.64 10*3/MM3 (ref 2–6.9)
NEUTROPHILS NFR BLD AUTO: 70.4 % (ref 37–80)
NRBC BLD AUTO-RTO: 0 /100 WBC (ref 0–0)
PLATELET # BLD AUTO: 355 10*3/MM3 (ref 130–400)
RBC # BLD AUTO: 3.85 10*6/MM3 (ref 4.2–5.4)
TIBC SERPL-MCNC: 243 MCG/DL (ref 261–497)
UIBC SERPL-MCNC: 176 MCG/DL
WBC # BLD AUTO: 8 10*3/MM3 (ref 4.8–10.8)

## 2018-11-28 ENCOUNTER — OFFICE VISIT (OUTPATIENT)
Dept: ONCOLOGY | Facility: CLINIC | Age: 68
End: 2018-11-28

## 2018-11-28 VITALS
RESPIRATION RATE: 16 BRPM | DIASTOLIC BLOOD PRESSURE: 60 MMHG | HEART RATE: 98 BPM | HEIGHT: 67 IN | WEIGHT: 153 LBS | TEMPERATURE: 97.7 F | SYSTOLIC BLOOD PRESSURE: 132 MMHG | BODY MASS INDEX: 24.01 KG/M2

## 2018-11-28 DIAGNOSIS — D50.0 IRON DEFICIENCY ANEMIA DUE TO CHRONIC BLOOD LOSS: Primary | ICD-10-CM

## 2018-11-28 PROCEDURE — 99214 OFFICE O/P EST MOD 30 MIN: CPT | Performed by: INTERNAL MEDICINE

## 2018-11-28 NOTE — PROGRESS NOTES
DATE OF VISIT: 11/28/2018    REASON FOR VISIT: Followup for iron deficiency anemia.      HISTORY OF PRESENT ILLNESS: The patient is a very pleasant 68 y.o. female with past medical history significant for iron deficiency anemia diagnosed 4/6/2018. She presented with shortness of breath and chest pain to [facility] emergency department. She was found to be profoundly anemic with hemoglobin of 5. She received two units PRBC. She had evidence of iron deficiency and received IV iron during her hospitalization. She had EGD completed as an inpatient that showed acute gastritis. She also had CT chest that showed descending aortic aneurysm as well as right upper lobe 8 mm lung nodule. The patient is here today for scheduled follow up visit.     SUBJECTIVE: The patient is here today with her .  She is complaining of fatigue.  She denied any fever chills no night sweats.  Denied any bleeding.    PAST MEDICAL HISTORY/SOCIAL HISTORY/FAMILY HISTORY: Reviewed by me and unchanged from my documentation done on 11/28/18.    Review of Systems   Constitutional: Positive for fatigue. Negative for activity change, appetite change, chills, fever and unexpected weight change.   HENT: Negative for hearing loss, mouth sores, nosebleeds, sore throat and trouble swallowing.    Eyes: Negative for visual disturbance.   Respiratory: Negative for chest tightness and wheezing.    Cardiovascular: Negative for chest pain, palpitations and leg swelling.   Gastrointestinal: Negative for abdominal distention, abdominal pain, blood in stool, constipation, diarrhea, nausea, rectal pain and vomiting.   Endocrine: Negative for cold intolerance and heat intolerance.   Genitourinary: Negative for difficulty urinating, dysuria, frequency and urgency.   Musculoskeletal: Negative for arthralgias, back pain, gait problem, joint swelling and myalgias.   Skin: Negative for rash.   Neurological: Negative for dizziness, tremors, syncope, weakness,  "light-headedness, numbness and headaches.   Hematological: Negative for adenopathy. Does not bruise/bleed easily.   Psychiatric/Behavioral: Negative for confusion, sleep disturbance and suicidal ideas. The patient is not nervous/anxious.          Current Outpatient Medications:   •  albuterol (PROVENTIL HFA;VENTOLIN HFA) 108 (90 Base) MCG/ACT inhaler, Inhale 2 puffs Every 4 (Four) Hours As Needed for Wheezing., Disp: 1 inhaler, Rfl: 0  •  aspirin 81 MG tablet, Take 81 mg by mouth Daily., Disp: , Rfl:   •  atorvastatin (LIPITOR) 80 MG tablet, Take 1 tablet by mouth every night at bedtime., Disp: 90 tablet, Rfl: 3  •  betamethasone, augmented, (DIPROLENE) 0.05 % lotion, Apply  topically Daily., Disp: 60 mL, Rfl: 3  •  betamethasone, augmented, (DIPROLENE) 0.05 % ointment, Apply  topically Daily., Disp: 45 g, Rfl: 1  •  clopidogrel (PLAVIX) 75 MG tablet, Take 75 mg by mouth Daily., Disp: , Rfl:   •  ferrous sulfate 325 (65 FE) MG tablet, Take 1 tablet by mouth Daily With Breakfast., Disp: 30 tablet, Rfl: 2  •  HYDROcodone-acetaminophen (NORCO) 7.5-325 MG per tablet, Take 1 tablet by mouth Daily As Needed for Moderate Pain ., Disp: 15 tablet, Rfl: 0  •  lisinopril (PRINIVIL,ZESTRIL) 40 MG tablet, Take 0.5 tablets by mouth Daily., Disp: , Rfl:   •  traZODone (DESYREL) 50 MG tablet, Take 1-2 tablets at bedtime as needed for sleep., Disp: 180 tablet, Rfl: 3  •  venlafaxine 225 MG tablet sustained-release 24 hour 24 hr tablet, Take 1 tablet by mouth Daily With Breakfast., Disp: 90 tablet, Rfl: 1    PHYSICAL EXAMINATION:   /60   Pulse 98   Temp 97.7 °F (36.5 °C) (Oral)   Resp 16   Ht 170.2 cm (67\")   Wt 69.4 kg (153 lb)   BMI 23.96 kg/m²    ECOG Performance Status: 1 - Symptomatic but completely ambulatory  General Appearance:  alert, cooperative, no apparent distress and appears stated age   Neurologic/Psychiatric: A&O x 3, gait steady, appropriate affect, strength 5/5 in all muscle groups   HEENT:  " Normocephalic, without obvious abnormality, mucous membranes moist   Neck: Supple, symmetrical, trachea midline, no adenopathy;  No thyromegaly, masses, or tenderness   Lungs:   Clear to auscultation bilaterally; respirations regular, even, and unlabored bilaterally   Heart:  Regular rate and rhythm, no murmurs appreciated   Abdomen:   Soft, non-tender, non-distended and no organomegaly   Lymph nodes: No cervical, supraclavicular, inguinal or axillary adenopathy noted   Extremities: Normal, atraumatic; no clubbing, cyanosis, or edema    Skin: No rashes, ulcers, or suspicious lesions noted     Orders Only on 11/26/2018   Component Date Value Ref Range Status   • WBC 11/26/2018 8.00  4.80 - 10.80 10*3/mm3 Final   • RBC 11/26/2018 3.85* 4.20 - 5.40 10*6/mm3 Final   • Hemoglobin 11/26/2018 12.0  12.0 - 16.0 g/dL Final   • Hematocrit 11/26/2018 38.2  37.0 - 47.0 % Final   • MCV 11/26/2018 99.2* 81.0 - 99.0 fL Final   • MCH 11/26/2018 31.2* 27.0 - 31.0 pg Final   • MCHC 11/26/2018 31.4  30.0 - 37.0 g/dL Final   • RDW 11/26/2018 13.4  11.5 - 14.5 % Final   • Platelets 11/26/2018 355  130 - 400 10*3/mm3 Final   • Neutrophil Rel % 11/26/2018 70.4  37.0 - 80.0 % Final   • Lymphocyte Rel % 11/26/2018 19.3  10.0 - 50.0 % Final   • Monocyte Rel % 11/26/2018 7.0  0.0 - 12.0 % Final   • Eosinophil Rel % 11/26/2018 2.6  0.0 - 7.0 % Final   • Basophil Rel % 11/26/2018 0.4  0.0 - 2.5 % Final   • Neutrophils Absolute 11/26/2018 5.64  2.00 - 6.90 10*3/mm3 Final   • Lymphocytes Absolute 11/26/2018 1.54  0.60 - 3.40 10*3/mm3 Final   • Monocytes Absolute 11/26/2018 0.56  0.00 - 0.90 10*3/mm3 Final   • Eosinophils Absolute 11/26/2018 0.21  0.00 - 0.70 10*3/mm3 Final   • Basophils Absolute 11/26/2018 0.03  0.00 - 0.20 10*3/mm3 Final   • Immature Granulocyte Rel % 11/26/2018 0.3  0.0 - 0.6 % Final   • Immature Grans Absolute 11/26/2018 0.02  0.00 - 0.06 10*3/mm3 Final   • nRBC 11/26/2018 0.0  0.0 - 0.0 /100 WBC Final   • TIBC 11/26/2018 243*  261 - 497 mcg/dL Final   • UIBC 11/26/2018 176  mcg/dL Final   • Iron 11/26/2018 67  37 - 181 mcg/dL Final   • Iron Saturation 11/26/2018 28  11 - 46 % Final   • Ferritin 11/26/2018 116.00  11.10 - 264.00 ng/mL Final   Hospital Outpatient Visit on 11/26/2018   Component Date Value Ref Range Status   • Creatinine 11/26/2018 1.00  0.60 - 1.30 mg/dL Final    Serial Number: [id number]:  [id number]        Ct Abdomen Pelvis Without Contrast    Result Date: 11/14/2018  Narrative: PROCEDURE: CT ABDOMEN PELVIS WO CONTRAST-  HISTORY: R10.31, R10.813, Z95.820; R10.31-Right lower quadrant pain; R10.813-Right lower quadrant abdominal tenderness  COMPARISON: June 7, 2018.  PROCEDURE: Axial images were obtained from the lung bases through the pubic symphysis without intravenous contrast. Oral contrast was administered.  FINDINGS:  ABDOMEN: The lung bases are clear. The heart size is normal. There is an aneurysm of the distal thoracic aorta which is unchanged. The limited noncontrast images of the liver are normal. The patient is status post cholecystectomy. The spleen is normal. No adrenal masses are seen.  The pancreas has an unremarkable unenhanced appearance.. The abdominal aorta is normal in caliber. Note is made of prior aortofemoral and femoral femoral bypass surgery. There is no significant free fluid or adenopathy.  There is no nephrolithiasis. There is no hydronephrosis. Limited noncontrast images of the bowel are unremarkable.  PELVIS: The appendix is not identified. The urinary bladder is unremarkable. There is no significant fluid or adenopathy.         Impression: No acute intra-abdominal or pelvic process.       519.53 mGy.cm. 12.68 mGy  This study was performed with techniques to keep radiation doses as low as reasonably achievable (ALARA). Individualized dose reduction techniques using automated exposure control or adjustment of mA and/or kV according to the patient size were employed.  This report was finalized on  "11/14/2018 3:03 PM by Jaycee Bullard M.D..    Ct Chest With Contrast    Result Date: 11/27/2018  Narrative: EXAMINATION: CT CHEST WITH CONTRAST-11/26/2018:  INDICATION: Lung nodule, <1 cm; R91.1-Solitary pulmonary nodule.     TECHNIQUE: CT scan of the chest was performed following intravenous contrast.  The radiation dose reduction device was turned on for each scan per the ALARA (As Low as Reasonably Achievable) protocol.  COMPARISON: 08/02/2018.  FINDINGS: There is no axillary lymphadenopathy. There is no mediastinal or hilar adenopathy. There is no pericardial or pleural effusion. Limited images of the upper abdomen demonstrate no acute pathology. Images displayed at lung window settings demonstrate chronic obstructive airways disease. There is a spiculated nodular fibrotic process in the right upper lobe which is stable and unchanged. There is diffuse underlying chronic obstructive airways disease. There are benign parenchymal and mediastinal old granulomatous calcifications.      Impression: There is a spiculated process in the right upper lobe which has the appearance of a fibrotic \"scar\". The finding is stable and unchanged when compared to the previous examination of 08/02/2018. Furthermore, this finding has been noted on numerous previous examinations including a CT scan of the chest dated 10/01/2014.  D:  11/27/2018 E:  11/27/2018  This report was finalized on 11/27/2018 2:24 PM by Dr. Shai Aleman MD.        ASSESSMENT: The patient is a very pleasant 68 y.o. female  with iron deficiency anemia.     PROBLEM LIST:  1. Iron deficiency anemia  A. Likely related to chronic blood loss.   B. Component of chronic kidney disease.   C. Status post IV iron as inpatient with allergic reaction.   D. Status post EGD done inpatient that showed mild gastritis.   2.  Chronic kidney disease: Stage III  3. Right upper lobe 8 mm lung nodule: Stable since 2014   4. Thoracic descending aortic aneurysm.   5. Peripheral " vascular disease with recent angina.   6. HTN  7. Hyperlipidemia  8. Insomnia    PLAN:  1.  I did go over the blood work results with the patient and her  I reassured her she has no evidence of anemia and your iron storage is admitted to quit.    2. If ferritin level is below 30 we will plan for another round of IV iron using injectafer.  I reviewed potential side effects of this medication with the patient including nausea, vomiting, constipation, muscle pain, and allergic reaction.   3. The patient will be due a colonoscopy in April 2023 with Dr. Hussein.Last Colonoscopy showed diverticulosis with no bleeding.    4.  I did go over the scan results with the patient and her , I reassured her that the scar like area in the right upper lobe has been essentially stable compared to 2014.  Even her extensive history of smoking with 2 packs a day for more than 30 years I recommend annual low dose screening CT scans.  She is scheduled to follow-up with Dr. Zapata next month and she would like to discuss this further with him.  6. She will continue cardiology follow up with Dr. Gupta secondary to history of PVD with prior subclavian and carotid stent as well as femoral bypass. She will continue current aspirin and plavix therapy.   7.  The patient will follow-up with me us in 6 months with repeat CT scan and repeat labs including CBC and iron studies.   8.  If she has hemoglobin less than 8 we will consider blood transfusion is needed.   9. I will continue lisinopril for HTN.   10. I will continue atorvastatin for hyperlipidemia.   11. I will continue trazodone for insomnia.     Dragan Weathers MD  11/28/2018

## 2018-12-03 ENCOUNTER — OFFICE VISIT (OUTPATIENT)
Dept: PULMONOLOGY | Facility: CLINIC | Age: 68
End: 2018-12-03

## 2018-12-03 VITALS
TEMPERATURE: 98.6 F | BODY MASS INDEX: 24.01 KG/M2 | HEIGHT: 67 IN | HEART RATE: 90 BPM | OXYGEN SATURATION: 98 % | DIASTOLIC BLOOD PRESSURE: 80 MMHG | WEIGHT: 153 LBS | SYSTOLIC BLOOD PRESSURE: 132 MMHG

## 2018-12-03 DIAGNOSIS — R06.02 SHORTNESS OF BREATH: Primary | ICD-10-CM

## 2018-12-03 DIAGNOSIS — Z87.891 PERSONAL HISTORY OF SMOKING: ICD-10-CM

## 2018-12-03 DIAGNOSIS — R91.1 LUNG NODULE: ICD-10-CM

## 2018-12-03 DIAGNOSIS — J43.2 CENTRILOBULAR EMPHYSEMA (HCC): ICD-10-CM

## 2018-12-03 PROCEDURE — 94729 DIFFUSING CAPACITY: CPT | Performed by: NURSE PRACTITIONER

## 2018-12-03 PROCEDURE — 94726 PLETHYSMOGRAPHY LUNG VOLUMES: CPT | Performed by: NURSE PRACTITIONER

## 2018-12-03 PROCEDURE — 99214 OFFICE O/P EST MOD 30 MIN: CPT | Performed by: NURSE PRACTITIONER

## 2018-12-03 PROCEDURE — 94375 RESPIRATORY FLOW VOLUME LOOP: CPT | Performed by: NURSE PRACTITIONER

## 2018-12-03 RX ORDER — ALBUTEROL SULFATE 90 UG/1
2 AEROSOL, METERED RESPIRATORY (INHALATION) EVERY 4 HOURS PRN
Qty: 6.7 G | Refills: 5 | Status: SHIPPED | OUTPATIENT
Start: 2018-12-03 | End: 2019-04-16

## 2018-12-03 NOTE — PROGRESS NOTES
[facility] Pulmonary Follow up    CHIEF COMPLAINT    COPD/lung nodule    HISTORY OF PRESENT ILLNESS    Kailee Almanza is a 68 y.o.female here today for follow-up of her COPD and lung nodule.  She was last seen in our office in 2016 by Dr. Mclean.  Her breathing has been stable since this visit.  She has noticed in the last year or so her shortness of breath has worsened.  Her breathing becomes severe when she is exposed to humidity.  She also gets short of breath when climbing up stairs and has to rest when she gets to the top.  She is concerned about her breathing today.      She had a CT scan completed at the end of November that was ordered by her PCP.  This revealed a stable 8 mm nodule and recommended yearly follow-up.  She is also curious if she needs frequent CT scans for this.    She denies fever, chills, hemoptysis, chest pain, weight loss, night sweats or lower extremity edema.  She occasionally has a dry cough and never produces any sputum.  She states she occasionally has palpitations that occur at random times, however these do not cause chest pain.  They go away rather quickly as well.  She denies reflux symptoms.  She has seasonal allergy symptoms and does not have to take any medication for this.  She does not receive the flu vaccination.    She has a 1 pack per day for 35 years smoking history and quit in 2013.      She is being followed by Dr. Weathers for iron deficiency anemia.  Earlier this year she was at [facility] for chest pain and was found to be severely anemic.  She received 2 units of PRBCs and was referred to Dr. Weathers at discharge.  She has had multiple scopes for acute blood loss and all tests have been negative.      Patient Active Problem List   Diagnosis   • Peripheral vascular disease (CMS/HCC)   • Essential hypertension   • Cerebrovascular disease   • Hyperlipidemia   • Chronic bilateral low back pain without sciatica   • Centrilobular emphysema (CMS/HCC)   • S/P  parathyroidectomy 2/1/17   • Aneurysm of thoracic aorta (CMS/HCC)   • Lung nodule   • Iron deficiency anemia due to chronic blood loss   • Iron malabsorption   • Psychophysiological insomnia   • Carotid stenosis   • Insomnia due to medical condition   • Shortness of breath   • Personal history of smoking       Allergies   Allergen Reactions   • Ferrlecit [Na Ferric Gluc Cplx In Sucrose] Diarrhea and Nausea And Vomiting   • Sulfa Antibiotics Rash     Last as a baby.   • Percodan [Oxycodone-Aspirin] Mental Status Change       Current Outpatient Medications:   •  albuterol (PROVENTIL HFA;VENTOLIN HFA) 108 (90 Base) MCG/ACT inhaler, Inhale 2 puffs Every 4 (Four) Hours As Needed for Wheezing., Disp: 1 inhaler, Rfl: 0  •  aspirin 81 MG tablet, Take 81 mg by mouth Daily., Disp: , Rfl:   •  atorvastatin (LIPITOR) 80 MG tablet, Take 1 tablet by mouth every night at bedtime., Disp: 90 tablet, Rfl: 3  •  clopidogrel (PLAVIX) 75 MG tablet, Take 75 mg by mouth Daily., Disp: , Rfl:   •  HYDROcodone-acetaminophen (NORCO) 7.5-325 MG per tablet, Take 1 tablet by mouth Daily As Needed for Moderate Pain ., Disp: 15 tablet, Rfl: 0  •  lisinopril (PRINIVIL,ZESTRIL) 40 MG tablet, Take 0.5 tablets by mouth Daily., Disp: , Rfl:   •  traZODone (DESYREL) 50 MG tablet, Take 1-2 tablets at bedtime as needed for sleep., Disp: 180 tablet, Rfl: 3  •  albuterol 108 (90 Base) MCG/ACT inhaler, Inhale 2 puffs Every 4 (Four) Hours As Needed for Wheezing., Disp: 6.7 g, Rfl: 5  •  betamethasone, augmented, (DIPROLENE) 0.05 % lotion, Apply  topically Daily., Disp: 60 mL, Rfl: 3  •  betamethasone, augmented, (DIPROLENE) 0.05 % ointment, Apply  topically Daily., Disp: 45 g, Rfl: 1  •  ferrous sulfate 325 (65 FE) MG tablet, Take 1 tablet by mouth Daily With Breakfast., Disp: 30 tablet, Rfl: 2  •  Fluticasone-Umeclidin-Vilant 100-62.5-25 MCG/INH aerosol powder , Inhale 1 each Daily., Disp: 60 each, Rfl: 3  •  venlafaxine 225 MG tablet sustained-release 24  "hour 24 hr tablet, Take 1 tablet by mouth Daily With Breakfast., Disp: 90 tablet, Rfl: 1  MEDICATION LIST AND ALLERGIES REVIEWED.    Social History     Tobacco Use   • Smoking status: Former Smoker     Packs/day: 1.00     Years: 35.00     Pack years: 35.00     Types: Cigarettes     Last attempt to quit: 2013     Years since quittin.5   • Smokeless tobacco: Never Used   Substance Use Topics   • Alcohol use: Yes     Comment: occasional   • Drug use: No       FAMILY AND SOCIAL HISTORY REVIEWED.    Review of Systems   Constitutional: Negative for activity change, appetite change, fatigue, fever and unexpected weight change.   HENT: Negative for congestion, postnasal drip, rhinorrhea, sinus pressure, sore throat and voice change.    Eyes: Negative for visual disturbance.   Respiratory: Positive for shortness of breath. Negative for cough, chest tightness and wheezing.    Cardiovascular: Positive for palpitations. Negative for chest pain and leg swelling.   Gastrointestinal: Negative for abdominal distention, abdominal pain, nausea and vomiting.   Endocrine: Negative for cold intolerance and heat intolerance.   Genitourinary: Negative for difficulty urinating and urgency.   Musculoskeletal: Negative for arthralgias, back pain and neck pain.   Skin: Negative for color change and pallor.   Allergic/Immunologic: Negative for environmental allergies and food allergies.   Neurological: Negative for dizziness, syncope, weakness and light-headedness.   Hematological: Negative for adenopathy. Does not bruise/bleed easily.   Psychiatric/Behavioral: Negative for agitation and behavioral problems.   .    /80   Pulse 90   Temp 98.6 °F (37 °C)   Ht 170.2 cm (67\")   Wt 69.4 kg (153 lb)   SpO2 98% Comment: RA @ REST  BMI 23.96 kg/m²     Immunization History   Administered Date(s) Administered   • Shingrix 2016       Physical Exam   Constitutional: She is oriented to person, place, and time. She appears " "well-developed and well-nourished.   HENT:   Head: Normocephalic and atraumatic.   Eyes: Pupils are equal, round, and reactive to light.   Neck: Normal range of motion. Neck supple. No thyromegaly present.   Cardiovascular: Normal rate, regular rhythm, normal heart sounds and intact distal pulses. Exam reveals no gallop and no friction rub.   No murmur heard.  Pulmonary/Chest: Effort normal and breath sounds normal. No respiratory distress. She has no wheezes. She has no rales. She exhibits no tenderness.   Abdominal: Soft. Bowel sounds are normal. There is no tenderness.   Musculoskeletal: Normal range of motion.   Lymphadenopathy:     She has no cervical adenopathy.   Neurological: She is alert and oriented to person, place, and time.   Skin: Skin is warm and dry. Capillary refill takes less than 2 seconds. She is not diaphoretic.   Psychiatric: She has a normal mood and affect. Her behavior is normal.   Nursing note and vitals reviewed.        RESULTS    PFTS in the office today, read by me:  FVC 2.06 62% predicted, FEV1 0.91 36% predicted, FEV1/FVC 44% predicted, TLC 5.86 112% predicted, DLCO 34% predicted, severe obstruction, no restriction, severely decreased DLCO.    Ct Chest With Contrast    Result Date: 11/27/2018  There is a spiculated process in the right upper lobe which has the appearance of a fibrotic \"scar\". The finding is stable and unchanged when compared to the previous examination of 08/02/2018. Furthermore, this finding has been noted on numerous previous examinations including a CT scan of the chest dated 10/01/2014.  D:  11/27/2018 E:  11/27/2018  This report was finalized on 11/27/2018 2:24 PM by Dr. Shai Aleman MD.        PROBLEM LIST    Problem List Items Addressed This Visit        Respiratory    Centrilobular emphysema (CMS/HCC)    Overview     CT scan of the chest July 18, 2016, centrilobular emphysema with fibrosis, no pulmonary embolus         Relevant Medications    " Fluticasone-Umeclidin-Vilant 100-62.5-25 MCG/INH aerosol powder     albuterol 108 (90 Base) MCG/ACT inhaler    Lung nodule    Shortness of breath - Primary    Relevant Orders    Pulmonary Function Test (Completed)       Other    Personal history of smoking    Relevant Orders    CT chest low dose wo            DISCUSSION  Mrs. Almanza was here for follow-up of her COPD and lung nodule.  Her lung nodule has been stable since 2014 and based on her smoking history she does qualify for low-dose CT scans yearly.  She will need a low-dose CT scan in November 2019.  She is agreeable to this plan.      Based on her PFTs, and they have not changed since the previous testing.  She does have severe obstruction noted on her testing.  I will start her on Trilogy today, I called this in for her as well.  We discussed how to use the inhaler, and to rinse her mouth out after each use.  I also showed her how to use the inhaler.  I provided her with a sample today.    Her shortness of breath could be contributed to her anemia however her hemoglobin and hematocrit are within normal limits as of earlier this month.  She will follow-up with Dr. Weathers in 6 months for her anemia.    She will follow-up in 3 months or sooner if her symptoms worsen.    I spent 25 minutes with the patient. I spent > 50% percent of this time counseling and discussing diagnosis, prognosis, diagnostic testing, evaluation, current status, treatment options and management.    Kelsey Genao, APRN  12/03/20189:38 AM  Electronically signed     Please note that portions of this note were completed with a voice recognition program. Efforts were made to edit the dictations, but occasionally words are mistranscribed.      CC: Machelle Atkinson MD

## 2018-12-17 DIAGNOSIS — I73.9 PERIPHERAL VASCULAR DISEASE (HCC): ICD-10-CM

## 2018-12-17 RX ORDER — CLOPIDOGREL BISULFATE 75 MG/1
TABLET ORAL
Qty: 90 TABLET | Refills: 2 | OUTPATIENT
Start: 2018-12-17

## 2018-12-23 ENCOUNTER — HOSPITAL ENCOUNTER (EMERGENCY)
Facility: HOSPITAL | Age: 68
Discharge: HOME | End: 2018-12-23
Attending: EMERGENCY MEDICINE | Admitting: EMERGENCY MEDICINE

## 2018-12-23 VITALS
RESPIRATION RATE: 17 BRPM | WEIGHT: 152 LBS | SYSTOLIC BLOOD PRESSURE: 137 MMHG | BODY MASS INDEX: 23.04 KG/M2 | OXYGEN SATURATION: 98 % | HEIGHT: 68 IN | TEMPERATURE: 98.9 F | HEART RATE: 80 BPM | DIASTOLIC BLOOD PRESSURE: 55 MMHG

## 2018-12-23 DIAGNOSIS — N30.00 ACUTE CYSTITIS WITHOUT HEMATURIA: Primary | ICD-10-CM

## 2018-12-23 LAB
BACTERIA UR QL AUTO: ABNORMAL /HPF
BILIRUB UR QL STRIP: NEGATIVE
CLARITY UR: ABNORMAL
COLOR UR: YELLOW
GLUCOSE UR STRIP-MCNC: NEGATIVE MG/DL
HGB UR QL STRIP.AUTO: ABNORMAL
HYALINE CASTS UR QL AUTO: ABNORMAL /LPF
KETONES UR QL STRIP: NEGATIVE
LEUKOCYTE ESTERASE UR QL STRIP.AUTO: ABNORMAL
NITRITE UR QL STRIP: POSITIVE
PH UR STRIP.AUTO: 6.5 [PH] (ref 5–8)
PROT UR QL STRIP: ABNORMAL
RBC # UR: ABNORMAL /HPF
REF LAB TEST METHOD: ABNORMAL
SP GR UR STRIP: 1.02 (ref 1–1.03)
SQUAMOUS #/AREA URNS HPF: ABNORMAL /HPF
UROBILINOGEN UR QL STRIP: ABNORMAL
WBC CLUMPS # UR AUTO: ABNORMAL /HPF
WBC UR QL AUTO: ABNORMAL /HPF

## 2018-12-23 PROCEDURE — 81001 URINALYSIS AUTO W/SCOPE: CPT | Performed by: PHYSICIAN ASSISTANT

## 2018-12-23 PROCEDURE — 87086 URINE CULTURE/COLONY COUNT: CPT | Performed by: PHYSICIAN ASSISTANT

## 2018-12-23 PROCEDURE — 99283 EMERGENCY DEPT VISIT LOW MDM: CPT

## 2018-12-23 PROCEDURE — 87186 SC STD MICRODIL/AGAR DIL: CPT | Performed by: PHYSICIAN ASSISTANT

## 2018-12-23 PROCEDURE — 87077 CULTURE AEROBIC IDENTIFY: CPT | Performed by: PHYSICIAN ASSISTANT

## 2018-12-23 RX ORDER — CEFUROXIME AXETIL 500 MG/1
500 TABLET ORAL 2 TIMES DAILY
Qty: 10 TABLET | Refills: 0 | Status: SHIPPED | OUTPATIENT
Start: 2018-12-23 | End: 2018-12-28

## 2018-12-23 RX ORDER — PHENAZOPYRIDINE HYDROCHLORIDE 100 MG/1
100 TABLET, FILM COATED ORAL 3 TIMES DAILY PRN
Qty: 12 TABLET | Refills: 0 | Status: SHIPPED | OUTPATIENT
Start: 2018-12-23 | End: 2018-12-23 | Stop reason: SDUPTHER

## 2018-12-23 RX ORDER — PHENAZOPYRIDINE HYDROCHLORIDE 100 MG/1
100 TABLET, FILM COATED ORAL 3 TIMES DAILY PRN
Qty: 12 TABLET | Refills: 0 | Status: SHIPPED | OUTPATIENT
Start: 2018-12-23 | End: 2019-04-16

## 2018-12-23 RX ORDER — CEFUROXIME AXETIL 500 MG/1
500 TABLET ORAL 2 TIMES DAILY
Qty: 10 TABLET | Refills: 0 | Status: SHIPPED | OUTPATIENT
Start: 2018-12-23 | End: 2018-12-23 | Stop reason: SDUPTHER
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
## 2018-12-25 LAB — BACTERIA SPEC AEROBE CULT: ABNORMAL

## 2019-03-12 ENCOUNTER — TRANSCRIBE ORDERS (OUTPATIENT)
Dept: ADMINISTRATIVE | Facility: HOSPITAL | Age: 69
End: 2019-03-12

## 2019-03-12 ENCOUNTER — APPOINTMENT (OUTPATIENT)
Dept: LAB | Facility: HOSPITAL | Age: 69
End: 2019-03-12

## 2019-03-12 DIAGNOSIS — D50.0 IRON DEFICIENCY ANEMIA SECONDARY TO BLOOD LOSS (CHRONIC): Primary | ICD-10-CM

## 2019-03-12 LAB
ABO GROUP BLD: NORMAL
BLD GP AB SCN SERPL QL: NEGATIVE
DEPRECATED RDW RBC AUTO: 51.7 FL (ref 37–54)
ERYTHROCYTE [DISTWIDTH] IN BLOOD BY AUTOMATED COUNT: 15.2 % (ref 11.5–14.5)
FERRITIN SERPL-MCNC: 19.7 NG/ML (ref 11.1–264)
HCT VFR BLD AUTO: 38.2 % (ref 37–47)
HGB BLD-MCNC: 11.9 G/DL (ref 12–16)
IRON 24H UR-MRATE: 82 MCG/DL (ref 37–181)
IRON SATN MFR SERPL: 22 % (ref 11–46)
MCH RBC QN AUTO: 28.6 PG (ref 27–31)
MCHC RBC AUTO-ENTMCNC: 31.2 G/DL (ref 30–37)
MCV RBC AUTO: 91.8 FL (ref 81–99)
PLATELET # BLD AUTO: 303 10*3/MM3 (ref 130–400)
PMV BLD AUTO: 8.5 FL (ref 6–12)
RBC # BLD AUTO: 4.16 10*6/MM3 (ref 4.2–5.4)
RH BLD: NEGATIVE
TIBC SERPL-MCNC: 373 MCG/DL (ref 261–497)
WBC NRBC COR # BLD: 6.23 10*3/MM3 (ref 4.8–10.8)

## 2019-03-12 PROCEDURE — 82728 ASSAY OF FERRITIN: CPT | Performed by: FAMILY MEDICINE

## 2019-03-12 PROCEDURE — 86901 BLOOD TYPING SEROLOGIC RH(D): CPT | Performed by: FAMILY MEDICINE

## 2019-03-12 PROCEDURE — 83550 IRON BINDING TEST: CPT | Performed by: FAMILY MEDICINE

## 2019-03-12 PROCEDURE — 83540 ASSAY OF IRON: CPT | Performed by: FAMILY MEDICINE

## 2019-03-12 PROCEDURE — 86900 BLOOD TYPING SEROLOGIC ABO: CPT | Performed by: FAMILY MEDICINE

## 2019-03-12 PROCEDURE — 36415 COLL VENOUS BLD VENIPUNCTURE: CPT | Performed by: FAMILY MEDICINE

## 2019-03-12 PROCEDURE — 86850 RBC ANTIBODY SCREEN: CPT | Performed by: FAMILY MEDICINE

## 2019-03-12 PROCEDURE — 85027 COMPLETE CBC AUTOMATED: CPT | Performed by: FAMILY MEDICINE

## 2019-03-18 ENCOUNTER — TELEPHONE (OUTPATIENT)
Dept: CARDIOLOGY | Facility: CLINIC | Age: 69
End: 2019-03-18

## 2019-03-18 DIAGNOSIS — R00.2 PALPITATIONS: Primary | ICD-10-CM

## 2019-03-18 NOTE — TELEPHONE ENCOUNTER
Pt calls to report she is experiencing palpitations, shortness of breath and fatigue. Her blood counts have remained stable. She had issues with anemia in the past. She has not had follow up with [facility]UM since 4/2018 hospital admission. Will work in to see [facility], 4/16/19 @ 3:45. Per [facility] place [facility] for eval of palpitations.

## 2019-03-25 ENCOUNTER — OFFICE VISIT (OUTPATIENT)
Dept: SURGERY | Facility: CLINIC | Age: 69
End: 2019-03-25

## 2019-03-25 VITALS
WEIGHT: 159 LBS | OXYGEN SATURATION: 97 % | DIASTOLIC BLOOD PRESSURE: 72 MMHG | BODY MASS INDEX: 24.96 KG/M2 | TEMPERATURE: 98 F | HEIGHT: 67 IN | HEART RATE: 90 BPM | SYSTOLIC BLOOD PRESSURE: 138 MMHG

## 2019-03-25 DIAGNOSIS — K21.9 GASTROESOPHAGEAL REFLUX DISEASE, ESOPHAGITIS PRESENCE NOT SPECIFIED: ICD-10-CM

## 2019-03-25 DIAGNOSIS — R13.11 ORAL PHASE DYSPHAGIA: Primary | ICD-10-CM

## 2019-03-25 PROCEDURE — 99213 OFFICE O/P EST LOW 20 MIN: CPT | Performed by: SURGERY

## 2019-03-26 ENCOUNTER — OUTSIDE FACILITY SERVICE (OUTPATIENT)
Dept: SURGERY | Facility: CLINIC | Age: 69
End: 2019-03-26

## 2019-03-26 PROCEDURE — 43239 EGD BIOPSY SINGLE/MULTIPLE: CPT | Performed by: SURGERY

## 2019-03-28 ENCOUNTER — TELEPHONE (OUTPATIENT)
Dept: SURGERY | Facility: CLINIC | Age: 69
End: 2019-03-28

## 2019-03-28 NOTE — TELEPHONE ENCOUNTER
Pt called said she had egd done and her throat is sore and her uvulas is swollen would this be from the tube put down her neck when she had egd done or should she go to urgent care    Please call her on cell phone because [place]

## 2019-03-28 NOTE — TELEPHONE ENCOUNTER
Patient stated there was yellowish phelm in the back of her throat.  I offered her an appt patient is out of town and will go to an urgent care to get checked.

## 2019-04-03 ENCOUNTER — OFFICE VISIT (OUTPATIENT)
Dept: SURGERY | Facility: CLINIC | Age: 69
End: 2019-04-03

## 2019-04-03 VITALS
OXYGEN SATURATION: 98 % | SYSTOLIC BLOOD PRESSURE: 122 MMHG | TEMPERATURE: 98.4 F | DIASTOLIC BLOOD PRESSURE: 64 MMHG | BODY MASS INDEX: 24.96 KG/M2 | HEIGHT: 67 IN | WEIGHT: 159 LBS | HEART RATE: 96 BPM

## 2019-04-03 DIAGNOSIS — R13.11 ORAL PHASE DYSPHAGIA: Primary | ICD-10-CM

## 2019-04-03 DIAGNOSIS — K21.00 GASTROESOPHAGEAL REFLUX DISEASE WITH ESOPHAGITIS: ICD-10-CM

## 2019-04-03 DIAGNOSIS — K21.00 GASTROESOPHAGEAL REFLUX DISEASE WITH ESOPHAGITIS: Primary | ICD-10-CM

## 2019-04-03 PROCEDURE — 99213 OFFICE O/P EST LOW 20 MIN: CPT | Performed by: SURGERY

## 2019-04-03 NOTE — PROGRESS NOTES
Patient: Kailee Almanza    YOB: 1950    Date: 04/03/2019    Primary Care Provider: Machelle Atkinson MD    Reason for Consultation: Follow-up EGD    Chief Complaint   Patient presents with   • Follow-up     EGD       History of present illness:  I saw the patient in the office today as a followup from their recent EGD with biopsy, the pathology report did show fundic and antral mucosa with mild inflammation, focal intestinal metaplasia. Esophagus biopsy showed glandular mucosa consistent with gastric cardia.  They state that they have done well and has no complains at this time.  Patient has difficulty swallowing since her recent thyroid surgery.  She attributes difficulty from that procedure.  No significant findings on upper endoscopy.  No narrowing or stricture.    The following portions of the patient's history were reviewed and updated as appropriate: allergies, current medications, past family history, past medical history, past social history, past surgical history and problem list.      Review of Systems   Constitutional: Negative for chills, fever and unexpected weight change.   HENT: Negative for hearing loss, trouble swallowing and voice change.    Eyes: Negative for visual disturbance.   Respiratory: Negative for apnea, cough, chest tightness, shortness of breath and wheezing.    Cardiovascular: Negative for chest pain, palpitations and leg swelling.   Gastrointestinal: Negative for abdominal distention, abdominal pain, anal bleeding, blood in stool, constipation, diarrhea, nausea, rectal pain and vomiting.   Endocrine: Negative for cold intolerance and heat intolerance.   Genitourinary: Negative for difficulty urinating, dysuria and flank pain.   Musculoskeletal: Negative for back pain and gait problem.   Skin: Negative for color change, rash and wound.   Neurological: Negative for dizziness, syncope, speech difficulty, weakness, light-headedness, numbness and headaches.  "  Hematological: Negative for adenopathy. Does not bruise/bleed easily.   Psychiatric/Behavioral: Negative for confusion. The patient is not nervous/anxious.        Vital Signs:   Vitals:    04/03/19 0956   BP: 122/64   Pulse: 96   Temp: 98.4 °F (36.9 °C)   SpO2: 98%   Weight: 72.1 kg (159 lb)   Height: 170.2 cm (67\")       Allergies:  Allergies   Allergen Reactions   • Ferrlecit [Na Ferric Gluc Cplx In Sucrose] Diarrhea and Nausea And Vomiting   • Sulfa Antibiotics Rash     Last as a baby.   • Percodan [Oxycodone-Aspirin] Mental Status Change       Medications:    Current Outpatient Medications:   •  albuterol (PROVENTIL HFA;VENTOLIN HFA) 108 (90 Base) MCG/ACT inhaler, Inhale 2 puffs Every 4 (Four) Hours As Needed for Wheezing., Disp: 1 inhaler, Rfl: 0  •  albuterol 108 (90 Base) MCG/ACT inhaler, Inhale 2 puffs Every 4 (Four) Hours As Needed for Wheezing., Disp: 6.7 g, Rfl: 5  •  aspirin 81 MG tablet, Take 81 mg by mouth Daily., Disp: , Rfl:   •  atorvastatin (LIPITOR) 80 MG tablet, Take 1 tablet by mouth every night at bedtime., Disp: 90 tablet, Rfl: 3  •  betamethasone, augmented, (DIPROLENE) 0.05 % lotion, Apply  topically Daily., Disp: 60 mL, Rfl: 3  •  betamethasone, augmented, (DIPROLENE) 0.05 % ointment, Apply  topically Daily., Disp: 45 g, Rfl: 1  •  clopidogrel (PLAVIX) 75 MG tablet, Take 75 mg by mouth Daily., Disp: , Rfl:   •  ferrous sulfate 325 (65 FE) MG tablet, Take 1 tablet by mouth Daily With Breakfast., Disp: 30 tablet, Rfl: 2  •  Fluticasone-Umeclidin-Vilant 100-62.5-25 MCG/INH aerosol powder , Inhale 1 each Daily., Disp: 60 each, Rfl: 3  •  HYDROcodone-acetaminophen (NORCO) 7.5-325 MG per tablet, Take 1 tablet by mouth Daily As Needed for Moderate Pain ., Disp: 15 tablet, Rfl: 0  •  lisinopril (PRINIVIL,ZESTRIL) 40 MG tablet, Take 0.5 tablets by mouth Daily., Disp: , Rfl:   •  phenazopyridine (PYRIDIUM) 100 MG tablet, Take 1 tablet by mouth 3 (Three) Times a Day As Needed for bladder spasms., " Disp: 12 tablet, Rfl: 0  •  traZODone (DESYREL) 50 MG tablet, Take 1-2 tablets at bedtime as needed for sleep., Disp: 180 tablet, Rfl: 3  •  venlafaxine 225 MG tablet sustained-release 24 hour 24 hr tablet, Take 1 tablet by mouth Daily With Breakfast., Disp: 90 tablet, Rfl: 1    Physical Exam:   General Appearance:    Alert, cooperative, in no acute distress   Abdomen:     no masses, no organomegaly, soft non-tender, non-distended, no guarding, wounds are well healed, no evidence of recurrent hernia   Chest:      Clear toausculation            Cor:  Regular rate and rhythm      Results Review:   I reviewed the patient's new clinical results.    Assessment / Plan:    1. Oral phase dysphagia    2. Gastroesophageal reflux disease with esophagitis        I did discuss the situation with the patient today in the office and they have done well from their recent EGD with biopsy. I have told the patient we will schedule esophageal manometry to evaluate her swallowing mechanism.  Also consider speech pathology evaluation for swallowing study.  She will discussed that with her daughter who is a speech pathologist.    Electronically signed by Lena Hussein MD  04/03/19

## 2019-04-04 ENCOUNTER — TELEPHONE (OUTPATIENT)
Dept: SURGERY | Facility: CLINIC | Age: 69
End: 2019-04-04

## 2019-04-04 NOTE — TELEPHONE ENCOUNTER
Patient called having questions about the Manometry study that Dr Hussein ordered .  I explained to her the study will detected muscle weakness and other problems with the esophagus.  She was hesitant about the procedure but is going to proceed with it.

## 2019-04-17 ENCOUNTER — TELEPHONE (OUTPATIENT)
Dept: SURGERY | Facility: CLINIC | Age: 69
End: 2019-04-17

## 2019-04-17 NOTE — TELEPHONE ENCOUNTER
Patient's manometry was confirmed for 04/25/19 arrival at 7am / CB 2nd floor [facility]

## 2019-04-19 ENCOUNTER — HOSPITAL ENCOUNTER (OUTPATIENT)
Dept: CARDIOLOGY | Facility: HOSPITAL | Age: 69
Discharge: HOME | End: 2019-04-19

## 2019-04-19 VITALS — WEIGHT: 157 LBS | HEIGHT: 67 IN | BODY MASS INDEX: 24.64 KG/M2

## 2019-04-19 DIAGNOSIS — R07.89 OTHER CHEST PAIN: ICD-10-CM

## 2019-04-19 PROCEDURE — 93018 CV STRESS TEST I&R ONLY: CPT | Performed by: INTERNAL MEDICINE

## 2019-04-19 PROCEDURE — 25010000002 REGADENOSON 0.4 MG/5ML SOLUTION: Performed by: INTERNAL MEDICINE

## 2019-04-19 PROCEDURE — 78452 HT MUSCLE IMAGE SPECT MULT: CPT | Performed by: INTERNAL MEDICINE

## 2019-04-19 PROCEDURE — 78452 HT MUSCLE IMAGE SPECT MULT: CPT

## 2019-04-19 PROCEDURE — 93017 CV STRESS TEST TRACING ONLY: CPT

## 2019-04-19 PROCEDURE — 0 TECHNETIUM SESTAMIBI: Performed by: INTERNAL MEDICINE

## 2019-04-19 PROCEDURE — A9500 TC99M SESTAMIBI: HCPCS | Performed by: INTERNAL MEDICINE

## 2019-04-19 RX ADMIN — REGADENOSON 0.4 MG: 0.08 INJECTION, SOLUTION INTRAVENOUS at 10:45

## 2019-04-19 RX ADMIN — TECHNETIUM TC 99M SESTAMIBI 1 DOSE: 1 INJECTION INTRAVENOUS at 10:43

## 2019-04-19 RX ADMIN — TECHNETIUM TC 99M SESTAMIBI 1 DOSE: 1 INJECTION INTRAVENOUS at 08:15

## 2019-04-23 LAB
BH CV STRESS BP STAGE 1: NORMAL
BH CV STRESS BP STAGE 2: NORMAL
BH CV STRESS COMMENTS STAGE 1: NORMAL
BH CV STRESS COMMENTS STAGE 2: NORMAL
BH CV STRESS DOSE REGADENOSON STAGE 1: 0.4
BH CV STRESS DURATION MIN STAGE 1: 4
BH CV STRESS DURATION MIN STAGE 2: 7
BH CV STRESS DURATION SEC STAGE 1: 0
BH CV STRESS DURATION SEC STAGE 2: 0
BH CV STRESS HR STAGE 1: 93
BH CV STRESS HR STAGE 2: 96
BH CV STRESS PROTOCOL 1: NORMAL
BH CV STRESS RECOVERY BP: NORMAL MMHG
BH CV STRESS RECOVERY HR: 98 BPM
BH CV STRESS STAGE 1: 1
BH CV STRESS STAGE 2: 2
LV EF NUC BP: 69 %
MAXIMAL PREDICTED HEART RATE: 152 BPM
PERCENT MAX PREDICTED HR: 66.45 %
STRESS BASELINE BP: NORMAL MMHG
STRESS BASELINE HR: 82 BPM
STRESS PERCENT HR: 78 %
STRESS POST PEAK BP: NORMAL MMHG
STRESS POST PEAK HR: 101 BPM
STRESS TARGET HR: 129 BPM

## 2019-05-08 ENCOUNTER — OFFICE VISIT (OUTPATIENT)
Dept: PULMONOLOGY | Facility: CLINIC | Age: 69
End: 2019-05-08

## 2019-05-08 VITALS
HEART RATE: 88 BPM | DIASTOLIC BLOOD PRESSURE: 60 MMHG | BODY MASS INDEX: 25.15 KG/M2 | OXYGEN SATURATION: 94 % | WEIGHT: 160.2 LBS | HEIGHT: 67 IN | SYSTOLIC BLOOD PRESSURE: 100 MMHG | TEMPERATURE: 97.6 F

## 2019-05-08 DIAGNOSIS — J43.2 CENTRILOBULAR EMPHYSEMA (HCC): Primary | ICD-10-CM

## 2019-05-08 DIAGNOSIS — R06.02 SHORTNESS OF BREATH: ICD-10-CM

## 2019-05-08 DIAGNOSIS — R91.1 LUNG NODULE: ICD-10-CM

## 2019-05-08 PROCEDURE — 99213 OFFICE O/P EST LOW 20 MIN: CPT | Performed by: NURSE PRACTITIONER

## 2019-05-08 PROCEDURE — 94060 EVALUATION OF WHEEZING: CPT | Performed by: NURSE PRACTITIONER

## 2019-05-08 RX ORDER — ALBUTEROL SULFATE 90 UG/1
4 AEROSOL, METERED RESPIRATORY (INHALATION) ONCE
Status: COMPLETED | OUTPATIENT
Start: 2019-05-08 | End: 2019-05-08

## 2019-05-08 RX ADMIN — ALBUTEROL SULFATE 4 PUFF: 90 AEROSOL, METERED RESPIRATORY (INHALATION) at 08:58

## 2019-05-08 NOTE — PROGRESS NOTES
[facility] Pulmonary Follow up    CHIEF COMPLAINT    COPD    HISTORY OF PRESENT ILLNESS    Kailee Almanza is a 68 y.o.female here today for follow-up of her COPD.  She was last seen in our office in December by me.  She states that she has been doing well since her last visit.  I had provided her with some samples of Trelegy inhaler at the last visit.  She try this inhaler however had a severe headache and quit taking this inhaler.  Since this time she is only been using her rescue inhaler and uses it very seldom.    She does have some shortness of breath with activity, she has to take frequent breaks to do her daily activities.  She states that she does wheeze as well when she has to do strenuous activities.  She will occasionally use DuoNeb's as needed for shortness of breath.    She denies fever, chills, sputum production, hemoptysis, night sweats, weight loss, chest pain or palpitations.  She denies any lower extremity edema.  She denies any allergy or sinus symptoms.  She denies reflux symptoms.  She denies any difficulties with swallowing or eating or foods.    She quit smoking in 2013 and is a 35 pack history.  She is scheduled for a CT scan to be performed in November of this year.    She is accompanied today by her .    Patient Active Problem List   Diagnosis   • Peripheral vascular disease (CMS/HCC)   • Essential hypertension   • Cerebrovascular disease   • Hyperlipidemia   • Chronic bilateral low back pain without sciatica   • Centrilobular emphysema (CMS/HCC)   • S/P parathyroidectomy 2/1/17   • Aneurysm of thoracic aorta (CMS/HCC)   • Lung nodule   • Iron deficiency anemia due to chronic blood loss   • Iron malabsorption   • Psychophysiological insomnia   • Carotid stenosis   • Insomnia due to medical condition   • Shortness of breath   • Personal history of smoking       Allergies   Allergen Reactions   • Ferrlecit [Na Ferric Gluc Cplx In Sucrose] Diarrhea and Nausea And Vomiting   • Sulfa  Antibiotics Rash     Last as a baby.   • Percodan [Oxycodone-Aspirin] Mental Status Change       Current Outpatient Medications:   •  albuterol (PROVENTIL HFA;VENTOLIN HFA) 108 (90 Base) MCG/ACT inhaler, Inhale 2 puffs Every 4 (Four) Hours As Needed for Wheezing., Disp: 1 inhaler, Rfl: 0  •  atorvastatin (LIPITOR) 80 MG tablet, Take 1 tablet by mouth every night at bedtime., Disp: 90 tablet, Rfl: 3  •  clopidogrel (PLAVIX) 75 MG tablet, Take 75 mg by mouth Daily., Disp: , Rfl:   •  HYDROcodone-acetaminophen (NORCO) 7.5-325 MG per tablet, Take 1 tablet by mouth Daily As Needed for Moderate Pain ., Disp: 15 tablet, Rfl: 0  •  ipratropium-albuterol (DUO-NEB) 0.5-2.5 mg/3 ml nebulizer, Take 3 mL by nebulization As Needed for Wheezing., Disp: , Rfl:   •  lisinopril (PRINIVIL,ZESTRIL) 20 MG tablet, Take 1 tablet by mouth Daily., Disp: 30 tablet, Rfl: 5  •  traZODone (DESYREL) 50 MG tablet, Take 1-2 tablets at bedtime as needed for sleep., Disp: 180 tablet, Rfl: 3  •  venlafaxine 225 MG tablet sustained-release 24 hour 24 hr tablet, Take 1 tablet by mouth Daily With Breakfast., Disp: 90 tablet, Rfl: 1  No current facility-administered medications for this visit.   MEDICATION LIST AND ALLERGIES REVIEWED.    Social History     Tobacco Use   • Smoking status: Former Smoker     Packs/day: 1.00     Years: 35.00     Pack years: 35.00     Types: Cigarettes     Last attempt to quit: 2013     Years since quittin.9   • Smokeless tobacco: Never Used   Substance Use Topics   • Alcohol use: Yes     Comment: occasional   • Drug use: No       FAMILY AND SOCIAL HISTORY REVIEWED.    Review of Systems   Constitutional: Negative for activity change, appetite change, fatigue, fever and unexpected weight change.   HENT: Negative for congestion, postnasal drip, rhinorrhea, sinus pressure, sore throat and voice change.    Eyes: Negative for visual disturbance.   Respiratory: Positive for shortness of breath and wheezing. Negative for  "cough and chest tightness.    Cardiovascular: Negative for chest pain, palpitations and leg swelling.   Gastrointestinal: Negative for abdominal distention, abdominal pain, nausea and vomiting.   Endocrine: Negative for cold intolerance and heat intolerance.   Genitourinary: Negative for difficulty urinating and urgency.   Musculoskeletal: Negative for arthralgias, back pain and neck pain.   Skin: Negative for color change and pallor.   Allergic/Immunologic: Negative for environmental allergies and food allergies.   Neurological: Negative for dizziness, syncope, weakness and light-headedness.   Hematological: Negative for adenopathy. Does not bruise/bleed easily.   Psychiatric/Behavioral: Negative for agitation and behavioral problems.   .    /60 (BP Location: Right arm, Patient Position: Sitting)   Pulse 88   Temp 97.6 °F (36.4 °C)   Ht 170.2 cm (67\")   Wt 72.7 kg (160 lb 3.2 oz)   SpO2 94% Comment: resting at room air  BMI 25.09 kg/m²     Immunization History   Administered Date(s) Administered   • Shingrix 01/01/2016       Physical Exam   Constitutional: She is oriented to person, place, and time. She appears well-developed and well-nourished.   HENT:   Head: Normocephalic and atraumatic.   Eyes: Pupils are equal, round, and reactive to light.   Neck: Normal range of motion. Neck supple. No thyromegaly present.   Cardiovascular: Normal rate, regular rhythm, normal heart sounds and intact distal pulses. Exam reveals no gallop and no friction rub.   No murmur heard.  Pulmonary/Chest: Effort normal and breath sounds normal. No respiratory distress. She has no wheezes. She has no rales. She exhibits no tenderness.   Abdominal: Soft. Bowel sounds are normal. There is no tenderness.   Musculoskeletal: Normal range of motion.   Lymphadenopathy:     She has no cervical adenopathy.   Neurological: She is alert and oriented to person, place, and time.   Skin: Skin is warm and dry. Capillary refill takes less " than 2 seconds. She is not diaphoretic.   Psychiatric: She has a normal mood and affect. Her behavior is normal.   Nursing note and vitals reviewed.        RESULTS    PFTS in the office today, read by me.    Spirometry Interpretation 05/08/19:    1. Severe airway obstruction. (FEV1 35-49% pred.).  2. The maximum voluntary ventilation (MVV) is reduced.   3. Postbronchodilator response    PROBLEM LIST    Problem List Items Addressed This Visit        Respiratory    Centrilobular emphysema (CMS/HCC) - Primary    Overview     CT scan of the chest July 18, 2016, centrilobular emphysema with fibrosis, no pulmonary embolus         Relevant Medications    albuterol sulfate HFA (PROVENTIL HFA;VENTOLIN HFA;PROAIR HFA) inhaler 4 puff (Completed)    Other Relevant Orders    Spirometry With Bronchodilator (Completed)    Lung nodule    Shortness of breath            DISCUSSION    Ms. Almanza was here for follow-up of her COPD.  She states that she did not tolerate trilogy at all and is interested in something else to try for her breathing.  She is currently only using a rescue inhaler as needed for shortness of breath.  We reviewed her PFTs in detail today she still has severe obstruction with a postbronchodilator response.  I will give her samples of Symbicort and Spiriva to try for 1 month today and if she notices an improvement in her breathing she is going to call me and I will call these in for her.  I did instruct her on how to use each inhaler and to call with any questions or concerns.  She will continue to use her albuterol rescue inhaler or nebulizers as needed for shortness of breath.    Based on her smoking history of a 35 pack history and quitting in 2013 she will qualify for yearly CT low-dose screenings.  I had already placed an order for a CT low-dose to be performed in November 2019.  I advised her that we will call her closer to that date and get this scheduled.    She will follow-up in 3 months or sooner if her  symptoms worsen.  She will call with any additional concerns or questions.  I spent 15 minutes with the patient and family. I spent > 50% percent of this time counseling and discussing diagnosis, prognosis, diagnostic testing, evaluation, current status, treatment options and management.    Kelsey DEO Genao, APRN  05/08/201911:53 AM  Electronically signed     Please note that portions of this note were completed with a voice recognition program. Efforts were made to edit the dictations, but occasionally words are mistranscribed.      CC: Machelle Atkinson MD

## 2019-07-05 ENCOUNTER — LAB (OUTPATIENT)
Dept: LAB | Facility: HOSPITAL | Age: 69
End: 2019-07-05

## 2019-07-05 ENCOUNTER — TRANSCRIBE ORDERS (OUTPATIENT)
Dept: LAB | Facility: HOSPITAL | Age: 69
End: 2019-07-05

## 2019-07-05 DIAGNOSIS — E53.8 FOLATE DEFICIENCY: ICD-10-CM

## 2019-07-05 DIAGNOSIS — D50.0 IRON DEFICIENCY ANEMIA SECONDARY TO BLOOD LOSS (CHRONIC): ICD-10-CM

## 2019-07-05 DIAGNOSIS — E55.9 VITAMIN D DEFICIENCY: ICD-10-CM

## 2019-07-05 DIAGNOSIS — I10 ESSENTIAL HYPERTENSION, MALIGNANT: ICD-10-CM

## 2019-07-05 DIAGNOSIS — I10 ESSENTIAL HYPERTENSION, MALIGNANT: Primary | ICD-10-CM

## 2019-07-05 DIAGNOSIS — R53.82 CHRONIC FATIGUE: ICD-10-CM

## 2019-07-05 DIAGNOSIS — E83.52 HYPERCALCEMIA: ICD-10-CM

## 2019-07-05 LAB
25(OH)D3 SERPL-MCNC: 40.8 NG/ML
ALBUMIN SERPL-MCNC: 4.7 G/DL (ref 3.5–5)
ALBUMIN/GLOB SERPL: 1.1 G/DL (ref 1–2)
ALP SERPL-CCNC: 137 U/L (ref 38–126)
ALT SERPL W P-5'-P-CCNC: 21 U/L (ref 13–69)
ANION GAP SERPL CALCULATED.3IONS-SCNC: 18.2 MMOL/L (ref 10–20)
AST SERPL-CCNC: 33 U/L (ref 15–46)
BILIRUB SERPL-MCNC: 0.4 MG/DL (ref 0.2–1.3)
BUN BLD-MCNC: 17 MG/DL (ref 7–20)
BUN/CREAT SERPL: 15.5 (ref 7.1–23.5)
CALCIUM SPEC-SCNC: 9.8 MG/DL (ref 8.4–10.2)
CHLORIDE SERPL-SCNC: 98 MMOL/L (ref 98–107)
CO2 SERPL-SCNC: 27 MMOL/L (ref 26–30)
CREAT BLD-MCNC: 1.1 MG/DL (ref 0.6–1.3)
DEPRECATED RDW RBC AUTO: 54.8 FL (ref 37–54)
ERYTHROCYTE [DISTWIDTH] IN BLOOD BY AUTOMATED COUNT: 16.6 % (ref 12.3–15.4)
FERRITIN SERPL-MCNC: 18.5 NG/ML (ref 11.1–264)
GFR SERPL CREATININE-BSD FRML MDRD: 49 ML/MIN/1.73
GLOBULIN UR ELPH-MCNC: 4.1 GM/DL
GLUCOSE BLD-MCNC: 97 MG/DL (ref 74–98)
HCT VFR BLD AUTO: 40 % (ref 34–46.6)
HGB BLD-MCNC: 12.5 G/DL (ref 12–15.9)
IRON 24H UR-MRATE: 75 MCG/DL (ref 37–181)
IRON SATN MFR SERPL: 20 % (ref 11–46)
MCH RBC QN AUTO: 28.1 PG (ref 26.6–33)
MCHC RBC AUTO-ENTMCNC: 31.3 G/DL (ref 31.5–35.7)
MCV RBC AUTO: 89.9 FL (ref 79–97)
PLATELET # BLD AUTO: 335 10*3/MM3 (ref 140–450)
PMV BLD AUTO: 8.9 FL (ref 6–12)
POTASSIUM BLD-SCNC: 4.2 MMOL/L (ref 3.5–5.1)
PROT SERPL-MCNC: 8.8 G/DL (ref 6.3–8.2)
RBC # BLD AUTO: 4.45 10*6/MM3 (ref 3.77–5.28)
SODIUM BLD-SCNC: 139 MMOL/L (ref 137–145)
TIBC SERPL-MCNC: 377 MCG/DL (ref 261–497)
TSH SERPL DL<=0.05 MIU/L-ACNC: 6.69 MIU/ML (ref 0.47–4.68)
VIT B12 BLD-MCNC: 650 PG/ML (ref 239–931)
WBC NRBC COR # BLD: 8.15 10*3/MM3 (ref 3.4–10.8)

## 2019-07-05 PROCEDURE — 84436 ASSAY OF TOTAL THYROXINE: CPT

## 2019-07-05 PROCEDURE — 82728 ASSAY OF FERRITIN: CPT

## 2019-07-05 PROCEDURE — 84443 ASSAY THYROID STIM HORMONE: CPT

## 2019-07-05 PROCEDURE — 85027 COMPLETE CBC AUTOMATED: CPT

## 2019-07-05 PROCEDURE — 36415 COLL VENOUS BLD VENIPUNCTURE: CPT

## 2019-07-05 PROCEDURE — 80053 COMPREHEN METABOLIC PANEL: CPT

## 2019-07-05 PROCEDURE — 83540 ASSAY OF IRON: CPT

## 2019-07-05 PROCEDURE — 82306 VITAMIN D 25 HYDROXY: CPT

## 2019-07-05 PROCEDURE — 82330 ASSAY OF CALCIUM: CPT

## 2019-07-05 PROCEDURE — 83550 IRON BINDING TEST: CPT

## 2019-07-05 PROCEDURE — 82607 VITAMIN B-12: CPT

## 2019-07-06 LAB
CA-I SERPL ISE-MCNC: 5.1 MG/DL (ref 4.5–5.6)
T4 SERPL-MCNC: 5.6 UG/DL (ref 4.5–12)

## 2019-07-17 ENCOUNTER — TELEPHONE (OUTPATIENT)
Dept: PULMONOLOGY | Facility: CLINIC | Age: 69
End: 2019-07-17

## 2019-07-17 DIAGNOSIS — J43.2 CENTRILOBULAR EMPHYSEMA (HCC): Primary | ICD-10-CM

## 2019-07-17 RX ORDER — BUDESONIDE AND FORMOTEROL FUMARATE DIHYDRATE 160; 4.5 UG/1; UG/1
2 AEROSOL RESPIRATORY (INHALATION) 2 TIMES DAILY
Qty: 1 INHALER | Refills: 5 | Status: SHIPPED | OUTPATIENT
Start: 2019-07-17 | End: 2019-07-23

## 2019-07-17 NOTE — TELEPHONE ENCOUNTER
Pt needs an order for Symbicort and Spiriva Inhalers sent to [facility] please. Samples were given at last visit an they work real good.

## 2019-07-23 ENCOUNTER — TELEPHONE (OUTPATIENT)
Dept: PULMONOLOGY | Facility: CLINIC | Age: 69
End: 2019-07-23

## 2019-07-23 DIAGNOSIS — J43.2 CENTRILOBULAR EMPHYSEMA (HCC): Primary | ICD-10-CM

## 2019-07-24 RX ORDER — BUDESONIDE AND FORMOTEROL FUMARATE DIHYDRATE 160; 4.5 UG/1; UG/1
2 AEROSOL RESPIRATORY (INHALATION) 2 TIMES DAILY
Qty: 3 INHALER | Refills: 3 | Status: SHIPPED | OUTPATIENT
Start: 2019-07-24 | End: 2019-08-09

## 2019-08-09 ENCOUNTER — OFFICE VISIT (OUTPATIENT)
Dept: PULMONOLOGY | Facility: CLINIC | Age: 69
End: 2019-08-09

## 2019-08-09 VITALS
WEIGHT: 161 LBS | HEIGHT: 68 IN | SYSTOLIC BLOOD PRESSURE: 128 MMHG | BODY MASS INDEX: 24.4 KG/M2 | HEART RATE: 77 BPM | TEMPERATURE: 97.8 F | DIASTOLIC BLOOD PRESSURE: 62 MMHG | OXYGEN SATURATION: 96 %

## 2019-08-09 DIAGNOSIS — R91.1 LUNG NODULE: ICD-10-CM

## 2019-08-09 DIAGNOSIS — R06.02 SHORTNESS OF BREATH: ICD-10-CM

## 2019-08-09 DIAGNOSIS — Z87.891 PERSONAL HISTORY OF SMOKING: ICD-10-CM

## 2019-08-09 DIAGNOSIS — J43.2 CENTRILOBULAR EMPHYSEMA (HCC): Primary | ICD-10-CM

## 2019-08-09 PROCEDURE — 94060 EVALUATION OF WHEEZING: CPT | Performed by: NURSE PRACTITIONER

## 2019-08-09 PROCEDURE — 99213 OFFICE O/P EST LOW 20 MIN: CPT | Performed by: NURSE PRACTITIONER

## 2019-08-09 RX ORDER — ALBUTEROL SULFATE 90 UG/1
4 AEROSOL, METERED RESPIRATORY (INHALATION) ONCE
Status: COMPLETED | OUTPATIENT
Start: 2019-08-09 | End: 2019-08-09

## 2019-08-09 RX ORDER — ALBUTEROL SULFATE 90 UG/1
2 AEROSOL, METERED RESPIRATORY (INHALATION) EVERY 4 HOURS PRN
Qty: 6.7 G | Refills: 3 | Status: SHIPPED | OUTPATIENT
Start: 2019-08-09 | End: 2020-09-10

## 2019-08-09 RX ADMIN — ALBUTEROL SULFATE 4 PUFF: 90 AEROSOL, METERED RESPIRATORY (INHALATION) at 13:19

## 2019-08-09 NOTE — PROGRESS NOTES
[facility] Pulmonary Follow up    CHIEF COMPLAINT    COPD    HISTORY OF PRESENT ILLNESS    Kailee Alamnza is a 68 y.o.female here today for follow-up of her COPD.  She was last seen in the office in May by me.  She states that she has done fairly well since her last appointment.  She has not had any respiratory illnesses or exacerbations since her last appointment.    She continues to take Symbicort 2 puffs twice a day and Spiriva daily for her COPD.  Her insurance is no longer going to cover Symbicort and she needs to be switched over to Advair.  She occasionally uses DuoNeb's as needed for shortness of breath.  She does have wheezing with any exertion.  She has noticed a worsening shortness of air with activity.  She does recover quickly at rest.  She states that she was visiting in [place] last week and did not have any difficulty breathing and did not have to use her inhalers while she was there.    She denies fever, chills, sputum production, hemoptysis, night sweats, weight loss, chest pain or palpitations.  She denies any lower extremity edema.  She denies any sinus or allergy symptoms.  She denies reflux symptoms.    She quit smoking in 2013 and has a 35-pack-year history.  Her next CT scan is due in November.  We have been following her for a spiculated nodular fibrotic process in the right upper lobe which has remained stable.    Patient Active Problem List   Diagnosis   • Peripheral vascular disease (CMS/HCC)   • Essential hypertension   • Cerebrovascular disease   • Hyperlipidemia   • Chronic bilateral low back pain without sciatica   • Centrilobular emphysema (CMS/HCC)   • S/P parathyroidectomy 2/1/17   • Aneurysm of thoracic aorta (CMS/HCC)   • Lung nodule   • Iron deficiency anemia due to chronic blood loss   • Iron malabsorption   • Psychophysiological insomnia   • Carotid stenosis   • Insomnia due to medical condition   • Shortness of breath   • Personal history of smoking       Allergies   Allergen  Reactions   • Ferrlecit [Na Ferric Gluc Cplx In Sucrose] Diarrhea and Nausea And Vomiting   • Sulfa Antibiotics Rash     Last as a baby.   • Percodan [Oxycodone-Aspirin] Mental Status Change       Current Outpatient Medications:   •  albuterol sulfate  (90 Base) MCG/ACT inhaler, Inhale 2 puffs Every 4 (Four) Hours As Needed for Wheezing., Disp: 6.7 g, Rfl: 3  •  atorvastatin (LIPITOR) 80 MG tablet, Take 1 tablet by mouth every night at bedtime., Disp: 90 tablet, Rfl: 3  •  cefuroxime (CEFTIN) 500 MG tablet, Take 1 tablet by mouth 2 (Two) Times a Day., Disp: 20 tablet, Rfl: 0  •  clopidogrel (PLAVIX) 75 MG tablet, Take 75 mg by mouth Daily., Disp: , Rfl:   •  cyclobenzaprine (FLEXERIL) 10 MG tablet, Take 10 mg by mouth 3 (Three) Times a Day As Needed for Muscle Spasms., Disp: , Rfl:   •  gabapentin (NEURONTIN) 600 MG tablet, Take 600 mg by mouth 3 (Three) Times a Day., Disp: , Rfl:   •  HYDROcodone-acetaminophen (NORCO) 7.5-325 MG per tablet, Take 1 tablet by mouth Daily As Needed for Moderate Pain ., Disp: 15 tablet, Rfl: 0  •  ipratropium-albuterol (DUO-NEB) 0.5-2.5 mg/3 ml nebulizer, Take 3 mL by nebulization As Needed for Wheezing., Disp: , Rfl:   •  lisinopril (PRINIVIL,ZESTRIL) 20 MG tablet, Take 1 tablet by mouth Daily., Disp: 30 tablet, Rfl: 5  •  Tiotropium Bromide Monohydrate (SPIRIVA RESPIMAT) 1.25 MCG/ACT aerosol solution inhaler, Inhale 2 puffs Daily., Disp: 1 inhaler, Rfl: 3  •  traZODone (DESYREL) 50 MG tablet, Take 1-2 tablets at bedtime as needed for sleep., Disp: 180 tablet, Rfl: 3  •  venlafaxine 225 MG tablet sustained-release 24 hour 24 hr tablet, Take 1 tablet by mouth Daily With Breakfast., Disp: 90 tablet, Rfl: 1  •  fluticasone-salmeterol (ADVAIR) 500-50 MCG/DOSE DISKUS, Inhale 1 puff 2 (Two) Times a Day., Disp: 60 each, Rfl: 11  No current facility-administered medications for this visit.   MEDICATION LIST AND ALLERGIES REVIEWED.    Social History     Tobacco Use   • Smoking status:  "Former Smoker     Packs/day: 1.00     Years: 35.00     Pack years: 35.00     Types: Cigarettes     Last attempt to quit: 2013     Years since quittin.1   • Smokeless tobacco: Never Used   Substance Use Topics   • Alcohol use: Yes     Comment: occasional   • Drug use: No       FAMILY AND SOCIAL HISTORY REVIEWED.    Review of Systems   Constitutional: Negative for activity change, appetite change, fatigue, fever and unexpected weight change.   HENT: Negative for congestion, postnasal drip, rhinorrhea, sinus pressure, sore throat and voice change.    Eyes: Negative for visual disturbance.   Respiratory: Positive for cough and shortness of breath. Negative for chest tightness and wheezing.    Cardiovascular: Negative for chest pain, palpitations and leg swelling.   Gastrointestinal: Negative for abdominal distention, abdominal pain, nausea and vomiting.   Endocrine: Negative for cold intolerance and heat intolerance.   Genitourinary: Negative for difficulty urinating and urgency.   Musculoskeletal: Negative for arthralgias, back pain and neck pain.   Skin: Negative for color change and pallor.   Allergic/Immunologic: Negative for environmental allergies and food allergies.   Neurological: Negative for dizziness, syncope, weakness and light-headedness.   Hematological: Negative for adenopathy. Does not bruise/bleed easily.   Psychiatric/Behavioral: Negative for agitation and behavioral problems.   .    /62 (BP Location: Right arm, Patient Position: Sitting)   Pulse 77   Temp 97.8 °F (36.6 °C)   Ht 172.7 cm (68\")   Wt 73 kg (161 lb)   SpO2 96% Comment: resting at room air  BMI 24.48 kg/m²     Immunization History   Administered Date(s) Administered   • Shingrix 2016       Physical Exam   Constitutional: She is oriented to person, place, and time. She appears well-developed and well-nourished.   HENT:   Head: Normocephalic and atraumatic.   Eyes: Pupils are equal, round, and reactive to light. "   Neck: Normal range of motion. Neck supple. No thyromegaly present.   Cardiovascular: Normal rate, regular rhythm, normal heart sounds and intact distal pulses. Exam reveals no gallop and no friction rub.   No murmur heard.  Pulmonary/Chest: Effort normal and breath sounds normal. No respiratory distress. She has no wheezes. She has no rales. She exhibits no tenderness.   Abdominal: Soft. Bowel sounds are normal. There is no tenderness.   Musculoskeletal: Normal range of motion.   Lymphadenopathy:     She has no cervical adenopathy.   Neurological: She is alert and oriented to person, place, and time.   Skin: Skin is warm and dry. Capillary refill takes less than 2 seconds. She is not diaphoretic.   Psychiatric: She has a normal mood and affect. Her behavior is normal.   Nursing note and vitals reviewed.        RESULTS    PFTS in the office today, read by me.  FVC 2.27 68% predicted, FEV1 0.96 38% predicted, FEV1/FVC 42% predicted, severe obstruction with no postbronchodilator response.    PROBLEM LIST    Problem List Items Addressed This Visit        Respiratory    Centrilobular emphysema (CMS/HCC) - Primary    Overview     CT scan of the chest July 18, 2016, centrilobular emphysema with fibrosis, no pulmonary embolus         Relevant Medications    albuterol sulfate HFA (PROVENTIL HFA;VENTOLIN HFA;PROAIR HFA) inhaler 4 puff (Completed)    fluticasone-salmeterol (ADVAIR) 500-50 MCG/DOSE DISKUS    albuterol sulfate  (90 Base) MCG/ACT inhaler    Other Relevant Orders    Spirometry With Bronchodilator (Completed)    Lung nodule    Shortness of breath       Other    Personal history of smoking            DISCUSSION    Ms. Vela he was here for follow-up of her COPD.  We reviewed her PFTs in detail today and she remains to have severe obstruction.  I will change her Symbicort to Advair per her insurance request.  She will continue Spiriva daily for COPD.  She will also use her albuterol rescue inhaler as needed  for shortness of breath.  I did send a refill in for her albuterol inhaler.  She will continue duo nebs as needed for shortness of breath as well.    I did offer to refer her to pulmonary rehabilitation for her severe COPD however the patient declined this referral at this time.    Based on her 35 pack year history and quitting in 2013 she does qualify for a yearly CT screening for lung cancer.  Her next CT scan will be due in November.  I have already placed this order and we will call her closer to time to have this completed.  She will follow-up in the our office after the CT scan has been performed.  She will call with any additional concerns or questions prior to her CT scan in November.  She will call our office after she has her CT scan scheduled for a follow-up appointment.  I spent 15 minutes with the patient and family. I spent > 50% percent of this time counseling and discussing diagnosis, prognosis, diagnostic testing, evaluation, current status, treatment options and management.    Kelsey Genao, GET  08/09/20192:45 PM  Electronically signed     Please note that portions of this note were completed with a voice recognition program. Efforts were made to edit the dictations, but occasionally words are mistranscribed.      CC: Machelle Atkinson MD

## 2019-08-13 ENCOUNTER — TRANSCRIBE ORDERS (OUTPATIENT)
Dept: CT IMAGING | Facility: HOSPITAL | Age: 69
End: 2019-08-13

## 2019-08-13 DIAGNOSIS — R59.0 BILATERAL HILAR ADENOPATHY SYNDROME: Primary | ICD-10-CM

## 2019-08-15 ENCOUNTER — HOSPITAL ENCOUNTER (OUTPATIENT)
Dept: CT IMAGING | Facility: HOSPITAL | Age: 69
Discharge: HOME | End: 2019-08-15
Admitting: FAMILY MEDICINE

## 2019-08-15 DIAGNOSIS — R59.0 BILATERAL HILAR ADENOPATHY SYNDROME: ICD-10-CM

## 2019-08-15 PROCEDURE — 25010000002 IOPAMIDOL 61 % SOLUTION: Performed by: FAMILY MEDICINE

## 2019-08-15 PROCEDURE — 70470 CT HEAD/BRAIN W/O & W/DYE: CPT

## 2019-08-15 RX ADMIN — IOPAMIDOL 100 ML: 612 INJECTION, SOLUTION INTRAVENOUS at 14:13

## 2019-08-28 ENCOUNTER — TRANSCRIBE ORDERS (OUTPATIENT)
Dept: LAB | Facility: HOSPITAL | Age: 69
End: 2019-08-28

## 2019-08-28 ENCOUNTER — LAB (OUTPATIENT)
Dept: LAB | Facility: HOSPITAL | Age: 69
End: 2019-08-28

## 2019-08-28 DIAGNOSIS — E03.4 IDIOPATHIC ATROPHIC HYPOTHYROIDISM: ICD-10-CM

## 2019-08-28 DIAGNOSIS — D50.0 IRON DEFICIENCY ANEMIA SECONDARY TO BLOOD LOSS (CHRONIC): ICD-10-CM

## 2019-08-28 DIAGNOSIS — I12.0 MALIGNANT HYPERTENSIVE KIDNEY DISEASE WITH CHRONIC KIDNEY DISEASE STAGE V OR END STAGE RENAL DISEASE (HCC): ICD-10-CM

## 2019-08-28 DIAGNOSIS — I12.0 MALIGNANT HYPERTENSIVE KIDNEY DISEASE WITH CHRONIC KIDNEY DISEASE STAGE V OR END STAGE RENAL DISEASE (HCC): Primary | ICD-10-CM

## 2019-08-28 DIAGNOSIS — E55.9 VITAMIN D DEFICIENCY DISEASE: ICD-10-CM

## 2019-08-28 LAB
25(OH)D3 SERPL-MCNC: 37.9 NG/ML (ref 30–100)
ALBUMIN SERPL-MCNC: 4.1 G/DL (ref 3.5–5.2)
ALBUMIN/GLOB SERPL: 1.2 G/DL
ALP SERPL-CCNC: 126 U/L (ref 39–117)
ALT SERPL W P-5'-P-CCNC: 11 U/L (ref 1–33)
ANION GAP SERPL CALCULATED.3IONS-SCNC: 12.4 MMOL/L (ref 5–15)
AST SERPL-CCNC: 17 U/L (ref 1–32)
BILIRUB SERPL-MCNC: 0.3 MG/DL (ref 0.2–1.2)
BUN BLD-MCNC: 18 MG/DL (ref 8–23)
BUN/CREAT SERPL: 14.5 (ref 7–25)
CALCIUM SPEC-SCNC: 8.8 MG/DL (ref 8.6–10.5)
CHLORIDE SERPL-SCNC: 96 MMOL/L (ref 98–107)
CO2 SERPL-SCNC: 26.6 MMOL/L (ref 22–29)
CREAT BLD-MCNC: 1.24 MG/DL (ref 0.57–1)
DEPRECATED RDW RBC AUTO: 54.9 FL (ref 37–54)
ERYTHROCYTE [DISTWIDTH] IN BLOOD BY AUTOMATED COUNT: 15.9 % (ref 12.3–15.4)
GFR SERPL CREATININE-BSD FRML MDRD: 43 ML/MIN/1.73
GLOBULIN UR ELPH-MCNC: 3.4 GM/DL
GLUCOSE BLD-MCNC: 84 MG/DL (ref 65–99)
HCT VFR BLD AUTO: 40.2 % (ref 34–46.6)
HGB BLD-MCNC: 12.2 G/DL (ref 12–15.9)
MCH RBC QN AUTO: 28.5 PG (ref 26.6–33)
MCHC RBC AUTO-ENTMCNC: 30.3 G/DL (ref 31.5–35.7)
MCV RBC AUTO: 93.9 FL (ref 79–97)
PLATELET # BLD AUTO: 318 10*3/MM3 (ref 140–450)
PMV BLD AUTO: 9.3 FL (ref 6–12)
POTASSIUM BLD-SCNC: 5.2 MMOL/L (ref 3.5–5.2)
PROT SERPL-MCNC: 7.5 G/DL (ref 6–8.5)
RBC # BLD AUTO: 4.28 10*6/MM3 (ref 3.77–5.28)
SODIUM BLD-SCNC: 135 MMOL/L (ref 136–145)
T4 FREE SERPL-MCNC: 0.9 NG/DL (ref 0.93–1.7)
TSH SERPL DL<=0.05 MIU/L-ACNC: 5.65 UIU/ML (ref 0.27–4.2)
WBC NRBC COR # BLD: 6.64 10*3/MM3 (ref 3.4–10.8)

## 2019-08-28 PROCEDURE — 84443 ASSAY THYROID STIM HORMONE: CPT

## 2019-08-28 PROCEDURE — 82306 VITAMIN D 25 HYDROXY: CPT

## 2019-08-28 PROCEDURE — 84439 ASSAY OF FREE THYROXINE: CPT

## 2019-08-28 PROCEDURE — 80053 COMPREHEN METABOLIC PANEL: CPT

## 2019-08-28 PROCEDURE — 36415 COLL VENOUS BLD VENIPUNCTURE: CPT

## 2019-08-28 PROCEDURE — 85027 COMPLETE CBC AUTOMATED: CPT

## 2019-08-30 DIAGNOSIS — G47.01 INSOMNIA DUE TO MEDICAL CONDITION: ICD-10-CM

## 2019-08-30 RX ORDER — TRAZODONE HYDROCHLORIDE 50 MG/1
TABLET ORAL
Qty: 180 TABLET | Refills: 4 | OUTPATIENT
Start: 2019-08-30

## 2019-10-24 ENCOUNTER — OFFICE VISIT (OUTPATIENT)
Dept: PULMONOLOGY | Facility: CLINIC | Age: 69
End: 2019-10-24

## 2019-10-24 VITALS
DIASTOLIC BLOOD PRESSURE: 62 MMHG | HEART RATE: 82 BPM | OXYGEN SATURATION: 97 % | WEIGHT: 164 LBS | SYSTOLIC BLOOD PRESSURE: 118 MMHG | HEIGHT: 68 IN | TEMPERATURE: 97.6 F | BODY MASS INDEX: 24.86 KG/M2

## 2019-10-24 DIAGNOSIS — Z23 IMMUNIZATION DUE: Primary | ICD-10-CM

## 2019-10-24 DIAGNOSIS — J43.2 CENTRILOBULAR EMPHYSEMA (HCC): ICD-10-CM

## 2019-10-24 DIAGNOSIS — R06.02 SHORTNESS OF BREATH: ICD-10-CM

## 2019-10-24 PROCEDURE — 99213 OFFICE O/P EST LOW 20 MIN: CPT | Performed by: NURSE PRACTITIONER

## 2019-10-24 PROCEDURE — 90653 IIV ADJUVANT VACCINE IM: CPT | Performed by: NURSE PRACTITIONER

## 2019-10-24 PROCEDURE — G0296 VISIT TO DETERM LDCT ELIG: HCPCS | Performed by: NURSE PRACTITIONER

## 2019-10-24 PROCEDURE — G0008 ADMIN INFLUENZA VIRUS VAC: HCPCS | Performed by: NURSE PRACTITIONER

## 2019-10-24 PROCEDURE — 94618 PULMONARY STRESS TESTING: CPT | Performed by: NURSE PRACTITIONER

## 2019-10-24 NOTE — PROGRESS NOTES
[facility] Pulmonary Follow up    CHIEF COMPLAINT    Shortness of breath    HISTORY OF PRESENT ILLNESS    Kailee Almanza is a 68 y.o.female here today for follow-up of her shortness of breath.  She was last seen in the office by me in August.  She was concerned that she may need oxygen with activity and wanted to be seen today.    She continues to use Advair and Spiriva daily for her COPD.  She will occasionally use her albuterol 2-3 times per day.  She also has duo nebs that she only uses once or twice a week.  She has noticed worsening shortness of breath with activity over the last several months.  She does recover fairly quickly at rest.    She denies fever, chills, sputum production, hemoptysis, night sweats, weight loss, chest pain or palpitations.  She denies any lower extremity edema.  She occasionally has some sinus symptoms but takes over-the-counter medication as needed.  She denies any reflux symptoms.    She would like to receive her influenza vaccination today if possible.  She is accompanied today by her .    She quit smoking in 2013 and has a 35-pack-year history.  Her next CT scan is due in November and this is scheduled.  Her previous CT scan in November 2018 showed a spiculated process in the right upper lobe which had been unchanged since August 2018.  This finding is also been on numerous CT scans dating back to 2014.    Patient Active Problem List   Diagnosis   • Peripheral vascular disease (CMS/HCC)   • Essential hypertension   • Cerebrovascular disease   • Hyperlipidemia   • Chronic bilateral low back pain without sciatica   • Centrilobular emphysema (CMS/HCC)   • S/P parathyroidectomy 2/1/17   • Aneurysm of thoracic aorta (CMS/HCC)   • Lung nodule   • Iron deficiency anemia due to chronic blood loss   • Iron malabsorption   • Psychophysiological insomnia   • Carotid stenosis   • Insomnia due to medical condition   • Shortness of breath   • Personal history of smoking       Allergies    Allergen Reactions   • Ferrlecit [Na Ferric Gluc Cplx In Sucrose] Diarrhea and Nausea And Vomiting   • Sulfa Antibiotics Rash     Last as a baby.   • Percodan [Oxycodone-Aspirin] Mental Status Change       Current Outpatient Medications:   •  albuterol sulfate  (90 Base) MCG/ACT inhaler, Inhale 2 puffs Every 4 (Four) Hours As Needed for Wheezing., Disp: 6.7 g, Rfl: 3  •  atorvastatin (LIPITOR) 80 MG tablet, Take 1 tablet by mouth every night at bedtime., Disp: 90 tablet, Rfl: 3  •  clopidogrel (PLAVIX) 75 MG tablet, Take 75 mg by mouth Daily., Disp: , Rfl:   •  cyclobenzaprine (FLEXERIL) 10 MG tablet, Take 10 mg by mouth 3 (Three) Times a Day As Needed for Muscle Spasms., Disp: , Rfl:   •  fluticasone-salmeterol (ADVAIR) 500-50 MCG/DOSE DISKUS, Inhale 1 puff 2 (Two) Times a Day., Disp: 60 each, Rfl: 11  •  gabapentin (NEURONTIN) 600 MG tablet, Take 600 mg by mouth 3 (Three) Times a Day., Disp: , Rfl:   •  HYDROcodone-acetaminophen (NORCO) 7.5-325 MG per tablet, Take 1 tablet by mouth Daily As Needed for Moderate Pain ., Disp: 15 tablet, Rfl: 0  •  ipratropium-albuterol (DUO-NEB) 0.5-2.5 mg/3 ml nebulizer, Take 3 mL by nebulization As Needed for Wheezing., Disp: , Rfl:   •  lisinopril (PRINIVIL,ZESTRIL) 20 MG tablet, Take 1 tablet by mouth Daily., Disp: 30 tablet, Rfl: 5  •  Tiotropium Bromide Monohydrate (SPIRIVA RESPIMAT) 1.25 MCG/ACT aerosol solution inhaler, Inhale 2 puffs Daily., Disp: 1 inhaler, Rfl: 3  •  traZODone (DESYREL) 50 MG tablet, Take 1-2 tablets at bedtime as needed for sleep., Disp: 180 tablet, Rfl: 3  •  venlafaxine 225 MG tablet sustained-release 24 hour 24 hr tablet, Take 1 tablet by mouth Daily With Breakfast., Disp: 90 tablet, Rfl: 1  MEDICATION LIST AND ALLERGIES REVIEWED.    Social History     Tobacco Use   • Smoking status: Former Smoker     Packs/day: 1.00     Years: 35.00     Pack years: 35.00     Types: Cigarettes     Last attempt to quit: 2013     Years since quittin.4  "  • Smokeless tobacco: Never Used   Substance Use Topics   • Alcohol use: Yes     Comment: occasional   • Drug use: No       FAMILY AND SOCIAL HISTORY REVIEWED.    Review of Systems   Constitutional: Negative for activity change, appetite change, fatigue, fever and unexpected weight change.   HENT: Negative for congestion, postnasal drip, rhinorrhea, sinus pressure, sore throat and voice change.    Eyes: Negative for visual disturbance.   Respiratory: Positive for shortness of breath and wheezing. Negative for cough and chest tightness.    Cardiovascular: Negative for chest pain, palpitations and leg swelling.   Gastrointestinal: Negative for abdominal distention, abdominal pain, nausea and vomiting.   Endocrine: Negative for cold intolerance and heat intolerance.   Genitourinary: Negative for difficulty urinating and urgency.   Musculoskeletal: Negative for arthralgias, back pain and neck pain.   Skin: Negative for color change and pallor.   Allergic/Immunologic: Negative for environmental allergies and food allergies.   Neurological: Negative for dizziness, syncope, weakness and light-headedness.   Hematological: Negative for adenopathy. Does not bruise/bleed easily.   Psychiatric/Behavioral: Negative for agitation and behavioral problems. The patient is nervous/anxious.    .    /62   Pulse 82   Temp 97.6 °F (36.4 °C)   Ht 172.7 cm (68\")   Wt 74.4 kg (164 lb)   LMP  (LMP Unknown)   SpO2 97% Comment: resting, room air  Breastfeeding? No   BMI 24.94 kg/m²     Immunization History   Administered Date(s) Administered   • Fluad Quad 10/24/2019   • Shingrix 01/01/2016       Physical Exam   Constitutional: She is oriented to person, place, and time. She appears well-developed and well-nourished.   HENT:   Head: Normocephalic and atraumatic.   Eyes: Pupils are equal, round, and reactive to light.   Neck: Normal range of motion. Neck supple. No thyromegaly present.   Cardiovascular: Normal rate, regular " rhythm, normal heart sounds and intact distal pulses. Exam reveals no gallop and no friction rub.   No murmur heard.  Pulmonary/Chest: Effort normal and breath sounds normal. No respiratory distress. She has no wheezes. She has no rales. She exhibits no tenderness.   Abdominal: Soft. Bowel sounds are normal. There is no tenderness.   Musculoskeletal: Normal range of motion.   Lymphadenopathy:     She has no cervical adenopathy.   Neurological: She is alert and oriented to person, place, and time.   Skin: Skin is warm and dry. Capillary refill takes less than 2 seconds. She is not diaphoretic.   Psychiatric: She has a normal mood and affect. Her behavior is normal.   Nursing note and vitals reviewed.        RESULTS    6-minute walk test: Patient ambulated 650 feet her oxygen saturation never dropped below 98% throughout entire testing.  She did have some mild dyspnea at the end of testing and had to sit down for recovery.  She does not qualify for oxygen at this time.    PROBLEM LIST    Problem List Items Addressed This Visit        Respiratory    Centrilobular emphysema (CMS/HCC)    Overview     CT scan of the chest July 18, 2016, centrilobular emphysema with fibrosis, no pulmonary embolus         Relevant Orders    Walking Oximetry (Completed)    Ambulatory Referral to Pulmonary Rehab (Completed)    Shortness of breath      Other Visit Diagnoses     Immunization due    -  Primary    Relevant Orders    Fluad Quad >65 years (6812-3745) (Completed)            DISCUSSION    Ms. Almanza was here for worsening shortness of breath.  I did have her perform a 6-minute walk test in the office today and she never dropped below 98%.  She does not qualify for oxygen with activity.  I did advise her that she would benefit with pulmonary rehab.  She is interested in starting this program and would like to perform this in [place].    I am also going order an overnight oximetry to be performed to rule out any desaturations while she  is sleeping.  If she does qualify for oxygen we will set her up at that time.  I will call her once I receive the results of her overnight.    She will continue Advair and Spiriva daily for her emphysema.  She will also continue albuterol rescue inhaler or DuoNeb's as needed for shortness of breath.  I did encourage her to do the DuoNeb's more frequently to see if this help with her shortness of breath.    She has a CT scan scheduled in November for a low-dose screening for lung cancer and I will call her after receive the results of this testing.    She will follow-up in 6 months or sooner if her symptoms worsen.  She will call with any additional concerns or questions.  I spent 15 minutes with the patient. I spent > 50% percent of this time counseling and discussing diagnosis, prognosis, diagnostic testing, evaluation, current status, treatment options and management.    Kelseytiffany Genao, APRN  10/24/880794:56 PM  Electronically signed     Please note that portions of this note were completed with a voice recognition program. Efforts were made to edit the dictations, but occasionally words are mistranscribed.      CC: Machelle Atkinson MD

## 2019-11-05 ENCOUNTER — TRANSCRIBE ORDERS (OUTPATIENT)
Dept: PULMONOLOGY | Facility: CLINIC | Age: 69
End: 2019-11-05

## 2019-11-18 ENCOUNTER — HOSPITAL ENCOUNTER (OUTPATIENT)
Dept: CT IMAGING | Facility: HOSPITAL | Age: 69
Discharge: HOME | End: 2019-11-18
Admitting: NURSE PRACTITIONER

## 2019-11-18 DIAGNOSIS — Z87.891 PERSONAL HISTORY OF SMOKING: ICD-10-CM

## 2019-11-18 PROCEDURE — G0297 LDCT FOR LUNG CA SCREEN: HCPCS

## 2019-11-19 ENCOUNTER — TELEPHONE (OUTPATIENT)
Dept: PULMONOLOGY | Facility: CLINIC | Age: 69
End: 2019-11-19

## 2019-11-19 DIAGNOSIS — J43.2 CENTRILOBULAR EMPHYSEMA (HCC): Primary | ICD-10-CM

## 2019-11-19 RX ORDER — PREDNISONE 10 MG/1
TABLET ORAL
Qty: 30 TABLET | Refills: 0 | Status: SHIPPED | OUTPATIENT
Start: 2019-11-19 | End: 2020-02-12

## 2019-11-19 RX ORDER — DOXYCYCLINE HYCLATE 100 MG
100 TABLET ORAL 2 TIMES DAILY
Qty: 20 TABLET | Refills: 0 | Status: SHIPPED | OUTPATIENT
Start: 2019-11-19 | End: 2019-12-06

## 2019-11-19 NOTE — TELEPHONE ENCOUNTER
I notified Ms. Gordo CORONA of her most recent CT scan that showed, A stable fibrotic area in the right upper lobe.  No suspicious findings.  She did have a new small area of right basilar atelectasis.  She states she has been more short of breath over the last week.  She would like an antibiotic and steroid taper called and.  I did call these in for her today to her local pharmacy.  I did advise her that if her symptoms were to worsen she need need to call make an appointment to be seen.  She was agreeable to this plan.  She will need a repeat CT scan in November 2020.

## 2019-12-03 DIAGNOSIS — J43.2 CENTRILOBULAR EMPHYSEMA (HCC): Primary | ICD-10-CM

## 2019-12-05 DIAGNOSIS — J43.2 CENTRILOBULAR EMPHYSEMA (HCC): Primary | ICD-10-CM

## 2019-12-05 RX ORDER — IPRATROPIUM BROMIDE AND ALBUTEROL SULFATE 2.5; .5 MG/3ML; MG/3ML
3 SOLUTION RESPIRATORY (INHALATION)
Qty: 360 ML | Refills: 3 | Status: SHIPPED | OUTPATIENT
Start: 2019-12-05

## 2019-12-05 NOTE — PROGRESS NOTES
I had a message for Ms. Almanza to return her call.  She states that she has had increased wheezing overnight and today.  She states that she has been using her nebulizers and this has helped her wheezing.  She denies any sputum production or hemoptysis.  She denies fever or chills.      She finished a round of doxycycline and prednisone 2 weeks ago.  I did advise her to continue doing nebulizers for the next day and if she continues to worsen I will call in an additional antibiotic and steroid tomorrow for her.  She is going to call me tomorrow and let me know how she feels.  I am going to call in some DuoNeb's for her to use 4 times a day while she is feeling ill.

## 2019-12-06 DIAGNOSIS — J44.1 COPD WITH ACUTE EXACERBATION (HCC): Primary | ICD-10-CM

## 2019-12-06 RX ORDER — PREDNISONE 10 MG/1
TABLET ORAL
Qty: 31 TABLET | Refills: 0 | Status: SHIPPED | OUTPATIENT
Start: 2019-12-06 | End: 2020-02-12

## 2019-12-06 RX ORDER — DOXYCYCLINE HYCLATE 100 MG
100 TABLET ORAL 2 TIMES DAILY
Qty: 20 TABLET | Refills: 0 | Status: SHIPPED | OUTPATIENT
Start: 2019-12-06 | End: 2020-02-12

## 2019-12-09 ENCOUNTER — CLINICAL SUPPORT (OUTPATIENT)
Dept: PULMONOLOGY | Facility: CLINIC | Age: 69
End: 2019-12-09

## 2019-12-09 DIAGNOSIS — R06.02 SHORTNESS OF BREATH: ICD-10-CM

## 2019-12-09 DIAGNOSIS — J43.2 CENTRILOBULAR EMPHYSEMA (HCC): Primary | ICD-10-CM

## 2019-12-09 PROCEDURE — G0424 PULMONARY REHAB W EXER: HCPCS | Performed by: INTERNAL MEDICINE

## 2019-12-11 ENCOUNTER — CLINICAL SUPPORT (OUTPATIENT)
Dept: PULMONOLOGY | Facility: CLINIC | Age: 69
End: 2019-12-11

## 2019-12-11 VITALS
HEART RATE: 85 BPM | RESPIRATION RATE: 14 BRPM | DIASTOLIC BLOOD PRESSURE: 70 MMHG | OXYGEN SATURATION: 97 % | SYSTOLIC BLOOD PRESSURE: 140 MMHG

## 2019-12-11 VITALS
SYSTOLIC BLOOD PRESSURE: 118 MMHG | HEART RATE: 87 BPM | RESPIRATION RATE: 14 BRPM | OXYGEN SATURATION: 100 % | DIASTOLIC BLOOD PRESSURE: 80 MMHG

## 2019-12-11 DIAGNOSIS — R06.02 SHORTNESS OF BREATH: ICD-10-CM

## 2019-12-11 DIAGNOSIS — J43.2 CENTRILOBULAR EMPHYSEMA (HCC): Primary | ICD-10-CM

## 2019-12-11 PROCEDURE — G0424 PULMONARY REHAB W EXER: HCPCS | Performed by: INTERNAL MEDICINE

## 2019-12-11 NOTE — PROGRESS NOTES
[facility]  Pulmonary Rehabilitation  Daily Treatment Log    Name: Kailee Almanza : 1950 Date: 2019     Session: 1/ approved: 18          Time In/Out: 10:00/11:30am     COPD Charges:  x1    Physician : Drew Zapata MD      Dx: J43.2, R06.02      GOLD: 1 []  2 []  3 [x]  4 []     Primary Insurance:                Secondary Insurance: for Life    /70   Pulse 85   Resp 14   LMP  (LMP Unknown)   SpO2 97% Comment: R/A     Auscultation:  []Clear   [x]Clear and Decreased   []Insp. Wheeze   []Exp. Wheeze     []Rhonchi   []Rales    Target Heart Rate Zone: 105-115  Max HR: 125    Exercise Sp02% Blood Pressure Heart Rate HOLLIE Scale/Fatigue   Treadmill: Speed:       Min:  % Grade:          Distance:  x      Bands:#:        Reps:        Min:  Color Band:  x      6MWT:Min: 2 min walk  Unable to complete 6 MWT/dyspnea  Distance: 300ft    MET:1.5 97  89 140/70 85  101 3/3   Step-ups:#:      Reps:     Min:  x      Cybex/Vision/Schwinn Bike:  Level:      Speed:    RPM:       Min:  Distance:                 Seat:  x      Assessments    Min:15  Sit/Stand #13  Curls #26  Step Test #23                  91  99  93    107  93  103   0/0  0/0  2/2   NuStep: Level:      METS:     Min:       SPM:         Total Steps:  Seat:      Arms:  x      Arm Ergometer: Level:     Min:  RPM:            Fwd:      Back:  Total Turns:  x      PLB / DB Spinner: Min:  P-Flex: Level:         Min:  IS: Volume:             Min:  Warm-up Stretches: Min:  x         Education and Training: []HOLLIE Scale  []PLB  []DB  []IMT  []IS  []Vital Signs   []Target HR Zone  []Exercise Vital Signs [] Safe Vital Signs  []Pulmonary Anatomy  []Lung Function  []Lung Disease Process  []Oxygen Use  []Oxygen Safety  []Pulmonary Meds  []Med Instruction  []Cough Technique  []Pulmonary Hygeine  []Infection Prevention  []Signs of Infection  []When to Call MD  []Nutrition  []Weight Management  []Advance Directives  []Posture   []Body Mechanics []Energy Conservation  []Pacing ADL's  []Stress/Anxiety Management  []Stretching  []Home Exercise  []Smoking Cessation   []UT Progress  []Maintenance Exercise Program    []Pulmonary Knowledge Test/Education  []Review PFT  []Review Specific Disease  []Review Home Follow-through Compliance  []Safe Exercise []Exercise Progression Strategies    Progress Report:  Initial evaluation for UT. See clinical eval/assessments for details.     Treating Clinicians: [x]Meryl Null, RRT  [] Christina Fournier RRT    MD in Office: [x]MYKE Mclean  []ANDRIY eVlarde  []DC Bosch  []JR Zapata    []JULIETA Garcia []PEGGY Grier  []ANDRIY Kay  []ANDRIY Turner  [x]MAHI Zapata  [x]RITA Saunders   []JULIETA Julien  []MYKE Aguila

## 2019-12-11 NOTE — PROGRESS NOTES
[facility]  Pulmonary Rehabilitation  Daily Treatment Log    Name: Kailee Almanza : 1950 Date: 2019     Session: 2/ approved: 18          Time In/Out: 1:00/2:20pm     COPD Charges:  x1    Physician : Drew Zapata MD      Dx: J43.2, R06.02      GOLD: 1 []  2 []  3 [x]  4 []     Primary Insurance:               Secondary Insurance: for Life    /80   Pulse 87   Resp 14   LMP  (LMP Unknown)   SpO2 100% Comment: R/A     Auscultation:  []Clear   [x]Clear and Decreased   []Insp. Wheeze   []Exp. Wheeze     []Rhonchi   []Rales    Target Heart Rate Zone: 105-115  Max HR: 125    Exercise Sp02% Blood Pressure Heart Rate HOLLIE Scale/Fatigue   Treadmill: Speed:       Min:  % Grade:          Distance:  x      Bands:#:        Reps:        Min:  Color Band:  x      6MWT:Min:        Distance:     MET:  x      Step-ups:#:      Reps:     Min:  x      Cybex/Vision/Schwinn Bike:  Level:      Speed:    RPM:       Min:  Distance:                 Seat:  x      Hand Weights: Pounds:     Min:  Curls#         Sets:   Presses#    Sets:  Fly#            Sets:  Shoulder Shrugs#     Sets:  Wings#       Sets:  Wrist Curls#   Sets:                      x      NuStep: Level: 2      METS: 2.0      Min:20  Rest at 10 min       SPM: 55          Total Steps: 1139  Seat: 11      Arms:11   97   122/70   77   0/2  Back sore   Arm Ergometer: Level:     Min:  RPM:            Fwd:      Back:  Total Turns:  x      PLB / DB Spinner: Min:10  P-Flex: Level:         Min:10  IS: Volume: 1200            Min:10  Warm-up Stretches: Min:15 99  95       Education and Training: 15 min [x]HOLLIE Scale  [x]PLB  [x]DB  [x]IMT  [x]IS  [x]Vital Signs   []Target HR Zone  []Exercise Vital Signs [] Safe Vital Signs  [x]Pulmonary Anatomy  []Lung Function  []Lung Disease Process  []Oxygen Use  []Oxygen Safety  []Pulmonary Meds  []Med Instruction  []Cough Technique  []Pulmonary Hygeine  []Infection Prevention  []Signs  of Infection  []When to Call MD  []Nutrition  []Weight Management  []Advance Directives  []Posture  []Body Mechanics []Energy Conservation  []Pacing ADL's  []Stress/Anxiety Management  [x]Stretching  [x]Home Exercise  []Smoking Cessation   []UT Progress  []Maintenance Exercise Program    [x]Pulmonary Knowledge Test/Education  []Review PFT  []Review Specific Disease  []Review Home Follow-through Compliance  [x]Safe Exercise []Exercise Progression Strategies    Progress Report: Orientation to UT. Pt given IS and PFlex and instructed on use. Encouraged pt use IS and Pflex twice daily for 10 min each time. Pt completed pre pulmonary knowledge test, missed 2 questions. Initial training on PLB/DB, exercise safety.        Treating Clinicians: [x]Meryl Null, RRT  [] Christina Fournier, RRT    MD in Office: []MYKE Mclean  []ANDRIY Velarde  []DC Bosch  []JR Zapata    []JULIETA Garcia []PEGGY Grier  []ANDRIY Kay  []ANDRIY Turner  [x]MAHI Zapata  [x]RITA Saunders   []JULIETA Julien  []MYKE Aguila

## 2019-12-12 ENCOUNTER — LAB (OUTPATIENT)
Dept: LAB | Facility: HOSPITAL | Age: 69
End: 2019-12-12

## 2019-12-12 ENCOUNTER — TRANSCRIBE ORDERS (OUTPATIENT)
Dept: LAB | Facility: HOSPITAL | Age: 69
End: 2019-12-12

## 2019-12-12 DIAGNOSIS — E03.4 IDIOPATHIC ATROPHIC HYPOTHYROIDISM: Primary | ICD-10-CM

## 2019-12-12 DIAGNOSIS — E03.4 IDIOPATHIC ATROPHIC HYPOTHYROIDISM: ICD-10-CM

## 2019-12-12 LAB
T4 FREE SERPL-MCNC: 1.04 NG/DL (ref 0.93–1.7)
TSH SERPL DL<=0.05 MIU/L-ACNC: 4.87 UIU/ML (ref 0.27–4.2)

## 2019-12-12 PROCEDURE — 36415 COLL VENOUS BLD VENIPUNCTURE: CPT

## 2019-12-12 PROCEDURE — 84443 ASSAY THYROID STIM HORMONE: CPT

## 2019-12-12 PROCEDURE — 84439 ASSAY OF FREE THYROXINE: CPT

## 2019-12-23 ENCOUNTER — OFFICE VISIT (OUTPATIENT)
Dept: CARDIAC SURGERY | Facility: CLINIC | Age: 69
End: 2019-12-23

## 2019-12-23 VITALS
TEMPERATURE: 97.9 F | SYSTOLIC BLOOD PRESSURE: 144 MMHG | HEART RATE: 104 BPM | DIASTOLIC BLOOD PRESSURE: 78 MMHG | OXYGEN SATURATION: 98 % | HEIGHT: 68 IN | WEIGHT: 163 LBS | BODY MASS INDEX: 24.71 KG/M2

## 2019-12-23 DIAGNOSIS — I73.9 PERIPHERAL VASCULAR DISEASE (HCC): ICD-10-CM

## 2019-12-23 DIAGNOSIS — I73.9 PERIPHERAL VASCULAR DISEASE (HCC): Primary | ICD-10-CM

## 2019-12-23 PROCEDURE — 99215 OFFICE O/P EST HI 40 MIN: CPT | Performed by: THORACIC SURGERY (CARDIOTHORACIC VASCULAR SURGERY)

## 2019-12-23 PROCEDURE — 93923 UPR/LXTR ART STDY 3+ LVLS: CPT | Performed by: THORACIC SURGERY (CARDIOTHORACIC VASCULAR SURGERY)

## 2019-12-23 RX ORDER — LEVOTHYROXINE SODIUM 0.07 MG/1
75 TABLET ORAL DAILY
COMMUNITY
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
## 2020-01-02 ENCOUNTER — PREP FOR SURGERY (OUTPATIENT)
Dept: OTHER | Facility: HOSPITAL | Age: 70
End: 2020-01-02

## 2020-01-02 DIAGNOSIS — I73.9 PERIPHERAL VASCULAR DISEASE (HCC): Primary | ICD-10-CM

## 2020-01-07 ENCOUNTER — APPOINTMENT (OUTPATIENT)
Dept: PREADMISSION TESTING | Facility: HOSPITAL | Age: 70
End: 2020-01-07

## 2020-01-07 VITALS — WEIGHT: 162.04 LBS | BODY MASS INDEX: 24.56 KG/M2 | HEIGHT: 68 IN

## 2020-01-07 DIAGNOSIS — I73.9 PERIPHERAL VASCULAR DISEASE (HCC): ICD-10-CM

## 2020-01-07 LAB
ANION GAP SERPL CALCULATED.3IONS-SCNC: 12 MMOL/L (ref 5–15)
APTT PPP: 32.6 SECONDS (ref 24–37)
BUN BLD-MCNC: 16 MG/DL (ref 8–23)
BUN/CREAT SERPL: 11.9 (ref 7–25)
CALCIUM SPEC-SCNC: 9.3 MG/DL (ref 8.6–10.5)
CHLORIDE SERPL-SCNC: 97 MMOL/L (ref 98–107)
CO2 SERPL-SCNC: 26 MMOL/L (ref 22–29)
CREAT BLD-MCNC: 1.34 MG/DL (ref 0.57–1)
DEPRECATED RDW RBC AUTO: 52.5 FL (ref 37–54)
ERYTHROCYTE [DISTWIDTH] IN BLOOD BY AUTOMATED COUNT: 15.3 % (ref 12.3–15.4)
GFR SERPL CREATININE-BSD FRML MDRD: 39 ML/MIN/1.73
GLUCOSE BLD-MCNC: 106 MG/DL (ref 65–99)
HBA1C MFR BLD: 5.6 % (ref 4.8–5.6)
HCT VFR BLD AUTO: 39.2 % (ref 34–46.6)
HGB BLD-MCNC: 12.1 G/DL (ref 12–15.9)
INR PPP: 0.98 (ref 0.85–1.16)
MCH RBC QN AUTO: 28.7 PG (ref 26.6–33)
MCHC RBC AUTO-ENTMCNC: 30.9 G/DL (ref 31.5–35.7)
MCV RBC AUTO: 92.9 FL (ref 79–97)
PLATELET # BLD AUTO: 352 10*3/MM3 (ref 140–450)
PMV BLD AUTO: 9.2 FL (ref 6–12)
POTASSIUM BLD-SCNC: 4.4 MMOL/L (ref 3.5–5.2)
PROTHROMBIN TIME: 12.5 SECONDS (ref 11.2–14.3)
RBC # BLD AUTO: 4.22 10*6/MM3 (ref 3.77–5.28)
SODIUM BLD-SCNC: 135 MMOL/L (ref 136–145)
WBC NRBC COR # BLD: 7.03 10*3/MM3 (ref 3.4–10.8)

## 2020-01-07 PROCEDURE — 83036 HEMOGLOBIN GLYCOSYLATED A1C: CPT | Performed by: PHYSICIAN ASSISTANT

## 2020-01-07 PROCEDURE — 93005 ELECTROCARDIOGRAM TRACING: CPT

## 2020-01-07 PROCEDURE — 36415 COLL VENOUS BLD VENIPUNCTURE: CPT

## 2020-01-07 PROCEDURE — 93010 ELECTROCARDIOGRAM REPORT: CPT | Performed by: INTERNAL MEDICINE

## 2020-01-07 PROCEDURE — 80048 BASIC METABOLIC PNL TOTAL CA: CPT | Performed by: PHYSICIAN ASSISTANT

## 2020-01-07 PROCEDURE — 85027 COMPLETE CBC AUTOMATED: CPT | Performed by: PHYSICIAN ASSISTANT

## 2020-01-07 PROCEDURE — 85730 THROMBOPLASTIN TIME PARTIAL: CPT | Performed by: PHYSICIAN ASSISTANT

## 2020-01-07 PROCEDURE — 85610 PROTHROMBIN TIME: CPT | Performed by: PHYSICIAN ASSISTANT

## 2020-01-07 NOTE — TELEPHONE ENCOUNTER
CHIEF COMPLAINT:   Presents for follow-up visit on Xofigo treatments.    ONCOLOGY PROBLEM LIST:  Patient Active Problem List    Diagnosis Date Noted   • CLL (chronic lymphocytic leukemia) (CMS/[facility]) 02/05/2014     Priority: Medium     Peripheral blood, flow immunophenotypic and morphologic analysis: - Absolute lymphocytosis (5.1 K/uL) with a monoclonal B-cell population detected by flow cytometry.  Flow cytometric analysis on peripheral blood demonstrates: - Population of monoclonal kappa restricted B-lymphocytes with an immunophenotype typical for chronic lymphocytic leukemia, CD20+CD38-. SEE COMMENT. - Population of T-lymphocytes with a normal CD4:CD8 ratio. Comment: The International Workshop on Chronic Lymphocytic Leukemia report requires that the monoclonal lymphocytes be at least 5K/uL and that the lymphocytosis be present for at least three months (in patients without disease-related symptoms) for a diagnosis of Chronic Lymphocytic Leukemia. Certain exceptions are made for disease-symptomatic patients, cytopenias, or extramedullary involvement (p.180, 2008 WHO Classification). This patient shows borderline features (but actual monoclonal B-cells are much less than 5K/uL in this case )and whether this is best classified as Chronic Lymphocytic Leukemia versus monoclonal B-lymphocytosis is still yet to be determined according to the 2008 WHO Classification.  Diagnosed December 11, 2013. Currently on observation, not treated.       • Malignant neoplasm of prostate (CMS/[facility]) 04/12/2013     Priority: Low     1. Significant for diagnosis of CA of prostate, when he presented with a rise in PSA from 5.6 to 9.9.   2. He is status post transrectal ultrasound and biopsy on 12/20/2006.   3. He was documented to have adenocarcinoma of the prostate, with a Richmond score of 4+3=7 , 3 cores  involving 80% of the biopsy from the left, and a Richmond score of 3+4=7 involving 20% of the tissue submitted from the right  Patient is scheduled for [facility]omentry at [facility] 04/25/19 needs to arrive @ 7:00 am & procedure will @ 8:00 am patient needs to NPO after midnight.. I called patient had to leave message for patient to call me so I can give her instructions and directions .   side.  4. Status post definitive radiation therapy for clinical stage TIIA lesion. He was treated to the prostate and seminal vesicles from 03/20/2007 to 05/16/2007. He received a total of 80 Gy.   5. The patient had received neoadjuvant and adjuvant Lupron.   6. He was last seen in radiation oncology on 06/20/2008.  7. As his PSA in 08/2012 was 0.31, he did not receive a dose.   8. PSA in mid January 2013 of 271.6. He received Lupron 45 mg. on  9. February 7th  10. February 27,2013 transrectal ultrasound biopsies of the prostate revealed benign prostatic tissue with atrophy and mild chronic Inflammation.  11. CT scan on March 3rd 2013,did not reveal any obvious adenopathy, however the bone windows clearly reveal metastasis to the bones, especially the left pelvis at L1 and L5.  12. On Lupron every 6 months and denosumab for bone metastasis every 4 weeks.  13. Progressive rise in PSA. Bone scan on April 11 revealed stable disease, no obvious visceral metastasis seen on CT scan. However there appeared to be progression of bone metastasis seen on the CT scan  14. Progressive rise in PSA, started on Casodex July 21, 2016, discontinued Neftaly 10,2018 due to rise in PSA doubling time < 6months  15. Patient's Denosumab was held from 05/2016-3/13/17 due to having undergone extensive dental work.  16. CT CAP from 3/16/18 did not reveal visceral metastasis.  17. Bone scan from 3/15/18 reveals progression of bone metastasis.   18. Patient recieved treatment with Provenge, x 3 doses. First dose given 5/7/18 , last dose 6/4/18  19.   Latest PSA from 7/30/18 is elevated at at 60.94. This is likely treatment response.    Denosumab for bone metastasis, held from 01/16/19-04/25/19 for possible dental work.      CT CAP/bone scan from 1/31/2019 revealed progression of bone metastasis, although patient remained asymptomatic.      Patient was treated with Zytiga and prednisone initiated 02/26/19, last on 07/02/19. Stopped as his PSA had  been gradually increasing while on treatment, suggestive of no response.      On 08/01/19, initiated next line of treatment with Xtandi 40mg, to be taken 4 tablets by mouth once daily. Progressively rising PSA noted at his last visit on 09/24/19.     Denosumab restarted 10/28/19 due to progressive disease and progressive bone mets.    Xtandi finished 10/31/19: side effects included problems with appetite    Initiated treatment with Xofigo 1/6 on 11/19/19.         HPI:  This is a 81 year old with history of metastatic prostate cancer who presents for follow-up visit s/p xofigo #2 on 12/19/19.    Patient presents to the clinic with his wife and son. He notes overall he is doing ok. He notes pain is same as usual. He continues on daily prednisone and BID Acetaminophen 1000mg.  He is noting difficulties in maintaining adequate INR levels; they have been up and down. He notes some dizziness with position changes, he feels he is drinking adequate fluids. He asks if he can take curcumin with his warfarin.     REVIEW OF SYSTEMS  GENERAL:  Patient denies headache, fevers, chills, night sweats, excessive fatigue, change in appetite, weight loss, dizziness  ALLERGIC/IMMUNOLOGIC: Verified allergies: Yes  EYES:  Patient denies significant visual difficulties, double vision, blurred vision  ENT/MOUTH: Patient denies problems with hearing, sore throat, mouth sores, but complains of: sinus drainage  ENDOCRINE:  Patient denies diabetes, thyroid disease, hormone replacement, hot flashes  HEMATOLOGIC/LYMPHATIC: Patient denies easy bruising, bleeding, tender lymph nodes, swollen lymph nodes  BREASTS: Patient denies abnormal masses of breast, nipple discharge, pain  RESPIRATORY:  Patient denies lung pain with breathing, cough, coughing up blood, shortness of breath  CARDIOVASCULAR:  Patient denies anginal chest pain, palpitations, shortness of breath when lying flat, but complains of: peripheral edema bilateral ankles  GASTROINTESTINAL:  Patient denies abdominal pain , nausea, vomiting, diarrhea, GI bleeding, constipation, change in bowel habits, heartburn, sensation of feeling full, difficulty swallowing  : Patient denies blood in the urine, burning with urination, frequency, urgency, hesitancy, incontinence  MUSCULOSKELETAL:  Patient denies joint pain, bone pain, joint swelling, redness, decreased range of motion  SKIN:  Patient denies chronic rashes, inflammation, ulcerations, skin changes, itching  NEUROLOGIC:  Patient denies areas of focal weakness, abnormal gait, sensory problems, numbness, tingling, but complains of: loss of balance  PSYCHIATRIC: Patient denies insomnia, depression, anxiety    ALLERGY:  Reviewed and verified in EMR    MEDICATION:  Reviewed and verified in EMR    I have personally reviewed the ALLERGIES, MEDICATIONS, PMH, PSH, FAMILY HISTORY, SOCIAL HISTORY, LAB RESULTS, RADIOLOGY RESULTS, OLD RECORDS AND OUTSIDE RECORDS when available in the EMR and any updates required were made in the EMR.    REVIEW OF SYSTEMS:  I personally reviewed all 14 organ systems with the patient and the pertinent positive and negative items are documented in the HPI.    PHYSICAL EXAM:    Vitals:    01/07/20 1443   BP: 130/60   Pulse: 58   Resp: 16   Temp: 98.2 °F (36.8 °C)   TempSrc: Oral   SpO2: 98%   Weight: 98 kg   Height: 5' 10\" (1.778 m)   PainSc: 7-8   PainLoc: Back     ECOG Performance Status 2  GENERAL:  Alert, cooperative, well developed gentleman in no acute physical distress  HEENT:  Normocephalic, atraumatic. Sclerae anicteric,  No conjunctival pallor or injection. Moist oral mucosa without lesion. Posterior oropharynx without erythema or cobblestoning.   NECK:  supple with no palpable mass or thyromegaly  LYMPHATIC SYSTEM: Negative for bilateral preauricular, postauricular, submental, submandibular, cervical, supraclavicular, infraclavicular  LN  LUNGS: Respirations are non-labored. clear bilaterally with no rales or rhonchi and non  labored respirations. pacemaker click noted.  CARDIOVASCULAR:  RRR, S1/S2, no murmurs/rubs/gallops. No JVD.  ABDOMEN: soft, nontender, nondistended with normal bowel sounds, no palpable masses and no hepatosplenomegaly  MUSCULOSKELETAL: no cyanosis, clubbing or edema  DERMATOLOGIC: Skin is warm and dry with no rash   PSYCHIATRIC:   Alert & oriented x 3  and normal mood and affect. Good insight.   NEUROLOGIC: cranial nerves 2-12 grossly intact, moves all extremities well and gait is steady with standby assistance    LABORATORY FINDINGS:  Recent Labs   Lab 12/23/19  1236 12/10/19  1135 11/13/19  0919   WBC 34.2* 38.7* 40.6*   RBC 3.60* 3.94* 3.73*   HGB 11.3* 12.4* 11.3*   HCT 36.4* 40.3 37.1*   .1* 102.3* 99.5   * 113* 125*   Absolute Neutrophil 4.9 3.5 5.7     Recent Labs   Lab 12/10/19  1135 11/13/19  0919 10/28/19  0840 08/19/19  0949 07/22/19  1207   Sodium 141 140  --  140 141   Potassium 4.2 4.3  --  4.2 3.7   Chloride 104 104  --  102 102   BUN 34* 25*  --  11 15   Creatinine 1.15 1.16 1.20* 1.32* 1.22*   Glucose 97 115*  --  137* 141*   CALCIUM 9.0 8.2*  --  8.8 8.6   Albumin 3.6 3.6  --  3.8 3.5*   GLOBULIN 3.4 3.4  --   --  3.2   TOTAL BILIRUBIN 0.6 0.5  --  0.9 0.9   AST/SGOT 18 13  --  21 26   ALK PHOSPHATASE 146* 246*  --  215* 263*   ALT/SGPT 25 12  --  13 18     Recent Labs   Lab 11/13/19  0919 08/19/19  0948 05/20/19  0832    339* 370*       RADIOGRAPHIC FINDINGS:  none      ASSESSMENT AND PLAN:  In summary, this is a 81 year old with history of metastatic prostate cancer who presents for follow-up visit.    1. Metastatic prostate cancer with progressive bony metastasis, no visceral metastasis. Patient has been on several treatments including casodex, Provenge, Zytiga and most recently Xtandi; which was discontinued the end of October. Lupron and last received 09/24/19. Patient initiated treatment with 1/6 Xofigo (145.2 microCi)at standard recommended 1.49 microcurie/kg q 4 weeks  in view of his underlying CLL on 11/19/19 and has tolerated without difficulty. Patients PSA did go up after 1st treatment to 1333. Xofigo dose 2/6 given on 12/19/19 at (146.2microCi). Originally, plan was to reduce dosing at 1microcurie/kg however for nuclear medicine dose is ordered from standard dosing. Review of cbc shows WBC at 16.4, hemoglobin decreased to 10.5 and platelet count lower at 99k. While patient meets parameters for treatment next week per standard guidelines (ANC > 1000 and platelet count >50k) with is underlying CLL he may be more sensitive to the treatment. Discussed with patient and wife, will get cbc on Friday (as he is having labs done already that day) to see if his counts are starting to recover some, otherwise plan to delay treatment by a week. Patient is again reminded to notify my office for any fevers, other symptoms of infection, uncontrolled or poorly controlled or new side effect from treatment, as well as any other concerns.     2.  Neoplasm related pain: back pain; he has been on prednisone 10mg daily. Due to concern for developing steroid myopathy and constipation has improved, added Tramadol 50mg Q 6 hours prn pain along with Tyelonol, however patient had unfavorable side effects with dizziness. Patient is taking prednisone to 10mg daily and Acetaminophen BID. Patient did have elevated INR while taking Tramadol.     3. CLL; overall stable. On observation.     4. History of AVR and AV block; patient has pacemaker. On digoxin and warfarin.     5. Thrombocytopenia: platelet 99 on cbc from 01/06/2020. Slowly declining. Will recheck on Friday (as patient has his INR that day also). If improving, will go ahead with #3/6 Xofigo next week.    6. Bone metastasis: on Denosumab, last given 12/10/19.     7. Steroid inducted myopathy; weakness. Also dizziness with positional changes. Service to PT and OT for work on strengthening and dizziness.      Patient is asking if ok to take curcumin  with his warfarin, per Luke in pharmacy, would start dosing per package but always use same brand all the time, will likely affect INR but then would adjust warfarin from INR levels on consistent dose.     Follow up orders:    Patient agrees with this plan of care and wishes to proceed.  Cbc and PSA on Friday 01/10/2020 (if PSA not done 2020): Xofigo next week if counts improved.  Order for weekly cbc at [facility]  2 weeks cbc, cmp, MD visit.    The patient and the patients wife and son were educated in the symptoms for which the patient should seek earlier follow up.    Call the [facility] [phone] at any time for any of the followin.  Fevers > 100.5  2.  Symptoms of infection  3.  Uncontrolled nausea or vomiting  4.  Bleeding events or unexplained bruising.  5.  New or uncontrolled pain  6.  Other unusual symptoms or concerns.    All the patient's and the patients wife and sons questions were answered, I believe, to their satisfaction.  Dr. Adame is the supervising physician.  Summer CÁRDENAS

## 2020-01-07 NOTE — PAT
Per Anesthesia Request, patient instructed not to take their ACE/ARB medications on the AM of surgery.    NURSE SPOKE WITH GIOVANY MILLER AT DR CRUZ OFFICE. PATIENT CURRENTLY TAKING PLAVIX FOR CAROTID AND SUBCLAVIAN STENTS. PER GIOVANY PATIENT IS OK TO CONTINUE TAKING PLAVIX. GIOVANY ALSO INSTRUCTED NURSE TO INFORM PATIENT NEW ARRIVAL TIME IS 10:30AM. PATIENT NOTIFIED.

## 2020-01-09 ENCOUNTER — APPOINTMENT (OUTPATIENT)
Dept: CT IMAGING | Facility: HOSPITAL | Age: 70
End: 2020-01-09

## 2020-01-09 ENCOUNTER — HOSPITAL ENCOUNTER (OUTPATIENT)
Facility: HOSPITAL | Age: 70
Setting detail: SURGERY ADMIT
Discharge: HOME | End: 2020-01-09
Attending: THORACIC SURGERY (CARDIOTHORACIC VASCULAR SURGERY) | Admitting: ANESTHESIOLOGY

## 2020-01-09 ENCOUNTER — APPOINTMENT (OUTPATIENT)
Dept: GENERAL RADIOLOGY | Facility: HOSPITAL | Age: 70
End: 2020-01-09

## 2020-01-09 ENCOUNTER — ANESTHESIA (OUTPATIENT)
Dept: PERIOP | Facility: HOSPITAL | Age: 70
End: 2020-01-09

## 2020-01-09 ENCOUNTER — ANESTHESIA EVENT (OUTPATIENT)
Dept: PERIOP | Facility: HOSPITAL | Age: 70
End: 2020-01-09

## 2020-01-09 VITALS
WEIGHT: 162.04 LBS | BODY MASS INDEX: 24.56 KG/M2 | TEMPERATURE: 98 F | SYSTOLIC BLOOD PRESSURE: 131 MMHG | HEART RATE: 73 BPM | RESPIRATION RATE: 18 BRPM | HEIGHT: 68 IN | OXYGEN SATURATION: 98 % | DIASTOLIC BLOOD PRESSURE: 90 MMHG

## 2020-01-09 DIAGNOSIS — I73.9 PERIPHERAL VASCULAR DISEASE (HCC): ICD-10-CM

## 2020-01-09 PROCEDURE — 0 IOPAMIDOL PER 1 ML: Performed by: THORACIC SURGERY (CARDIOTHORACIC VASCULAR SURGERY)

## 2020-01-09 PROCEDURE — 75635 CT ANGIO ABDOMINAL ARTERIES: CPT

## 2020-01-09 RX ORDER — LIDOCAINE HYDROCHLORIDE 10 MG/ML
2 INJECTION, SOLUTION INFILTRATION; PERINEURAL ONCE
Status: COMPLETED | OUTPATIENT
Start: 2020-01-09 | End: 2020-01-09

## 2020-01-09 RX ORDER — FAMOTIDINE 20 MG/1
20 TABLET, FILM COATED ORAL ONCE
Status: COMPLETED | OUTPATIENT
Start: 2020-01-09 | End: 2020-01-09

## 2020-01-09 RX ORDER — SODIUM CHLORIDE 9 MG/ML
9 INJECTION, SOLUTION INTRAVENOUS CONTINUOUS
Status: DISCONTINUED | OUTPATIENT
Start: 2020-01-09 | End: 2020-01-16 | Stop reason: HOSPADM

## 2020-01-09 RX ADMIN — IOPAMIDOL 120 ML: 755 INJECTION, SOLUTION INTRAVENOUS at 13:55

## 2020-01-09 RX ADMIN — LIDOCAINE HYDROCHLORIDE 0.2 ML: 10 INJECTION, SOLUTION EPIDURAL; INFILTRATION; INTRACAUDAL; PERINEURAL at 10:55

## 2020-01-09 RX ADMIN — SODIUM CHLORIDE 9 ML/HR: 9 INJECTION, SOLUTION INTRAVENOUS at 10:55

## 2020-01-09 RX ADMIN — FAMOTIDINE 20 MG: 20 TABLET ORAL at 10:54

## 2020-01-09 NOTE — ANESTHESIA PREPROCEDURE EVALUATION
Anesthesia Evaluation                  Airway   Mallampati: II  Dental      Pulmonary    (+) COPD,   Cardiovascular     (+) hypertension, PVD,       Neuro/Psych  GI/Hepatic/Renal/Endo      Musculoskeletal     Abdominal    Substance History      OB/GYN          Other   arthritis,                      Anesthesia Plan    ASA 3     MAC     intravenous induction     Anesthetic plan, all risks, benefits, and alternatives have been provided, discussed and informed consent has been obtained with: patient.

## 2020-02-10 ENCOUNTER — TELEPHONE (OUTPATIENT)
Dept: PULMONOLOGY | Facility: CLINIC | Age: 70
End: 2020-02-10

## 2020-02-10 NOTE — TELEPHONE ENCOUNTER
Patient called states is having wheezing and SOB sometimes o2 is dropping will need pft will give April

## 2020-02-12 ENCOUNTER — OFFICE VISIT (OUTPATIENT)
Dept: PULMONOLOGY | Facility: CLINIC | Age: 70
End: 2020-02-12

## 2020-02-12 VITALS
WEIGHT: 161 LBS | TEMPERATURE: 97.9 F | BODY MASS INDEX: 24.4 KG/M2 | SYSTOLIC BLOOD PRESSURE: 100 MMHG | HEIGHT: 68 IN | HEART RATE: 80 BPM | DIASTOLIC BLOOD PRESSURE: 52 MMHG | OXYGEN SATURATION: 94 %

## 2020-02-12 DIAGNOSIS — R06.02 SHORTNESS OF BREATH: ICD-10-CM

## 2020-02-12 DIAGNOSIS — J43.2 CENTRILOBULAR EMPHYSEMA (HCC): Primary | ICD-10-CM

## 2020-02-12 DIAGNOSIS — J42 CHRONIC BRONCHITIS, UNSPECIFIED CHRONIC BRONCHITIS TYPE (HCC): ICD-10-CM

## 2020-02-12 PROCEDURE — 94618 PULMONARY STRESS TESTING: CPT | Performed by: NURSE PRACTITIONER

## 2020-02-12 PROCEDURE — 99214 OFFICE O/P EST MOD 30 MIN: CPT | Performed by: NURSE PRACTITIONER

## 2020-02-12 RX ORDER — AZITHROMYCIN 250 MG/1
TABLET, FILM COATED ORAL
Qty: 6 TABLET | Refills: 2 | Status: SHIPPED | OUTPATIENT
Start: 2020-02-12 | End: 2020-04-27

## 2020-02-12 RX ORDER — BUDESONIDE AND FORMOTEROL FUMARATE DIHYDRATE 160; 4.5 UG/1; UG/1
2 AEROSOL RESPIRATORY (INHALATION) 2 TIMES DAILY
Qty: 3 INHALER | Refills: 3 | Status: SHIPPED | OUTPATIENT
Start: 2020-02-12 | End: 2020-04-13 | Stop reason: SDUPTHER

## 2020-02-12 NOTE — PROGRESS NOTES
[facility] Pulmonary Follow up    CHIEF COMPLAINT    COPD/dyspnea    HISTORY OF PRESENT ILLNESS    Kailee Almanza is a 69 y.o.female here today for a sick visit.  She states that she has noticed worsening shortness of breath over the last several weeks.  She was concerned and wanted to be seen in the office today.  She was last seen in the office by me in October.  She was treated with 1 round of antibiotics and steroids in December.    She notices shortness of breath with any activity.  She states that her oxygenation at home will run around 88 to 89% on room air.  She is concerned that she may need some oxygen with activity.  She does have to take frequent breaks to recover.    I had enrolled her in pulmonary rehabilitation in our office but unfortunately she had to have eye surgery and could not continue pulmonary rehab.  She also states that the drive from [place] to [facility] was too costly for her.  She would like to be set up with pulmonary rehabilitation in [place] if possible.    She had been on Advair but unfortunately cannot tolerate powdered inhalers and wants to be switched to something else.  She has not taken her Advair in several months.  She also has Spiriva at home but states this is a powder as well and has not been using this.  She has DuoNeb's at home that she will use occasionally but does not use them very frequently.  She may use once a day.    She denies any fever, chills, sputum production, hemoptysis, night sweats, weight loss, chest pain or palpitations.  She denies any lower extremity edema.  She has had worsening sinus congestion over the last week or so.  She denies reflux symptoms.    She is up-to-date on her current vaccinations.    Patient Active Problem List   Diagnosis   • Peripheral vascular disease (CMS/HCC)   • Essential hypertension   • Cerebrovascular disease   • Hyperlipidemia   • Chronic bilateral low back pain without sciatica   • Centrilobular emphysema (CMS/HCC)   •  "S/P parathyroidectomy 2/1/17   • Aneurysm of thoracic aorta (CMS/HCC)   • Lung nodule   • Iron deficiency anemia due to chronic blood loss   • Iron malabsorption   • Psychophysiological insomnia   • Carotid stenosis   • Insomnia due to medical condition   • Shortness of breath   • Personal history of smoking       Allergies   Allergen Reactions   • Ferrlecit [Na Ferric Gluc Cplx In Sucrose] Diarrhea and Nausea And Vomiting   • Percodan [Oxycodone-Aspirin] Mental Status Change     \"MAKES ME FEEL LIKE I'M FLYING\"   • Sulfa Antibiotics Rash     Last as a baby.       Current Outpatient Medications:   •  albuterol sulfate  (90 Base) MCG/ACT inhaler, Inhale 2 puffs Every 4 (Four) Hours As Needed for Wheezing., Disp: 6.7 g, Rfl: 3  •  atorvastatin (LIPITOR) 80 MG tablet, Take 1 tablet by mouth every night at bedtime., Disp: 90 tablet, Rfl: 3  •  clopidogrel (PLAVIX) 75 MG tablet, Take 75 mg by mouth Daily., Disp: , Rfl:   •  cyclobenzaprine (FLEXERIL) 10 MG tablet, Take 10 mg by mouth 3 (Three) Times a Day As Needed for Muscle Spasms., Disp: , Rfl:   •  gabapentin (NEURONTIN) 600 MG tablet, Take 800 mg by mouth 3 (Three) Times a Day., Disp: , Rfl:   •  HYDROcodone-acetaminophen (NORCO) 7.5-325 MG per tablet, Take 1 tablet by mouth Daily As Needed for Moderate Pain ., Disp: 15 tablet, Rfl: 0  •  ipratropium-albuterol (DUO-NEB) 0.5-2.5 mg/3 ml nebulizer, Take 3 mL by nebulization 4 (Four) Times a Day., Disp: 360 mL, Rfl: 3  •  levothyroxine (SYNTHROID, LEVOTHROID) 75 MCG tablet, Take 75 mcg by mouth Daily., Disp: , Rfl:   •  lisinopril (PRINIVIL,ZESTRIL) 20 MG tablet, Take 1 tablet by mouth Daily., Disp: 30 tablet, Rfl: 5  •  O2 (OXYGEN), Inhale 2 L/min Every Night., Disp: , Rfl:   •  traZODone (DESYREL) 50 MG tablet, Take 1-2 tablets at bedtime as needed for sleep. (Patient taking differently: Take  by mouth Every Night. Take 1-2 tablets at bedtime as needed for sleep.), Disp: 180 tablet, Rfl: 3  •  venlafaxine 225 " MG tablet sustained-release 24 hour 24 hr tablet, Take 1 tablet by mouth Daily With Breakfast. (Patient taking differently: Take 75 mg by mouth Daily With Breakfast.), Disp: 90 tablet, Rfl: 1  •  azithromycin (ZITHROMAX) 250 MG tablet, Take 2 by mouth today then 1 daily for 4 days, Disp: 6 tablet, Rfl: 2  •  budesonide-formoterol (SYMBICORT) 160-4.5 MCG/ACT inhaler, Inhale 2 puffs 2 (Two) Times a Day., Disp: 3 inhaler, Rfl: 3  •  tiotropium bromide monohydrate (SPIRIVA RESPIMAT) 2.5 MCG/ACT aerosol solution inhaler, Inhale 2 puffs Daily., Disp: 3 inhaler, Rfl: 3  MEDICATION LIST AND ALLERGIES REVIEWED.    Social History     Tobacco Use   • Smoking status: Former Smoker     Packs/day: 1.00     Years: 35.00     Pack years: 35.00     Types: Cigarettes     Last attempt to quit: 2013     Years since quittin.7   • Smokeless tobacco: Never Used   Substance Use Topics   • Alcohol use: Yes     Comment: occasional   • Drug use: No       FAMILY AND SOCIAL HISTORY REVIEWED.    Review of Systems   Constitutional: Positive for activity change. Negative for appetite change, fatigue, fever and unexpected weight change.   HENT: Negative for congestion, postnasal drip, rhinorrhea, sinus pressure, sore throat and voice change.    Eyes: Negative for visual disturbance.   Respiratory: Positive for shortness of breath and wheezing. Negative for cough and chest tightness.    Cardiovascular: Negative for chest pain, palpitations and leg swelling.   Gastrointestinal: Negative for abdominal distention, abdominal pain, nausea and vomiting.   Endocrine: Negative for cold intolerance and heat intolerance.   Genitourinary: Negative for difficulty urinating and urgency.   Musculoskeletal: Positive for back pain. Negative for arthralgias and neck pain.   Skin: Negative for color change and pallor.   Allergic/Immunologic: Negative for environmental allergies and food allergies.   Neurological: Negative for dizziness, syncope, weakness and  "light-headedness.   Hematological: Negative for adenopathy. Does not bruise/bleed easily.   Psychiatric/Behavioral: Negative for agitation and behavioral problems.   .    /52   Pulse 80   Temp 97.9 °F (36.6 °C)   Ht 172.5 cm (67.9\")   Wt 73 kg (161 lb)   LMP  (LMP Unknown)   SpO2 94% Comment: resting at room air  BMI 24.55 kg/m²     Immunization History   Administered Date(s) Administered   • Fluad Quad 10/24/2019   • Fluzone High Dose =>65 Years ([employer] ONLY) 10/24/2019   • Shingrix 01/01/2016       Physical Exam   Constitutional: She is oriented to person, place, and time. She appears well-developed and well-nourished.   HENT:   Head: Normocephalic and atraumatic.   Eyes: Pupils are equal, round, and reactive to light.   Neck: Normal range of motion. Neck supple. No thyromegaly present.   Cardiovascular: Normal rate, regular rhythm, normal heart sounds and intact distal pulses. Exam reveals no gallop and no friction rub.   No murmur heard.  Pulmonary/Chest: Effort normal and breath sounds normal. No respiratory distress. She has no wheezes. She has no rales. She exhibits no tenderness.   Abdominal: Soft. Bowel sounds are normal. There is no tenderness.   Musculoskeletal: Normal range of motion.   Lymphadenopathy:     She has no cervical adenopathy.   Neurological: She is alert and oriented to person, place, and time.   Skin: Skin is warm and dry. Capillary refill takes less than 2 seconds. She is not diaphoretic.   Psychiatric: She has a normal mood and affect. Her behavior is normal.   Nursing note and vitals reviewed.        RESULTS    6-minute walk test: Patient ambulated 800 feet and had to stop due to back pain.  She did desaturate to 87% during her walk and was placed on 2 L pulse dose.  Her oxygenation improved to 95% throughout the remainder of the testing.  She does qualify for oxygen with activity.    PROBLEM LIST    Problem List Items Addressed This Visit        Respiratory    " Centrilobular emphysema (CMS/HCC) - Primary    Overview     CT scan of the chest July 18, 2016, centrilobular emphysema with fibrosis, no pulmonary embolus         Relevant Medications    budesonide-formoterol (SYMBICORT) 160-4.5 MCG/ACT inhaler    tiotropium bromide monohydrate (SPIRIVA RESPIMAT) 2.5 MCG/ACT aerosol solution inhaler    Other Relevant Orders    Walking Oximetry (Completed)    Ambulatory Referral to Pulmonary Rehab (Completed)    Shortness of breath      Other Visit Diagnoses     Chronic bronchitis, unspecified chronic bronchitis type (CMS/HCC)        Relevant Medications    budesonide-formoterol (SYMBICORT) 160-4.5 MCG/ACT inhaler    tiotropium bromide monohydrate (SPIRIVA RESPIMAT) 2.5 MCG/ACT aerosol solution inhaler    azithromycin (ZITHROMAX) 250 MG tablet            DISCUSSION    Ms. Guajardo was here for a sick visit today.  She does not appear to be infected and I am going to call in an antibiotic for her to have on hand if she were to worsen over the next week or so.  She has had worsening congestion for the last couple of weeks.  I did advise her to only use this antibiotic if needed.    I did explain to her that her worsening shortness of breath is because she is not currently on any treatment for her COPD.  I am going to start her on Symbicort and Spiriva Respimat for her COPD.  I did call both of these in today.  I did advise her that her breathing should get better with the addition of both of these inhalers.  I did advise her to take these as prescribed every day.  I did encourage her to use her DuoNeb's more frequently throughout the day as well for shortness of breath.  She currently is not using them but that frequently.    We reviewed her 6-minute walk test in the office today and she does qualify for oxygen at 2 L pulse dose with activity.  I will see if we can get her a set up with a POC with her current [employer] company that supplies her oxygen at night.  If they are unable to do this I  will call in a new company to get a POC.  I did encourage her to wear 2 L pulse dose with activity and continue wearing oxygen at night.    I am going to put in a new referral for pulmonary rehabilitation in [place].  She is agreeable to start this rehabilitation when they have a position available.  I did advise her that they would call her when they had a spot available for her for a start date.  I did encourage her to have a daily exercise regimen once she gets her COPD under better control.    She will follow-up in 2 months or sooner if her symptoms worsen.  She will need full PFTs at her next appointment.  She will call with any additional concerns or questions.  I spent 25 minutes with the patient. I spent > 50% percent of this time counseling and discussing diagnosis, prognosis, diagnostic testing, evaluation, current status and treatment options.    Kelsey Genao, APRN  02/12/202010:53 AM  Electronically signed     Please note that portions of this note were completed with a voice recognition program. Efforts were made to edit the dictations, but occasionally words are mistranscribed.      CC: Machelle Atkinson MD

## 2020-02-20 ENCOUNTER — TRANSCRIBE ORDERS (OUTPATIENT)
Dept: PULMONOLOGY | Facility: CLINIC | Age: 70
End: 2020-02-20

## 2020-03-18 ENCOUNTER — TRANSCRIBE ORDERS (OUTPATIENT)
Dept: PULMONOLOGY | Facility: CLINIC | Age: 70
End: 2020-03-18

## 2020-04-13 ENCOUNTER — TELEMEDICINE (OUTPATIENT)
Dept: PULMONOLOGY | Facility: CLINIC | Age: 70
End: 2020-04-13

## 2020-04-13 VITALS — OXYGEN SATURATION: 98 %

## 2020-04-13 DIAGNOSIS — R06.02 SHORTNESS OF BREATH: Primary | ICD-10-CM

## 2020-04-13 DIAGNOSIS — Z87.891 PERSONAL HISTORY OF SMOKING: ICD-10-CM

## 2020-04-13 DIAGNOSIS — J43.2 CENTRILOBULAR EMPHYSEMA (HCC): ICD-10-CM

## 2020-04-13 PROCEDURE — 99214 OFFICE O/P EST MOD 30 MIN: CPT | Performed by: NURSE PRACTITIONER

## 2020-04-13 RX ORDER — LEVALBUTEROL INHALATION SOLUTION 0.63 MG/3ML
3 SOLUTION RESPIRATORY (INHALATION) 4 TIMES DAILY PRN
Qty: 360 ML | Refills: 11 | Status: SHIPPED | OUTPATIENT
Start: 2020-04-13

## 2020-04-13 RX ORDER — BUDESONIDE AND FORMOTEROL FUMARATE DIHYDRATE 160; 4.5 UG/1; UG/1
2 AEROSOL RESPIRATORY (INHALATION) 2 TIMES DAILY
Qty: 3 INHALER | Refills: 3 | Status: SHIPPED | OUTPATIENT
Start: 2020-04-13 | End: 2020-11-16 | Stop reason: SDUPTHER

## 2020-04-13 NOTE — PROGRESS NOTES
"[facility] Pulmonary Follow up    CHIEF COMPLAINT    COPD    HISTORY OF PRESENT ILLNESS    Kailee Almanza is a 69 y.o.female here today for a telemedicine visit.  She was last seen by me in the middle of February for a sick visit.  I treated her with antibiotics and steroids.  She states that she recovered quickly after initiating her antibiotics and steroids.  She denies any rest or illnesses or exacerbations since this appointment.    She continues to have dyspnea with activity.  She does recover quickly at rest.  She wears oxygen at night at 2 L and will occasionally wear her oxygen during the day.  She does have a portable concentrator but has not been using it with activity.  She was afraid that she would become \"addicted to her oxygen.\"  She states that her oxygen at rest ranges from 95 to 98%.  She states with activity her oxygen has dropped into the low 80s, but does recover fairly quickly into the 90s at rest.    She continues to use Symbicort and Spiriva daily for COPD.  She has albuterol but rarely uses it.  She had been using DuoNeb's but they make her shaky and nauseous.  She would like something that would help with her breathing that does not cause the adverse reactions like the DuoNeb's.    She denies fever, chills, sputum production, hemoptysis, night sweats, weight loss, chest pain or palpitations.  She denies any lower extremity edema or calf tenderness.  She denies any sinus or allergy symptoms.  She denies reflux symptoms.    She received her pneumonia vaccination from her PCP last week.  She does not remember which vaccination she received.  She is going to check and let me know through her my chart which vaccination she received.    She quit smoking in 2013 and has a 35-pack-year history.  Her last CT scan was in November 2019 which revealed no new nodule or mass concerning for malignancy.  Her neck CT scan is due in November 2020.    Patient Active Problem List   Diagnosis   • Peripheral " "vascular disease (CMS/HCC)   • Essential hypertension   • Cerebrovascular disease   • Hyperlipidemia   • Chronic bilateral low back pain without sciatica   • Centrilobular emphysema (CMS/HCC)   • S/P parathyroidectomy 2/1/17   • Aneurysm of thoracic aorta (CMS/HCC)   • Lung nodule   • Iron deficiency anemia due to chronic blood loss   • Iron malabsorption   • Psychophysiological insomnia   • Carotid stenosis   • Insomnia due to medical condition   • Shortness of breath   • Personal history of smoking       Allergies   Allergen Reactions   • Ferrlecit [Na Ferric Gluc Cplx In Sucrose] Diarrhea and Nausea And Vomiting   • Percodan [Oxycodone-Aspirin] Mental Status Change     \"MAKES ME FEEL LIKE I'M FLYING\"   • Sulfa Antibiotics Rash     Last as a baby.       Current Outpatient Medications:   •  albuterol sulfate  (90 Base) MCG/ACT inhaler, Inhale 2 puffs Every 4 (Four) Hours As Needed for Wheezing., Disp: 6.7 g, Rfl: 3  •  atorvastatin (LIPITOR) 80 MG tablet, Take 1 tablet by mouth every night at bedtime., Disp: 90 tablet, Rfl: 3  •  azithromycin (ZITHROMAX) 250 MG tablet, Take 2 by mouth today then 1 daily for 4 days, Disp: 6 tablet, Rfl: 2  •  budesonide-formoterol (SYMBICORT) 160-4.5 MCG/ACT inhaler, Inhale 2 puffs 2 (Two) Times a Day., Disp: 3 inhaler, Rfl: 3  •  clopidogrel (PLAVIX) 75 MG tablet, Take 75 mg by mouth Daily., Disp: , Rfl:   •  cyclobenzaprine (FLEXERIL) 10 MG tablet, Take 10 mg by mouth 3 (Three) Times a Day As Needed for Muscle Spasms., Disp: , Rfl:   •  gabapentin (NEURONTIN) 600 MG tablet, Take 800 mg by mouth 3 (Three) Times a Day., Disp: , Rfl:   •  HYDROcodone-acetaminophen (NORCO) 7.5-325 MG per tablet, Take 1 tablet by mouth Daily As Needed for Moderate Pain ., Disp: 15 tablet, Rfl: 0  •  ipratropium-albuterol (DUO-NEB) 0.5-2.5 mg/3 ml nebulizer, Take 3 mL by nebulization 4 (Four) Times a Day., Disp: 360 mL, Rfl: 3  •  levalbuterol (XOPENEX) 0.63 MG/3ML nebulizer solution, Take 1 " ampule by nebulization 4 (Four) Times a Day As Needed for Wheezing., Disp: 360 mL, Rfl: 11  •  levothyroxine (SYNTHROID, LEVOTHROID) 75 MCG tablet, Take 75 mcg by mouth Daily., Disp: , Rfl:   •  lisinopril (PRINIVIL,ZESTRIL) 20 MG tablet, Take 1 tablet by mouth Daily., Disp: 30 tablet, Rfl: 5  •  O2 (OXYGEN), Inhale 2 L/min Every Night., Disp: , Rfl:   •  tiotropium bromide monohydrate (SPIRIVA RESPIMAT) 2.5 MCG/ACT aerosol solution inhaler, Inhale 2 puffs Daily., Disp: 3 inhaler, Rfl: 3  •  traZODone (DESYREL) 50 MG tablet, Take 1-2 tablets at bedtime as needed for sleep. (Patient taking differently: Take  by mouth Every Night. Take 1-2 tablets at bedtime as needed for sleep.), Disp: 180 tablet, Rfl: 3  •  venlafaxine 225 MG tablet sustained-release 24 hour 24 hr tablet, Take 1 tablet by mouth Daily With Breakfast. (Patient taking differently: Take 75 mg by mouth Daily With Breakfast.), Disp: 90 tablet, Rfl: 1  MEDICATION LIST AND ALLERGIES REVIEWED.    Social History     Tobacco Use   • Smoking status: Former Smoker     Packs/day: 1.00     Years: 35.00     Pack years: 35.00     Types: Cigarettes     Last attempt to quit: 2013     Years since quittin.8   • Smokeless tobacco: Never Used   Substance Use Topics   • Alcohol use: Yes     Alcohol/week: 1.0 standard drinks     Types: 1 drink(s) per week     Comment: Once a week or more   • Drug use: No       FAMILY AND SOCIAL HISTORY REVIEWED.    Review of Systems   Constitutional: Negative for activity change, appetite change, fatigue, fever and unexpected weight change.   HENT: Negative for congestion, postnasal drip, rhinorrhea, sinus pressure, sore throat and voice change.    Eyes: Negative for visual disturbance.   Respiratory: Positive for shortness of breath. Negative for cough, chest tightness and wheezing.    Cardiovascular: Negative for chest pain, palpitations and leg swelling.   Gastrointestinal: Negative for abdominal distention, abdominal pain,  nausea and vomiting.   Endocrine: Negative for cold intolerance and heat intolerance.   Genitourinary: Negative for difficulty urinating and urgency.   Musculoskeletal: Negative for arthralgias, back pain and neck pain.   Skin: Negative for color change and pallor.   Allergic/Immunologic: Negative for environmental allergies and food allergies.   Neurological: Negative for dizziness, syncope, weakness and light-headedness.   Hematological: Negative for adenopathy. Does not bruise/bleed easily.   Psychiatric/Behavioral: Negative for agitation and behavioral problems.   .    LMP  (LMP Unknown)   SpO2 98% Comment: per pt. report    Immunization History   Administered Date(s) Administered   • Fluad Quad 10/24/2019   • Fluzone High Dose =>65 Years ([employer] ONLY) 10/24/2019   • Shingrix 01/01/2016       Physical Exam    Unable to do a physical exam due to tele-medicine visit, patient appear to be in no respiratory distress and was able to communicate without difficulty.  Patient's oxygen nation was at 98% per home monitor.    RESULTS    PROBLEM LIST    Problem List Items Addressed This Visit        Respiratory    Centrilobular emphysema (CMS/HCC)    Overview     CT scan of the chest July 18, 2016, centrilobular emphysema with fibrosis, no pulmonary embolus         Relevant Medications    tiotropium bromide monohydrate (SPIRIVA RESPIMAT) 2.5 MCG/ACT aerosol solution inhaler    budesonide-formoterol (SYMBICORT) 160-4.5 MCG/ACT inhaler    levalbuterol (XOPENEX) 0.63 MG/3ML nebulizer solution    Shortness of breath - Primary       Other    Personal history of smoking    Relevant Orders    CT chest low dose wo            DISCUSSION    Ms. Almanza gave consent to do a telemedicine visit today.  She did give me consent to treat her over the phone today.  She will continue Spiriva 2 puffs daily and Symbicort 2 puffs twice a day for her COPD.  She is unable to tolerate powdered inhalers and has tried and failed these in the past.   I am going to send in some Xopenex nebulizers for her to use as needed for shortness of breath.  She is unable to tolerate the DuoNeb's at this time.  I will send all of her prescriptions to [facility].    She will continue to use her oxygen at 2 L nasal cannula at nighttime and 2 L with activity for her emphysema.  I did encourage her to use her oxygen with activity.  She states that she has not been wearing her oxygen with activity.  I would like her to be enrolled in a pulmonary rehab program but unfortunately due to the coronavirus outbreak all pulmonary rehabilitation has been postponed.  We will get her started with pulmonary rehabilitation once our program opens back up.    I did encourage her to have some daily physical activity to help with her shortness of breath.  I did encourage her to use her albuterol as needed for shortness of breath as well.    Based on her 35-pack-year history and quitting in 2013 she does qualify for yearly CT screenings.  Her next CT screening will be due in November 2020.  She is agreeable to have the CT scan performed in November.    She will follow-up in 3 to 6 months or sooner if her symptoms worsen.  She will call with any additional concerns or questions.  I spent 25 minutes with the patient. I spent > 50% percent of this time counseling and discussing diagnosis, prognosis, diagnostic testing, evaluation, current status, treatment options and management.    Kelsey Genao, APRN  04/13/202011:36 AM  Electronically signed     Please note that portions of this note were completed with a voice recognition program. Efforts were made to edit the dictations, but occasionally words are mistranscribed.      CC: Machelle Atkinson MD

## 2020-04-27 ENCOUNTER — OFFICE VISIT (OUTPATIENT)
Dept: CARDIOLOGY | Facility: CLINIC | Age: 70
End: 2020-04-27

## 2020-04-27 VITALS
HEART RATE: 78 BPM | DIASTOLIC BLOOD PRESSURE: 60 MMHG | HEIGHT: 67 IN | BODY MASS INDEX: 24.33 KG/M2 | SYSTOLIC BLOOD PRESSURE: 110 MMHG | WEIGHT: 155 LBS

## 2020-04-27 DIAGNOSIS — N28.9 RENAL INSUFFICIENCY: ICD-10-CM

## 2020-04-27 DIAGNOSIS — I10 ESSENTIAL HYPERTENSION: ICD-10-CM

## 2020-04-27 DIAGNOSIS — E78.2 MIXED HYPERLIPIDEMIA: ICD-10-CM

## 2020-04-27 DIAGNOSIS — I73.9 PERIPHERAL VASCULAR DISEASE (HCC): Primary | ICD-10-CM

## 2020-04-27 PROCEDURE — 99441 PR PHYS/QHP TELEPHONE EVALUATION 5-10 MIN: CPT | Performed by: INTERNAL MEDICINE

## 2020-04-27 RX ORDER — TIZANIDINE 2 MG/1
2 TABLET ORAL 3 TIMES DAILY PRN
COMMUNITY
Start: 2020-03-03

## 2020-04-27 RX ORDER — ESCITALOPRAM OXALATE 10 MG/1
10 TABLET ORAL DAILY
COMMUNITY

## 2020-04-27 NOTE — PROGRESS NOTES
"  OFFICE FOLLOW UP     Date of Encounter:2020     Name: Kailee Almanza  : 1950  Address: [address] [place], [place] [id number]    PCP: Machelle Atkinson  [address] [place], [place] [id number]    Kailee Almanza is a 69 y.o. female.    Chief Complaint: Follow up of HTN, HLD, PAD    Problem List:   1. Cerebrovascular disease.  a. Amaurosis fugax, OD.  b. History of right CEA   c. Stent 70% LJ stenosis, 2014:  Questionable recurrent symptoms “early” following stent, treated with reinstitution Plavix.    d. 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   e. High-degree left subclavian artery stenosis, treated with stenting, 2014.    1. In-stent re-stenosis of left subclavian with placement of 4.0x28mm Xience EES in proximal left subclavian 2015  2. Peripheral vascular disease.   a. Aorta bi fem bypass by Dr. David Gordon, .  b. Fem-fem bypass  3. Palpitations 2019   a. ZIO monitor 2019: Underlying rhythm is SR, PAC's and brief runs of nonsustained atrial tachycardia   4. Hypertension.   5. Chest pain, 1 bout 2019.  a. Normal stress MPS 2019  6. Dyslipidemia.   7. Remote tobacco abuse.   8. Chronic back pain.    9. Microcytic anemia  a. Severe anemia 2018 s/p 2 units PRBCs  b. EGD with mild gastritis, colonoscopy pending   10. COPD  11. Right upper lobe lung nodule, noted on CT scan 2018 to be increasing  a. [facility]   12. History of TIA  13. Renal insufficiency  14. Surgeries:  a. Appendectomy  b. Breast biopsy  c. Hysterectomy   d. Bladder surgery  e. lasik surgery  f. Oral surgery  g. Parathyroidectomy  h. Cholecystectomy      Allergies:  Allergies   Allergen Reactions   • Ferrlecit [Na Ferric Gluc Cplx In Sucrose] Diarrhea and Nausea And Vomiting   • Percodan [Oxycodone-Aspirin] Mental Status Change     \"MAKES ME FEEL LIKE I'M FLYING\"   • Sulfa Antibiotics Rash     Last as a baby.     Current Medications:  •  albuterol sulfate "  (90 Base) MCG/ACT inhaler, Inhale 2 puffs Every 4 (Four) Hours As Needed for Wheezing.  •  atorvastatin (LIPITOR) 80 MG tablet, Take 1 tablet by mouth every night at bedtime  •  budesonide-formoterol (SYMBICORT) 160-4.5 MCG/ACT inhaler, Inhale 2 puffs 2 (Two) Times a Day.  •  clopidogrel (PLAVIX) 75 MG tablet, Take 75 mg by mouth Daily  •  escitalopram (LEXAPRO) 10 MG tablet, Take 10 mg by mouth Daily  •  gabapentin (NEURONTIN) 600 MG tablet, Take 800 mg by mouth 3 (Three) Times a Day  •  HYDROcodone-acetaminophen (NORCO) 7.5-325 MG per tablet, Take 1 tablet by mouth Daily As Needed for Moderate Pain   •  ipratropium-albuterol (DUO-NEB) 0.5-2.5 mg/3 ml nebulizer, Take 3 mL by nebulization 4 (Four) Times a Day. (Patient taking differently: Take 3 mL by nebulization As Needed.)  •  levalbuterol (XOPENEX) 0.63 MG/3ML nebulizer solution, Take 1 ampule by nebulization 4 (Four) Times a Day As Needed for Wheezing.  •  levothyroxine (SYNTHROID, LEVOTHROID) 75 MCG tablet, Take 75 mcg by mouth Daily.  •  lisinopril (PRINIVIL,ZESTRIL) 20 MG tablet, Take 1 tablet by mouth Daily  •  O2 (OXYGEN), Inhale 2 L/min Daily  •  tiotropium bromide monohydrate (SPIRIVA RESPIMAT) 2.5 MCG/ACT aerosol solution inhaler, Inhale 2 puffs Daily.  •  tiZANidine (ZANAFLEX) 2 MG tablet, 3 (Three) Times a Day As Needed.  •  traZODone (DESYREL) 50 MG tablet, Take 1-2 tablets at bedtime as needed for sleep. (Patient taking differently: Take  by mouth Every Night. Take 1-2 tablets at bedtime as needed for sleep.)  •  venlafaxine 225 MG tablet sustained-release 24 hour 24 hr tablet, Take 1 tablet by mouth Daily With Breakfast. (Patient taking differently: Take 75 mg by mouth Daily With Breakfast.)    History of Present Illness:            Mrs. Almanza was contacted today via telephone for a follow up visit. [place] She denies any cardiac complaints, and reports her main problem at this time is COPD. She  "denies any PAD symptoms. Labs from January were reviewed, and she denies prior history of renal insufficiency. Blood pressures are well controlled.     The following portions of the patient's history were reviewed and updated as appropriate: allergies, current medications and problem list.    ROS: Pertinent positives as listed in the HPI.  All other systems reviewed and negative.    Objective:  Vitals:    04/27/20 1240   BP: 110/60   BP Location: Right arm   Patient Position: Sitting   Pulse: 78   Weight: 70.3 kg (155 lb)   Height: 170.2 cm (67\")     Physical Exam:  GENERAL: Alert, cooperative, in no acute distress.     Diagnostic Data:    Lab Results   Component Value Date    WBC 7.03 01/07/2020    HGB 12.1 01/07/2020    HCT 39.2 01/07/2020    MCV 92.9 01/07/2020     01/07/2020     Lab Results   Component Value Date    GLUCOSE 106 (H) 01/07/2020    BUN 16 01/07/2020    CREATININE 1.34 (H) 01/07/2020    EGFRIFNONA 39 (L) 01/07/2020    EGFRIFAFRI 67 06/01/2018    BCR 11.9 01/07/2020    K 4.4 01/07/2020    CO2 26.0 01/07/2020    CALCIUM 9.3 01/07/2020    PROTENTOTREF 7.5 06/01/2018    ALBUMIN 4.10 08/28/2019    LABIL2 1.4 06/01/2018    AST 17 08/28/2019    ALT 11 08/28/2019     Lab Results   Component Value Date    HGBA1C 5.60 01/07/2020       Procedures      Assessment and Plan:     1. PAD: no symptoms of claudication or cerebrovascular deficits.   2. HTN: well controlled. Continue current medical regimen.   3. HLD: Continue Lipitor 80mg to target LDL <70. Will check lipid panel at her next office visit.   4. Renal insufficiency: no prior history. BP is well controlled. We will re-check this in 3 months.   5. Follow up in 3 months with above labs, sooner if needed.       This patient has consented to a telehealth visit via phone. The visit was scheduled as a phone visit to comply with patient safety concerns in accordance with CDC recommendations.  All vitals recorded within this visit are reported by the " patient.  I spent 12 minutes in total including but not limited to the 6 minutes spent in direct conversation with this patient.       Scribed for Luis Miguel Gupta MD by Jaclyn Paredes PA-C. 4/27/2020  13:03

## 2020-07-28 ENCOUNTER — TELEMEDICINE (OUTPATIENT)
Dept: CARDIOLOGY | Facility: CLINIC | Age: 70
End: 2020-07-28

## 2020-07-28 VITALS
SYSTOLIC BLOOD PRESSURE: 135 MMHG | BODY MASS INDEX: 24.8 KG/M2 | HEIGHT: 67 IN | DIASTOLIC BLOOD PRESSURE: 76 MMHG | WEIGHT: 158 LBS | HEART RATE: 76 BPM

## 2020-07-28 DIAGNOSIS — I67.9 CVD (CEREBROVASCULAR DISEASE): ICD-10-CM

## 2020-07-28 DIAGNOSIS — I10 ESSENTIAL HYPERTENSION: Primary | ICD-10-CM

## 2020-07-28 DIAGNOSIS — E78.2 MIXED HYPERLIPIDEMIA: ICD-10-CM

## 2020-07-28 PROCEDURE — 99213 OFFICE O/P EST LOW 20 MIN: CPT | Performed by: INTERNAL MEDICINE

## 2020-07-28 NOTE — PROGRESS NOTES
"  OFFICE FOLLOW UP     Date of Encounter:2020     Name: Kailee Almanza  : 1950  Address: [address] [place], [place] [id number]    PCP: Machelle Atkinson  [address] [place], [place] [address]    Kailee Almanza is a 69 y.o. female.      Chief Complaint: Follow up of CVD, PVD, HTN, HLD    Problem List:   1. Cerebrovascular disease.  a. Amaurosis fugax, OD.  b. History of right CEA   c. Stent 70% LJ stenosis, 2014:  Questionable recurrent symptoms “early” following stent, treated with reinstitution Plavix.    d. 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   e. High-degree left subclavian artery stenosis, treated with stenting, 2014.    1. In-stent re-stenosis of left subclavian with placement of 4.0x28mm Xience EES in proximal left subclavian 2015  2. Peripheral vascular disease.   a. Aorta bi fem bypass by Dr. David Gordon, .  b. Fem-fem bypass  3. Palpitations 2019   a. ZIO monitor 2019: Underlying rhythm is SR, PAC's and brief runs of nonsustained atrial tachycardia   4. Hypertension.   5. Chest pain, 1 bout 2019.  a. Normal stress MPS 2019  6. Dyslipidemia.   7. Remote tobacco abuse.   8. Chronic back pain.    9. Microcytic anemia  a. Severe anemia 2018 s/p 2 units PRBCs  b. EGD with mild gastritis, colonoscopy pending   10. COPD  a. Oxygen at night.  11. Right upper lobe lung nodule, noted on CT scan 2018 to be increasing  a. [facility]   12. History of TIA  13. Renal insufficiency  14. Surgeries:  a. Appendectomy  b. Breast biopsy  c. Hysterectomy   d. Bladder surgery  e. lasik surgery  f. Oral surgery  g. Parathyroidectomy  h. Cholecystectomy     Allergies:  Allergies   Allergen Reactions   • Ferrlecit [Na Ferric Gluc Cplx In Sucrose] Diarrhea and Nausea And Vomiting   • Percodan [Oxycodone-Aspirin] Mental Status Change     \"MAKES ME FEEL LIKE I'M FLYING\"   • Sulfa Antibiotics Rash     Last as a baby.       Current " Medications:  •  albuterol sulfate  (90 Base) MCG/ACT inhaler, Inhale 2 puffs Every 4 (Four) Hours As Needed for Wheezing  •  atorvastatin (LIPITOR) 80 MG tablet, Take 1 tablet by mouth every night at bedtime.  •  budesonide-formoterol (SYMBICORT) 160-4.5 MCG/ACT inhaler, Inhale 2 puffs 2 (Two) Times a Day.  •  clopidogrel (PLAVIX) 75 MG tablet, Take 75 mg by mouth Daily.  •  escitalopram (LEXAPRO) 10 MG tablet, Take 10 mg by mouth Daily  •  gabapentin (NEURONTIN) 800 MG tablet, Take 800 mg by mouth 2 (Two) Times a Day.  •  HYDROcodone-acetaminophen (NORCO) 7.5-325 MG per tablet, Take 1 tablet by mouth Daily As Needed for Moderate Pain  •  ipratropium-albuterol (DUO-NEB) 0.5-2.5 mg/3 ml nebulizer, Take 3 mL by nebulization 4 (Four) Times a Day. (Patient taking differently: Take 3 mL by nebulization As Needed.)  •  levalbuterol (XOPENEX) 0.63 MG/3ML nebulizer solution, Take 1 ampule by nebulization 4 (Four) Times a Day As Needed for Wheezing.  •  levothyroxine (SYNTHROID, LEVOTHROID) 75 MCG tablet, Take 75 mcg by mouth Daily.  •  lisinopril (PRINIVIL,ZESTRIL) 20 MG tablet, Take 1 tablet by mouth Daily  •  O2 (OXYGEN), Inhale 2 L/min Every Night. Every night  During the day prn  •  tiotropium bromide monohydrate (SPIRIVA RESPIMAT) 2.5 MCG/ACT aerosol solution inhaler, Inhale 2 puffs Daily  •  tiZANidine (ZANAFLEX) 2 MG tablet, 3 (Three) Times a Day As Needed.  •  traZODone (DESYREL) 50 MG tablet, Take 1-2 tablets at bedtime as needed for sleep. (Patient taking differently: Take  by mouth Every Night. Take 1-2 tablets at bedtime as needed for sleep.),  •  venlafaxine 225 MG tablet sustained-release 24 hour 24 hr tablet, Take 1 tablet by mouth Daily With Breakfast. (Patient taking differently: Take 75 mg by mouth Daily With Breakfast.)    History of Present Illness:   Kailee Hurtadoey was contacted for follow up today via video. She follows with    Pulmonary for her COPD and uses nighttime oxygen. She denies  "chest pain and recurrent neurovascular symptoms. She bruises easily on Plavix. She is staying isolated in the light of COVID 19 pandemic.        The following portions of the patient's history were reviewed and updated as appropriate: allergies, current medications and problem list.    ROS: Pertinent positives as listed in the HPI.  All other systems reviewed and negative.    Objective:    Vitals:    07/28/20 1435   BP: 135/76   BP Location: Right arm   Patient Position: Sitting   Pulse: 76   Weight: 71.7 kg (158 lb)   Height: 170.2 cm (67\")       Physical Exam:  Physical Exam   Constitutional: No distress.   Pulmonary/Chest: Effort normal.   Neurological: She is alert.   Psychiatric: She has a normal mood and affect.       Diagnostic Data:  No new labs available to review.     Procedures      Assessment and Plan:   1.  CVD/PVD:  No recurrent neurovascular symptoms. Continue Plavix and statin.  2.  HTN:  Well controlled on current medical management.  3.  HLD:  Labs pending. [facility].      This patient has consented to a telehealth visit via video. The visit was scheduled as a telehealth visit to comply with patient safety concerns in accordance with CDC recommendations.  All vitals recorded within this visit are reported by the patient.  I spent 15 minutes in total including but not limited to the 6 minutes spent in direct conversation with this patient.       I will see Kailee Almanza back in 6 months or sooner on an as needed basis.    Scribed for Luis Miguel Gupta MD by Juanis Gayle RN. 07/28/2020 3:45 PM.     EMR Dragon/Transcription Disclaimer:  Much of this encounter note is an electronic transcription/translation of spoken language to printed text.  The electronic translation of spoken language may permit erroneous, or at times, nonsensical words or phrases to be inadvertently transcribed.  Although I have reviewed the note for such errors, some may still " exist.

## 2020-07-29 RX ORDER — EZETIMIBE 10 MG/1
10 TABLET ORAL DAILY
Qty: 90 TABLET | Refills: 3 | Status: SHIPPED | OUTPATIENT
Start: 2020-07-29 | End: 2021-07-06

## 2020-07-29 NOTE — PROGRESS NOTES
Labs reviewed, will send copy to PCP. Per [facility] add Zetia to target LDL to < 70. Pt aware and agreeable. Requested Zetia be sent to [facility]. Repeat lipid panel in 6-8 weeks. Follow up closely with PCP regarding elevated creatinine/decreased eGFR.

## 2020-08-14 ENCOUNTER — TRANSCRIBE ORDERS (OUTPATIENT)
Dept: ADMINISTRATIVE | Facility: HOSPITAL | Age: 70
End: 2020-08-14

## 2020-08-14 ENCOUNTER — TELEPHONE (OUTPATIENT)
Dept: CARDIOLOGY | Facility: CLINIC | Age: 70
End: 2020-08-14

## 2020-08-14 DIAGNOSIS — N18.30 CHRONIC KIDNEY DISEASE, STAGE III (MODERATE) (HCC): Primary | ICD-10-CM

## 2020-08-14 RX ORDER — AMLODIPINE BESYLATE 5 MG/1
5 TABLET ORAL DAILY
Qty: 30 TABLET | Refills: 11 | Status: SHIPPED | OUTPATIENT
Start: 2020-08-14 | End: 2020-08-31 | Stop reason: SDUPTHER

## 2020-08-14 NOTE — TELEPHONE ENCOUNTER
Patient contacted with recommendations. Patient verbalizes understanding, all questions answered at this time.

## 2020-08-14 NOTE — TELEPHONE ENCOUNTER
Patient called to report that since her last telephone visit, she has been experiencing occasional chest pains that radiate to her jaw. Patient states these are brief and resolve spontaneously. She does not , associate the pains with exertion. No abnormal SOA. Patients BP has also been elevated through the week. Readings: 167/88, 154/94, 145/94, 160/81. HR averaging 60-70s. Patient is currently taking Lisinopril 20 mg daily.       Negative stress test in 04/2019. Patient advised we will discuss with Dr. Gupta and follow up with his recommendations.

## 2020-08-19 ENCOUNTER — HOSPITAL ENCOUNTER (OUTPATIENT)
Dept: ULTRASOUND IMAGING | Facility: HOSPITAL | Age: 70
Discharge: HOME | End: 2020-08-19
Admitting: FAMILY MEDICINE

## 2020-08-19 DIAGNOSIS — N18.30 CHRONIC KIDNEY DISEASE, STAGE III (MODERATE) (HCC): ICD-10-CM

## 2020-08-19 PROCEDURE — 76775 US EXAM ABDO BACK WALL LIM: CPT

## 2020-08-31 RX ORDER — AMLODIPINE BESYLATE 5 MG/1
5 TABLET ORAL DAILY
Qty: 90 TABLET | Refills: 3 | Status: SHIPPED | OUTPATIENT
Start: 2020-08-31 | End: 2021-08-09

## 2020-09-09 DIAGNOSIS — J43.2 CENTRILOBULAR EMPHYSEMA (HCC): ICD-10-CM

## 2020-09-14 ENCOUNTER — HOSPITAL ENCOUNTER (EMERGENCY)
Facility: HOSPITAL | Age: 70
Discharge: LEFT BEFORE DISCHARGE | End: 2020-09-14

## 2020-09-14 VITALS
WEIGHT: 160 LBS | TEMPERATURE: 95.7 F | DIASTOLIC BLOOD PRESSURE: 87 MMHG | SYSTOLIC BLOOD PRESSURE: 159 MMHG | HEIGHT: 68 IN | RESPIRATION RATE: 18 BRPM | HEART RATE: 94 BPM | OXYGEN SATURATION: 100 % | BODY MASS INDEX: 24.25 KG/M2

## 2020-09-15 ENCOUNTER — TRANSCRIBE ORDERS (OUTPATIENT)
Dept: LAB | Facility: HOSPITAL | Age: 70
End: 2020-09-15

## 2020-09-15 ENCOUNTER — LAB (OUTPATIENT)
Dept: LAB | Facility: HOSPITAL | Age: 70
End: 2020-09-15

## 2020-09-15 DIAGNOSIS — R51.9 FACIAL PAIN: Primary | ICD-10-CM

## 2020-09-15 DIAGNOSIS — R94.6 NONSPECIFIC ABNORMAL RESULTS OF THYROID FUNCTION STUDY: ICD-10-CM

## 2020-09-15 DIAGNOSIS — R51.9 FACIAL PAIN: ICD-10-CM

## 2020-09-15 DIAGNOSIS — N28.9 RENAL INSUFFICIENCY: ICD-10-CM

## 2020-09-15 LAB
ANION GAP SERPL CALCULATED.3IONS-SCNC: 12.1 MMOL/L (ref 5–15)
BUN SERPL-MCNC: 16 MG/DL (ref 8–23)
BUN/CREAT SERPL: 14.2 (ref 7–25)
CALCIUM SPEC-SCNC: 8.9 MG/DL (ref 8.6–10.5)
CHLORIDE SERPL-SCNC: 99 MMOL/L (ref 98–107)
CO2 SERPL-SCNC: 24.9 MMOL/L (ref 22–29)
CREAT SERPL-MCNC: 1.13 MG/DL (ref 0.57–1)
CRP SERPL-MCNC: 0.41 MG/DL (ref 0–0.5)
ERYTHROCYTE [SEDIMENTATION RATE] IN BLOOD: 16 MM/HR (ref 0–30)
GFR SERPL CREATININE-BSD FRML MDRD: 48 ML/MIN/1.73
GLUCOSE SERPL-MCNC: 102 MG/DL (ref 65–99)
POTASSIUM SERPL-SCNC: 5.3 MMOL/L (ref 3.5–5.2)
SODIUM SERPL-SCNC: 136 MMOL/L (ref 136–145)
T3 SERPL-MCNC: 82.2 NG/DL (ref 80–200)
T4 FREE SERPL-MCNC: 1.23 NG/DL (ref 0.93–1.7)
TSH SERPL DL<=0.05 MIU/L-ACNC: 0.25 UIU/ML (ref 0.27–4.2)

## 2020-09-15 PROCEDURE — 36415 COLL VENOUS BLD VENIPUNCTURE: CPT

## 2020-09-15 PROCEDURE — 84445 ASSAY OF TSI GLOBULIN: CPT

## 2020-09-15 PROCEDURE — 80048 BASIC METABOLIC PNL TOTAL CA: CPT

## 2020-09-15 PROCEDURE — 84480 ASSAY TRIIODOTHYRONINE (T3): CPT

## 2020-09-15 PROCEDURE — 84443 ASSAY THYROID STIM HORMONE: CPT

## 2020-09-15 PROCEDURE — 85652 RBC SED RATE AUTOMATED: CPT

## 2020-09-15 PROCEDURE — 86140 C-REACTIVE PROTEIN: CPT

## 2020-09-15 PROCEDURE — 84439 ASSAY OF FREE THYROXINE: CPT

## 2020-09-16 ENCOUNTER — TRANSCRIBE ORDERS (OUTPATIENT)
Dept: ADMINISTRATIVE | Facility: HOSPITAL | Age: 70
End: 2020-09-16

## 2020-09-16 DIAGNOSIS — H53.2 DIPLOPIA: Primary | ICD-10-CM

## 2020-09-17 LAB — TSI SER-ACNC: <0.1 IU/L (ref 0–0.55)

## 2020-09-21 ENCOUNTER — TRANSCRIBE ORDERS (OUTPATIENT)
Dept: ADMINISTRATIVE | Facility: HOSPITAL | Age: 70
End: 2020-09-21

## 2020-09-21 DIAGNOSIS — H53.2 DIPLOPIA: Primary | ICD-10-CM

## 2020-09-22 ENCOUNTER — HOSPITAL ENCOUNTER (OUTPATIENT)
Dept: MRI IMAGING | Facility: HOSPITAL | Age: 70
Discharge: HOME | End: 2020-09-22

## 2020-09-22 DIAGNOSIS — H53.2 DIPLOPIA: ICD-10-CM

## 2020-09-22 PROCEDURE — 70543 MRI ORBT/FAC/NCK W/O &W/DYE: CPT

## 2020-09-22 PROCEDURE — 70553 MRI BRAIN STEM W/O & W/DYE: CPT

## 2020-09-22 PROCEDURE — 0 GADOBENATE DIMEGLUMINE 529 MG/ML SOLUTION: Performed by: FAMILY MEDICINE

## 2020-09-22 PROCEDURE — A9577 INJ MULTIHANCE: HCPCS | Performed by: FAMILY MEDICINE

## 2020-09-22 RX ADMIN — GADOBENATE DIMEGLUMINE 15 ML: 529 INJECTION, SOLUTION INTRAVENOUS at 13:00

## 2020-10-06 ENCOUNTER — HOSPITAL ENCOUNTER (OUTPATIENT)
Dept: MRI IMAGING | Facility: HOSPITAL | Age: 70
End: 2020-10-06

## 2020-10-08 ENCOUNTER — APPOINTMENT (OUTPATIENT)
Dept: MRI IMAGING | Facility: HOSPITAL | Age: 70
End: 2020-10-08

## 2020-10-15 ENCOUNTER — TRANSCRIBE ORDERS (OUTPATIENT)
Dept: ADMINISTRATIVE | Facility: HOSPITAL | Age: 70
End: 2020-10-15

## 2020-10-15 DIAGNOSIS — Z12.31 VISIT FOR SCREENING MAMMOGRAM: Primary | ICD-10-CM

## 2020-11-16 ENCOUNTER — TELEMEDICINE (OUTPATIENT)
Dept: PULMONOLOGY | Facility: CLINIC | Age: 70
End: 2020-11-16

## 2020-11-16 DIAGNOSIS — J43.2 CENTRILOBULAR EMPHYSEMA (HCC): ICD-10-CM

## 2020-11-16 DIAGNOSIS — G47.34 NOCTURNAL HYPOXEMIA: Primary | ICD-10-CM

## 2020-11-16 PROCEDURE — 99213 OFFICE O/P EST LOW 20 MIN: CPT | Performed by: NURSE PRACTITIONER

## 2020-11-16 RX ORDER — BUDESONIDE AND FORMOTEROL FUMARATE DIHYDRATE 160; 4.5 UG/1; UG/1
2 AEROSOL RESPIRATORY (INHALATION) 2 TIMES DAILY
Qty: 3 INHALER | Refills: 3 | Status: SHIPPED | OUTPATIENT
Start: 2020-11-16

## 2020-11-16 NOTE — PROGRESS NOTES
"[facility] Pulmonary Follow up    CHIEF COMPLAINT    COPD/oxygen renewal    HISTORY OF PRESENT ILLNESS    Kailee Almanza is a 69 y.o.female here today for a telemedicine visit.  She was last seen in the office In February by me.  She denies any rest or illnesses or exacerbations since her last appointment.    She continues to have shortness of breath with activity.  She does wear her oxygen at night 2 L and occasionally during the day when she is short of breath.  She does take frequent breaks to recover.    She continues to use Spiriva and Symbicort daily.  She does have duo nebs but does not use them on a regular basis.    She denies fever, chills, sputum production, hemoptysis, night sweats, weight loss, chest pain or palpitations.  She denies any lower extremity edema or calf tenderness.  She denies any sinus or allergy symptoms.  She denies reflux symptoms.    She is up-to-date on her vaccinations.  She is leaving for [place] for a month in a couple of weeks.    She quit smoking in 2013 has a 35-pack-year history.  She is scheduled for CT scan later this month.    Patient Active Problem List   Diagnosis   • Peripheral vascular disease (CMS/HCC)   • Essential hypertension   • CVD (cerebrovascular disease)   • Mixed hyperlipidemia   • Chronic bilateral low back pain without sciatica   • Centrilobular emphysema (CMS/HCC)   • S/P parathyroidectomy 2/1/17   • Aneurysm of thoracic aorta (CMS/HCC)   • Lung nodule   • Iron deficiency anemia due to chronic blood loss   • Iron malabsorption   • Psychophysiological insomnia   • Carotid stenosis   • Insomnia due to medical condition   • Shortness of breath   • Personal history of smoking       Allergies   Allergen Reactions   • Ferrlecit [Na Ferric Gluc Cplx In Sucrose] Diarrhea and Nausea And Vomiting   • Percodan [Oxycodone-Aspirin] Mental Status Change     \"MAKES ME FEEL LIKE I'M FLYING\"   • Sulfa Antibiotics Rash     Last as a baby.       Current Outpatient Medications: "   •  amLODIPine (NORVASC) 5 MG tablet, Take 1 tablet by mouth Daily., Disp: 90 tablet, Rfl: 3  •  atorvastatin (LIPITOR) 80 MG tablet, Take 1 tablet by mouth every night at bedtime., Disp: 90 tablet, Rfl: 3  •  budesonide-formoterol (SYMBICORT) 160-4.5 MCG/ACT inhaler, Inhale 2 puffs 2 (Two) Times a Day., Disp: 3 inhaler, Rfl: 3  •  clopidogrel (PLAVIX) 75 MG tablet, Take 75 mg by mouth Daily., Disp: , Rfl:   •  escitalopram (LEXAPRO) 10 MG tablet, Take 10 mg by mouth Daily., Disp: , Rfl:   •  ezetimibe (ZETIA) 10 MG tablet, Take 1 tablet by mouth Daily., Disp: 90 tablet, Rfl: 3  •  gabapentin (NEURONTIN) 800 MG tablet, Take 800 mg by mouth 2 (Two) Times a Day., Disp: , Rfl:   •  HYDROcodone-acetaminophen (NORCO) 7.5-325 MG per tablet, Take 1 tablet by mouth Daily As Needed for Moderate Pain ., Disp: 15 tablet, Rfl: 0  •  ipratropium-albuterol (DUO-NEB) 0.5-2.5 mg/3 ml nebulizer, Take 3 mL by nebulization 4 (Four) Times a Day. (Patient taking differently: Take 3 mL by nebulization As Needed.), Disp: 360 mL, Rfl: 3  •  levalbuterol (XOPENEX) 0.63 MG/3ML nebulizer solution, Take 1 ampule by nebulization 4 (Four) Times a Day As Needed for Wheezing., Disp: 360 mL, Rfl: 11  •  levothyroxine (SYNTHROID, LEVOTHROID) 75 MCG tablet, Take 75 mcg by mouth Daily., Disp: , Rfl:   •  lisinopril (PRINIVIL,ZESTRIL) 20 MG tablet, Take 1 tablet by mouth Daily., Disp: 30 tablet, Rfl: 5  •  O2 (OXYGEN), Inhale 2 L/min Every Night. Every night  During the day prn, Disp: , Rfl:   •  PROAIR  (90 Base) MCG/ACT inhaler, USE 2 INHALATIONS EVERY 4 HOURS AS NEEDED FOR WHEEZING, Disp: 51 g, Rfl: 3  •  tiotropium bromide monohydrate (SPIRIVA RESPIMAT) 2.5 MCG/ACT aerosol solution inhaler, Inhale 2 puffs Daily., Disp: 3 inhaler, Rfl: 3  •  tiZANidine (ZANAFLEX) 2 MG tablet, 3 (Three) Times a Day As Needed., Disp: , Rfl:   •  traZODone (DESYREL) 50 MG tablet, Take 1-2 tablets at bedtime as needed for sleep. (Patient taking differently: Take   by mouth Every Night. Take 1-2 tablets at bedtime as needed for sleep.), Disp: 180 tablet, Rfl: 3  •  venlafaxine 225 MG tablet sustained-release 24 hour 24 hr tablet, Take 1 tablet by mouth Daily With Breakfast. (Patient taking differently: Take 75 mg by mouth Daily With Breakfast.), Disp: 90 tablet, Rfl: 1  MEDICATION LIST AND ALLERGIES REVIEWED.    Social History     Tobacco Use   • Smoking status: Former Smoker     Packs/day: 1.00     Years: 35.00     Pack years: 35.00     Types: Cigarettes     Quit date: 2013     Years since quittin.4   • Smokeless tobacco: Never Used   Substance Use Topics   • Alcohol use: Yes     Alcohol/week: 1.0 standard drinks     Types: 1 Standard drinks or equivalent per week     Comment: Once a week or more   • Drug use: No       FAMILY AND SOCIAL HISTORY REVIEWED.    Review of Systems   Constitutional: Negative for activity change, appetite change, fatigue, fever and unexpected weight change.   HENT: Negative for congestion, postnasal drip, rhinorrhea, sinus pressure, sore throat and voice change.    Eyes: Negative for visual disturbance.   Respiratory: Positive for shortness of breath. Negative for cough, chest tightness and wheezing.    Cardiovascular: Negative for chest pain, palpitations and leg swelling.   Gastrointestinal: Negative for abdominal distention, abdominal pain, nausea and vomiting.   Endocrine: Negative for cold intolerance and heat intolerance.   Genitourinary: Negative for difficulty urinating and urgency.   Musculoskeletal: Negative for arthralgias, back pain and neck pain.   Skin: Negative for color change and pallor.   Allergic/Immunologic: Negative for environmental allergies and food allergies.   Neurological: Negative for dizziness, syncope, weakness and light-headedness.   Hematological: Negative for adenopathy. Does not bruise/bleed easily.   Psychiatric/Behavioral: Negative for agitation and behavioral problems.   .    LMP  (LMP Unknown)      Immunization History   Administered Date(s) Administered   • Fluad Quad 65+ 10/24/2019   • Fluzone High Dose =>65 Years ([employer] ONLY) 10/24/2019   • Shingrix 01/01/2016       Physical Exam    Unable to do physical exam due to telemedicine visit, patient was able to medicate without respiratory distress throughout the entire visit.    RESULTS      PROBLEM LIST    Problem List Items Addressed This Visit        Respiratory    Centrilobular emphysema (CMS/HCC)    Overview     CT scan of the chest July 18, 2016, centrilobular emphysema with fibrosis, no pulmonary embolus         Relevant Medications    tiotropium bromide monohydrate (SPIRIVA RESPIMAT) 2.5 MCG/ACT aerosol solution inhaler    budesonide-formoterol (SYMBICORT) 160-4.5 MCG/ACT inhaler            DISCUSSION  You have chosen to receive care through a telehealth visit.  Do you consent to use a video/audio connection for your medical care today? Yes    Ms. Perez was here for follow-up of her COPD.  She remains on Spiriva and Symbicort for COPD.  I did send in refills for her today.    She will continue her oxygen at 2 L nasal cannula at nighttime for her nocturnal hypoxemia and during the day as needed for shortness of breath.  I will send a new order for her to [facility].      I will call her once I receive her CT scan results that is scheduled later this month.    She will follow up in 6 months or sooner if her symptoms worsen.  I did advise her to call with any additional concerns or questions.  I spent 10-19 minutes with the patient. I spent > 50% percent of this time counseling and discussing diagnosis, prognosis, diagnostic testing, evaluation, current status, treatment options and management.    Kelsey Genao, GET  11/16/202015:47 EST  Electronically signed     Please note that portions of this note were completed with a voice recognition program. Efforts were made to edit the dictations, but occasionally words are mistranscribed.      CC: [place]  Machelle CAPELLAN MD

## 2020-11-23 ENCOUNTER — HOSPITAL ENCOUNTER (OUTPATIENT)
Dept: CT IMAGING | Facility: HOSPITAL | Age: 70
Discharge: HOME | End: 2020-11-23
Admitting: NURSE PRACTITIONER

## 2020-11-23 DIAGNOSIS — Z87.891 PERSONAL HISTORY OF SMOKING: ICD-10-CM

## 2020-11-23 PROCEDURE — G0297 LDCT FOR LUNG CA SCREEN: HCPCS

## 2020-11-24 ENCOUNTER — TELEPHONE (OUTPATIENT)
Dept: PULMONOLOGY | Facility: CLINIC | Age: 70
End: 2020-11-24

## 2020-11-24 NOTE — TELEPHONE ENCOUNTER
----- Message from GET Gonzalez sent at 11/24/2020  9:08 AM EST -----  Please let her know that her CT scan is stable no nodule or mass concerning for malignancy.  Please let her know that the thoracic aorta was similar when compared to the January CT scan.

## 2020-12-07 ENCOUNTER — APPOINTMENT (OUTPATIENT)
Dept: MAMMOGRAPHY | Facility: HOSPITAL | Age: 70
End: 2020-12-07

## 2020-12-21 ENCOUNTER — APPOINTMENT (OUTPATIENT)
Dept: CT IMAGING | Facility: HOSPITAL | Age: 70
End: 2020-12-21

## 2021-01-11 ENCOUNTER — APPOINTMENT (OUTPATIENT)
Dept: MAMMOGRAPHY | Facility: HOSPITAL | Age: 71
End: 2021-01-11

## 2021-02-05 ENCOUNTER — HOSPITAL ENCOUNTER (OUTPATIENT)
Dept: MAMMOGRAPHY | Facility: HOSPITAL | Age: 71
Discharge: HOME | End: 2021-02-05
Admitting: FAMILY MEDICINE

## 2021-02-05 DIAGNOSIS — Z12.31 VISIT FOR SCREENING MAMMOGRAM: ICD-10-CM

## 2021-02-05 PROCEDURE — 77063 BREAST TOMOSYNTHESIS BI: CPT

## 2021-02-05 PROCEDURE — 77067 SCR MAMMO BI INCL CAD: CPT

## 2021-03-30 ENCOUNTER — OFFICE VISIT (OUTPATIENT)
Dept: CARDIOLOGY | Facility: CLINIC | Age: 71
End: 2021-03-30

## 2021-03-30 VITALS
WEIGHT: 165 LBS | OXYGEN SATURATION: 97 % | DIASTOLIC BLOOD PRESSURE: 77 MMHG | HEART RATE: 76 BPM | SYSTOLIC BLOOD PRESSURE: 125 MMHG | BODY MASS INDEX: 25.01 KG/M2 | HEIGHT: 68 IN

## 2021-03-30 DIAGNOSIS — I10 ESSENTIAL HYPERTENSION: ICD-10-CM

## 2021-03-30 DIAGNOSIS — E78.2 MIXED HYPERLIPIDEMIA: ICD-10-CM

## 2021-03-30 DIAGNOSIS — I73.9 PERIPHERAL VASCULAR DISEASE (HCC): Primary | ICD-10-CM

## 2021-03-30 PROCEDURE — 99214 OFFICE O/P EST MOD 30 MIN: CPT | Performed by: INTERNAL MEDICINE

## 2021-06-04 LAB
ALBUMIN SERPL-MCNC: 4.4 G/DL (ref 3.5–5.2)
ALBUMIN/GLOB SERPL: 1.8 G/DL
ALP SERPL-CCNC: 112 U/L (ref 39–117)
ALT SERPL-CCNC: 14 U/L (ref 1–33)
AST SERPL-CCNC: 13 U/L (ref 1–32)
BILIRUB SERPL-MCNC: 0.3 MG/DL (ref 0–1.2)
BUN SERPL-MCNC: 14 MG/DL (ref 8–23)
BUN/CREAT SERPL: 11.3 (ref 7–25)
CALCIUM SERPL-MCNC: 9.1 MG/DL (ref 8.6–10.5)
CHLORIDE SERPL-SCNC: 101 MMOL/L (ref 98–107)
CHOLEST SERPL-MCNC: 167 MG/DL (ref 0–200)
CO2 SERPL-SCNC: 26.7 MMOL/L (ref 22–29)
CREAT SERPL-MCNC: 1.24 MG/DL (ref 0.57–1)
GLOBULIN SER CALC-MCNC: 2.5 GM/DL
GLUCOSE SERPL-MCNC: 90 MG/DL (ref 65–99)
HDLC SERPL-MCNC: 55 MG/DL (ref 40–60)
LDLC SERPL CALC-MCNC: 87 MG/DL (ref 0–100)
POTASSIUM SERPL-SCNC: 4.8 MMOL/L (ref 3.5–5.2)
PROT SERPL-MCNC: 6.9 G/DL (ref 6–8.5)
SODIUM SERPL-SCNC: 138 MMOL/L (ref 136–145)
TRIGL SERPL-MCNC: 147 MG/DL (ref 0–150)
VLDLC SERPL CALC-MCNC: 25 MG/DL (ref 5–40)

## 2021-06-07 DIAGNOSIS — N18.32 STAGE 3B CHRONIC KIDNEY DISEASE (HCC): Primary | ICD-10-CM

## 2021-06-07 NOTE — PROGRESS NOTES
Spoke with pt, agreeable to referral to nephrology at [facility]. Referral sent in [facility]. Pt mailed letter with labs. ANABELL

## 2021-06-21 ENCOUNTER — OFFICE VISIT (OUTPATIENT)
Dept: PULMONOLOGY | Facility: CLINIC | Age: 71
End: 2021-06-21

## 2021-06-21 VITALS
HEIGHT: 68 IN | DIASTOLIC BLOOD PRESSURE: 64 MMHG | TEMPERATURE: 97.3 F | HEART RATE: 68 BPM | OXYGEN SATURATION: 93 % | WEIGHT: 166.4 LBS | SYSTOLIC BLOOD PRESSURE: 102 MMHG | BODY MASS INDEX: 25.22 KG/M2

## 2021-06-21 DIAGNOSIS — J43.2 CENTRILOBULAR EMPHYSEMA (HCC): Primary | ICD-10-CM

## 2021-06-21 DIAGNOSIS — G47.34 NOCTURNAL HYPOXEMIA: ICD-10-CM

## 2021-06-21 DIAGNOSIS — R06.02 SHORTNESS OF BREATH: ICD-10-CM

## 2021-06-21 DIAGNOSIS — R91.1 LUNG NODULE: ICD-10-CM

## 2021-06-21 DIAGNOSIS — Z87.891 PERSONAL HISTORY OF SMOKING: ICD-10-CM

## 2021-06-21 PROCEDURE — 99214 OFFICE O/P EST MOD 30 MIN: CPT | Performed by: NURSE PRACTITIONER

## 2021-06-21 NOTE — PROGRESS NOTES
Pre-operative Clearance    Chief Complaint   Patient presents with   • centrilobular emphysema     f/u       HPI     Kailee Almanza is a pleasant 70 y.o. female who has been referred for preoperative exam.  She is planning on having surgery on her right eye in the near future and is wanting clearance from pulmonary standpoint.  She did have surgery on her left eye since her last appointment and tolerated the procedure well.  She did not have any postop complications.    She was last seen in the office as a telemedicine visit in November.  She denies any rest or illnesses or exacerbations since her last appointment.    She continues to use Symbicort and Spiriva daily.  She does have duo nebs but does not use them on a regular basis.  She does have shortness of breath with exertion but does recover fairly quickly at rest.    She continues to wear 2 L nasal cannula at nighttime.  She will occasionally wear her oxygen during the day if she is short of breath.    She denies fever, chills, sputum production, hemoptysis, night sweats, weight loss, chest pain or palpitations.  She denies any lower extremity edema or calf tenderness.  She denies any sinus or allergy symptoms.  She denies reflux symptoms.    She is up-to-date on her current vaccinations.    She quit smoking in 2013 is a 35-pack-year history.  Her last CT scan was in November 2020 that showed no nodule or mass concerning for malignancy.    Past Medical History:   Diagnosis Date   • Anemia     Due to low folic acid   • Arthritis    • Carotid stenosis    • Carotid stenosis    • Cataract    • Cerebrovascular disease 7/21/2016    Amaurosis fugax, OD.  Stent 70% LJ stenosis, April 2014:  Questionable recurrent symptoms “early” following stent, treated with reinstitution Plavix.   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  High-degree left subclavian artery stenosis, treated with stenting, April 2014.      • COPD (chronic obstructive pulmonary disease)  (CMS/HCC)    • Coronary artery disease    • Disease of thyroid gland     hypothyroid   • Frequent UTI    • History of transfusion     NO REACTION   • Hot flashes    • Hyperlipidemia 7/21/2016   • Hypertension 7/21/2016   • IBS (irritable bowel syndrome)    • Low back pain    • Lung nodule Right lung.   • Neuropathy    • On home oxygen therapy     2L NC QHS   • Pleural effusion, bilateral    • Polymyositis (CMS/HCC)    • Psoriasis     EYE BROW   • TIA (transient ischemic attack)     3 years ago carotid stent placed   • Wears dentures        Past Surgical History:   Procedure Laterality Date   • AORTA - FEMORAL ARTERY BYPASS GRAFT Bilateral 1996   • APPENDECTOMY     • BREAST BIOPSY Bilateral    • CAROTID ENDARTERECTOMY      Right. 2010   • CAROTID STENT      Right common carotid artery, 2015   • CHOLECYSTECTOMY     • COLONOSCOPY     • CYSTOCELE REPAIR     • ENDOSCOPY N/A 4/7/2018    Procedure: ESOPHAGOGASTRODUODENOSCOPY;  Surgeon: Alfonzo Cox MD;  Location:  [facility];  Service: Gastroenterology   • EYE SURGERY      lasik   • EYE SURGERY Left 01/14/2021   • FEMORAL ARTERY - FEMORAL ARTERY BYPASS GRAFT  2008    left to right   • HYSTERECTOMY      bleeding, uterine cyst   • WA EXPLORE PARATHYROID GLANDS Bilateral 2/1/2017    Procedure: PARATHYROIDECTOMY;  Surgeon: Isaac CORONA MD;  Location:  [facility];  Service: ENT   • REFRACTIVE SURGERY     • SALIVARY GLAND SURGERY Left    • SUBCLAVIAN ARTERY STENT Left     7/15   • THROMBOENDARTERECTOMY Left     f Subclavian        Family History   Problem Relation Age of Onset   • Alzheimer's disease Mother    • Heart disease Mother    • Hypertension Mother    • Heart attack Father    • Heart disease Father    • Heart failure Father    • No Known Problems Brother    • Heart disease Paternal Grandfather    • Cancer Paternal Grandfather         colon?   • Colon cancer Other         Possible   • Diabetes Paternal Grandmother    • Emphysema Maternal Grandfather   "      Social History     Socioeconomic History   • Marital status:      Spouse name: Not on file   • Number of children: 3   • Years of education: Not on file   • Highest education level: Not on file   Tobacco Use   • Smoking status: Former Smoker     Packs/day: 1.00     Years: 35.00     Pack years: 35.00     Types: Cigarettes     Quit date: 2013     Years since quittin.0   • Smokeless tobacco: Never Used   Vaping Use   • Vaping Use: Never used   Substance and Sexual Activity   • Alcohol use: Yes     Alcohol/week: 1.0 standard drinks     Types: 1 Standard drinks or equivalent per week     Comment: Once a week or more   • Drug use: No   • Sexual activity: Yes     Partners: Male     Birth control/protection: Post-menopausal     Comment: 1 partner/       Allergies   Allergen Reactions   • Ferrlecit [Na Ferric Gluc Cplx In Sucrose] Diarrhea and Nausea And Vomiting   • Percodan [Oxycodone-Aspirin] Mental Status Change     \"MAKES ME FEEL LIKE I'M FLYING\"   • Sulfa Antibiotics Rash     Last as a baby.       ROS    Review of Systems   Constitutional: Negative for activity change, chills, fever and unexpected weight loss.   HENT: Negative for congestion, hearing loss, postnasal drip, rhinorrhea, sinus pressure, sore throat and voice change.    Eyes: Negative for blurred vision and visual disturbance.   Respiratory: Positive for shortness of breath. Negative for apnea, cough and wheezing.    Cardiovascular: Negative for chest pain and palpitations.   Gastrointestinal: Negative for abdominal pain, nausea, vomiting and GERD.   Endocrine: Negative for cold intolerance.   Genitourinary: Negative for difficulty urinating and urinary incontinence.   Musculoskeletal: Negative for back pain, joint swelling and myalgias.   Skin: Negative for color change and pallor.   Allergic/Immunologic: Negative for food allergies.   Neurological: Negative for weakness, numbness, headache and confusion.   Hematological: " Negative for adenopathy.   Psychiatric/Behavioral: Negative for agitation, behavioral problems and depressed mood.       Vitals:    06/21/21 1424   BP: 102/64   Pulse: 68   Temp: 97.3 °F (36.3 °C)   SpO2: 93%         Current Outpatient Medications:   •  amLODIPine (NORVASC) 5 MG tablet, Take 1 tablet by mouth Daily., Disp: 90 tablet, Rfl: 3  •  atorvastatin (LIPITOR) 80 MG tablet, Take 1 tablet by mouth every night at bedtime., Disp: 90 tablet, Rfl: 3  •  budesonide-formoterol (SYMBICORT) 160-4.5 MCG/ACT inhaler, Inhale 2 puffs 2 (Two) Times a Day., Disp: 3 inhaler, Rfl: 3  •  clopidogrel (PLAVIX) 75 MG tablet, Take 75 mg by mouth Daily., Disp: , Rfl:   •  escitalopram (LEXAPRO) 10 MG tablet, Take 10 mg by mouth Daily., Disp: , Rfl:   •  ezetimibe (ZETIA) 10 MG tablet, Take 1 tablet by mouth Daily., Disp: 90 tablet, Rfl: 3  •  gabapentin (NEURONTIN) 800 MG tablet, Take 800 mg by mouth 2 (Two) Times a Day., Disp: , Rfl:   •  HYDROcodone-acetaminophen (NORCO) 7.5-325 MG per tablet, Take 1 tablet by mouth Daily As Needed for Moderate Pain ., Disp: 15 tablet, Rfl: 0  •  HYDROCODONE-ACETAMINOPHEN PO, TAKE TABLET  750 Mg. Prn, Disp: , Rfl:   •  ipratropium-albuterol (DUO-NEB) 0.5-2.5 mg/3 ml nebulizer, Take 3 mL by nebulization 4 (Four) Times a Day. (Patient taking differently: Take 3 mL by nebulization As Needed.), Disp: 360 mL, Rfl: 3  •  levalbuterol (XOPENEX) 0.63 MG/3ML nebulizer solution, Take 1 ampule by nebulization 4 (Four) Times a Day As Needed for Wheezing., Disp: 360 mL, Rfl: 11  •  levothyroxine (SYNTHROID, LEVOTHROID) 75 MCG tablet, Take 75 mcg by mouth Daily., Disp: , Rfl:   •  lisinopril (PRINIVIL,ZESTRIL) 20 MG tablet, Take 1 tablet by mouth Daily., Disp: 30 tablet, Rfl: 5  •  O2 (OXYGEN), Inhale 2 L/min Every Night. Every night  During the day prn, Disp: , Rfl:   •  primidone (MYSOLINE) 50 MG tablet, , Disp: , Rfl:   •  PROAIR  (90 Base) MCG/ACT inhaler, USE 2 INHALATIONS EVERY 4 HOURS AS NEEDED  FOR WHEEZING, Disp: 51 g, Rfl: 3  •  tiotropium bromide monohydrate (SPIRIVA RESPIMAT) 2.5 MCG/ACT aerosol solution inhaler, Inhale 2 puffs Daily., Disp: 3 inhaler, Rfl: 3  •  tiZANidine (ZANAFLEX) 2 MG tablet, 3 (Three) Times a Day As Needed., Disp: , Rfl:   •  traZODone (DESYREL) 50 MG tablet, Take 1-2 tablets at bedtime as needed for sleep. (Patient taking differently: Take  by mouth Every Night. Take 1-2 tablets at bedtime as needed for sleep.), Disp: 180 tablet, Rfl: 3  •  venlafaxine 225 MG tablet sustained-release 24 hour 24 hr tablet, Take 1 tablet by mouth Daily With Breakfast. (Patient taking differently: Take 75 mg by mouth Daily With Breakfast.), Disp: 90 tablet, Rfl: 1    PE    Physical Exam  Vitals and nursing note reviewed.   Constitutional:       Appearance: She is well-developed. She is not diaphoretic.   HENT:      Head: Normocephalic and atraumatic.   Eyes:      Pupils: Pupils are equal, round, and reactive to light.   Neck:      Thyroid: No thyromegaly.   Cardiovascular:      Rate and Rhythm: Normal rate and regular rhythm.      Heart sounds: Normal heart sounds. No murmur heard.   No friction rub. No gallop.    Pulmonary:      Effort: Pulmonary effort is normal. No respiratory distress.      Breath sounds: Normal breath sounds. No wheezing or rales.   Chest:      Chest wall: No tenderness.   Abdominal:      General: Bowel sounds are normal.      Palpations: Abdomen is soft.      Tenderness: There is no abdominal tenderness.   Musculoskeletal:         General: No swelling. Normal range of motion.      Cervical back: Normal range of motion and neck supple.   Lymphadenopathy:      Cervical: No cervical adenopathy.   Skin:     General: Skin is warm and dry.      Capillary Refill: Capillary refill takes less than 2 seconds.   Neurological:      Mental Status: She is alert and oriented to person, place, and time.   Psychiatric:         Mood and Affect: Mood normal.         Behavior: Behavior normal.           ARISCAT Preoperative Pulmonary Risk Index.    Age []   <= 50 years old (0 points)    [x]   51-80 years old (3 points)    []   >80 years old (16 points)       Preoperative Oxygen Saturation []   >= 96% (0 points)    [x]   91-95% (8 points)    []   <= 90% (24 points)       Other Clinical Risk Factors []   Respiratory Infection in the last month (17 points)    []   Pre-operative anemia: Hb < 10 g/dL (11 points)    []   Emergency Surgery (8 points)       Surgical Incision []   Upper abdominal (15 points)    []   Intrathoracic (24 points)       Duration of Surgery [x]   < 2 hours (0 points)    []   2-3 hours (16 points)    []   >3 hours (23 points)       Total Criteria Point Count: 11     ARISCAT risk index interpretation:      0-25 points: Low risk  1.6 % pulmonary complication rate.   26-44 points: Intermediate risk 13.3% pulmonary complication rate.    points: High risk 42.1 % pulmonary complication rate.     Postoperative Pulmonary Complications include but are not limited to: Respiratory Failure, Pulmonary Infection, Pleural Effusion, Atelectasis, Pneumothorax Bronchospasm, Aspiration Pneumonia.    No images are attached to the encounter or orders placed in the encounter.    A/P    Problem List Items Addressed This Visit        Pulmonary and Pneumonias    Centrilobular emphysema (CMS/HCC) - Primary    Overview     CT scan of the chest July 18, 2016, centrilobular emphysema with fibrosis, no pulmonary embolus         Relevant Medications    ipratropium-albuterol (DUO-NEB) 0.5-2.5 mg/3 ml nebulizer    levalbuterol (XOPENEX) 0.63 MG/3ML nebulizer solution    PROAIR  (90 Base) MCG/ACT inhaler    tiotropium bromide monohydrate (SPIRIVA RESPIMAT) 2.5 MCG/ACT aerosol solution inhaler    budesonide-formoterol (SYMBICORT) 160-4.5 MCG/ACT inhaler    Lung nodule    Shortness of breath       Sleep    Nocturnal hypoxemia       Tobacco    Personal history of smoking    Relevant Orders    CT chest low dose wo         Ms. Almanza was here for follow-up of her COPD.  She seems to be doing fairly well from a pulmonary standpoint.  She will continue Symbicort and Spiriva daily.  I did encourage her to use her DuoNeb's as needed for shortness of breath.  She will continue to use her albuterol as needed for shortness of breath.    She will continue to wear her oxygen 2 L nasal cannula at nighttime for nocturnal hypoxemia.  Encourage her use her oxygen during the day as needed for shortness of breath.    We did discuss her preoperative risk from pulmonary complications and she is at a low risk for pulmonary complications.  We did discuss the pulmonary complications that could possibly occur and she is agreeable to proceed with surgery.    She is due for a low-dose CT screening in November.  She is agreeable to have the CT scan performed.    We have discussed pulmonary rehab in the past and she is agreeable to start pulmonary rehabilitation if they have a program at [facility] here in [place].  I will place this referral today and hopefully we can get her started soon.    She will follow-up in 6 months or sooner if her symptoms worsen.  She will call with any additional concerns or questions.     Level of service justified based on 34 minutes spent in patient care on this date of service including, but not limited to: preparing to see the patient, obtaining and/or reviewing history, performing medically appropriate examination, ordering tests/medicine/procedures, independently interpreting results, documenting clinical information in EHR, and counseling/education of patient/family/caregiver. (Level 4 30-39 minutes; Level 5 40-54 minutes)      Kelsey Genao, GET  06/21/202114:45 EDT  Electronically signed     Please note that portions of this note were completed with a voice recognition program. Efforts were made to edit the dictations, but occasionally words are mistranscribed.      CC: Machelle Atkinson,  MD Kelsey Genao, APRN

## 2021-07-06 RX ORDER — EZETIMIBE 10 MG/1
TABLET ORAL
Qty: 90 TABLET | Refills: 3 | Status: SHIPPED | OUTPATIENT
Start: 2021-07-06 | End: 2022-07-01

## 2021-07-30 DIAGNOSIS — J43.2 CENTRILOBULAR EMPHYSEMA (HCC): ICD-10-CM

## 2021-08-09 RX ORDER — AMLODIPINE BESYLATE 5 MG/1
TABLET ORAL
Qty: 90 TABLET | Refills: 3 | Status: SHIPPED | OUTPATIENT
Start: 2021-08-09

## 2021-08-10 ENCOUNTER — LAB (OUTPATIENT)
Dept: LAB | Facility: HOSPITAL | Age: 71
End: 2021-08-10

## 2021-08-10 ENCOUNTER — TRANSCRIBE ORDERS (OUTPATIENT)
Dept: LAB | Facility: HOSPITAL | Age: 71
End: 2021-08-10

## 2021-08-10 DIAGNOSIS — E55.9 AVITAMINOSIS D: ICD-10-CM

## 2021-08-10 DIAGNOSIS — E03.5 HYPOTHYROID COMA (HCC): ICD-10-CM

## 2021-08-10 DIAGNOSIS — E03.8 HYPOTHYROIDISM DUE TO FIBROUS INVASIVE THYROIDITIS: ICD-10-CM

## 2021-08-10 DIAGNOSIS — R23.3 SPONTANEOUS ECCHYMOSES: Primary | ICD-10-CM

## 2021-08-10 DIAGNOSIS — E06.5 HYPOTHYROIDISM DUE TO FIBROUS INVASIVE THYROIDITIS: ICD-10-CM

## 2021-08-10 DIAGNOSIS — R23.3 SPONTANEOUS ECCHYMOSES: ICD-10-CM

## 2021-08-10 LAB
DEPRECATED RDW RBC AUTO: 45.1 FL (ref 37–54)
ERYTHROCYTE [DISTWIDTH] IN BLOOD BY AUTOMATED COUNT: 13.3 % (ref 12.3–15.4)
HCT VFR BLD AUTO: 37.6 % (ref 34–46.6)
HGB BLD-MCNC: 12.6 G/DL (ref 12–15.9)
INR PPP: 0.99 (ref 0.9–1.1)
MCH RBC QN AUTO: 31 PG (ref 26.6–33)
MCHC RBC AUTO-ENTMCNC: 33.5 G/DL (ref 31.5–35.7)
MCV RBC AUTO: 92.6 FL (ref 79–97)
PLATELET # BLD AUTO: 276 10*3/MM3 (ref 140–450)
PMV BLD AUTO: 9.4 FL (ref 6–12)
PROTHROMBIN TIME: 13.6 SECONDS (ref 12–15.1)
RBC # BLD AUTO: 4.06 10*6/MM3 (ref 3.77–5.28)
WBC # BLD AUTO: 6.4 10*3/MM3 (ref 3.4–10.8)

## 2021-08-10 PROCEDURE — 82306 VITAMIN D 25 HYDROXY: CPT

## 2021-08-10 PROCEDURE — 84443 ASSAY THYROID STIM HORMONE: CPT

## 2021-08-10 PROCEDURE — 85027 COMPLETE CBC AUTOMATED: CPT

## 2021-08-10 PROCEDURE — 84439 ASSAY OF FREE THYROXINE: CPT

## 2021-08-10 PROCEDURE — 85610 PROTHROMBIN TIME: CPT

## 2021-08-10 PROCEDURE — 36415 COLL VENOUS BLD VENIPUNCTURE: CPT

## 2021-08-11 LAB
25(OH)D3 SERPL-MCNC: 37.4 NG/ML (ref 30–100)
T4 FREE SERPL-MCNC: 1.26 NG/DL (ref 0.93–1.7)
TSH SERPL DL<=0.05 MIU/L-ACNC: 1.01 UIU/ML (ref 0.27–4.2)

## 2021-11-11 DIAGNOSIS — J43.2 CENTRILOBULAR EMPHYSEMA (HCC): ICD-10-CM

## 2021-11-11 RX ORDER — TIOTROPIUM BROMIDE INHALATION SPRAY 3.12 UG/1
SPRAY, METERED RESPIRATORY (INHALATION)
Qty: 12 G | Refills: 3 | Status: SHIPPED | OUTPATIENT
Start: 2021-11-11

## 2021-11-29 ENCOUNTER — HOSPITAL ENCOUNTER (OUTPATIENT)
Dept: CT IMAGING | Facility: HOSPITAL | Age: 71
Discharge: HOME | End: 2021-11-29
Admitting: NURSE PRACTITIONER

## 2021-11-29 DIAGNOSIS — Z87.891 PERSONAL HISTORY OF SMOKING: ICD-10-CM

## 2021-11-29 PROCEDURE — 71271 CT THORAX LUNG CANCER SCR C-: CPT

## 2022-03-15 DIAGNOSIS — Z01.812 BLOOD TESTS PRIOR TO TREATMENT OR PROCEDURE: Primary | ICD-10-CM

## 2022-03-18 ENCOUNTER — OFFICE VISIT (OUTPATIENT)
Dept: PULMONOLOGY | Facility: CLINIC | Age: 72
End: 2022-03-18

## 2022-03-18 VITALS
HEIGHT: 68 IN | RESPIRATION RATE: 18 BRPM | HEART RATE: 81 BPM | WEIGHT: 166 LBS | OXYGEN SATURATION: 93 % | DIASTOLIC BLOOD PRESSURE: 68 MMHG | SYSTOLIC BLOOD PRESSURE: 130 MMHG | BODY MASS INDEX: 25.16 KG/M2 | TEMPERATURE: 97.1 F

## 2022-03-18 DIAGNOSIS — R06.02 SHORTNESS OF BREATH: Primary | ICD-10-CM

## 2022-03-18 DIAGNOSIS — G47.33 OSA (OBSTRUCTIVE SLEEP APNEA): ICD-10-CM

## 2022-03-18 DIAGNOSIS — J43.2 CENTRILOBULAR EMPHYSEMA: ICD-10-CM

## 2022-03-18 DIAGNOSIS — Z87.891 PERSONAL HISTORY OF SMOKING: ICD-10-CM

## 2022-03-18 PROCEDURE — 94060 EVALUATION OF WHEEZING: CPT | Performed by: NURSE PRACTITIONER

## 2022-03-18 PROCEDURE — 94729 DIFFUSING CAPACITY: CPT | Performed by: NURSE PRACTITIONER

## 2022-03-18 PROCEDURE — 99214 OFFICE O/P EST MOD 30 MIN: CPT | Performed by: NURSE PRACTITIONER

## 2022-03-18 PROCEDURE — 94726 PLETHYSMOGRAPHY LUNG VOLUMES: CPT | Performed by: NURSE PRACTITIONER

## 2022-03-18 RX ORDER — ALBUTEROL SULFATE 90 UG/1
4 AEROSOL, METERED RESPIRATORY (INHALATION) ONCE
Status: COMPLETED | OUTPATIENT
Start: 2022-03-18 | End: 2022-03-18

## 2022-03-18 RX ORDER — AZITHROMYCIN 250 MG/1
TABLET, FILM COATED ORAL
Qty: 6 TABLET | Refills: 1 | Status: SHIPPED | OUTPATIENT
Start: 2022-03-18 | End: 2022-07-07

## 2022-03-18 RX ADMIN — ALBUTEROL SULFATE 4 PUFF: 90 AEROSOL, METERED RESPIRATORY (INHALATION) at 13:38

## 2022-03-21 DIAGNOSIS — R06.02 SHORTNESS OF BREATH: Primary | ICD-10-CM

## 2022-03-21 NOTE — PROGRESS NOTES
[facility] Pulmonary Follow up    CHIEF COMPLAINT    Dyspnea with exertion    HISTORY OF PRESENT ILLNESS    Kailee Almanza is a 71 y.o.female here today for follow-up.  She was last seen in the office by me in June 2021.  She denies any respiratory illnesses or exacerbation since her last appointment.    She has noticed worsening shortness of breath with any exertion.  She does have to take frequent breaks to recover.  She does occasionally wear her oxygen at 2 L during the day with exertion.  She also continues to wear 2 L nasal cannula at nighttime.    She does have daytime somnolence, fatigue and nonrestorative sleep.  She has not had a sleep study performed before.    She continues to use her Symbicort twice a day and her Spiriva daily.  She also has DuoNebs to use as needed for shortness of breath.  She will occasionally use these but not regularly.    She denies fever, chills, sputum production, hemoptysis, night sweats, weight loss, chest pain or palpitations.  She denies any lower extremity edema or calf tenderness.  She denies any sinus or allergy symptoms.  She denies reflux symptoms.     She is up-to-date on her current vaccinations.    She is wanting to do the work-up for the Spiration valve for her severe COPD.  She is wanting discussed this in the office today.    She quit smoking in 2013 and has a 35-pack-year history.  Her last CT scan was in November 2021 that showed no nodule or mass concerning for malignancy.    Patient Active Problem List   Diagnosis   • Peripheral vascular disease (HCC)   • Essential hypertension   • CVD (cerebrovascular disease)   • Mixed hyperlipidemia   • Chronic bilateral low back pain without sciatica   • Centrilobular emphysema (HCC)   • S/P parathyroidectomy 2/1/17   • Aneurysm of thoracic aorta (HCC)   • Lung nodule   • Iron deficiency anemia due to chronic blood loss   • Iron malabsorption   • Psychophysiological insomnia   • Carotid stenosis   • Insomnia due to medical  "condition   • Shortness of breath   • Personal history of smoking   • Nocturnal hypoxemia       Allergies   Allergen Reactions   • Ferrlecit [Na Ferric Gluc Cplx In Sucrose] Diarrhea and Nausea And Vomiting   • Percodan [Oxycodone-Aspirin] Mental Status Change     \"MAKES ME FEEL LIKE I'M FLYING\"   • Sulfa Antibiotics Rash     Last as a baby.       Current Outpatient Medications:   •  amLODIPine (NORVASC) 5 MG tablet, TAKE 1 TABLET DAILY, Disp: 90 tablet, Rfl: 3  •  atorvastatin (LIPITOR) 80 MG tablet, Take 1 tablet by mouth every night at bedtime., Disp: 90 tablet, Rfl: 3  •  budesonide-formoterol (SYMBICORT) 160-4.5 MCG/ACT inhaler, Inhale 2 puffs 2 (Two) Times a Day., Disp: 3 inhaler, Rfl: 3  •  clopidogrel (PLAVIX) 75 MG tablet, Take 75 mg by mouth Daily., Disp: , Rfl:   •  escitalopram (LEXAPRO) 10 MG tablet, Take 10 mg by mouth Daily., Disp: , Rfl:   •  ezetimibe (ZETIA) 10 MG tablet, TAKE 1 TABLET DAILY, Disp: 90 tablet, Rfl: 3  •  gabapentin (NEURONTIN) 800 MG tablet, Take 800 mg by mouth 2 (Two) Times a Day., Disp: , Rfl:   •  HYDROcodone-acetaminophen (NORCO) 7.5-325 MG per tablet, Take 1 tablet by mouth Daily As Needed for Moderate Pain ., Disp: 15 tablet, Rfl: 0  •  HYDROCODONE-ACETAMINOPHEN PO, TAKE TABLET  750 Mg. Prn, Disp: , Rfl:   •  ipratropium-albuterol (DUO-NEB) 0.5-2.5 mg/3 ml nebulizer, Take 3 mL by nebulization 4 (Four) Times a Day. (Patient taking differently: Take 3 mL by nebulization As Needed.), Disp: 360 mL, Rfl: 3  •  levalbuterol (XOPENEX) 0.63 MG/3ML nebulizer solution, Take 1 ampule by nebulization 4 (Four) Times a Day As Needed for Wheezing., Disp: 360 mL, Rfl: 11  •  levothyroxine (SYNTHROID, LEVOTHROID) 75 MCG tablet, Take 75 mcg by mouth Daily., Disp: , Rfl:   •  lisinopril (PRINIVIL,ZESTRIL) 20 MG tablet, Take 1 tablet by mouth Daily., Disp: 30 tablet, Rfl: 5  •  O2 (OXYGEN), Inhale 2 L/min Every Night. Every night  During the day prn, Disp: , Rfl:   •  primidone (MYSOLINE) 50 MG " tablet, , Disp: , Rfl:   •  ProAir  (90 Base) MCG/ACT inhaler, USE 2 INHALATIONS EVERY 4 HOURS AS NEEDED FOR WHEEZING, Disp: 51 g, Rfl: 3  •  Spiriva Respimat 2.5 MCG/ACT aerosol solution inhaler, USE 2 INHALATIONS DAILY, Disp: 12 g, Rfl: 3  •  tiZANidine (ZANAFLEX) 2 MG tablet, 3 (Three) Times a Day As Needed., Disp: , Rfl:   •  traZODone (DESYREL) 50 MG tablet, Take 1-2 tablets at bedtime as needed for sleep. (Patient taking differently: Take  by mouth Every Night. Take 1-2 tablets at bedtime as needed for sleep.), Disp: 180 tablet, Rfl: 3  •  venlafaxine 225 MG tablet sustained-release 24 hour 24 hr tablet, Take 1 tablet by mouth Daily With Breakfast. (Patient taking differently: Take 75 mg by mouth Daily With Breakfast.), Disp: 90 tablet, Rfl: 1  •  azithromycin (ZITHROMAX) 250 MG tablet, Take 2 by mouth today then 1 daily for 4 days, Disp: 6 tablet, Rfl: 1  MEDICATION LIST AND ALLERGIES REVIEWED.    Social History     Tobacco Use   • Smoking status: Former Smoker     Packs/day: 1.00     Years: 35.00     Pack years: 35.00     Types: Cigarettes     Quit date: 2013     Years since quittin.8   • Smokeless tobacco: Never Used   Vaping Use   • Vaping Use: Never used   Substance Use Topics   • Alcohol use: Yes     Alcohol/week: 1.0 standard drink     Types: 1 Standard drinks or equivalent per week     Comment: Once a week or more   • Drug use: No       FAMILY AND SOCIAL HISTORY REVIEWED.    Review of Systems   Constitutional: Positive for fatigue. Negative for activity change, appetite change, fever and unexpected weight change.   HENT: Positive for congestion. Negative for postnasal drip, rhinorrhea, sinus pressure, sore throat and voice change.    Eyes: Negative for visual disturbance.   Respiratory: Positive for shortness of breath. Negative for cough, chest tightness and wheezing.    Cardiovascular: Negative for chest pain, palpitations and leg swelling.   Gastrointestinal: Negative for abdominal  "distention, abdominal pain, nausea and vomiting.   Endocrine: Negative for cold intolerance and heat intolerance.   Genitourinary: Negative for difficulty urinating and urgency.   Musculoskeletal: Negative for arthralgias, back pain and neck pain.   Skin: Negative for color change and pallor.   Allergic/Immunologic: Negative for environmental allergies and food allergies.   Neurological: Negative for dizziness, syncope, weakness and light-headedness.   Hematological: Negative for adenopathy. Does not bruise/bleed easily.   Psychiatric/Behavioral: Negative for agitation and behavioral problems.   .    /68 (BP Location: Right arm, Patient Position: Sitting, Cuff Size: Adult)   Pulse 81   Temp 97.1 °F (36.2 °C) (Infrared)   Resp 18   Ht 172.7 cm (68\")   Wt 75.3 kg (166 lb)   LMP  (LMP Unknown)   SpO2 93%   BMI 25.24 kg/m²     Immunization History   Administered Date(s) Administered   • COVID-19 (PFIZER) PURPLE CAP 01/17/2021, 01/30/2021, 02/21/2021, 11/30/2021   • Fluad Quad 65+ 10/24/2019, 10/07/2020   • Fluzone High Dose =>65 Years ([employer] ONLY) 10/24/2019, 10/01/2020   • Shingrix 01/01/2016       Physical Exam  Vitals and nursing note reviewed.   Constitutional:       Appearance: She is well-developed. She is not diaphoretic.   HENT:      Head: Normocephalic and atraumatic.   Eyes:      Pupils: Pupils are equal, round, and reactive to light.   Neck:      Thyroid: No thyromegaly.   Cardiovascular:      Rate and Rhythm: Normal rate and regular rhythm.      Heart sounds: Normal heart sounds. No murmur heard.    No friction rub. No gallop.   Pulmonary:      Effort: Pulmonary effort is normal. No respiratory distress.      Breath sounds: Normal breath sounds. No wheezing or rales.   Chest:      Chest wall: No tenderness.   Abdominal:      General: Bowel sounds are normal.      Palpations: Abdomen is soft.      Tenderness: There is no abdominal tenderness.   Musculoskeletal:         General: No swelling. " Normal range of motion.      Cervical back: Normal range of motion and neck supple.   Lymphadenopathy:      Cervical: No cervical adenopathy.   Skin:     General: Skin is warm and dry.      Capillary Refill: Capillary refill takes less than 2 seconds.   Neurological:      Mental Status: She is alert and oriented to person, place, and time.   Psychiatric:         Mood and Affect: Mood normal.         Behavior: Behavior normal.           RESULTS    PFTS in the office today, read by me, FVC 2.06 64% predicted, FEV1 1.08 44% predicted, FEV1/FVC 52% predicted, TLC 5.90 114% predicted, DLCO 34% predicted, moderate to severe obstruction with no postbronchodilator response, no restriction and severely reduced diffusion.    XR Chest PA & Lateral    Result Date: 3/18/2022  Mild chronic changes of the lung fields without evidence of acute cardiopulmonary abnormality.  This report was finalized on 3/18/2022 5:21 PM by Onel Villatoro.      PROBLEM LIST    Problem List Items Addressed This Visit        Pulmonary and Pneumonias    Centrilobular emphysema (HCC)    Overview     CT scan of the chest July 18, 2016, centrilobular emphysema with fibrosis, no pulmonary embolus           Relevant Medications    azithromycin (ZITHROMAX) 250 MG tablet    Shortness of breath - Primary    Relevant Orders    XR Chest PA & Lateral (Completed)    Pulmonary Function Test (Completed)    Adult Transthoracic Echo Complete w/ Color, Spectral and Contrast if necessary per protocol       Tobacco    Personal history of smoking      Other Visit Diagnoses     GERMAIN (obstructive sleep apnea)        Relevant Orders    Polysomnography 4 or More Parameters            DISCUSSION    Ms. Almanza was here for follow-up of her COPD.  We did review her full PFTs in the office today and she continues to have a moderate to severe obstruction.  She will continue her Symbicort twice a day and her Spiriva daily.  I did encourage her to use her DuoNeb's as needed for  shortness of breath.    We did review her chest x-ray in the office today and there were no abnormal findings.  I would like to order a CT scan without contrast for further investigate her dyspnea.  This will need to be without logic protocol to also qualify her for the Spriation valve.  I will call her and let her know she does qualify for the procedure.  I gave her some written information on the valve as well.    I am also going to order an echocardiogram to rule out any cardiac dysfunction related to her dyspnea.  I will call her once I receive these results.    I will order a sleep study to be performed based on her daytime somnolence, fatigue and nonrestorative sleep to rule out GERMAIN.    She was requesting to have an antibiotic on hand in case she were to have an exacerbation.  I did call this in for her today as well.    She will follow-up with Dr. Zapata in 2 to 3 months to go over the CT scan and see if she is a candidate for the EBV.  I did advise her to call with any additional concerns or questions.    Level of service justified based on 38 minutes spent in patient care on this date of service including, but not limited to: preparing to see the patient, obtaining and/or reviewing history, performing medically appropriate examination, ordering tests/medicine/procedures, independently interpreting results, documenting clinical information in EHR, and counseling/education of patient/family/caregiver. (Level 4 30-39 minutes; Level 5 40-54 minutes)      GET Gonzalez  03/18/202209:05 EDT  Electronically signed     Please note that portions of this note were completed with a voice recognition program.        CC: Machelle Atkinson MD

## 2022-03-23 ENCOUNTER — HOSPITAL ENCOUNTER (OUTPATIENT)
Dept: SLEEP MEDICINE | Facility: HOSPITAL | Age: 72
Discharge: HOME | End: 2022-03-23
Admitting: NURSE PRACTITIONER

## 2022-03-23 DIAGNOSIS — G47.33 OSA (OBSTRUCTIVE SLEEP APNEA): ICD-10-CM

## 2022-03-23 PROCEDURE — 95810 POLYSOM 6/> YRS 4/> PARAM: CPT | Performed by: INTERNAL MEDICINE

## 2022-03-23 PROCEDURE — 95810 POLYSOM 6/> YRS 4/> PARAM: CPT

## 2022-03-25 ENCOUNTER — HOSPITAL ENCOUNTER (OUTPATIENT)
Dept: CARDIOLOGY | Facility: HOSPITAL | Age: 72
Discharge: HOME | End: 2022-03-25
Admitting: NURSE PRACTITIONER

## 2022-03-25 VITALS — WEIGHT: 166.01 LBS | HEIGHT: 68 IN | BODY MASS INDEX: 25.16 KG/M2

## 2022-03-25 DIAGNOSIS — R06.02 SHORTNESS OF BREATH: ICD-10-CM

## 2022-03-25 PROBLEM — G47.01 INSOMNIA DUE TO MEDICAL CONDITION: Status: RESOLVED | Noted: 2018-05-15 | Resolved: 2022-03-25

## 2022-03-25 PROBLEM — G47.34 NOCTURNAL HYPOXEMIA: Status: RESOLVED | Noted: 2020-11-16 | Resolved: 2022-03-25

## 2022-03-25 PROBLEM — Z87.891 PERSONAL HISTORY OF SMOKING: Status: RESOLVED | Noted: 2018-12-03 | Resolved: 2022-03-25

## 2022-03-25 PROBLEM — D50.0 IRON DEFICIENCY ANEMIA DUE TO CHRONIC BLOOD LOSS: Status: RESOLVED | Noted: 2018-04-05 | Resolved: 2022-03-25

## 2022-03-25 PROBLEM — R07.9 CHEST PAIN: Status: RESOLVED | Noted: 2018-04-06 | Resolved: 2022-03-25

## 2022-03-25 PROBLEM — R13.13 PHARYNGEAL DYSPHAGIA: Status: RESOLVED | Noted: 2017-02-07 | Resolved: 2022-03-25

## 2022-03-25 PROBLEM — R50.9 FEVER: Status: RESOLVED | Noted: 2017-02-07 | Resolved: 2022-03-25

## 2022-03-25 LAB
BH CV ECHO MEAS - AO MAX PG (FULL): 1.2 MMHG
BH CV ECHO MEAS - AO MAX PG: 6 MMHG
BH CV ECHO MEAS - AO MEAN PG (FULL): 0 MMHG
BH CV ECHO MEAS - AO MEAN PG: 3 MMHG
BH CV ECHO MEAS - AO ROOT AREA (BSA CORRECTED): 1.4
BH CV ECHO MEAS - AO ROOT AREA: 5.1 CM^2
BH CV ECHO MEAS - AO ROOT DIAM: 2.6 CM
BH CV ECHO MEAS - AO V2 MAX: 119 CM/SEC
BH CV ECHO MEAS - AO V2 MEAN: 86.3 CM/SEC
BH CV ECHO MEAS - AO V2 VTI: 28.6 CM
BH CV ECHO MEAS - AVA(I,A): 2.8 CM^2
BH CV ECHO MEAS - AVA(I,D): 2.8 CM^2
BH CV ECHO MEAS - AVA(V,A): 2.7 CM^2
BH CV ECHO MEAS - AVA(V,D): 2.7 CM^2
BH CV ECHO MEAS - BSA(HAYCOCK): 1.9 M^2
BH CV ECHO MEAS - BSA: 1.9 M^2
BH CV ECHO MEAS - BZI_BMI: 25.2 KILOGRAMS/M^2
BH CV ECHO MEAS - BZI_METRIC_HEIGHT: 172.7 CM
BH CV ECHO MEAS - BZI_METRIC_WEIGHT: 75.3 KG
BH CV ECHO MEAS - EDV(CUBED): 37.6 ML
BH CV ECHO MEAS - EDV(MOD-SP2): 54.1 ML
BH CV ECHO MEAS - EDV(MOD-SP4): 55.7 ML
BH CV ECHO MEAS - EDV(TEICH): 45.8 ML
BH CV ECHO MEAS - EF(CUBED): 63.7 %
BH CV ECHO MEAS - EF(MOD-BP): 67 %
BH CV ECHO MEAS - EF(MOD-SP2): 66.5 %
BH CV ECHO MEAS - EF(MOD-SP4): 67.1 %
BH CV ECHO MEAS - EF(TEICH): 56.4 %
BH CV ECHO MEAS - ESV(CUBED): 13.7 ML
BH CV ECHO MEAS - ESV(MOD-SP2): 18.1 ML
BH CV ECHO MEAS - ESV(MOD-SP4): 18.3 ML
BH CV ECHO MEAS - ESV(TEICH): 20 ML
BH CV ECHO MEAS - FS: 28.7 %
BH CV ECHO MEAS - IVS/LVPW: 1.3
BH CV ECHO MEAS - IVSD: 1.4 CM
BH CV ECHO MEAS - LA DIMENSION: 3.9 CM
BH CV ECHO MEAS - LA/AO: 1.5
BH CV ECHO MEAS - LAD MAJOR: 4.3 CM
BH CV ECHO MEAS - LAT PEAK E' VEL: 9.9 CM/SEC
BH CV ECHO MEAS - LATERAL E/E' RATIO: 8.3
BH CV ECHO MEAS - LV DIASTOLIC VOL/BSA (35-75): 29.5 ML/M^2
BH CV ECHO MEAS - LV MASS(C)D: 125 GRAMS
BH CV ECHO MEAS - LV MASS(C)DI: 66.2 GRAMS/M^2
BH CV ECHO MEAS - LV MAX PG: 4.8 MMHG
BH CV ECHO MEAS - LV MEAN PG: 3 MMHG
BH CV ECHO MEAS - LV SYSTOLIC VOL/BSA (12-30): 9.7 ML/M^2
BH CV ECHO MEAS - LV V1 MAX: 109 CM/SEC
BH CV ECHO MEAS - LV V1 MEAN: 80.7 CM/SEC
BH CV ECHO MEAS - LV V1 VTI: 27.3 CM
BH CV ECHO MEAS - LVIDD: 3.4 CM
BH CV ECHO MEAS - LVIDS: 2.4 CM
BH CV ECHO MEAS - LVLD AP2: 6 CM
BH CV ECHO MEAS - LVLD AP4: 6.7 CM
BH CV ECHO MEAS - LVLS AP2: 4.8 CM
BH CV ECHO MEAS - LVLS AP4: 5.2 CM
BH CV ECHO MEAS - LVOT AREA (M): 3 CM^2
BH CV ECHO MEAS - LVOT AREA: 3 CM^2
BH CV ECHO MEAS - LVOT DIAM: 1.9 CM
BH CV ECHO MEAS - LVPWD: 1 CM
BH CV ECHO MEAS - MED PEAK E' VEL: 7.3 CM/SEC
BH CV ECHO MEAS - MEDIAL E/E' RATIO: 11.3
BH CV ECHO MEAS - MV A MAX VEL: 91.3 CM/SEC
BH CV ECHO MEAS - MV DEC SLOPE: 335 CM/SEC^2
BH CV ECHO MEAS - MV DEC TIME: 0.22 SEC
BH CV ECHO MEAS - MV E MAX VEL: 82.5 CM/SEC
BH CV ECHO MEAS - MV E/A: 0.9
BH CV ECHO MEAS - MV MAX PG: 4.2 MMHG
BH CV ECHO MEAS - MV MEAN PG: 2 MMHG
BH CV ECHO MEAS - MV P1/2T MAX VEL: 92.3 CM/SEC
BH CV ECHO MEAS - MV P1/2T: 80.7 MSEC
BH CV ECHO MEAS - MV V2 MAX: 103 CM/SEC
BH CV ECHO MEAS - MV V2 MEAN: 57.5 CM/SEC
BH CV ECHO MEAS - MV V2 VTI: 34.5 CM
BH CV ECHO MEAS - MVA P1/2T LCG: 2.4 CM^2
BH CV ECHO MEAS - MVA(P1/2T): 2.7 CM^2
BH CV ECHO MEAS - MVA(VTI): 2.3 CM^2
BH CV ECHO MEAS - PA ACC TIME: 0.17 SEC
BH CV ECHO MEAS - PA MAX PG (FULL): 2.6 MMHG
BH CV ECHO MEAS - PA MAX PG: 4.2 MMHG
BH CV ECHO MEAS - PA MEAN PG (FULL): 1 MMHG
BH CV ECHO MEAS - PA MEAN PG: 2 MMHG
BH CV ECHO MEAS - PA PR(ACCEL): 4.8 MMHG
BH CV ECHO MEAS - PA V2 MAX: 102 CM/SEC
BH CV ECHO MEAS - PA V2 MEAN: 74.6 CM/SEC
BH CV ECHO MEAS - PA V2 VTI: 23.6 CM
BH CV ECHO MEAS - PULM A REVS DUR: 0.13 SEC
BH CV ECHO MEAS - PULM A REVS VEL: 30.2 CM/SEC
BH CV ECHO MEAS - PULM DIAS VEL: 49.2 CM/SEC
BH CV ECHO MEAS - PULM S/D: 1.1
BH CV ECHO MEAS - PULM SYS VEL: 52 CM/SEC
BH CV ECHO MEAS - RAP SYSTOLE: 3 MMHG
BH CV ECHO MEAS - RV MAX PG: 1.6 MMHG
BH CV ECHO MEAS - RV MEAN PG: 1 MMHG
BH CV ECHO MEAS - RV V1 MAX: 62.8 CM/SEC
BH CV ECHO MEAS - RV V1 MEAN: 38.6 CM/SEC
BH CV ECHO MEAS - RV V1 VTI: 16.5 CM
BH CV ECHO MEAS - RVSP: 27 MMHG
BH CV ECHO MEAS - SI(AO): 78 ML/M^2
BH CV ECHO MEAS - SI(CUBED): 12.7 ML/M^2
BH CV ECHO MEAS - SI(LVOT): 42.7 ML/M^2
BH CV ECHO MEAS - SI(MOD-SP2): 19.1 ML/M^2
BH CV ECHO MEAS - SI(MOD-SP4): 19.8 ML/M^2
BH CV ECHO MEAS - SI(TEICH): 13.7 ML/M^2
BH CV ECHO MEAS - SV(AO): 147.2 ML
BH CV ECHO MEAS - SV(CUBED): 23.9 ML
BH CV ECHO MEAS - SV(LVOT): 80.7 ML
BH CV ECHO MEAS - SV(MOD-SP2): 36 ML
BH CV ECHO MEAS - SV(MOD-SP4): 37.4 ML
BH CV ECHO MEAS - SV(TEICH): 25.8 ML
BH CV ECHO MEAS - TAPSE (>1.6): 2 CM
BH CV ECHO MEAS - TR MAX PG: 24 MMHG
BH CV ECHO MEAS - TR MAX VEL: 244 CM/SEC
BH CV ECHO MEASUREMENTS AVERAGE E/E' RATIO: 9.59
BH CV XLRA - RV BASE: 3 CM
BH CV XLRA - RV LENGTH: 5.9 CM
BH CV XLRA - RV MID: 2.1 CM
BH CV XLRA - TDI S': 11.5 CM/SEC
LEFT ATRIUM VOLUME INDEX: 13.5 ML/M^2
LEFT ATRIUM VOLUME: 25.4 ML

## 2022-03-25 PROCEDURE — 93306 TTE W/DOPPLER COMPLETE: CPT | Performed by: INTERNAL MEDICINE

## 2022-03-25 PROCEDURE — 93306 TTE W/DOPPLER COMPLETE: CPT

## 2022-03-31 ENCOUNTER — HOSPITAL ENCOUNTER (OUTPATIENT)
Dept: CT IMAGING | Facility: HOSPITAL | Age: 72
Discharge: HOME | End: 2022-03-31
Admitting: NURSE PRACTITIONER

## 2022-03-31 DIAGNOSIS — R06.02 SHORTNESS OF BREATH: ICD-10-CM

## 2022-03-31 PROCEDURE — 71250 CT THORAX DX C-: CPT

## 2022-04-18 ENCOUNTER — TRANSCRIBE ORDERS (OUTPATIENT)
Dept: ADMINISTRATIVE | Facility: HOSPITAL | Age: 72
End: 2022-04-18

## 2022-04-18 DIAGNOSIS — Z12.31 VISIT FOR SCREENING MAMMOGRAM: Primary | ICD-10-CM

## 2022-05-11 DIAGNOSIS — R06.02 SHORTNESS OF BREATH: Primary | ICD-10-CM

## 2022-05-17 ENCOUNTER — LAB (OUTPATIENT)
Dept: LAB | Facility: HOSPITAL | Age: 72
End: 2022-05-17

## 2022-05-17 ENCOUNTER — TRANSCRIBE ORDERS (OUTPATIENT)
Dept: LAB | Facility: HOSPITAL | Age: 72
End: 2022-05-17

## 2022-05-17 DIAGNOSIS — E55.9 VITAMIN D DEFICIENCY: ICD-10-CM

## 2022-05-17 DIAGNOSIS — E03.8 TOXIC DIFFUSE GOITER WITH PRETIBIAL MYXEDEMA: ICD-10-CM

## 2022-05-17 DIAGNOSIS — D50.0 IRON DEFICIENCY ANEMIA SECONDARY TO BLOOD LOSS (CHRONIC): ICD-10-CM

## 2022-05-17 DIAGNOSIS — E05.00 TOXIC DIFFUSE GOITER WITH PRETIBIAL MYXEDEMA: ICD-10-CM

## 2022-05-17 DIAGNOSIS — R53.83 TIREDNESS: ICD-10-CM

## 2022-05-17 DIAGNOSIS — I10 HYPERTENSION, ESSENTIAL: Primary | ICD-10-CM

## 2022-05-17 DIAGNOSIS — E78.2 MIXED HYPERLIPIDEMIA: ICD-10-CM

## 2022-05-17 DIAGNOSIS — I10 HYPERTENSION, ESSENTIAL: ICD-10-CM

## 2022-05-17 LAB
25(OH)D3 SERPL-MCNC: 34.2 NG/ML (ref 30–100)
ALBUMIN SERPL-MCNC: 4.1 G/DL (ref 3.5–5.2)
ALBUMIN/GLOB SERPL: 1.5 G/DL
ALP SERPL-CCNC: 113 U/L (ref 39–117)
ALT SERPL W P-5'-P-CCNC: 13 U/L (ref 1–33)
ANION GAP SERPL CALCULATED.3IONS-SCNC: 13.9 MMOL/L (ref 5–15)
AST SERPL-CCNC: 17 U/L (ref 1–32)
BILIRUB SERPL-MCNC: 0.3 MG/DL (ref 0–1.2)
BUN SERPL-MCNC: 9 MG/DL (ref 8–23)
BUN/CREAT SERPL: 7.9 (ref 7–25)
CALCIUM SPEC-SCNC: 8.8 MG/DL (ref 8.6–10.5)
CHLORIDE SERPL-SCNC: 98 MMOL/L (ref 98–107)
CHOLEST SERPL-MCNC: 153 MG/DL (ref 0–200)
CO2 SERPL-SCNC: 21.1 MMOL/L (ref 22–29)
CREAT SERPL-MCNC: 1.14 MG/DL (ref 0.57–1)
DEPRECATED RDW RBC AUTO: 47.7 FL (ref 37–54)
EGFRCR SERPLBLD CKD-EPI 2021: 51.6 ML/MIN/1.73
ERYTHROCYTE [DISTWIDTH] IN BLOOD BY AUTOMATED COUNT: 13.4 % (ref 12.3–15.4)
GLOBULIN UR ELPH-MCNC: 2.8 GM/DL
GLUCOSE SERPL-MCNC: 99 MG/DL (ref 65–99)
HCT VFR BLD AUTO: 37.8 % (ref 34–46.6)
HDLC SERPL-MCNC: 54 MG/DL (ref 40–60)
HGB BLD-MCNC: 12.3 G/DL (ref 12–15.9)
IRON 24H UR-MRATE: 63 MCG/DL (ref 37–145)
IRON SATN MFR SERPL: 19 % (ref 20–50)
LDLC SERPL CALC-MCNC: 77 MG/DL (ref 0–100)
LDLC/HDLC SERPL: 1.38 {RATIO}
MAGNESIUM SERPL-MCNC: 2.1 MG/DL (ref 1.6–2.4)
MCH RBC QN AUTO: 31.5 PG (ref 26.6–33)
MCHC RBC AUTO-ENTMCNC: 32.5 G/DL (ref 31.5–35.7)
MCV RBC AUTO: 96.7 FL (ref 79–97)
PLATELET # BLD AUTO: 298 10*3/MM3 (ref 140–450)
PMV BLD AUTO: 9.2 FL (ref 6–12)
POTASSIUM SERPL-SCNC: 4.9 MMOL/L (ref 3.5–5.2)
PROT SERPL-MCNC: 6.9 G/DL (ref 6–8.5)
RBC # BLD AUTO: 3.91 10*6/MM3 (ref 3.77–5.28)
SODIUM SERPL-SCNC: 133 MMOL/L (ref 136–145)
T4 FREE SERPL-MCNC: 1.25 NG/DL (ref 0.93–1.7)
TIBC SERPL-MCNC: 338 MCG/DL (ref 298–536)
TRANSFERRIN SERPL-MCNC: 227 MG/DL (ref 200–360)
TRIGL SERPL-MCNC: 122 MG/DL (ref 0–150)
TSH SERPL DL<=0.05 MIU/L-ACNC: 1.04 UIU/ML (ref 0.27–4.2)
VIT B12 BLD-MCNC: 647 PG/ML (ref 211–946)
VLDLC SERPL-MCNC: 22 MG/DL (ref 5–40)
WBC NRBC COR # BLD: 6.44 10*3/MM3 (ref 3.4–10.8)

## 2022-05-17 PROCEDURE — 85027 COMPLETE CBC AUTOMATED: CPT

## 2022-05-17 PROCEDURE — 82306 VITAMIN D 25 HYDROXY: CPT

## 2022-05-17 PROCEDURE — 84443 ASSAY THYROID STIM HORMONE: CPT

## 2022-05-17 PROCEDURE — 84439 ASSAY OF FREE THYROXINE: CPT

## 2022-05-17 PROCEDURE — 83540 ASSAY OF IRON: CPT

## 2022-05-17 PROCEDURE — 83735 ASSAY OF MAGNESIUM: CPT

## 2022-05-17 PROCEDURE — 84466 ASSAY OF TRANSFERRIN: CPT

## 2022-05-17 PROCEDURE — 36415 COLL VENOUS BLD VENIPUNCTURE: CPT

## 2022-05-17 PROCEDURE — 82607 VITAMIN B-12: CPT

## 2022-05-17 PROCEDURE — 80053 COMPREHEN METABOLIC PANEL: CPT

## 2022-05-17 PROCEDURE — 80061 LIPID PANEL: CPT

## 2022-05-20 ENCOUNTER — TRANSCRIBE ORDERS (OUTPATIENT)
Dept: ADMINISTRATIVE | Facility: HOSPITAL | Age: 72
End: 2022-05-20

## 2022-05-20 DIAGNOSIS — I73.9 PERIPHERAL VASCULAR DISEASE: Primary | ICD-10-CM

## 2022-05-31 ENCOUNTER — HOSPITAL ENCOUNTER (OUTPATIENT)
Dept: ULTRASOUND IMAGING | Facility: HOSPITAL | Age: 72
Discharge: HOME | End: 2022-05-31
Admitting: FAMILY MEDICINE

## 2022-05-31 DIAGNOSIS — I73.9 PERIPHERAL VASCULAR DISEASE: ICD-10-CM

## 2022-05-31 PROCEDURE — 93923 UPR/LXTR ART STDY 3+ LVLS: CPT

## 2022-06-01 ENCOUNTER — HOSPITAL ENCOUNTER (OUTPATIENT)
Dept: CT IMAGING | Facility: HOSPITAL | Age: 72
Discharge: HOME | End: 2022-06-01
Admitting: NURSE PRACTITIONER

## 2022-06-01 DIAGNOSIS — R06.02 SHORTNESS OF BREATH: ICD-10-CM

## 2022-06-01 PROCEDURE — 71250 CT THORAX DX C-: CPT

## 2022-06-02 ENCOUNTER — DOCUMENTATION (OUTPATIENT)
Dept: PULMONOLOGY | Facility: CLINIC | Age: 72
End: 2022-06-02

## 2022-06-02 NOTE — PROGRESS NOTES
I called Ms. Burden about her CT scan results.  The area in the right upper lobe is slightly larger when compared to the CT scan in March.  I have sent this to Dr. Zapata to review to see how we should proceed.  We did discuss a PET scan or possible biopsy over the phone today and she is agreeable with either 1 of these.    We are also in the process of evaluating her for the EBV placement and we will have her scan uploaded hopefully today to see if she is a candidate for this.  I did advise her that this would not be some that we would be able to do right away until we can figure out what to do about the area in the right upper lobe.    She is agreeable this plan and I will call to discuss this with her over the phone on Monday.

## 2022-06-03 ENCOUNTER — TELEPHONE (OUTPATIENT)
Dept: PULMONOLOGY | Facility: CLINIC | Age: 72
End: 2022-06-03

## 2022-06-03 NOTE — TELEPHONE ENCOUNTER
Patient called wanting to know results of her most recent CT. She read the report in her chart and she is very concerned.

## 2022-06-06 DIAGNOSIS — R91.1 PULMONARY NODULE: Primary | ICD-10-CM

## 2022-06-06 NOTE — PROGRESS NOTES
Ms. Perez had a CT scan performed on 6/1 that showed an enlarging nodule in the right upper lobe.  We're going to proceed with a PET to further evaluate this nodule.  Once the PET scan has been completed I will call and let her know the results.  Based on these findings she may or may not need a bronchoscopy for a positive PET diagnosis.    She also does qualify for the EBV valve placement.  Once we have addressed the right upper lobe we will get her into the office to discuss this further with Dr. Zapata.    She is agreeable this plan and I will call her once I receive her PET scan results.

## 2022-06-15 ENCOUNTER — HOSPITAL ENCOUNTER (OUTPATIENT)
Dept: PET IMAGING | Facility: HOSPITAL | Age: 72
End: 2022-06-15

## 2022-06-15 ENCOUNTER — APPOINTMENT (OUTPATIENT)
Dept: PET IMAGING | Facility: HOSPITAL | Age: 72
End: 2022-06-15

## 2022-06-20 ENCOUNTER — HOSPITAL ENCOUNTER (OUTPATIENT)
Dept: MAMMOGRAPHY | Facility: HOSPITAL | Age: 72
Discharge: HOME | End: 2022-06-20
Admitting: FAMILY MEDICINE

## 2022-06-20 DIAGNOSIS — Z12.31 VISIT FOR SCREENING MAMMOGRAM: ICD-10-CM

## 2022-06-20 PROCEDURE — 77063 BREAST TOMOSYNTHESIS BI: CPT

## 2022-06-20 PROCEDURE — 77067 SCR MAMMO BI INCL CAD: CPT

## 2022-06-23 ENCOUNTER — APPOINTMENT (OUTPATIENT)
Dept: PET IMAGING | Facility: HOSPITAL | Age: 72
End: 2022-06-23

## 2022-06-23 ENCOUNTER — HOSPITAL ENCOUNTER (OUTPATIENT)
Dept: PET IMAGING | Facility: HOSPITAL | Age: 72
Discharge: HOME | End: 2022-06-23

## 2022-06-23 DIAGNOSIS — R91.1 PULMONARY NODULE: ICD-10-CM

## 2022-06-23 LAB — GLUCOSE BLDC GLUCOMTR-MCNC: 101 MG/DL (ref 70–130)

## 2022-06-23 PROCEDURE — 78815 PET IMAGE W/CT SKULL-THIGH: CPT

## 2022-06-23 PROCEDURE — 82962 GLUCOSE BLOOD TEST: CPT

## 2022-06-23 PROCEDURE — A9552 F18 FDG: HCPCS | Performed by: NURSE PRACTITIONER

## 2022-06-23 PROCEDURE — 0 FLUDEOXYGLUCOSE F18 SOLUTION: Performed by: NURSE PRACTITIONER

## 2022-06-23 RX ADMIN — FLUDEOXYGLUCOSE F18 1 DOSE: 300 INJECTION INTRAVENOUS at 10:00

## 2022-06-24 ENCOUNTER — APPOINTMENT (OUTPATIENT)
Dept: PET IMAGING | Facility: HOSPITAL | Age: 72
End: 2022-06-24

## 2022-06-24 ENCOUNTER — DOCUMENTATION (OUTPATIENT)
Dept: PULMONOLOGY | Facility: CLINIC | Age: 72
End: 2022-06-24

## 2022-06-24 NOTE — PROGRESS NOTES
Ms. Almanza called and wanted to discuss her PET scan results.  She does have an SUV of 3.3 in the right upper lobe.  We will try to arrange a bronchoscopy with Dr. Zapata next week.  She is agreeable to the procedure.  She is currently on Plavix but is going to hold with hopes of having a bronchoscopy next week.    My office will call and contact her with a date and time that is good for Dr. Zapata to perform this procedure.

## 2022-06-27 ENCOUNTER — PREP FOR SURGERY (OUTPATIENT)
Dept: OTHER | Facility: HOSPITAL | Age: 72
End: 2022-06-27

## 2022-06-27 DIAGNOSIS — R91.8 LUNG MASS: Primary | ICD-10-CM

## 2022-06-27 RX ORDER — SODIUM CHLORIDE 0.9 % (FLUSH) 0.9 %
10 SYRINGE (ML) INJECTION AS NEEDED
Status: CANCELLED | OUTPATIENT
Start: 2022-06-27

## 2022-06-27 RX ORDER — SODIUM CHLORIDE 9 MG/ML
125 INJECTION, SOLUTION INTRAVENOUS CONTINUOUS
Status: CANCELLED | OUTPATIENT
Start: 2022-06-27

## 2022-06-27 RX ORDER — SODIUM CHLORIDE 0.9 % (FLUSH) 0.9 %
3 SYRINGE (ML) INJECTION EVERY 12 HOURS SCHEDULED
Status: CANCELLED | OUTPATIENT
Start: 2022-06-27

## 2022-06-27 RX ORDER — LIDOCAINE HYDROCHLORIDE 40 MG/ML
4 INJECTION, SOLUTION RETROBULBAR; TOPICAL ONCE
Status: CANCELLED | OUTPATIENT
Start: 2022-06-27 | End: 2022-06-27

## 2022-06-29 ENCOUNTER — APPOINTMENT (OUTPATIENT)
Dept: PREADMISSION TESTING | Facility: HOSPITAL | Age: 72
End: 2022-06-29

## 2022-07-01 RX ORDER — EZETIMIBE 10 MG/1
TABLET ORAL
Qty: 90 TABLET | Refills: 3 | Status: SHIPPED | OUTPATIENT
Start: 2022-07-01 | End: 2022-07-07

## 2022-07-07 ENCOUNTER — PRE-ADMISSION TESTING (OUTPATIENT)
Dept: PREADMISSION TESTING | Facility: HOSPITAL | Age: 72
End: 2022-07-07

## 2022-07-07 VITALS — WEIGHT: 158.95 LBS | BODY MASS INDEX: 24.09 KG/M2 | HEIGHT: 68 IN

## 2022-07-07 DIAGNOSIS — R91.8 LUNG MASS: ICD-10-CM

## 2022-07-07 LAB
ALBUMIN SERPL-MCNC: 3.9 G/DL (ref 3.5–5.2)
ALBUMIN/GLOB SERPL: 1.4 G/DL
ALP SERPL-CCNC: 111 U/L (ref 39–117)
ALT SERPL W P-5'-P-CCNC: 9 U/L (ref 1–33)
ANION GAP SERPL CALCULATED.3IONS-SCNC: 10 MMOL/L (ref 5–15)
APTT PPP: 28.8 SECONDS (ref 22–39)
AST SERPL-CCNC: 15 U/L (ref 1–32)
BASOPHILS # BLD AUTO: 0.02 10*3/MM3 (ref 0–0.2)
BASOPHILS NFR BLD AUTO: 0.3 % (ref 0–1.5)
BILIRUB SERPL-MCNC: 0.3 MG/DL (ref 0–1.2)
BUN SERPL-MCNC: 17 MG/DL (ref 8–23)
BUN/CREAT SERPL: 14.9 (ref 7–25)
CALCIUM SPEC-SCNC: 9 MG/DL (ref 8.6–10.5)
CHLORIDE SERPL-SCNC: 101 MMOL/L (ref 98–107)
CO2 SERPL-SCNC: 25 MMOL/L (ref 22–29)
CREAT SERPL-MCNC: 1.14 MG/DL (ref 0.57–1)
DEPRECATED RDW RBC AUTO: 53.5 FL (ref 37–54)
EGFRCR SERPLBLD CKD-EPI 2021: 51.6 ML/MIN/1.73
EOSINOPHIL # BLD AUTO: 0.14 10*3/MM3 (ref 0–0.4)
EOSINOPHIL NFR BLD AUTO: 2.1 % (ref 0.3–6.2)
ERYTHROCYTE [DISTWIDTH] IN BLOOD BY AUTOMATED COUNT: 14.3 % (ref 12.3–15.4)
GLOBULIN UR ELPH-MCNC: 2.8 GM/DL
GLUCOSE SERPL-MCNC: 115 MG/DL (ref 65–99)
HCT VFR BLD AUTO: 39.1 % (ref 34–46.6)
HGB BLD-MCNC: 12.4 G/DL (ref 12–15.9)
IMM GRANULOCYTES # BLD AUTO: 0.03 10*3/MM3 (ref 0–0.05)
IMM GRANULOCYTES NFR BLD AUTO: 0.4 % (ref 0–0.5)
INR PPP: 1.01 (ref 0.84–1.13)
LYMPHOCYTES # BLD AUTO: 1.07 10*3/MM3 (ref 0.7–3.1)
LYMPHOCYTES NFR BLD AUTO: 15.9 % (ref 19.6–45.3)
MCH RBC QN AUTO: 32.2 PG (ref 26.6–33)
MCHC RBC AUTO-ENTMCNC: 31.7 G/DL (ref 31.5–35.7)
MCV RBC AUTO: 101.6 FL (ref 79–97)
MONOCYTES # BLD AUTO: 0.51 10*3/MM3 (ref 0.1–0.9)
MONOCYTES NFR BLD AUTO: 7.6 % (ref 5–12)
NEUTROPHILS NFR BLD AUTO: 4.96 10*3/MM3 (ref 1.7–7)
NEUTROPHILS NFR BLD AUTO: 73.7 % (ref 42.7–76)
NRBC BLD AUTO-RTO: 0 /100 WBC (ref 0–0.2)
PLATELET # BLD AUTO: 262 10*3/MM3 (ref 140–450)
PMV BLD AUTO: 8.7 FL (ref 6–12)
POTASSIUM SERPL-SCNC: 4.7 MMOL/L (ref 3.5–5.2)
PROT SERPL-MCNC: 6.7 G/DL (ref 6–8.5)
PROTHROMBIN TIME: 13.2 SECONDS (ref 11.4–14.4)
QT INTERVAL: 408 MS
QTC INTERVAL: 414 MS
RBC # BLD AUTO: 3.85 10*6/MM3 (ref 3.77–5.28)
SODIUM SERPL-SCNC: 136 MMOL/L (ref 136–145)
WBC NRBC COR # BLD: 6.73 10*3/MM3 (ref 3.4–10.8)

## 2022-07-07 PROCEDURE — 80053 COMPREHEN METABOLIC PANEL: CPT

## 2022-07-07 PROCEDURE — 85730 THROMBOPLASTIN TIME PARTIAL: CPT

## 2022-07-07 PROCEDURE — 36415 COLL VENOUS BLD VENIPUNCTURE: CPT

## 2022-07-07 PROCEDURE — 85025 COMPLETE CBC W/AUTO DIFF WBC: CPT

## 2022-07-07 PROCEDURE — 93010 ELECTROCARDIOGRAM REPORT: CPT | Performed by: INTERNAL MEDICINE

## 2022-07-07 PROCEDURE — 85610 PROTHROMBIN TIME: CPT

## 2022-07-07 PROCEDURE — 93005 ELECTROCARDIOGRAM TRACING: CPT

## 2022-07-07 NOTE — DISCHARGE INSTRUCTIONS
The following information and instructions were given:    Per Anesthesia Request, patient instructed not to take their ACE/ARB medications on the AM of surgery.     Do not eat, drink, smoke or chew gum after midnight the night before surgery. This includes no mints.  Take all routine, prescribed medications including heart and blood pressure medicines with a sip of water unless otherwise instructed by your physician.   Do NOT take diabetic medication unless instructed by your physician.    DO NOT shave for two days before your procedure.  Do not wear makeup.      DO NOT wear fingernail polish (gel/regular) and/or acrylic/artificial nails on the day of surgery.   If you had a recent manicure and would rather not remove polish or artificial nails, the minimum requirement is that the polish/artificial nails must be removed from the middle finger on each hand.      Remove all jewelry (advise to go to jeweler if unable to remove).  Jewelry, especially rings, can no longer be taped for surgery.    Leave anything you consider valuable at home.    Bring the following with you the day of your procedure (when applicable):       -Picture ID and insurance cards       -Co-pay/deductible required by insurance       -Medications in the original bottles (not a list) including all over-the-counter medications if not brought to PAT       -Copy of advance directive, living will or power of  documents if not brought to PAT       -PAT Pass    Educational handout related to procedure given to patient.  Educational handout also includes general information related to their recovery that mentions signs and symptoms of infection and when to call the doctor.

## 2022-07-07 NOTE — PAT
Patient denies any current skin issues.     Per Anesthesia Request, patient instructed not to take their ACE/ARB medications on the AM of surgery.

## 2022-07-08 ENCOUNTER — APPOINTMENT (OUTPATIENT)
Dept: GENERAL RADIOLOGY | Facility: HOSPITAL | Age: 72
End: 2022-07-08

## 2022-07-08 ENCOUNTER — ANESTHESIA EVENT (OUTPATIENT)
Dept: GASTROENTEROLOGY | Facility: HOSPITAL | Age: 72
End: 2022-07-08

## 2022-07-08 ENCOUNTER — HOSPITAL ENCOUNTER (OUTPATIENT)
Facility: HOSPITAL | Age: 72
Setting detail: HOSPITAL OUTPATIENT SURGERY
Discharge: HOME | End: 2022-07-08
Attending: INTERNAL MEDICINE | Admitting: INTERNAL MEDICINE

## 2022-07-08 ENCOUNTER — ANESTHESIA (OUTPATIENT)
Dept: GASTROENTEROLOGY | Facility: HOSPITAL | Age: 72
End: 2022-07-08

## 2022-07-08 VITALS
TEMPERATURE: 97 F | HEART RATE: 82 BPM | OXYGEN SATURATION: 94 % | DIASTOLIC BLOOD PRESSURE: 62 MMHG | SYSTOLIC BLOOD PRESSURE: 155 MMHG | RESPIRATION RATE: 22 BRPM

## 2022-07-08 DIAGNOSIS — R91.8 LUNG MASS: ICD-10-CM

## 2022-07-08 LAB — SARS-COV-2 RDRP RESP QL NAA+PROBE: NORMAL

## 2022-07-08 PROCEDURE — 31623 DX BRONCHOSCOPE/BRUSH: CPT | Performed by: INTERNAL MEDICINE

## 2022-07-08 PROCEDURE — 31628 BRONCHOSCOPY/LUNG BX EACH: CPT | Performed by: INTERNAL MEDICINE

## 2022-07-08 PROCEDURE — 31627 NAVIGATIONAL BRONCHOSCOPY: CPT | Performed by: INTERNAL MEDICINE

## 2022-07-08 PROCEDURE — 88305 TISSUE EXAM BY PATHOLOGIST: CPT | Performed by: INTERNAL MEDICINE

## 2022-07-08 PROCEDURE — S0260 H&P FOR SURGERY: HCPCS | Performed by: INTERNAL MEDICINE

## 2022-07-08 PROCEDURE — 87205 SMEAR GRAM STAIN: CPT | Performed by: INTERNAL MEDICINE

## 2022-07-08 PROCEDURE — 87206 SMEAR FLUORESCENT/ACID STAI: CPT | Performed by: INTERNAL MEDICINE

## 2022-07-08 PROCEDURE — C1726 CATH, BAL DIL, NON-VASCULAR: HCPCS | Performed by: INTERNAL MEDICINE

## 2022-07-08 PROCEDURE — 25010000002 DEXAMETHASONE PER 1 MG: Performed by: NURSE ANESTHETIST, CERTIFIED REGISTERED

## 2022-07-08 PROCEDURE — 25010000002 FENTANYL CITRATE (PF) 50 MCG/ML SOLUTION: Performed by: NURSE ANESTHETIST, CERTIFIED REGISTERED

## 2022-07-08 PROCEDURE — 76000 FLUOROSCOPY <1 HR PHYS/QHP: CPT | Performed by: INTERNAL MEDICINE

## 2022-07-08 PROCEDURE — 31652 BRONCH EBUS SAMPLNG 1/2 NODE: CPT | Performed by: INTERNAL MEDICINE

## 2022-07-08 PROCEDURE — 88341 IMHCHEM/IMCYTCHM EA ADD ANTB: CPT | Performed by: INTERNAL MEDICINE

## 2022-07-08 PROCEDURE — 31654 BRONCH EBUS IVNTJ PERPH LES: CPT | Performed by: INTERNAL MEDICINE

## 2022-07-08 PROCEDURE — 31624 DX BRONCHOSCOPE/LAVAGE: CPT | Performed by: INTERNAL MEDICINE

## 2022-07-08 PROCEDURE — 88342 IMHCHEM/IMCYTCHM 1ST ANTB: CPT | Performed by: INTERNAL MEDICINE

## 2022-07-08 PROCEDURE — 25010000002 PROPOFOL 10 MG/ML EMULSION: Performed by: NURSE ANESTHETIST, CERTIFIED REGISTERED

## 2022-07-08 PROCEDURE — 25010000002 ONDANSETRON PER 1 MG: Performed by: NURSE ANESTHETIST, CERTIFIED REGISTERED

## 2022-07-08 PROCEDURE — C9803 HOPD COVID-19 SPEC COLLECT: HCPCS

## 2022-07-08 PROCEDURE — 87116 MYCOBACTERIA CULTURE: CPT | Performed by: INTERNAL MEDICINE

## 2022-07-08 PROCEDURE — 87102 FUNGUS ISOLATION CULTURE: CPT | Performed by: INTERNAL MEDICINE

## 2022-07-08 PROCEDURE — 76000 FLUOROSCOPY <1 HR PHYS/QHP: CPT

## 2022-07-08 PROCEDURE — 71045 X-RAY EXAM CHEST 1 VIEW: CPT

## 2022-07-08 PROCEDURE — 87070 CULTURE OTHR SPECIMN AEROBIC: CPT | Performed by: INTERNAL MEDICINE

## 2022-07-08 PROCEDURE — 87635 SARS-COV-2 COVID-19 AMP PRB: CPT | Performed by: ANESTHESIOLOGY

## 2022-07-08 RX ORDER — LIDOCAINE HYDROCHLORIDE 40 MG/ML
4 INJECTION, SOLUTION RETROBULBAR; TOPICAL ONCE
Status: DISCONTINUED | OUTPATIENT
Start: 2022-07-08 | End: 2022-07-08 | Stop reason: HOSPADM

## 2022-07-08 RX ORDER — ROCURONIUM BROMIDE 10 MG/ML
INJECTION, SOLUTION INTRAVENOUS AS NEEDED
Status: DISCONTINUED | OUTPATIENT
Start: 2022-07-08 | End: 2022-07-08 | Stop reason: SURG

## 2022-07-08 RX ORDER — EPHEDRINE SULFATE 50 MG/ML
INJECTION, SOLUTION INTRAVENOUS AS NEEDED
Status: DISCONTINUED | OUTPATIENT
Start: 2022-07-08 | End: 2022-07-08 | Stop reason: SURG

## 2022-07-08 RX ORDER — FENTANYL CITRATE 50 UG/ML
INJECTION, SOLUTION INTRAMUSCULAR; INTRAVENOUS AS NEEDED
Status: DISCONTINUED | OUTPATIENT
Start: 2022-07-08 | End: 2022-07-08 | Stop reason: SURG

## 2022-07-08 RX ORDER — DEXAMETHASONE SODIUM PHOSPHATE 10 MG/ML
INJECTION INTRAMUSCULAR; INTRAVENOUS AS NEEDED
Status: DISCONTINUED | OUTPATIENT
Start: 2022-07-08 | End: 2022-07-08 | Stop reason: SURG

## 2022-07-08 RX ORDER — LIDOCAINE HYDROCHLORIDE 10 MG/ML
INJECTION, SOLUTION EPIDURAL; INFILTRATION; INTRACAUDAL; PERINEURAL AS NEEDED
Status: DISCONTINUED | OUTPATIENT
Start: 2022-07-08 | End: 2022-07-08 | Stop reason: SURG

## 2022-07-08 RX ORDER — SODIUM CHLORIDE 0.9 % (FLUSH) 0.9 %
3 SYRINGE (ML) INJECTION EVERY 12 HOURS SCHEDULED
Status: DISCONTINUED | OUTPATIENT
Start: 2022-07-08 | End: 2022-07-08 | Stop reason: HOSPADM

## 2022-07-08 RX ORDER — ONDANSETRON 2 MG/ML
INJECTION INTRAMUSCULAR; INTRAVENOUS AS NEEDED
Status: DISCONTINUED | OUTPATIENT
Start: 2022-07-08 | End: 2022-07-08 | Stop reason: SURG

## 2022-07-08 RX ORDER — SODIUM CHLORIDE 9 MG/ML
125 INJECTION, SOLUTION INTRAVENOUS CONTINUOUS
Status: DISCONTINUED | OUTPATIENT
Start: 2022-07-08 | End: 2022-07-08 | Stop reason: HOSPADM

## 2022-07-08 RX ORDER — SODIUM CHLORIDE 0.9 % (FLUSH) 0.9 %
10 SYRINGE (ML) INJECTION AS NEEDED
Status: DISCONTINUED | OUTPATIENT
Start: 2022-07-08 | End: 2022-07-08 | Stop reason: HOSPADM

## 2022-07-08 RX ORDER — ONDANSETRON 2 MG/ML
4 INJECTION INTRAMUSCULAR; INTRAVENOUS ONCE AS NEEDED
Status: DISCONTINUED | OUTPATIENT
Start: 2022-07-08 | End: 2022-07-08 | Stop reason: HOSPADM

## 2022-07-08 RX ORDER — SODIUM CHLORIDE 9 MG/ML
INJECTION, SOLUTION INTRAVENOUS CONTINUOUS PRN
Status: DISCONTINUED | OUTPATIENT
Start: 2022-07-08 | End: 2022-07-08 | Stop reason: SURG

## 2022-07-08 RX ORDER — ESMOLOL HYDROCHLORIDE 10 MG/ML
INJECTION INTRAVENOUS AS NEEDED
Status: DISCONTINUED | OUTPATIENT
Start: 2022-07-08 | End: 2022-07-08 | Stop reason: SURG

## 2022-07-08 RX ORDER — PROPOFOL 10 MG/ML
VIAL (ML) INTRAVENOUS AS NEEDED
Status: DISCONTINUED | OUTPATIENT
Start: 2022-07-08 | End: 2022-07-08 | Stop reason: SURG

## 2022-07-08 RX ORDER — IPRATROPIUM BROMIDE AND ALBUTEROL SULFATE 2.5; .5 MG/3ML; MG/3ML
3 SOLUTION RESPIRATORY (INHALATION) ONCE AS NEEDED
Status: DISCONTINUED | OUTPATIENT
Start: 2022-07-08 | End: 2022-07-08 | Stop reason: HOSPADM

## 2022-07-08 RX ADMIN — EPHEDRINE SULFATE 5 MG: 50 INJECTION, SOLUTION INTRAVENOUS at 08:32

## 2022-07-08 RX ADMIN — ESMOLOL HYDROCHLORIDE 80 MG: 10 INJECTION, SOLUTION INTRAVENOUS at 07:55

## 2022-07-08 RX ADMIN — SODIUM CHLORIDE: 9 INJECTION, SOLUTION INTRAVENOUS at 07:51

## 2022-07-08 RX ADMIN — PROPOFOL 140 MG: 10 INJECTION, EMULSION INTRAVENOUS at 07:55

## 2022-07-08 RX ADMIN — SUGAMMADEX 200 MG: 100 INJECTION, SOLUTION INTRAVENOUS at 09:05

## 2022-07-08 RX ADMIN — LIDOCAINE HYDROCHLORIDE 50 MG: 10 INJECTION, SOLUTION EPIDURAL; INFILTRATION; INTRACAUDAL; PERINEURAL at 07:55

## 2022-07-08 RX ADMIN — EPHEDRINE SULFATE 5 MG: 50 INJECTION, SOLUTION INTRAVENOUS at 08:20

## 2022-07-08 RX ADMIN — ONDANSETRON 4 MG: 2 INJECTION INTRAMUSCULAR; INTRAVENOUS at 08:59

## 2022-07-08 RX ADMIN — EPHEDRINE SULFATE 5 MG: 50 INJECTION, SOLUTION INTRAVENOUS at 08:53

## 2022-07-08 RX ADMIN — DEXAMETHASONE SODIUM PHOSPHATE 4 MG: 10 INJECTION INTRAMUSCULAR; INTRAVENOUS at 08:05

## 2022-07-08 RX ADMIN — ROCURONIUM BROMIDE 50 MG: 10 INJECTION, SOLUTION INTRAVENOUS at 07:55

## 2022-07-08 RX ADMIN — FENTANYL CITRATE 100 MCG: 50 INJECTION, SOLUTION INTRAMUSCULAR; INTRAVENOUS at 08:25

## 2022-07-08 NOTE — ANESTHESIA PREPROCEDURE EVALUATION
Anesthesia Evaluation     Patient summary reviewed and Nursing notes reviewed   no history of anesthetic complications:  NPO Solid Status: > 8 hours  NPO Liquid Status: > 2 hours           Airway   Mallampati: I  TM distance: >3 FB  Neck ROM: full  No difficulty expected  Dental    (+) lower dentures and upper dentures    Pulmonary    (+) pleural effusion, COPD moderate, decreased breath sounds,   Cardiovascular   Exercise tolerance: good (4-7 METS)    ECG reviewed  Rhythm: regular  Rate: normal    (+) hypertension well controlled 2 medications or greater, CAD, PVD, hyperlipidemia,  carotid artery disease carotid bilateral    ROS comment: EF 65%    Neuro/Psych  (+) TIA,    GI/Hepatic/Renal/Endo    (+)   renal disease CRI, thyroid problem hypothyroidism    Musculoskeletal     Abdominal   (+) obese,     Abdomen: soft.   Substance History      OB/GYN          Other   arthritis,                    Anesthesia Plan    ASA 3     general     intravenous induction     Anesthetic plan, risks, benefits, and alternatives have been provided, discussed and informed consent has been obtained with: patient.    Plan discussed with CRNA.        CODE STATUS:

## 2022-07-08 NOTE — ANESTHESIA PROCEDURE NOTES
Airway  Urgency: elective    Date/Time: 7/8/2022 7:57 AM  Airway not difficult    General Information and Staff    Patient location during procedure: OR  Anesthesiologist: Augustine Concepcion MD  CRNA/CAA: Cassius Thompson CRNA    Indications and Patient Condition  Indications for airway management: airway protection    Preoxygenated: yes  MILS not maintained throughout  Mask difficulty assessment: 1 - vent by mask    Final Airway Details  Final airway type: endotracheal airway      Successful airway: ETT  Cuffed: yes   Successful intubation technique: direct laryngoscopy  Endotracheal tube insertion site: oral  Blade: Rodo  Blade size: 3  ETT size (mm): 8.5  Cormack-Lehane Classification: grade I - full view of glottis  Placement verified by: chest auscultation and capnometry   Measured from: lips  ETT/EBT  to lips (cm): 20  Number of attempts at approach: 1  Assessment: lips, teeth, and gum same as pre-op and atraumatic intubation    Additional Comments  Negative epigastric sounds, Breath sound equal bilaterally with symmetric chest rise and fall

## 2022-07-08 NOTE — H&P
[facility] Pulmonary Follow up     CHIEF COMPLAINT     Dyspnea with exertion     HISTORY OF PRESENT ILLNESS     Kialee Almanza is a 71 y.o.female here today for follow-up.  She was last seen in the office by me in June 2021.  She denies any respiratory illnesses or exacerbation since her last appointment.     She has noticed worsening shortness of breath with any exertion.  She does have to take frequent breaks to recover.  She does occasionally wear her oxygen at 2 L during the day with exertion.  She also continues to wear 2 L nasal cannula at nighttime.     She does have daytime somnolence, fatigue and nonrestorative sleep.  She has not had a sleep study performed before.     She continues to use her Symbicort twice a day and her Spiriva daily.  She also has DuoNebs to use as needed for shortness of breath.  She will occasionally use these but not regularly.     She denies fever, chills, sputum production, hemoptysis, night sweats, weight loss, chest pain or palpitations.  She denies any lower extremity edema or calf tenderness.  She denies any sinus or allergy symptoms.  She denies reflux symptoms.     She is up-to-date on her current vaccinations.     She is wanting to do the work-up for the Spiration valve for her severe COPD.  She is wanting discussed this in the office today.     She quit smoking in 2013 and has a 35-pack-year history.  Her last CT scan was in November 2021 that showed no nodule or mass concerning for malignancy.         Patient Active Problem List   Diagnosis   • Peripheral vascular disease (HCC)   • Essential hypertension   • CVD (cerebrovascular disease)   • Mixed hyperlipidemia   • Chronic bilateral low back pain without sciatica   • Centrilobular emphysema (HCC)   • S/P parathyroidectomy 2/1/17   • Aneurysm of thoracic aorta (HCC)   • Lung nodule   • Iron deficiency anemia due to chronic blood loss   • Iron malabsorption   • Psychophysiological insomnia   • Carotid stenosis   • Insomnia  "due to medical condition   • Shortness of breath   • Personal history of smoking   • Nocturnal hypoxemia               Allergies   Allergen Reactions   • Ferrlecit [Na Ferric Gluc Cplx In Sucrose] Diarrhea and Nausea And Vomiting   • Percodan [Oxycodone-Aspirin] Mental Status Change       \"MAKES ME FEEL LIKE I'M FLYING\"   • Sulfa Antibiotics Rash       Last as a baby.         Current Outpatient Medications:   •  amLODIPine (NORVASC) 5 MG tablet, TAKE 1 TABLET DAILY, Disp: 90 tablet, Rfl: 3  •  atorvastatin (LIPITOR) 80 MG tablet, Take 1 tablet by mouth every night at bedtime., Disp: 90 tablet, Rfl: 3  •  budesonide-formoterol (SYMBICORT) 160-4.5 MCG/ACT inhaler, Inhale 2 puffs 2 (Two) Times a Day., Disp: 3 inhaler, Rfl: 3  •  clopidogrel (PLAVIX) 75 MG tablet, Take 75 mg by mouth Daily., Disp: , Rfl:   •  escitalopram (LEXAPRO) 10 MG tablet, Take 10 mg by mouth Daily., Disp: , Rfl:   •  ezetimibe (ZETIA) 10 MG tablet, TAKE 1 TABLET DAILY, Disp: 90 tablet, Rfl: 3  •  gabapentin (NEURONTIN) 800 MG tablet, Take 800 mg by mouth 2 (Two) Times a Day., Disp: , Rfl:   •  HYDROcodone-acetaminophen (NORCO) 7.5-325 MG per tablet, Take 1 tablet by mouth Daily As Needed for Moderate Pain ., Disp: 15 tablet, Rfl: 0  •  HYDROCODONE-ACETAMINOPHEN PO, TAKE TABLET  750 Mg. Prn, Disp: , Rfl:   •  ipratropium-albuterol (DUO-NEB) 0.5-2.5 mg/3 ml nebulizer, Take 3 mL by nebulization 4 (Four) Times a Day. (Patient taking differently: Take 3 mL by nebulization As Needed.), Disp: 360 mL, Rfl: 3  •  levalbuterol (XOPENEX) 0.63 MG/3ML nebulizer solution, Take 1 ampule by nebulization 4 (Four) Times a Day As Needed for Wheezing., Disp: 360 mL, Rfl: 11  •  levothyroxine (SYNTHROID, LEVOTHROID) 75 MCG tablet, Take 75 mcg by mouth Daily., Disp: , Rfl:   •  lisinopril (PRINIVIL,ZESTRIL) 20 MG tablet, Take 1 tablet by mouth Daily., Disp: 30 tablet, Rfl: 5  •  O2 (OXYGEN), Inhale 2 L/min Every Night. Every night  During the day prn, Disp: , Rfl:   • "  primidone (MYSOLINE) 50 MG tablet, , Disp: , Rfl:   •  ProAir  (90 Base) MCG/ACT inhaler, USE 2 INHALATIONS EVERY 4 HOURS AS NEEDED FOR WHEEZING, Disp: 51 g, Rfl: 3  •  Spiriva Respimat 2.5 MCG/ACT aerosol solution inhaler, USE 2 INHALATIONS DAILY, Disp: 12 g, Rfl: 3  •  tiZANidine (ZANAFLEX) 2 MG tablet, 3 (Three) Times a Day As Needed., Disp: , Rfl:   •  traZODone (DESYREL) 50 MG tablet, Take 1-2 tablets at bedtime as needed for sleep. (Patient taking differently: Take  by mouth Every Night. Take 1-2 tablets at bedtime as needed for sleep.), Disp: 180 tablet, Rfl: 3  •  venlafaxine 225 MG tablet sustained-release 24 hour 24 hr tablet, Take 1 tablet by mouth Daily With Breakfast. (Patient taking differently: Take 75 mg by mouth Daily With Breakfast.), Disp: 90 tablet, Rfl: 1  •  azithromycin (ZITHROMAX) 250 MG tablet, Take 2 by mouth today then 1 daily for 4 days, Disp: 6 tablet, Rfl: 1  MEDICATION LIST AND ALLERGIES REVIEWED.     Social History            Tobacco Use   • Smoking status: Former Smoker       Packs/day: 1.00       Years: 35.00       Pack years: 35.00       Types: Cigarettes       Quit date: 2013       Years since quittin.8   • Smokeless tobacco: Never Used   Vaping Use   • Vaping Use: Never used   Substance Use Topics   • Alcohol use: Yes       Alcohol/week: 1.0 standard drink       Types: 1 Standard drinks or equivalent per week       Comment: Once a week or more   • Drug use: No         FAMILY AND SOCIAL HISTORY REVIEWED.     Review of Systems   Constitutional: Positive for fatigue. Negative for activity change, appetite change, fever and unexpected weight change.   HENT: Positive for congestion. Negative for postnasal drip, rhinorrhea, sinus pressure, sore throat and voice change.    Eyes: Negative for visual disturbance.   Respiratory: Positive for shortness of breath. Negative for cough, chest tightness and wheezing.    Cardiovascular: Negative for chest pain, palpitations and  "leg swelling.   Gastrointestinal: Negative for abdominal distention, abdominal pain, nausea and vomiting.   Endocrine: Negative for cold intolerance and heat intolerance.   Genitourinary: Negative for difficulty urinating and urgency.   Musculoskeletal: Negative for arthralgias, back pain and neck pain.   Skin: Negative for color change and pallor.   Allergic/Immunologic: Negative for environmental allergies and food allergies.   Neurological: Negative for dizziness, syncope, weakness and light-headedness.   Hematological: Negative for adenopathy. Does not bruise/bleed easily.   Psychiatric/Behavioral: Negative for agitation and behavioral problems.   .     /68 (BP Location: Right arm, Patient Position: Sitting, Cuff Size: Adult)   Pulse 81   Temp 97.1 °F (36.2 °C) (Infrared)   Resp 18   Ht 172.7 cm (68\")   Wt 75.3 kg (166 lb)   LMP  (LMP Unknown)   SpO2 93%   BMI 25.24 kg/m²           Immunization History   Administered Date(s) Administered   • COVID-19 (PFIZER) PURPLE CAP 01/17/2021, 01/30/2021, 02/21/2021, 11/30/2021   • Fluad Quad 65+ 10/24/2019, 10/07/2020   • Fluzone High Dose =>65 Years ([employer] ONLY) 10/24/2019, 10/01/2020   • Shingrix 01/01/2016         Physical Exam  Vitals and nursing note reviewed.   Constitutional:       Appearance: She is well-developed. She is not diaphoretic.   HENT:      Head: Normocephalic and atraumatic.   Eyes:      Pupils: Pupils are equal, round, and reactive to light.   Neck:      Thyroid: No thyromegaly.   Cardiovascular:      Rate and Rhythm: Normal rate and regular rhythm.      Heart sounds: Normal heart sounds. No murmur heard.    No friction rub. No gallop.   Pulmonary:      Effort: Pulmonary effort is normal. No respiratory distress.      Breath sounds: Normal breath sounds. No wheezing or rales.   Chest:      Chest wall: No tenderness.   Abdominal:      General: Bowel sounds are normal.      Palpations: Abdomen is soft.      Tenderness: There is no " abdominal tenderness.   Musculoskeletal:         General: No swelling. Normal range of motion.      Cervical back: Normal range of motion and neck supple.   Lymphadenopathy:      Cervical: No cervical adenopathy.   Skin:     General: Skin is warm and dry.      Capillary Refill: Capillary refill takes less than 2 seconds.   Neurological:      Mental Status: She is alert and oriented to person, place, and time.   Psychiatric:         Mood and Affect: Mood normal.         Behavior: Behavior normal.               RESULTS     PFTS in the office today, read by me, FVC 2.06 64% predicted, FEV1 1.08 44% predicted, FEV1/FVC 52% predicted, TLC 5.90 114% predicted, DLCO 34% predicted, moderate to severe obstruction with no postbronchodilator response, no restriction and severely reduced diffusion.     XR Chest PA & Lateral     Result Date: 3/18/2022  Mild chronic changes of the lung fields without evidence of acute cardiopulmonary abnormality.  This report was finalized on 3/18/2022 5:21 PM by Onel Villatoro.       PROBLEM LIST          Problem List Items Addressed This Visit                 Pulmonary and Pneumonias      Centrilobular emphysema (HCC)      Overview        CT scan of the chest July 18, 2016, centrilobular emphysema with fibrosis, no pulmonary embolus               Relevant Medications      azithromycin (ZITHROMAX) 250 MG tablet      Shortness of breath - Primary      Relevant Orders      XR Chest PA & Lateral (Completed)      Pulmonary Function Test (Completed)      Adult Transthoracic Echo Complete w/ Color, Spectral and Contrast if necessary per protocol            Tobacco      Personal history of smoking                Other Visit Diagnoses      GERMAIN (obstructive sleep apnea)         Relevant Orders     Polysomnography 4 or More Parameters                DISCUSSION     Ms. Almanza was here for follow-up of her COPD.  We did review her full PFTs in the office today and she continues to have a moderate to severe  obstruction.  She will continue her Symbicort twice a day and her Spiriva daily.  I did encourage her to use her DuoNeb's as needed for shortness of breath.     We did review her chest x-ray in the office today and there were no abnormal findings.  I would like to order a CT scan without contrast for further investigate her dyspnea.  This will need to be without logic protocol to also qualify her for the Spriation valve.  I will call her and let her know she does qualify for the procedure.  I gave her some written information on the valve as well.     I am also going to order an echocardiogram to rule out any cardiac dysfunction related to her dyspnea.  I will call her once I receive these results.     I will order a sleep study to be performed based on her daytime somnolence, fatigue and nonrestorative sleep to rule out GERMAIN.     She was requesting to have an antibiotic on hand in case she were to have an exacerbation.  I did call this in for her today as well.     She will follow-up with Dr. Zapata in 2 to 3 months to go over the CT scan and see if she is a candidate for the EBV.  I did advise her to call with any additional concerns or questions.     Level of service justified based on 38 minutes spent in patient care on this date of service including, but not limited to: preparing to see the patient, obtaining and/or reviewing history, performing medically appropriate examination, ordering tests/medicine/procedures, independently interpreting results, documenting clinical information in EHR, and counseling/education of patient/family/caregiver. (Level 4 30-39 minutes; Level 5 40-54 minutes)        GET Gonzalez  03/18/202209:05 EDT  Electronically signed      Please note that portions of this note were completed with a voice recognition program.          The patient had a CT scan followed by a PET scan which revealed an right upper lobe density that exhibited abnormal hypermetabolic activity.  This is  a difficult lesion, very apical but does appear to be accessible via navigational bronchoscopy.  We have discussed other options such as surgical resection and close interval follow-up.  She is elected to pursue a biopsy which we did originally plan to do last week but our equipment was broken so it was rescheduled to today.  We discussed the risk of pneumothorax.  That risk is increased because of the location of this lesion.  We discussed the possibility of pneumothorax, hospitalization, chest tube placement, prolonged air leak.  The patient appears to understand these risks and wants to proceed.

## 2022-07-08 NOTE — OP NOTE
Bronchoscopy Procedure Note    Pre-op Diagnosis: Hypermetabolic RUL lesion    Post-op Diagnosis: Same    Surgeon: Aguilar Zapata MD    Anesthesia: General    Operation: Flexible fiberoptic bronchoscopy    Findings: Chronic bronchitic changes, no discrete endobronchial lesions    Specimen(s): BAL, transbronchial biopsy and brushing specimens from right upper lobe.  Transbronchial needle aspiration station 7    Estimated Blood Loss: Minimal/None    Complications: No immediate.  Chest x-ray pending    Indications and History:  Kailee Almanza is a 71 y.o. female  with incidentally identified a regular apical right upper lobe lesion that exhibits some abnormal hypermetabolic activity.  The risks, benefits, complications, treatment options and expected outcomes were discussed with the patient.  The possibilities of reaction to medication, pulmonary aspiration, perforation of a viscus, bleeding, failure to diagnose a condition and creating a complication requiring transfusion or operation were discussed with the patient who freely signed the consent.      Description of Procedure:  The patient was seen in the Holding Room and an immediate patient assessment was done prior to the administration of moderate and/or deep conscious sedation.  The patient was taken to the Endoscopy Suite, identified as Kailee Almanza  and the procedure verified as Flexible Fiberoptic Bronchoscopy.  A Time Out was held and the above information confirmed.    After the induction of general anesthesia the patient was intubated with an 8.5 endotracheal tube    The bronchoscope was introduced via the endotracheal tube which confirmed good position of the distal one third of the trachea    The scope was advanced in the left mainstem bronchus.  The left upper lobe, lingula, left lower lobe, and superior segmental left lower lobe revealed diffuse chronic bronchitic changes but no discrete endobronchial lesion.  Moderate secretions.    The  scope is advanced into the right mainstem bronchus.  The right upper lobe, bronchus intermedius, right middle lobe, right lower lobe, and superior segment of the right lower lobe again revealed diffuse chronic bronchitic changes and moderate secretions but no discrete endobronchial lesion    The scope was pulled back up into the distal trachea and the navigational probe was inserted.  Registration was performed using the navigational software.    Then the scope and then the working channel and probe were advanced out to within approximately 2 cm of the irregular right upper lobe density.  Fluoroscopy navigation was performed.  Radial ultrasound was used to help determine location for biopsies.  Multiple biopsies were taken from the several locations.  Fluoroscopy navigation was performed twice to improve yield from 2 different locations.  A 7 mm cytology brushing specimen was obtained as well.    The probe was removed and the scope was wedged into the apical subsegment of the right upper lobe where a BAL was obtained.  60 mL instilled and 25 mL return.    The scope was removed and the endobronchial ultrasound scope was introduced.  Station 4, station 7, and station 10 bilaterally were interrogated.  No significant adenopathy was noted although there was a small approximately 1 cm lymph node at station 7.  Multiple passes were taken with a 21-gauge needle.    The EBUS scope was removed and the regular scope was reintroduced.  The airways were suctioned clear and at the end of the procedure there was no active bleeding and no significant residual blood or secretions.  The scope was withdrawn without difficulty.    Fluoroscopy    Fluoroscopy was performed under my direct supervision without a radiologist present for obtaining transbronchial biopsy and brushing specimens and for performing fluoroscopy navigation.  Less than 5 minutes of fluoroscopy time was used in total.  There are no immediate complications.    The  Patient was taken to the Endoscopy Recovery area in satisfactory condition.    Attestation: I performed the procedure    Aguilar Zapata MD, [facility]

## 2022-07-08 NOTE — DISCHARGE INSTR - LAB
Dr. Zapata will call with biopsy results next week.  You may eat and drink at 11:15 am, avoid heavy, greasy or fried foods.  No driving until tomorrow at 11am

## 2022-07-08 NOTE — ANESTHESIA POSTPROCEDURE EVALUATION
Patient: Kailee Almanza    Procedure Summary     Date: 07/08/22 Room / Location:  [facility]    Anesthesia Start: 0751 Anesthesia Stop:     Procedure: BRONCHOSCOPY WITH ENDOBRONCHIAL ULTRASOUND AND NAVIGATION (N/A Bronchus) Diagnosis:       Lung mass      (Lung mass [R91.8])    Surgeons: Aguilar Zapata MD Provider: Augustine Concepcion MD    Anesthesia Type: general ASA Status: 3          Anesthesia Type: general    Vitals  No vitals data found for the desired time range.          Post Anesthesia Care and Evaluation    Patient location during evaluation: PACU  Patient participation: complete - patient participated  Level of consciousness: awake and alert  Pain score: 0  Pain management: adequate    Airway patency: patent  Anesthetic complications: No anesthetic complications  PONV Status: none  Cardiovascular status: hemodynamically stable and acceptable  Respiratory status: nonlabored ventilation, acceptable, nasal cannula and spontaneous ventilation  Hydration status: acceptable    Comments: VSS  No anesthesia care post op

## 2022-07-10 LAB
BACTERIA SPEC RESP CULT: NO GROWTH
GRAM STN SPEC: NORMAL

## 2022-07-11 LAB
GIE STN SPEC: NORMAL
REF LAB TEST METHOD: NORMAL

## 2022-07-12 DIAGNOSIS — R91.1 PULMONARY NODULE: Primary | ICD-10-CM

## 2022-07-12 DIAGNOSIS — C34.91 PRIMARY LUNG CANCER, RIGHT: ICD-10-CM

## 2022-07-12 LAB
CYTO UR: NORMAL
DX PRELIMINARY: NORMAL
LAB AP CASE REPORT: NORMAL
LAB AP CLINICAL INFORMATION: NORMAL
LAB AP SPECIAL STAINS: NORMAL
PATH REPORT.FINAL DX SPEC: NORMAL
PATH REPORT.GROSS SPEC: NORMAL

## 2022-07-12 NOTE — PROGRESS NOTES
In March the patient was found to have a right upper lobe density that was new in comparison to 2021  She eventually underwent a PET scan which revealed abnormal hypermetabolic activity in this lesion  We had some scheduling difficulties due to equipment malfunction but I was finally able to do a navigational bronchoscopy last week  Biopsies of the right upper lobe revealed adenocarcinoma  She only had 1 small little lymph node that I could find that was negative    She has an adequate lung function to tolerate surgical resection, only about a liter  We discussed primary radiation therapy which she is interested in discussing further    I will submit a ambulatory referral to radiation oncology  If CyberKnife therapy is considered I hope that it can be delivered without fiducial marker placement  We had some technical issues during the bronchoscopy and so I neglected to place markers empirically at that time  However, if she did need fiducial markers I suspect that she would tolerate a repeat procedure well and we should be able to place them without too much difficulty

## 2022-07-18 ENCOUNTER — HOSPITAL ENCOUNTER (OUTPATIENT)
Dept: RADIATION ONCOLOGY | Facility: HOSPITAL | Age: 72
Setting detail: RADIATION/ONCOLOGY SERIES
Discharge: HOME | End: 2022-07-18

## 2022-07-18 ENCOUNTER — OFFICE VISIT (OUTPATIENT)
Dept: RADIATION ONCOLOGY | Facility: HOSPITAL | Age: 72
End: 2022-07-18

## 2022-07-18 VITALS
BODY MASS INDEX: 24.2 KG/M2 | DIASTOLIC BLOOD PRESSURE: 58 MMHG | WEIGHT: 159.7 LBS | RESPIRATION RATE: 18 BRPM | OXYGEN SATURATION: 96 % | HEART RATE: 71 BPM | TEMPERATURE: 97.9 F | HEIGHT: 68 IN | SYSTOLIC BLOOD PRESSURE: 131 MMHG

## 2022-07-18 DIAGNOSIS — R91.1 LUNG NODULE: Primary | ICD-10-CM

## 2022-07-18 DIAGNOSIS — C34.11 MALIGNANT NEOPLASM OF UPPER LOBE OF RIGHT LUNG: ICD-10-CM

## 2022-07-18 PROCEDURE — G0463 HOSPITAL OUTPT CLINIC VISIT: HCPCS | Performed by: RADIOLOGY

## 2022-07-18 NOTE — PROGRESS NOTES
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  She has a 35-pack-year history of smoking and quit in .  She had FEV1 of 1.08 L on 2022.  She wears oxygen 2 L/day with exertion and continues to wear it at night.She was undergoing evaluation for placement of a spiration valve when she was found to have an irregular apical right upper lung lesion that measured 3.5 x 2.9 cm in the left upper lobe.  The mass exhibited abnormal hypermetabolic activity of SUV 3.3. there had been an increase in size so biopsy was recommended.  The right upper lobe lung biopsy was positive for adenocarcinoma and TTF-1.  A station 7 lymph node biopsy was negative.  Ms. Almanza is here to discuss CyberKnife stereotactic body radiotherapy to the lung mass.Her case is to be presented at our lung conference in the morning.      BMI:  Body mass index is 24.28 kg/m².      Social History     Substance and Sexual Activity   Alcohol Use Yes   • Alcohol/week: 2.0 standard drinks   • Types: 2 Standard drinks or equivalent per week    Comment: Once a week or more       Allergies   Allergen Reactions   • Ferrlecit [Na Ferric Gluc Cplx In Sucrose] Diarrhea and Nausea And Vomiting     Memory loss   • Percodan [Oxycodone-Aspirin] Mental Status Change     \"MAKES ME FEEL LIKE I'M FLYING\"   • Sulfa Antibiotics Rash     Last as a baby.       Social History     Tobacco Use   • Smoking status: " Former Smoker     Packs/day: 1.00     Years: 35.00     Pack years: 35.00     Types: Cigarettes     Quit date: 2013     Years since quittin.1   • Smokeless tobacco: Never Used   Vaping Use   • Vaping Use: Never used   Substance Use Topics   • Alcohol use: Yes     Alcohol/week: 2.0 standard drinks     Types: 2 Standard drinks or equivalent per week     Comment: Once a week or more   • Drug use: No         Past Medical History:   Diagnosis Date   • Anemia     Due to low folic acid   • Arthritis    • Carotid stenosis    • Carotid stenosis    • Cataract    • Cerebrovascular disease 2016    Amaurosis fugax, OD.  Stent 70% LJ stenosis, 2014:  Questionable recurrent symptoms “early” following stent, treated with reinstitution Plavix.   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  High-degree left subclavian artery stenosis, treated with stenting, 2014.      • COPD (chronic obstructive pulmonary disease) (HCC)    • Coronary artery disease    • Disease of thyroid gland     hypothyroid   • Frequent UTI    • History of transfusion 2019    NO REACTION bhlex   • Hot flashes    • Hyperlipidemia 2016   • Hypertension 2016   • Hypothyroid    • IBS (irritable bowel syndrome)    • Low back pain    • Lung cancer (HCC)    • Lung nodule Right lung.   • Neuropathy    • On home oxygen therapy     2L NC QHS   • Pleural effusion, bilateral    • Polymyositis (HCC)    • Psoriasis     EYE BROW   • Renal insufficiency    • Renal insufficiency    • TIA (transient ischemic attack)     3 years ago carotid stent placed no residual   • Wears dentures    • Wears dentures     full set   • Wears reading eyeglasses        family history includes Alzheimer's disease in her mother; Cancer in her paternal grandfather; Colon cancer in an other family member; Diabetes in her paternal grandmother; Emphysema in her maternal grandfather; Heart attack in her father; Heart disease in her father, mother, and paternal  grandfather; Heart failure in her father; Hypertension in her mother; No Known Problems in her brother.     Past Surgical History:   Procedure Laterality Date   • AORTA - FEMORAL ARTERY BYPASS GRAFT Bilateral 1996   • APPENDECTOMY     • BREAST BIOPSY Bilateral    • BRONCHOSCOPY N/A 7/8/2022    Procedure: BRONCHOSCOPY WITH ENDOBRONCHIAL ULTRASOUND AND NAVIGATION;  Surgeon: Aguilar Zapata MD;  Location:  [facility];  Service: Pulmonary;  Laterality: N/A;  EBUS scope removed with balloon intact.    • CAROTID ENDARTERECTOMY      Right. 2010   • CAROTID STENT      Right common carotid artery, 2015   • CHOLECYSTECTOMY     • COLONOSCOPY     • CYSTOCELE REPAIR     • ENDOSCOPY N/A 04/07/2018    Procedure: ESOPHAGOGASTRODUODENOSCOPY;  Surgeon: Alfonzo Cox MD;  Location:  [facility];  Service: Gastroenterology   • EYE SURGERY      lasik   • EYE SURGERY Left 01/14/2021   • FEMORAL ARTERY - FEMORAL ARTERY BYPASS GRAFT  2008    left to right   • HYSTERECTOMY      bleeding, uterine cyst   • NH EXPLORE PARATHYROID GLANDS Bilateral 02/01/2017    Procedure: PARATHYROIDECTOMY;  Surgeon: Isaac CORONA MD;  Location:  [facility];  Service: ENT   • REFRACTIVE SURGERY     • SALIVARY GLAND SURGERY Left    • SUBCLAVIAN ARTERY STENT Left     7/15   • THROMBOENDARTERECTOMY Left     f Subclavian         Review of Systems   Constitutional: Positive for fatigue and unexpected weight change (10 pounds in 6 months).   Respiratory: Positive for cough, shortness of breath and wheezing.         Severe COPD, home O2 at night and prn in day   Gastrointestinal: Positive for abdominal pain and diarrhea (IBS).   Genitourinary: Positive for bladder incontinence and nocturia.    Musculoskeletal: Positive for back pain (chronic from previous injury).   Neurological: Positive for extremity weakness.   Hematological: Bruises/bleeds easily.   Psychiatric/Behavioral: Positive for depression and sleep disturbance. The patient is  "nervous/anxious.    All other systems reviewed and are negative.          Objective   VITAL SIGNS:   Vitals:    07/18/22 0932   BP: 131/58   Pulse: 71   Resp: 18   Temp: 97.9 °F (36.6 °C)   TempSrc: Tympanic   SpO2: 96%  Comment: RA   Weight: 72.4 kg (159 lb 11.2 oz)   Height: 172.7 cm (68\")   PainSc: 0-No pain        KPS       80%    Physical Exam  Vitals reviewed.   Constitutional:       Appearance: Normal appearance. She is normal weight.   Cardiovascular:      Rate and Rhythm: Normal rate and regular rhythm.      Heart sounds: Normal heart sounds.   Pulmonary:      Effort: Pulmonary effort is normal. No respiratory distress.      Breath sounds: Normal breath sounds. No wheezing, rhonchi or rales.   Neurological:      Mental Status: She is alert.              The following portions of the patient's history were reviewed and updated as appropriate: allergies, current medications, past family history, past medical history, past social history, past surgical history and problem list.    Assessment      IMPRESSION:     Kailee Almanza  is a 71 y.o. who had a right upper lobe lung biopsy positive for adenocarcinoma and TTF-1.  A station 7 lymph node biopsy was negative.  Ms. Almanza is here to discuss CyberKnife stereotactic body radiotherapy to the lung mass.        RECOMMENDATIONS: Ms. Almanza appears to be a good candidate for CyberKnife SBRT.  I spent time with Ms. Martinez and her  discussing the various treatment options for the new lung cancer.  She has been told she is not a surgical candidate.  Stereotactic body radiation therapy using the CyberKnife treatment unit will yield a high chance of controlling the involved site of disease and limit potential toxicities including any further detriment of lung function.  She watched our CyberKnife educational video.  A full explanation of the risks and benefits of treatment were discussed and informed consent obtained today.  She is scheduled to return to our " department tomorrow for treatment planning.  Her case will be presented at our lung conference in the morning.    I anticipate a total of 4-5 fractions to a cumulative dose of 45-54 Gy depending on our ability to limit the radiation dose to the uninvolved normal lung, heart, esophagus, and chest wall.  We will try to begin treatment within the next 1-2 weeks pending insurance authorization.   It was a pleasure to meet Ms. Almanza.                     Chandni Muhammad MD    Total time of patient care on day of service including time prior to, face to face with patient, and following visit spent in reviewing records, lab results, imaging studies, discussion with patient, and documentation/charting was > 45 minutes.    Errors in dictation may reflect use of voice recognition software and not all errors in transcription may have been detected prior to signing.

## 2022-07-19 ENCOUNTER — HOSPITAL ENCOUNTER (OUTPATIENT)
Dept: RADIATION ONCOLOGY | Facility: HOSPITAL | Age: 72
Discharge: HOME | End: 2022-07-19

## 2022-07-19 PROCEDURE — 77332 RADIATION TREATMENT AID(S): CPT | Performed by: RADIOLOGY

## 2022-07-19 PROCEDURE — 77290 THER RAD SIMULAJ FIELD CPLX: CPT | Performed by: RADIOLOGY

## 2022-07-20 PROCEDURE — 77399 UNLISTED PX MED RADJ PHYSICS: CPT | Performed by: RADIOLOGY

## 2022-07-25 PROCEDURE — 77334 RADIATION TREATMENT AID(S): CPT | Performed by: RADIOLOGY

## 2022-07-25 PROCEDURE — 77295 3-D RADIOTHERAPY PLAN: CPT | Performed by: RADIOLOGY

## 2022-07-25 PROCEDURE — 77300 RADIATION THERAPY DOSE PLAN: CPT | Performed by: RADIOLOGY

## 2022-08-01 ENCOUNTER — HOSPITAL ENCOUNTER (OUTPATIENT)
Dept: RADIATION ONCOLOGY | Facility: HOSPITAL | Age: 72
Setting detail: RADIATION/ONCOLOGY SERIES
Discharge: HOME | End: 2022-08-01

## 2022-08-01 ENCOUNTER — HOSPITAL ENCOUNTER (OUTPATIENT)
Dept: RADIATION ONCOLOGY | Facility: HOSPITAL | Age: 72
Discharge: HOME | End: 2022-08-01

## 2022-08-01 PROCEDURE — 77373 STRTCTC BDY RAD THER TX DLVR: CPT | Performed by: RADIOLOGY

## 2022-08-01 PROCEDURE — 77332 RADIATION TREATMENT AID(S): CPT | Performed by: RADIOLOGY

## 2022-08-01 PROCEDURE — 77290 THER RAD SIMULAJ FIELD CPLX: CPT | Performed by: RADIOLOGY

## 2022-08-02 ENCOUNTER — HOSPITAL ENCOUNTER (OUTPATIENT)
Dept: RADIATION ONCOLOGY | Facility: HOSPITAL | Age: 72
Discharge: HOME | End: 2022-08-02

## 2022-08-02 PROCEDURE — 77373 STRTCTC BDY RAD THER TX DLVR: CPT | Performed by: RADIOLOGY

## 2022-08-02 PROCEDURE — 77290 THER RAD SIMULAJ FIELD CPLX: CPT | Performed by: RADIOLOGY

## 2022-08-03 ENCOUNTER — HOSPITAL ENCOUNTER (OUTPATIENT)
Dept: RADIATION ONCOLOGY | Facility: HOSPITAL | Age: 72
Discharge: HOME | End: 2022-08-03

## 2022-08-03 PROCEDURE — 77373 STRTCTC BDY RAD THER TX DLVR: CPT | Performed by: RADIOLOGY

## 2022-08-03 PROCEDURE — 77290 THER RAD SIMULAJ FIELD CPLX: CPT | Performed by: RADIOLOGY

## 2022-08-04 ENCOUNTER — HOSPITAL ENCOUNTER (OUTPATIENT)
Dept: RADIATION ONCOLOGY | Facility: HOSPITAL | Age: 72
Discharge: HOME | End: 2022-08-04

## 2022-08-04 PROCEDURE — 77373 STRTCTC BDY RAD THER TX DLVR: CPT | Performed by: RADIOLOGY

## 2022-08-04 PROCEDURE — 77290 THER RAD SIMULAJ FIELD CPLX: CPT | Performed by: RADIOLOGY

## 2022-08-05 ENCOUNTER — HOSPITAL ENCOUNTER (OUTPATIENT)
Dept: RADIATION ONCOLOGY | Facility: HOSPITAL | Age: 72
Discharge: HOME | End: 2022-08-05

## 2022-08-05 PROCEDURE — 77336 RADIATION PHYSICS CONSULT: CPT | Performed by: RADIOLOGY

## 2022-08-05 PROCEDURE — 77290 THER RAD SIMULAJ FIELD CPLX: CPT | Performed by: RADIOLOGY

## 2022-08-05 PROCEDURE — 77373 STRTCTC BDY RAD THER TX DLVR: CPT | Performed by: RADIOLOGY

## 2022-08-15 DIAGNOSIS — C34.11 MALIGNANT NEOPLASM OF UPPER LOBE OF RIGHT LUNG: Primary | ICD-10-CM

## 2022-08-19 LAB
FUNGUS WND CULT: NORMAL
MYCOBACTERIUM SPEC CULT: NORMAL
NIGHT BLUE STAIN TISS: NORMAL

## 2022-10-03 DIAGNOSIS — J43.2 CENTRILOBULAR EMPHYSEMA: Primary | ICD-10-CM

## 2023-03-31 ENCOUNTER — TELEPHONE (OUTPATIENT)
Dept: SURGERY | Facility: CLINIC | Age: 73
End: 2023-03-31
Payer: MEDICARE

## 2023-08-24 ENCOUNTER — HOSPITAL ENCOUNTER (EMERGENCY)
Facility: HOSPITAL | Age: 73
Discharge: HOME | End: 2023-08-24
Attending: STUDENT IN AN ORGANIZED HEALTH CARE EDUCATION/TRAINING PROGRAM
Payer: MEDICARE

## 2023-08-24 ENCOUNTER — APPOINTMENT (OUTPATIENT)
Dept: CT IMAGING | Facility: HOSPITAL | Age: 73
End: 2023-08-24
Payer: MEDICARE

## 2023-08-24 VITALS
WEIGHT: 155 LBS | RESPIRATION RATE: 18 BRPM | DIASTOLIC BLOOD PRESSURE: 79 MMHG | HEIGHT: 65 IN | TEMPERATURE: 98.4 F | HEART RATE: 97 BPM | BODY MASS INDEX: 25.83 KG/M2 | OXYGEN SATURATION: 91 % | SYSTOLIC BLOOD PRESSURE: 142 MMHG

## 2023-08-24 DIAGNOSIS — N39.0 URINARY TRACT INFECTION WITHOUT HEMATURIA, SITE UNSPECIFIED: Primary | ICD-10-CM

## 2023-08-24 LAB
ALBUMIN SERPL-MCNC: 4.2 G/DL (ref 3.5–5.2)
ALBUMIN/GLOB SERPL: 1.4 G/DL
ALP SERPL-CCNC: 121 U/L (ref 39–117)
ALT SERPL W P-5'-P-CCNC: 19 U/L (ref 1–33)
ANION GAP SERPL CALCULATED.3IONS-SCNC: 13.9 MMOL/L (ref 5–15)
AST SERPL-CCNC: 20 U/L (ref 1–32)
BACTERIA UR QL AUTO: ABNORMAL /HPF
BASOPHILS # BLD AUTO: 0.02 10*3/MM3 (ref 0–0.2)
BASOPHILS NFR BLD AUTO: 0.2 % (ref 0–1.5)
BILIRUB SERPL-MCNC: 0.4 MG/DL (ref 0–1.2)
BILIRUB UR QL STRIP: NEGATIVE
BUN SERPL-MCNC: 11 MG/DL (ref 8–23)
BUN/CREAT SERPL: 10.4 (ref 7–25)
CALCIUM SPEC-SCNC: 9.1 MG/DL (ref 8.6–10.5)
CHLORIDE SERPL-SCNC: 98 MMOL/L (ref 98–107)
CLARITY UR: ABNORMAL
CO2 SERPL-SCNC: 25.1 MMOL/L (ref 22–29)
COLOR UR: YELLOW
CREAT SERPL-MCNC: 1.06 MG/DL (ref 0.57–1)
DEPRECATED RDW RBC AUTO: 47.7 FL (ref 37–54)
EGFRCR SERPLBLD CKD-EPI 2021: 55.9 ML/MIN/1.73
EOSINOPHIL # BLD AUTO: 0.11 10*3/MM3 (ref 0–0.4)
EOSINOPHIL NFR BLD AUTO: 1.2 % (ref 0.3–6.2)
ERYTHROCYTE [DISTWIDTH] IN BLOOD BY AUTOMATED COUNT: 14 % (ref 12.3–15.4)
GLOBULIN UR ELPH-MCNC: 3.1 GM/DL
GLUCOSE SERPL-MCNC: 115 MG/DL (ref 65–99)
GLUCOSE UR STRIP-MCNC: NEGATIVE MG/DL
HCT VFR BLD AUTO: 38 % (ref 34–46.6)
HGB BLD-MCNC: 12.5 G/DL (ref 12–15.9)
HGB UR QL STRIP.AUTO: ABNORMAL
HOLD SPECIMEN: NORMAL
HOLD SPECIMEN: NORMAL
HYALINE CASTS UR QL AUTO: ABNORMAL /LPF
IMM GRANULOCYTES # BLD AUTO: 0.03 10*3/MM3 (ref 0–0.05)
IMM GRANULOCYTES NFR BLD AUTO: 0.3 % (ref 0–0.5)
KETONES UR QL STRIP: NEGATIVE
LEUKOCYTE ESTERASE UR QL STRIP.AUTO: ABNORMAL
LYMPHOCYTES # BLD AUTO: 0.56 10*3/MM3 (ref 0.7–3.1)
LYMPHOCYTES NFR BLD AUTO: 6.3 % (ref 19.6–45.3)
MCH RBC QN AUTO: 30.5 PG (ref 26.6–33)
MCHC RBC AUTO-ENTMCNC: 32.9 G/DL (ref 31.5–35.7)
MCV RBC AUTO: 92.7 FL (ref 79–97)
MONOCYTES # BLD AUTO: 0.43 10*3/MM3 (ref 0.1–0.9)
MONOCYTES NFR BLD AUTO: 4.8 % (ref 5–12)
NEUTROPHILS NFR BLD AUTO: 7.79 10*3/MM3 (ref 1.7–7)
NEUTROPHILS NFR BLD AUTO: 87.2 % (ref 42.7–76)
NITRITE UR QL STRIP: NEGATIVE
NRBC BLD AUTO-RTO: 0 /100 WBC (ref 0–0.2)
PH UR STRIP.AUTO: 7 [PH] (ref 5–8)
PLATELET # BLD AUTO: 236 10*3/MM3 (ref 140–450)
PMV BLD AUTO: 8.8 FL (ref 6–12)
POTASSIUM SERPL-SCNC: 4.6 MMOL/L (ref 3.5–5.2)
PROT SERPL-MCNC: 7.3 G/DL (ref 6–8.5)
PROT UR QL STRIP: NEGATIVE
RBC # BLD AUTO: 4.1 10*6/MM3 (ref 3.77–5.28)
RBC # UR STRIP: ABNORMAL /HPF
REF LAB TEST METHOD: ABNORMAL
SODIUM SERPL-SCNC: 137 MMOL/L (ref 136–145)
SP GR UR STRIP: 1.01 (ref 1–1.03)
SQUAMOUS #/AREA URNS HPF: ABNORMAL /HPF
UROBILINOGEN UR QL STRIP: ABNORMAL
WBC # UR STRIP: ABNORMAL /HPF
WBC NRBC COR # BLD: 8.94 10*3/MM3 (ref 3.4–10.8)
WHOLE BLOOD HOLD COAG: NORMAL
WHOLE BLOOD HOLD SPECIMEN: NORMAL

## 2023-08-24 PROCEDURE — 96361 HYDRATE IV INFUSION ADD-ON: CPT

## 2023-08-24 PROCEDURE — 85025 COMPLETE CBC W/AUTO DIFF WBC: CPT

## 2023-08-24 PROCEDURE — 25010000002 CEFTRIAXONE SODIUM-DEXTROSE 1-3.74 GM-%(50ML) RECONSTITUTED SOLUTION: Performed by: PHYSICIAN ASSISTANT

## 2023-08-24 PROCEDURE — 87077 CULTURE AEROBIC IDENTIFY: CPT | Performed by: PHYSICIAN ASSISTANT

## 2023-08-24 PROCEDURE — 87086 URINE CULTURE/COLONY COUNT: CPT | Performed by: PHYSICIAN ASSISTANT

## 2023-08-24 PROCEDURE — 87186 SC STD MICRODIL/AGAR DIL: CPT | Performed by: PHYSICIAN ASSISTANT

## 2023-08-24 PROCEDURE — 81001 URINALYSIS AUTO W/SCOPE: CPT

## 2023-08-24 PROCEDURE — 96365 THER/PROPH/DIAG IV INF INIT: CPT

## 2023-08-24 PROCEDURE — 74176 CT ABD & PELVIS W/O CONTRAST: CPT

## 2023-08-24 PROCEDURE — 80053 COMPREHEN METABOLIC PANEL: CPT

## 2023-08-24 PROCEDURE — 99284 EMERGENCY DEPT VISIT MOD MDM: CPT

## 2023-08-24 RX ORDER — SODIUM CHLORIDE 0.9 % (FLUSH) 0.9 %
10 SYRINGE (ML) INJECTION AS NEEDED
Status: DISCONTINUED | OUTPATIENT
Start: 2023-08-24 | End: 2023-08-24 | Stop reason: HOSPADM

## 2023-08-24 RX ORDER — CEFDINIR 300 MG/1
300 CAPSULE ORAL 2 TIMES DAILY
Qty: 14 CAPSULE | Refills: 0 | Status: SHIPPED | OUTPATIENT
Start: 2023-08-24 | End: 2023-08-31

## 2023-08-24 RX ORDER — CEFTRIAXONE 1 G/50ML
1000 INJECTION, SOLUTION INTRAVENOUS ONCE
Status: COMPLETED | OUTPATIENT
Start: 2023-08-24 | End: 2023-08-24

## 2023-08-24 RX ADMIN — CEFTRIAXONE 1000 MG: 1 INJECTION, SOLUTION INTRAVENOUS at 17:43

## 2023-08-24 RX ADMIN — SODIUM CHLORIDE 1000 ML: 9 INJECTION, SOLUTION INTRAVENOUS at 16:09

## 2023-08-24 NOTE — ED PROVIDER NOTES
"Subjective  History of Present Illness:    Chief Complaint:   Chief Complaint   Patient presents with    Illness      History of Present Illness: Kailee Almanza is a 72 y.o. female who presents to the emergency department complaining of low back pain, lower abdominal discomfort, urinary frequency, difficulty urinating, fever of 102.  3 weeks ago was diagnosed with UTI took a full course of amoxicillin and symptoms resolved.  Monday began again with urinary symptoms, went to [facility], prescribed Macrobid.  Went to [facility] again today for fever of 102, generally weak and stumbled in the office and fell but did not sustain an injury or strike the ground she was able to catch herself by grabbing onto a wall and using her cane.  She is here to be evaluated for worsening UTI to have sepsis rule out and be evaluated for possible pyelonephritis.  She is well-appearing, nontoxic.  In town from [place].  Onset: Monday  Duration: Ongoing  Exacerbating / Alleviating factors: None  Associated symptoms: None      Nurses Notes reviewed and agree, including vitals, allergies, social history and prior medical history.     Review of Systems   Constitutional:  Positive for fever.   HENT: Negative.     Eyes: Negative.    Respiratory: Negative.     Cardiovascular: Negative.    Gastrointestinal:  Positive for abdominal pain.   Genitourinary:  Positive for difficulty urinating and frequency.   Musculoskeletal:  Positive for back pain.   Skin: Negative.    Neurological: Negative.    Psychiatric/Behavioral: Negative.       Past Medical History:   Diagnosis Date    Anemia     Due to low folic acid    Arthritis     Carotid stenosis     Carotid stenosis     Cataract     Cerebrovascular disease 07/21/2016    Amaurosis fugax, OD.  Stent 70% LJ stenosis, April 2014:  Questionable recurrent symptoms "early" following stent, treated with reinstitution Plavix.   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  High-degree left subclavian artery stenosis, " treated with stenting, April 2014.       COPD (chronic obstructive pulmonary disease)     Coronary artery disease     Disease of thyroid gland     hypothyroid    Frequent UTI     History of transfusion 2019    NO REACTION bhlex    Hot flashes     Hyperlipidemia 07/21/2016    Hypertension 07/21/2016    Hypothyroid     IBS (irritable bowel syndrome)     Low back pain     Lung cancer     Lung nodule Right lung.    Neuropathy     On home oxygen therapy     2L NC QHS    Pleural effusion, bilateral     Polymyositis     Psoriasis     EYE BROW    Renal insufficiency     Renal insufficiency     TIA (transient ischemic attack)     3 years ago carotid stent placed no residual    Wears dentures     Wears dentures     full set    Wears reading eyeglasses        Allergies:    Ferrlecit [na ferric gluc cplx in sucrose], Percodan [oxycodone-aspirin], and Sulfa antibiotics      Past Surgical History:   Procedure Laterality Date    AORTA - FEMORAL ARTERY BYPASS GRAFT Bilateral 1996    APPENDECTOMY      BREAST BIOPSY Bilateral     BRONCHOSCOPY N/A 7/8/2022    Procedure: BRONCHOSCOPY WITH ENDOBRONCHIAL ULTRASOUND AND NAVIGATION;  Surgeon: Aguilar Zapata MD;  Location:  [facility];  Service: Pulmonary;  Laterality: N/A;  EBUS scope removed with balloon intact.     CAROTID ENDARTERECTOMY      Right. 2010    CAROTID STENT      Right common carotid artery, 2015    CHOLECYSTECTOMY      COLONOSCOPY      CYSTOCELE REPAIR      ENDOSCOPY N/A 04/07/2018    Procedure: ESOPHAGOGASTRODUODENOSCOPY;  Surgeon: Alfonzo Cox MD;  Location:  [facility];  Service: Gastroenterology    EYE SURGERY      lasik    EYE SURGERY Left 01/14/2021    FEMORAL ARTERY - FEMORAL ARTERY BYPASS GRAFT  2008    left to right    HYSTERECTOMY      bleeding, uterine cyst    GA PARATHYROIDECTOMY/EXPLORATION PARATHYROIDS Bilateral 02/01/2017    Procedure: PARATHYROIDECTOMY;  Surgeon: Isaac CORONA MD;  Location:  [facility];  Service: ENT    REFRACTIVE  "SURGERY      SALIVARY GLAND SURGERY Left     SUBCLAVIAN ARTERY STENT Left     7/15    THROMBOENDARTERECTOMY Left     f Subclavian          Social History     Socioeconomic History    Marital status:     Number of children: 3   Tobacco Use    Smoking status: Former     Packs/day: 1.00     Years: 35.00     Pack years: 35.00     Types: Cigarettes     Quit date: 6/1/2013     Years since quitting: 10.2    Smokeless tobacco: Never   Vaping Use    Vaping Use: Never used   Substance and Sexual Activity    Alcohol use: Yes     Alcohol/week: 2.0 standard drinks     Types: 2 Standard drinks or equivalent per week     Comment: Once a week or more    Drug use: No    Sexual activity: Yes     Partners: Male     Birth control/protection: Post-menopausal     Comment: 1 partner/         Family History   Problem Relation Age of Onset    Alzheimer's disease Mother     Heart disease Mother     Hypertension Mother     Heart attack Father     Heart disease Father     Heart failure Father     No Known Problems Brother     Heart disease Paternal Grandfather     Cancer Paternal Grandfather         colon?    Colon cancer Other         Possible    Diabetes Paternal Grandmother     Emphysema Maternal Grandfather        Objective  Physical Exam:  /66   Pulse 97   Temp 98.4 øF (36.9 øC)   Resp 18   Ht 165.1 cm (65\")   Wt 70.3 kg (155 lb)   LMP  (LMP Unknown)   SpO2 91%   BMI 25.79 kg/mý      Physical Exam  Vitals and nursing note reviewed.   Constitutional:       General: She is not in acute distress.     Appearance: Normal appearance. She is normal weight. She is not ill-appearing, toxic-appearing or diaphoretic.   HENT:      Head: Normocephalic and atraumatic.      Nose: Nose normal.   Eyes:      Extraocular Movements: Extraocular movements intact.   Cardiovascular:      Rate and Rhythm: Normal rate and regular rhythm.      Heart sounds: Normal heart sounds.   Pulmonary:      Effort: Pulmonary effort is normal. "   Abdominal:      General: Abdomen is flat.      Palpations: Abdomen is soft.      Tenderness: There is right CVA tenderness and left CVA tenderness. There is no guarding or rebound.      Comments: Mild suprapubic abdominal tenderness   Musculoskeletal:         General: Normal range of motion.      Cervical back: Normal range of motion.   Skin:     General: Skin is warm and dry.      Comments: Bruises in different stages of healing to the bilateral upper extremities   Neurological:      General: No focal deficit present.      Mental Status: She is oriented to person, place, and time. Mental status is at baseline.   Psychiatric:         Mood and Affect: Mood normal.         Behavior: Behavior normal.         Procedures    ED Course:    ED Course as of 08/24/23 1756   Thu Aug 24, 2023   1631 WBC: 8.94 [TM]   1711 WBC, UA(!): 31-50 [TM]   1711 Bacteria, UA(!): Trace [TM]      ED Course User Index  [TM] Crow Carl PA-C       Lab Results (last 24 hours)       Procedure Component Value Units Date/Time    POC Urinalysis Dipstick, Multipro (Automated Dipstick) [[id number]]  (Abnormal) Resulted: 08/24/23 1425    Specimen: Urine Updated: 08/24/23 1426     Color Yellow     Clarity, UA Cloudy     Glucose, UA Negative mg/dL      Bilirubin Negative     Ketones, UA Negative     Specific Gravity  1.015     Blood, UA Trace     pH, Urine 7.0     Protein, POC Negative mg/dL      Urobilinogen, UA 0.2 E.U./dL     Nitrite, UA Negative     Leukocytes Small (1+)    CBC & Differential [[id number]]  (Abnormal) Collected: 08/24/23 1549    Specimen: Blood Updated: 08/24/23 1557    Narrative:      The following orders were created for panel order CBC & Differential.  Procedure                               Abnormality         Status                     ---------                               -----------         ------                     CBC Auto Differential[[id number]]        Abnormal            Final result                  Please view results for these tests on the individual orders.    Comprehensive Metabolic Panel [[id number]]  (Abnormal) Collected: 08/24/23 1549    Specimen: Blood Updated: 08/24/23 1618     Glucose 115 mg/dL      BUN 11 mg/dL      Creatinine 1.06 mg/dL      Sodium 137 mmol/L      Potassium 4.6 mmol/L      Chloride 98 mmol/L      CO2 25.1 mmol/L      Calcium 9.1 mg/dL      Total Protein 7.3 g/dL      Albumin 4.2 g/dL      ALT (SGPT) 19 U/L      AST (SGOT) 20 U/L      Alkaline Phosphatase 121 U/L      Total Bilirubin 0.4 mg/dL      Globulin 3.1 gm/dL      A/G Ratio 1.4 g/dL      BUN/Creatinine Ratio 10.4     Anion Gap 13.9 mmol/L      eGFR 55.9 mL/min/1.73     Narrative:      GFR Normal >60  Chronic Kidney Disease <60  Kidney Failure <15    The GFR formula is only valid for adults with stable renal function between ages 18 and 70.    CBC Auto Differential [[id number]]  (Abnormal) Collected: 08/24/23 1549    Specimen: Blood Updated: 08/24/23 1557     WBC 8.94 10*3/mm3      RBC 4.10 10*6/mm3      Hemoglobin 12.5 g/dL      Hematocrit 38.0 %      MCV 92.7 fL      MCH 30.5 pg      MCHC 32.9 g/dL      RDW 14.0 %      RDW-SD 47.7 fl      MPV 8.8 fL      Platelets 236 10*3/mm3      Neutrophil % 87.2 %      Lymphocyte % 6.3 %      Monocyte % 4.8 %      Eosinophil % 1.2 %      Basophil % 0.2 %      Immature Grans % 0.3 %      Neutrophils, Absolute 7.79 10*3/mm3      Lymphocytes, Absolute 0.56 10*3/mm3      Monocytes, Absolute 0.43 10*3/mm3      Eosinophils, Absolute 0.11 10*3/mm3      Basophils, Absolute 0.02 10*3/mm3      Immature Grans, Absolute 0.03 10*3/mm3      nRBC 0.0 /100 WBC     Urinalysis With Microscopic If Indicated (No Culture) - Urine, Clean Catch [[id number]]  (Abnormal) Collected: 08/24/23 1643    Specimen: Urine, Clean Catch Updated: 08/24/23 1703     Color, UA Yellow     Appearance, UA Cloudy     pH, UA 7.0     Specific Gravity, UA 1.009     Glucose, UA Negative     Ketones, UA Negative     Bilirubin, UA  Negative     Blood, UA Trace     Protein, UA Negative     Leuk Esterase, UA Large (3+)     Nitrite, UA Negative     Urobilinogen, UA 0.2 E.U./dL    Urinalysis, Microscopic Only - Urine, Clean Catch [[id number]]  (Abnormal) Collected: 08/24/23 1643    Specimen: Urine, Clean Catch Updated: 08/24/23 1705     RBC, UA 0-2 /HPF      WBC, UA 31-50 /HPF      Bacteria, UA Trace /HPF      Squamous Epithelial Cells, UA 3-6 /HPF      Hyaline Casts, UA None Seen /LPF      Methodology Manual Light Microscopy             No radiology results from the last 24 hrs       Medical Decision Making  Amount and/or Complexity of Data Reviewed  Labs:  Decision-making details documented in ED Course.  Radiology: ordered.    Risk  Prescription drug management.        Kailee Almanza is a 72 y.o. female who presents to the emergency department for evaluation of fever and urinary symptoms    Differential diagnosis includes UTI, ureteral stone, pyelonephritis among other etiologies.    CBC, CMP, urinalysis, CT scan abdomen pelvis ordered for further evaluation of the patient's presentation.    Chart review if available included outside testing, previous visits, prior labs, prior imaging, available notes from prior evaluations or visits with specialists, medication list, allergies, past medical history, past surgical history when applicable.    Patient was treated with Rocephin and IV fluids    Urinalysis shows signs of infection.  On Macrobid, will switch to Omnicef.  Dose of Rocephin given here in the ED.    Plan for disposition is discharged home.  Patient/family comfortable with and understanding of the plan.      Final diagnoses:   Urinary tract infection without hematuria, site unspecified          Crow Carl PA-C  08/24/23 0146

## 2023-08-26 NOTE — PROGRESS NOTES
Urine culture showing 25,000 gram negative bacilli, patient treated with Cefdinir. Sensitivity still pending.

## 2023-08-27 LAB — BACTERIA SPEC AEROBE CULT: ABNORMAL

## 2023-09-14 NOTE — PROGRESS NOTES
"  OFFICE FOLLOW UP     Date of Encounter:2021     Name: Kailee Almanza  : 1950  Address: [address] [place] [id number]    PCP: Machelle Atkinson  [address] [place] [id number]    Kailee Almanza is a 70 y.o. female.      Chief Complaint: Follow up of CVD, PVD, HTN, HLD    Problem List:   1. Cerebrovascular disease.  a. Amaurosis fugax, OD.  b. History of right CEA   c. Stent 70% LJ stenosis, 2014:  Questionable recurrent symptoms “early” following stent, treated with reinstitution Plavix.    d. 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   e. High-degree left subclavian artery stenosis, treated with stenting, 2014.    1. In-stent re-stenosis of left subclavian with placement of 4.0x28mm Xience EES in proximal left subclavian 2015  2. Peripheral vascular disease.   a. Aorta bi fem bypass by Dr. David Gordon, .  b. Fem-fem bypass  3. Palpitations 2019   a. ZIO monitor 2019: Underlying rhythm is SR, PAC's and brief runs of nonsustained atrial tachycardia   4. Hypertension.   5. Chest pain, 1 bout 2019.  a. Normal stress MPS 2019  6. Dyslipidemia.   7. Remote tobacco abuse.   8. Chronic back pain.    9. Microcytic anemia  a. Severe anemia 2018 s/p 2 units PRBCs  b. Etiology uncertain.  10. COPD  11. Right upper lobe lung nodule, noted on CT scan 2018 to be increasing  a. [facility] by [facility]   12. History of TIA  13. Renal insufficiency  14. Surgeries:  a. Appendectomy  b. Breast biopsy  c. Hysterectomy   d. Bladder surgery  e. lasik surgery  f. Oral surgery  g. Parathyroidectomy  h. Cholecystectomy     Allergies:  Allergies   Allergen Reactions   • Ferrlecit [Na Ferric Gluc Cplx In Sucrose] Diarrhea and Nausea And Vomiting   • Percodan [Oxycodone-Aspirin] Mental Status Change     \"MAKES ME FEEL LIKE I'M FLYING\"   • Sulfa Antibiotics Rash     Last as a baby.       Current Medications:  Current Outpatient Medications "   Medication Instructions   • amLODIPine (NORVASC) 5 mg, Oral, Daily   • atorvastatin (LIPITOR) 80 mg, Oral, Every Night at Bedtime   • budesonide-formoterol (SYMBICORT) 160-4.5 MCG/ACT inhaler 2 puffs, Inhalation, 2 Times Daily   • clopidogrel (PLAVIX) 75 mg, Oral, Daily   • escitalopram (LEXAPRO) 10 mg, Oral, Daily   • ezetimibe (ZETIA) 10 mg, Oral, Daily   • gabapentin (NEURONTIN) 800 mg, Oral, 2 Times Daily   • HYDROcodone-acetaminophen (NORCO) 7.5-325 MG per tablet 1 tablet, Oral, Daily PRN   • ipratropium-albuterol (DUO-NEB) 0.5-2.5 mg/3 ml nebulizer 3 mL, Nebulization, 4 Times Daily - RT   • levalbuterol (XOPENEX) 0.63 MG/3ML nebulizer solution 3 mL, Nebulization, 4 Times Daily PRN   • levothyroxine (SYNTHROID, LEVOTHROID) 75 mcg, Oral, Daily   • lisinopril (PRINIVIL,ZESTRIL) 20 mg, Oral, Daily   • O2 (OXYGEN) 2 L/min, Inhalation, Nightly, Every night <BR>During the day prn   • primidone (MYSOLINE) 50 MG tablet No dose, route, or frequency recorded.   • PROAIR  (90 Base) MCG/ACT inhaler USE 2 INHALATIONS EVERY 4 HOURS AS NEEDED FOR WHEEZING   • tiotropium bromide monohydrate (SPIRIVA RESPIMAT) 2.5 MCG/ACT aerosol solution inhaler 2 puffs, Inhalation, Daily - RT   • tiZANidine (ZANAFLEX) 2 MG tablet 3 Times Daily PRN   • traZODone (DESYREL) 50 MG tablet Take 1-2 tablets at bedtime as needed for sleep.   • venlafaxine 225 mg, Oral, Daily With Breakfast        History of Present Illness:    Kailee Almanza returns for follow up today. Zetia added last summer for LDL of 89.   She denies left arm pain or fatigue since July 2015 MARIA EUGENIA for LSCA in-stent restenosis.  Continues pulmonary follow up for her pulmonary nodule.  Active with [employer], [place] [facility] [place] She denies angina, shortness of breath, no neurovascular symptoms. She has received both COVID vaccines. She bruises easily with chronic Plavix. She does not take aspirin due to anemia (??) and is tolerating Plavix alone.         The  "following portions of the patient's history were reviewed and updated as appropriate: allergies, current medications and problem list.    ROS: Pertinent positives as listed in the HPI.  All other systems reviewed and negative.    Objective:    BP right arm: 142/91; left arm: 125/77 (Juanis Gayle RN)    Vitals:    03/30/21 1457 03/30/21 1458   BP: 136/87 115/76   BP Location: Left arm Left arm   Patient Position: Sitting Standing   Pulse: 83 89   SpO2: 97%    Weight: 74.8 kg (165 lb)    Height: 172.7 cm (68\")      Body mass index is 25.09 kg/m².    Physical Exam:  GENERAL: Alert, cooperative, in no acute distress.   HEENT: Normocephalic, no adenopathy, no jugular venous distention  HEART: No discrete PMI is noted. Regular rhythm, normal rate, and no murmurs, gallops, or rubs.   LUNGS: Clear to auscultation bilaterally. No wheezing, rales or ronchi.  ABDOMEN: Soft, bowel sounds present, non-tender   NEUROLOGIC: No focal abnormalities involving strength or sensation are noted.   EXTREMITIES: No clubbing, cyanosis, or edema noted.      Diagnostic Data:  7/2020  LIPID: , TRIG 133, HDL 58, LDL 89    Procedures    Advance Care Planning   ACP discussion was held with the patient during this visit. Patient does not have an advance directive, declines further assistance.    Assessment and Plan:   1.  CVD/PVD:  She denies neurovascular symptoms, angina, and claudication. Continue Plavix and lipid lowering agents.   2.  HTN:  Well controlled on current regimen.  3.  HLD:  Needs repeat lipid panel on ezetimibe. Target LDL to < 70.     I will see Kailee Almanza back in one year or sooner on an as needed basis.      EMR Dragon/Transcription Disclaimer:  Much of this encounter note is an electronic transcription/translation of spoken language to printed text.  The electronic translation of spoken language may permit erroneous, or at times, nonsensical words or phrases to be inadvertently transcribed.  Although I have " reviewed the note for such errors, some may still exist.              Quality 226: Preventive Care And Screening: Tobacco Use: Screening And Cessation Intervention: Patient screened for tobacco use and is an ex/non-smoker Detail Level: Detailed Quality 130: Documentation Of Current Medications In The Medical Record: Current Medications Documented Quality 431: Preventive Care And Screening: Unhealthy Alcohol Use - Screening: Patient not identified as an unhealthy alcohol user when screened for unhealthy alcohol use using a systematic screening method

## (undated) DEVICE — LUER-LOK 360°: Brand: CONNECTA, LUER-LOK

## (undated) DEVICE — SINGLE-USE BIOPSY FORCEPS: Brand: RADIAL JAW 4

## (undated) DEVICE — ST NDL BRONCH ASP VIZISHOT 2 FLX 19GA

## (undated) DEVICE — FRCP BIOP SUPERDIMENSION PREMARK

## (undated) DEVICE — SINGLE USE SUCTION VALVE MAJ-209: Brand: SINGLE USE SUCTION VALVE (STERILE)

## (undated) DEVICE — PK ANGIO OR 10

## (undated) DEVICE — PTCH SENSR ILLUMISITE ADHS

## (undated) DEVICE — CATH GUIDE SOFTVU FLUSH HT PIG .035 4F 65CM

## (undated) DEVICE — CANNULA,OXY,ADULT,SUPERSOFT,W/7'TUB,UC: Brand: MEDLINE

## (undated) DEVICE — SNAP KOVER: Brand: UNBRANDED

## (undated) DEVICE — NDL PERC 1PRT THNWALL W/BASEPLT 18G 7CM

## (undated) DEVICE — ADAPT BRONCH ILLUMISITE FOR OLYMP

## (undated) DEVICE — GLV SURG PREMIERPRO MIC LTX PF SZ7 BRN

## (undated) DEVICE — THE BITE BLOCK MAXI, LATEX FREE STRAP IS USED TO PROTECT THE ENDOSCOPE INSERTION TUBE FROM BEING BITTEN BY THE PATIENT.

## (undated) DEVICE — ST EXT MICROBORE FIX M LL 38IN

## (undated) DEVICE — CONNECTOR,STRAIGHT,F/02 SUPPLY TUBING: Brand: MEDLINE

## (undated) DEVICE — AVANTI + 4F STD W/GW: Brand: AVANTI

## (undated) DEVICE — CVR HNDL LT SURG ACCSSRY BLU STRL

## (undated) DEVICE — KT PROC ENDOBRONC ILLUMISITE LOCAT/GUIDE EXT/WORK/CH 180DEG

## (undated) DEVICE — GLIDEX™ COATED HYDROPHILIC GUIDEWIRE: Brand: MAGIC TORQUE™

## (undated) DEVICE — TBG INJ CONTRL EXCITE RA 1200PSI 48IN

## (undated) DEVICE — KT PROC ENDOBRONC ILLUMISITE LOCAT/GUIDE EXT/WORK/CH 90DEG

## (undated) DEVICE — BRUSH CYTO BRONCOSCOPE

## (undated) DEVICE — Device: Brand: SINGLE USE ASPIRATION NEEDLE NA-U401SX

## (undated) DEVICE — Device: Brand: DEFENDO AIR/WATER/SUCTION AND BIOPSY VALVE

## (undated) DEVICE — Device: Brand: BALLOON

## (undated) DEVICE — SINGLE USE BIOPSY VALVE MAJ-210: Brand: SINGLE USE BIOPSY VALVE (STERILE)

## (undated) DEVICE — TRAP,MUCUS SPECIMEN,40CC: Brand: MEDLINE

## (undated) DEVICE — BOWL UTIL STRL 32OZ